# Patient Record
Sex: MALE | Race: WHITE | NOT HISPANIC OR LATINO | Employment: OTHER | URBAN - METROPOLITAN AREA
[De-identification: names, ages, dates, MRNs, and addresses within clinical notes are randomized per-mention and may not be internally consistent; named-entity substitution may affect disease eponyms.]

---

## 2017-01-05 RX ORDER — CLOPIDOGREL BISULFATE 75 MG/1
TABLET ORAL
Qty: 90 TABLET | Refills: 3 | Status: SHIPPED | OUTPATIENT
Start: 2017-01-05 | End: 2017-04-13 | Stop reason: SDUPTHER

## 2017-01-05 RX ORDER — ATORVASTATIN CALCIUM 20 MG/1
TABLET, FILM COATED ORAL
Qty: 90 TABLET | Refills: 3 | Status: SHIPPED | OUTPATIENT
Start: 2017-01-05 | End: 2017-04-13 | Stop reason: SDUPTHER

## 2017-01-23 RX ORDER — GABAPENTIN 100 MG/1
CAPSULE ORAL
Qty: 540 CAPSULE | Refills: 0 | Status: SHIPPED | OUTPATIENT
Start: 2017-01-23 | End: 2017-04-13

## 2017-01-25 ENCOUNTER — TELEPHONE (OUTPATIENT)
Dept: FAMILY MEDICINE CLINIC | Facility: CLINIC | Age: 77
End: 2017-01-25

## 2017-01-25 DIAGNOSIS — F41.9 ANXIETY: Chronic | ICD-10-CM

## 2017-01-25 RX ORDER — LORAZEPAM 0.5 MG/1
0.5 TABLET ORAL 2 TIMES DAILY
Qty: 60 TABLET | Refills: 0 | OUTPATIENT
Start: 2017-01-25 | End: 2017-03-27 | Stop reason: SDUPTHER

## 2017-01-25 NOTE — TELEPHONE ENCOUNTER
----- Message from Daniella Cabral sent at 1/25/2017  2:54 PM EST -----  Regarding: RX  LDS: 12/9/16  NEXT APPT: 4/13/17    BRANDEN @ [facility]    PATIENT NEEDS NEW RX FOR LORazepam (ATIVAN) 0.5 MG tablet    DAUGHTER (BENITA GAMEZ [phone])  CALLED THIS IN BECAUSE HER FATHER IS OUT OF THIS MED.    THANK YOU  Pharm called

## 2017-02-09 DIAGNOSIS — E11.9 DM II (DIABETES MELLITUS, TYPE II), CONTROLLED (HCC): Chronic | ICD-10-CM

## 2017-02-09 RX ORDER — NATEGLINIDE 120 MG/1
TABLET ORAL
Qty: 180 TABLET | Refills: 0 | Status: SHIPPED | OUTPATIENT
Start: 2017-02-09 | End: 2017-04-13 | Stop reason: SDUPTHER

## 2017-02-09 RX ORDER — PANTOPRAZOLE SODIUM 40 MG/1
TABLET, DELAYED RELEASE ORAL
Qty: 180 TABLET | Refills: 0 | Status: SHIPPED | OUTPATIENT
Start: 2017-02-09 | End: 2017-04-13 | Stop reason: SDUPTHER

## 2017-03-09 RX ORDER — RAMIPRIL 10 MG/1
CAPSULE ORAL
Qty: 30 CAPSULE | Refills: 0 | Status: SHIPPED | OUTPATIENT
Start: 2017-03-09 | End: 2017-04-04 | Stop reason: SDUPTHER

## 2017-03-13 RX ORDER — NIFEDIPINE 30 MG/1
30 TABLET, EXTENDED RELEASE ORAL DAILY
Qty: 30 TABLET | Refills: 2 | Status: SHIPPED | OUTPATIENT
Start: 2017-03-13 | End: 2017-04-13 | Stop reason: SDUPTHER

## 2017-03-23 RX ORDER — METOPROLOL SUCCINATE 25 MG/1
TABLET, EXTENDED RELEASE ORAL
Qty: 90 TABLET | Refills: 0 | Status: SHIPPED | OUTPATIENT
Start: 2017-03-23 | End: 2017-04-13 | Stop reason: SDUPTHER

## 2017-03-27 DIAGNOSIS — F41.9 ANXIETY: Chronic | ICD-10-CM

## 2017-03-27 RX ORDER — LORAZEPAM 0.5 MG/1
0.5 TABLET ORAL 2 TIMES DAILY
Qty: 60 TABLET | Refills: 0 | OUTPATIENT
Start: 2017-03-27 | End: 2017-04-13 | Stop reason: SDUPTHER

## 2017-04-04 RX ORDER — RAMIPRIL 10 MG/1
CAPSULE ORAL
Qty: 30 CAPSULE | Refills: 0 | Status: SHIPPED | OUTPATIENT
Start: 2017-04-04 | End: 2017-04-13 | Stop reason: SDUPTHER

## 2017-04-13 ENCOUNTER — OFFICE VISIT (OUTPATIENT)
Dept: FAMILY MEDICINE CLINIC | Facility: CLINIC | Age: 77
End: 2017-04-13

## 2017-04-13 VITALS
SYSTOLIC BLOOD PRESSURE: 122 MMHG | HEIGHT: 71 IN | BODY MASS INDEX: 30.15 KG/M2 | OXYGEN SATURATION: 98 % | WEIGHT: 215.4 LBS | TEMPERATURE: 98.4 F | HEART RATE: 75 BPM | DIASTOLIC BLOOD PRESSURE: 58 MMHG

## 2017-04-13 DIAGNOSIS — Z00.00 PREVENTATIVE HEALTH CARE: ICD-10-CM

## 2017-04-13 DIAGNOSIS — F41.9 ANXIETY: Chronic | ICD-10-CM

## 2017-04-13 DIAGNOSIS — Z79.899 ENCOUNTER FOR LONG-TERM (CURRENT) USE OF MEDICATIONS: ICD-10-CM

## 2017-04-13 DIAGNOSIS — E66.9 OBESITY (BMI 30.0-34.9): Chronic | ICD-10-CM

## 2017-04-13 DIAGNOSIS — E11.29 CONTROLLED TYPE 2 DIABETES MELLITUS WITH OTHER DIABETIC KIDNEY COMPLICATION, WITHOUT LONG-TERM CURRENT USE OF INSULIN (HCC): Chronic | ICD-10-CM

## 2017-04-13 DIAGNOSIS — E11.21 CONTROLLED TYPE 2 DIABETES MELLITUS WITH DIABETIC NEPHROPATHY, WITHOUT LONG-TERM CURRENT USE OF INSULIN (HCC): ICD-10-CM

## 2017-04-13 DIAGNOSIS — Z79.899 MEDICATION MANAGEMENT: ICD-10-CM

## 2017-04-13 DIAGNOSIS — I10 HYPERTENSION, ESSENTIAL: Primary | Chronic | ICD-10-CM

## 2017-04-13 DIAGNOSIS — E78.49 OTHER HYPERLIPIDEMIA: ICD-10-CM

## 2017-04-13 PROCEDURE — 99214 OFFICE O/P EST MOD 30 MIN: CPT | Performed by: INTERNAL MEDICINE

## 2017-04-13 PROCEDURE — G0439 PPPS, SUBSEQ VISIT: HCPCS | Performed by: INTERNAL MEDICINE

## 2017-04-13 RX ORDER — CLOPIDOGREL BISULFATE 75 MG/1
75 TABLET ORAL DAILY
Qty: 90 TABLET | Refills: 2 | Status: SHIPPED | OUTPATIENT
Start: 2017-04-13 | End: 2017-07-12

## 2017-04-13 RX ORDER — CHOLESTYRAMINE 4 G/9G
4 POWDER, FOR SUSPENSION ORAL EVERY 6 HOURS PRN
Qty: 90 PACKET | Refills: 2 | Status: SHIPPED | OUTPATIENT
Start: 2017-04-13 | End: 2018-02-05 | Stop reason: SDUPTHER

## 2017-04-13 RX ORDER — ATORVASTATIN CALCIUM 20 MG/1
20 TABLET, FILM COATED ORAL DAILY
Qty: 90 TABLET | Refills: 2 | Status: SHIPPED | OUTPATIENT
Start: 2017-04-13 | End: 2018-07-02 | Stop reason: SDUPTHER

## 2017-04-13 RX ORDER — NITROGLYCERIN 0.4 MG/1
0.4 TABLET SUBLINGUAL
Qty: 100 TABLET | Refills: 11 | Status: SHIPPED | OUTPATIENT
Start: 2017-04-13 | End: 2022-07-28 | Stop reason: ALTCHOICE

## 2017-04-13 RX ORDER — RAMIPRIL 10 MG/1
10 CAPSULE ORAL DAILY
Qty: 90 CAPSULE | Refills: 2 | Status: SHIPPED | OUTPATIENT
Start: 2017-04-13 | End: 2018-02-05 | Stop reason: SDUPTHER

## 2017-04-13 RX ORDER — LORAZEPAM 0.5 MG/1
0.5 TABLET ORAL 2 TIMES DAILY
Qty: 180 TABLET | Refills: 0 | Status: SHIPPED | OUTPATIENT
Start: 2017-04-13 | End: 2017-07-13 | Stop reason: SDUPTHER

## 2017-04-13 RX ORDER — PANTOPRAZOLE SODIUM 40 MG/1
40 TABLET, DELAYED RELEASE ORAL 2 TIMES DAILY
Qty: 180 TABLET | Refills: 2 | Status: SHIPPED | OUTPATIENT
Start: 2017-04-13 | End: 2017-07-12

## 2017-04-13 RX ORDER — GABAPENTIN 100 MG/1
200 CAPSULE ORAL 3 TIMES DAILY
Qty: 540 CAPSULE | Refills: 2 | Status: SHIPPED | OUTPATIENT
Start: 2017-04-13 | End: 2017-07-13 | Stop reason: SDUPTHER

## 2017-04-13 RX ORDER — METOPROLOL SUCCINATE 25 MG/1
25 TABLET, EXTENDED RELEASE ORAL DAILY
Qty: 90 TABLET | Refills: 2 | Status: SHIPPED | OUTPATIENT
Start: 2017-04-13 | End: 2018-07-02 | Stop reason: SDUPTHER

## 2017-04-13 RX ORDER — NATEGLINIDE 120 MG/1
120 TABLET ORAL
Qty: 180 TABLET | Refills: 2 | Status: SHIPPED | OUTPATIENT
Start: 2017-04-13 | End: 2017-07-12

## 2017-04-13 RX ORDER — NIFEDIPINE 30 MG/1
30 TABLET, EXTENDED RELEASE ORAL DAILY
Qty: 90 TABLET | Refills: 2 | Status: SHIPPED | OUTPATIENT
Start: 2017-04-13 | End: 2018-02-05 | Stop reason: SDUPTHER

## 2017-04-13 NOTE — PATIENT INSTRUCTIONS
"Diagnoses and all orders for this visit:    Hypertension, essential  Comments:  No change in meds  Watch salt in diet  Orders:  -     CBC & Differential  -     Comprehensive Metabolic Panel  -     Lipid Panel With LDL / HDL Ratio  -     Thyroid Panel With TSH  -     Microalbumin / Creatinine Urine Ratio  -     Hemoglobin A1c    Other hyperlipidemia  Comments:  Conitnue to monitor  Watch fat in diet  Exercise  Orders:  -     CBC & Differential  -     Comprehensive Metabolic Panel  -     Lipid Panel With LDL / HDL Ratio  -     Thyroid Panel With TSH  -     Microalbumin / Creatinine Urine Ratio  -     Hemoglobin A1c    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, without long-term current use of insulin  Comments:  Continue to monitor  Tianna change in meds  Orders:  -     CBC & Differential  -     Comprehensive Metabolic Panel  -     Lipid Panel With LDL / HDL Ratio  -     Thyroid Panel With TSH  -     Microalbumin / Creatinine Urine Ratio  -     Hemoglobin A1c    Obesity (BMI 30.0-34.9) - with reported \"poor appetite\"  Comments:  Obesity is ongoing  Follow up as planned  Orders:  -     CBC & Differential  -     Comprehensive Metabolic Panel  -     Lipid Panel With LDL / HDL Ratio  -     Thyroid Panel With TSH  -     Microalbumin / Creatinine Urine Ratio  -     Hemoglobin A1c    Anxiety  Comments:  controlled with lorazepam  Followe  up as planned  Orders:  -     LORazepam (ATIVAN) 0.5 MG tablet; Take 1 tablet by mouth 2 (Two) Times a Day.    Encounter for long-term (current) use of medications  -     CBC & Differential  -     Comprehensive Metabolic Panel  -     Lipid Panel With LDL / HDL Ratio  -     Thyroid Panel With TSH  -     Microalbumin / Creatinine Urine Ratio  -     Hemoglobin A1c    Preventative health care    Medication management  -     CBC & Differential  -     Comprehensive Metabolic Panel  -     Lipid Panel With LDL / HDL Ratio  -     Thyroid Panel With TSH  -     " Microalbumin / Creatinine Urine Ratio  -     Hemoglobin A1c    Controlled type 2 diabetes mellitus with diabetic nephropathy, without long-term current use of insulin  -     nateglinide (STARLIX) 120 MG tablet; Take 1 tablet by mouth 2 (Two) Times a Day Before Meals for 90 days.    Other orders  -     sertraline (ZOLOFT) 50 MG tablet; Take 1.5 tablets by mouth Daily for 90 days.  -     atorvastatin (LIPITOR) 20 MG tablet; Take 1 tablet by mouth Daily.  -     cholestyramine (QUESTRAN) 4 GM/DOSE powder; Take 1 packet by mouth Every 6 (Six) Hours As Needed (diarrhea).  -     clopidogrel (PLAVIX) 75 MG tablet; Take 1 tablet by mouth Daily for 90 days.  -     gabapentin (NEURONTIN) 100 MG capsule; Take 2 capsules by mouth 3 (Three) Times a Day.  -     metFORMIN (GLUCOPHAGE) 1000 MG tablet; Take 1 tablet by mouth 2 (Two) Times a Day With Meals.  -     metoprolol succinate XL (TOPROL-XL) 25 MG 24 hr tablet; Take 1 tablet by mouth Daily.  -     NIFEdipine XL (NIFEDICAL XL) 30 MG 24 hr tablet; Take 1 tablet by mouth Daily.  -     nitroglycerin (NITROSTAT) 0.4 MG SL tablet; Place 1 tablet under the tongue Every 5 (Five) Minutes As Needed for Chest Pain.  -     ONE TOUCH ULTRA TEST test strip; Use as instructed test glucose twice daily  -     pantoprazole (PROTONIX) 40 MG EC tablet; Take 1 tablet by mouth 2 (Two) Times a Day for 90 days.  -     ramipril (ALTACE) 10 MG capsule; Take 1 capsule by mouth Daily.      Medicare Wellness  Personal Prevention Plan of Service     Date of Office Visit:  2017  Encounter Provider:  Yfn Mendez MD  Place of Service:  [facility]  Patient Name: Dereck Ramírez  :  1940    As part of the Medicare Wellness portion of your visit today, we are providing you with this personalized preventive plan of services (PPPS). This plan is based upon recommendations of the [place] Preventive Services Task Force (USPSTF) and the Advisory  Committee on Immunization Practices (ACIP).    This lists the preventive care services that should be considered, and provides dates of when you are due. Items listed as completed are up-to-date and do not require any further intervention.    Health Maintenance   Topic Date Due   • TDAP/TD VACCINES (1 - Tdap) 08/13/1961   • ZOSTER VACCINE  05/12/2016   • DIABETIC FOOT EXAM  08/19/2016   • DIABETIC EYE EXAM  08/19/2016   • HEMOGLOBIN A1C  08/13/2017   • PNEUMOCOCCAL VACCINES (65+ LOW/MEDIUM RISK) (2 of 2 - PPSV23) 12/09/2017   • URINE MICROALBUMIN  12/09/2017   • MEDICARE ANNUAL WELLNESS  04/13/2018   • LIPID PANEL  04/13/2018   • COLONOSCOPY  05/23/2024   • INFLUENZA VACCINE  Completed       Orders Placed This Encounter   Procedures   • Comprehensive Metabolic Panel   • Lipid Panel With LDL / HDL Ratio   • Thyroid Panel With TSH   • Microalbumin / Creatinine Urine Ratio   • Hemoglobin A1c   • Unable To Void       Return in about 4 months (around 8/13/2017).

## 2017-04-13 NOTE — PROGRESS NOTES
Dereck Ramírez is a 76 y.o. male   Chief Complaint   Patient presents with   • Hypertension     follow up    • Diabetes       BEENA  Aba: Per House Bill #1 Requirements and [place] Board of Medical Licensure Regulations for prescribing of Schedule II and Schedule III with Hydrocodone, and other controlled medications for which the Board requires BEENA reporting and regulation, the following drug treatment plan was developed and reviewed with the patient on the date of this encounter:              Controlled medication(s) taken: Ativan           Medical Indication (including pain relief and/or other physical and psychoosocial functional issue) for treatment: anxiety          Further Diagnostic tests, consultations, or treatments needed: none            Plans for review of plan, adjustment and waning dose and further BEENA evaluation include:qwuarterly                  Risk for medication abuse for this patient based on physician review is felt to be extremely low.    Subjective   History of Present Illness     Dereck Ramírez is a 76 y.o.male who presents with:hypertension and type 2 diabetes.  Now doing well at present time.  Feels good at present time. Multiple medical issues.  Plan to review at present time.  Other problems addressed include:  HTN, hyperlipidemia, type 2 DM, obesity,        The following portions of the patient's history were reviewed and updated as appropriate: past medical history, surgeries, family history, allergies, current medications, past social history and problem list.    A comprehensive review of 14 systems was peformed  Review of Systems   Constitutional: Negative.  Negative for chills, fatigue, fever and unexpected weight change.   HENT: Negative.  Negative for ear pain, hearing loss, sinus pressure, sore throat and tinnitus.    Eyes: Negative.  Negative for pain, discharge and redness.   Respiratory: Negative.  Negative for cough, shortness of breath and wheezing.   "  Cardiovascular: Negative.  Negative for chest pain, palpitations and leg swelling.   Gastrointestinal: Negative.  Negative for abdominal pain, constipation, diarrhea and nausea.   Endocrine: Negative.  Negative for cold intolerance and heat intolerance.   Genitourinary: Negative.  Negative for difficulty urinating, flank pain and urgency.   Musculoskeletal: Negative.  Negative for back pain, joint swelling and myalgias.   Skin: Negative.  Negative for rash and wound.   Allergic/Immunologic: Negative.  Negative for environmental allergies and food allergies.   Neurological: Negative.  Negative for dizziness, seizures, numbness and headaches.   Hematological: Negative.  Negative for adenopathy. Does not bruise/bleed easily.   Psychiatric/Behavioral: Negative.  Negative for decreased concentration, dysphoric mood and sleep disturbance. The patient is not nervous/anxious.    All other systems reviewed and are negative.      I have reviewed the patient's medical history in detail and updated the computerized patient record.      Objective   Vitals:    04/13/17 1701   BP: 122/58   BP Location: Right arm   Patient Position: Sitting   Cuff Size: Adult   Pulse: 75   Temp: 98.4 °F (36.9 °C)   TempSrc: Oral   SpO2: 98%   Weight: 215 lb 6.4 oz (97.7 kg)   Height: 71\" (180.3 cm)   PainSc: 0-No pain         Physical Exam   Constitutional: He is oriented to person, place, and time. He appears well-developed and well-nourished. He is active and cooperative.  Non-toxic appearance. No distress.   HENT:   Head: Normocephalic and atraumatic. Hair is normal.   Right Ear: Hearing, tympanic membrane, external ear and ear canal normal. No drainage.   Left Ear: Hearing, tympanic membrane, external ear and ear canal normal. No drainage.   Nose: Nose normal. No rhinorrhea, nasal deformity or septal deviation.   Mouth/Throat: Oropharynx is clear and moist.   Eyes: Conjunctivae, EOM and lids are normal. Pupils are equal, round, and reactive " to light. Right eye exhibits no exudate. Left eye exhibits no exudate. Right pupil is round and reactive. Left pupil is round and reactive.   Neck: Trachea normal and normal range of motion. Neck supple. Normal carotid pulses, no hepatojugular reflux and no JVD present. Carotid bruit is not present. No tracheal deviation, no edema and normal range of motion present. No thyroid mass and no thyromegaly present.   Cardiovascular: Normal rate, regular rhythm, normal heart sounds, intact distal pulses and normal pulses.   No extrasystoles are present. PMI is not displaced.    Pulmonary/Chest: Effort normal and breath sounds normal. No accessory muscle usage. No tachypnea. No respiratory distress.   Abdominal: Soft. Normal appearance, normal aorta and bowel sounds are normal. He exhibits no abdominal bruit. There is no hepatosplenomegaly. There is no tenderness. Hernia confirmed negative in the right inguinal area and confirmed negative in the left inguinal area.   Bowel sounds present and normal in all four quadrants   Musculoskeletal: Normal range of motion.   Lymphadenopathy: No inguinal adenopathy noted on the right or left side.   Neurological: He is alert and oriented to person, place, and time. He has normal strength and normal reflexes. No cranial nerve deficit or sensory deficit. He displays a negative Romberg sign.   Skin: Skin is warm, dry and intact. He is not diaphoretic. No pallor.   Psychiatric: He has a normal mood and affect. His speech is normal and behavior is normal. Judgment and thought content normal. Cognition and memory are normal.   Nursing note and vitals reviewed.      Procedures                            Assessment/Plan     Diagnoses and all orders for this visit:    Hypertension, essential  Comments:  No change in meds  Watch salt in diet  Orders:  -     CBC & Differential  -     Comprehensive Metabolic Panel  -     Lipid Panel With LDL / HDL Ratio  -     Thyroid Panel With TSH  -      "Microalbumin / Creatinine Urine Ratio  -     Hemoglobin A1c    Other hyperlipidemia  Comments:  Conitnue to monitor  Watch fat in diet  Exercise  Orders:  -     CBC & Differential  -     Comprehensive Metabolic Panel  -     Lipid Panel With LDL / HDL Ratio  -     Thyroid Panel With TSH  -     Microalbumin / Creatinine Urine Ratio  -     Hemoglobin A1c    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, without long-term current use of insulin  Comments:  Continue to monitor  Tianna change in meds  Orders:  -     CBC & Differential  -     Comprehensive Metabolic Panel  -     Lipid Panel With LDL / HDL Ratio  -     Thyroid Panel With TSH  -     Microalbumin / Creatinine Urine Ratio  -     Hemoglobin A1c    Obesity (BMI 30.0-34.9) - with reported \"poor appetite\"  Comments:  Obesity is ongoing  Follow up as planned  Orders:  -     CBC & Differential  -     Comprehensive Metabolic Panel  -     Lipid Panel With LDL / HDL Ratio  -     Thyroid Panel With TSH  -     Microalbumin / Creatinine Urine Ratio  -     Hemoglobin A1c    Anxiety  Comments:  controlled with lorazepam  Followe  up as planned  Orders:  -     LORazepam (ATIVAN) 0.5 MG tablet; Take 1 tablet by mouth 2 (Two) Times a Day.    Encounter for long-term (current) use of medications  -     CBC & Differential  -     Comprehensive Metabolic Panel  -     Lipid Panel With LDL / HDL Ratio  -     Thyroid Panel With TSH  -     Microalbumin / Creatinine Urine Ratio  -     Hemoglobin A1c    Preventative health care    Medication management  -     CBC & Differential  -     Comprehensive Metabolic Panel  -     Lipid Panel With LDL / HDL Ratio  -     Thyroid Panel With TSH  -     Microalbumin / Creatinine Urine Ratio  -     Hemoglobin A1c    Controlled type 2 diabetes mellitus with diabetic nephropathy, without long-term current use of insulin  -     nateglinide (STARLIX) 120 MG tablet; Take 1 tablet by mouth 2 (Two) Times a Day Before Meals for 90 " days.    Other orders  -     sertraline (ZOLOFT) 50 MG tablet; Take 1.5 tablets by mouth Daily for 90 days.  -     atorvastatin (LIPITOR) 20 MG tablet; Take 1 tablet by mouth Daily.  -     cholestyramine (QUESTRAN) 4 GM/DOSE powder; Take 1 packet by mouth Every 6 (Six) Hours As Needed (diarrhea).  -     clopidogrel (PLAVIX) 75 MG tablet; Take 1 tablet by mouth Daily for 90 days.  -     gabapentin (NEURONTIN) 100 MG capsule; Take 2 capsules by mouth 3 (Three) Times a Day.  -     metFORMIN (GLUCOPHAGE) 1000 MG tablet; Take 1 tablet by mouth 2 (Two) Times a Day With Meals.  -     metoprolol succinate XL (TOPROL-XL) 25 MG 24 hr tablet; Take 1 tablet by mouth Daily.  -     NIFEdipine XL (NIFEDICAL XL) 30 MG 24 hr tablet; Take 1 tablet by mouth Daily.  -     nitroglycerin (NITROSTAT) 0.4 MG SL tablet; Place 1 tablet under the tongue Every 5 (Five) Minutes As Needed for Chest Pain.  -     ONE TOUCH ULTRA TEST test strip; Use as instructed test glucose twice daily  -     pantoprazole (PROTONIX) 40 MG EC tablet; Take 1 tablet by mouth 2 (Two) Times a Day for 90 days.  -     ramipril (ALTACE) 10 MG capsule; Take 1 capsule by mouth Daily.           Yfn Mendez MD  4/13/2017  5:06 PM

## 2017-04-14 LAB
ALBUMIN SERPL-MCNC: 4.5 G/DL (ref 3.5–5.2)
ALBUMIN/GLOB SERPL: 1.6 G/DL
ALP SERPL-CCNC: 139 U/L (ref 39–117)
ALT SERPL-CCNC: 12 U/L (ref 1–41)
AST SERPL-CCNC: 15 U/L (ref 1–40)
BASOPHILS # BLD AUTO: 0.04 10*3/MM3 (ref 0–0.2)
BASOPHILS NFR BLD AUTO: 0.6 % (ref 0–1.5)
BILIRUB SERPL-MCNC: 0.2 MG/DL (ref 0.1–1.2)
BUN SERPL-MCNC: 18 MG/DL (ref 8–23)
BUN/CREAT SERPL: 15.8 (ref 7–25)
CALCIUM SERPL-MCNC: 9.3 MG/DL (ref 8.6–10.5)
CHLORIDE SERPL-SCNC: 103 MMOL/L (ref 98–107)
CHOLEST SERPL-MCNC: 142 MG/DL (ref 0–200)
CO2 SERPL-SCNC: 20.9 MMOL/L (ref 22–29)
CREAT SERPL-MCNC: 1.14 MG/DL (ref 0.76–1.27)
EOSINOPHIL # BLD AUTO: 0.2 10*3/MM3 (ref 0–0.7)
EOSINOPHIL NFR BLD AUTO: 3.1 % (ref 0.3–6.2)
ERYTHROCYTE [DISTWIDTH] IN BLOOD BY AUTOMATED COUNT: 13.4 % (ref 11.5–14.5)
FT4I SERPL CALC-MCNC: 1.8 (ref 1.2–4.9)
GLOBULIN SER CALC-MCNC: 2.8 GM/DL
GLUCOSE SERPL-MCNC: 76 MG/DL (ref 65–99)
HBA1C MFR BLD: 5.01 % (ref 4.8–5.6)
HCT VFR BLD AUTO: 32.5 % (ref 40.4–52.2)
HDLC SERPL-MCNC: 31 MG/DL (ref 40–60)
HGB BLD-MCNC: 10.5 G/DL (ref 13.7–17.6)
IMM GRANULOCYTES # BLD: 0 10*3/MM3 (ref 0–0.03)
IMM GRANULOCYTES NFR BLD: 0 % (ref 0–0.5)
LDLC SERPL CALC-MCNC: 42 MG/DL (ref 0–100)
LDLC/HDLC SERPL: 1.36 {RATIO}
LYMPHOCYTES # BLD AUTO: 1.64 10*3/MM3 (ref 0.9–4.8)
LYMPHOCYTES NFR BLD AUTO: 25.1 % (ref 19.6–45.3)
MCH RBC QN AUTO: 29 PG (ref 27–32.7)
MCHC RBC AUTO-ENTMCNC: 32.3 G/DL (ref 32.6–36.4)
MCV RBC AUTO: 89.8 FL (ref 79.8–96.2)
MONOCYTES # BLD AUTO: 0.54 10*3/MM3 (ref 0.2–1.2)
MONOCYTES NFR BLD AUTO: 8.3 % (ref 5–12)
NEUTROPHILS # BLD AUTO: 4.12 10*3/MM3 (ref 1.9–8.1)
NEUTROPHILS NFR BLD AUTO: 62.9 % (ref 42.7–76)
PLATELET # BLD AUTO: 296 10*3/MM3 (ref 140–500)
POTASSIUM SERPL-SCNC: 4.9 MMOL/L (ref 3.5–5.2)
PROT SERPL-MCNC: 7.3 G/DL (ref 6–8.5)
RBC # BLD AUTO: 3.62 10*6/MM3 (ref 4.6–6)
SODIUM SERPL-SCNC: 141 MMOL/L (ref 136–145)
T3RU NFR SERPL: 28 % (ref 24–39)
T4 SERPL-MCNC: 6.5 UG/DL (ref 4.5–12)
TRIGL SERPL-MCNC: 344 MG/DL (ref 0–150)
TSH SERPL DL<=0.005 MIU/L-ACNC: 4.18 UIU/ML (ref 0.45–4.5)
UNABLE TO VOID: NORMAL
VLDLC SERPL CALC-MCNC: 68.8 MG/DL (ref 5–40)
WBC # BLD AUTO: 6.54 10*3/MM3 (ref 4.5–10.7)

## 2017-06-26 RX ORDER — METOPROLOL SUCCINATE 25 MG/1
TABLET, EXTENDED RELEASE ORAL
Qty: 90 TABLET | Refills: 0 | Status: SHIPPED | OUTPATIENT
Start: 2017-06-26 | End: 2017-07-13

## 2017-07-13 ENCOUNTER — OFFICE VISIT (OUTPATIENT)
Dept: FAMILY MEDICINE CLINIC | Facility: CLINIC | Age: 77
End: 2017-07-13

## 2017-07-13 VITALS
BODY MASS INDEX: 29.65 KG/M2 | HEIGHT: 71 IN | HEART RATE: 64 BPM | SYSTOLIC BLOOD PRESSURE: 116 MMHG | OXYGEN SATURATION: 96 % | TEMPERATURE: 98.1 F | DIASTOLIC BLOOD PRESSURE: 64 MMHG | WEIGHT: 211.8 LBS

## 2017-07-13 DIAGNOSIS — E78.49 OTHER HYPERLIPIDEMIA: ICD-10-CM

## 2017-07-13 DIAGNOSIS — F41.9 ANXIETY: Chronic | ICD-10-CM

## 2017-07-13 DIAGNOSIS — I10 HYPERTENSION, ESSENTIAL: Chronic | ICD-10-CM

## 2017-07-13 DIAGNOSIS — F41.9 ANXIETY: Primary | Chronic | ICD-10-CM

## 2017-07-13 DIAGNOSIS — Z13.9 SCREENING: ICD-10-CM

## 2017-07-13 DIAGNOSIS — E11.29 CONTROLLED TYPE 2 DIABETES MELLITUS WITH OTHER DIABETIC KIDNEY COMPLICATION, WITHOUT LONG-TERM CURRENT USE OF INSULIN (HCC): Chronic | ICD-10-CM

## 2017-07-13 LAB
POC AMPHETAMINES: NEGATIVE
POC BARBITURATES: NEGATIVE
POC BENZODIAZEPHINES: NEGATIVE
POC COCAINE: NEGATIVE
POC METHADONE: NEGATIVE
POC METHAMPHETAMINE SCREEN URINE: NEGATIVE
POC OPIATES: NEGATIVE
POC OXYCODONE: NEGATIVE
POC PHENCYCLIDINE: NEGATIVE
POC PROPOXYPHENE: NEGATIVE
POC THC: NEGATIVE
POC TRICYCLIC ANTIDEPRESSANTS: NEGATIVE

## 2017-07-13 PROCEDURE — 99213 OFFICE O/P EST LOW 20 MIN: CPT | Performed by: NURSE PRACTITIONER

## 2017-07-13 RX ORDER — GABAPENTIN 100 MG/1
200 CAPSULE ORAL 3 TIMES DAILY
Qty: 540 CAPSULE | Refills: 0 | OUTPATIENT
Start: 2017-07-13 | End: 2017-10-24 | Stop reason: SDUPTHER

## 2017-07-13 RX ORDER — LORAZEPAM 0.5 MG/1
TABLET ORAL
Qty: 180 TABLET | Refills: 0 | OUTPATIENT
Start: 2017-07-13 | End: 2017-10-12 | Stop reason: SDUPTHER

## 2017-07-13 NOTE — PATIENT INSTRUCTIONS
Generalized Anxiety Disorder  Generalized anxiety disorder (BETTINA) is a mental disorder. It interferes with life functions, including relationships, work, and school.  BETTINA is different from normal anxiety, which everyone experiences at some point in their lives in response to specific life events and activities. Normal anxiety actually helps us prepare for and get through these life events and activities. Normal anxiety goes away after the event or activity is over.   BETTINA causes anxiety that is not necessarily related to specific events or activities. It also causes excess anxiety in proportion to specific events or activities. The anxiety associated with BETTINA is also difficult to control. BETTINA can vary from mild to severe. People with severe BETTINA can have intense waves of anxiety with physical symptoms (panic attacks).   SYMPTOMS  The anxiety and worry associated with BETTINA are difficult to control. This anxiety and worry are related to many life events and activities and also occur more days than not for 6 months or longer. People with BETTINA also have three or more of the following symptoms (one or more in children):  · Restlessness.    · Fatigue.  · Difficulty concentrating.    · Irritability.  · Muscle tension.  · Difficulty sleeping or unsatisfying sleep.  DIAGNOSIS  BETTINA is diagnosed through an assessment by your health care provider. Your health care provider will ask you questions about your mood, physical symptoms, and events in your life. Your health care provider may ask you about your medical history and use of alcohol or drugs, including prescription medicines. Your health care provider may also do a physical exam and blood tests. Certain medical conditions and the use of certain substances can cause symptoms similar to those associated with BETTINA. Your health care provider may refer you to a mental health specialist for further evaluation.  TREATMENT  The following therapies are usually used to treat BETTINA:    · Medication. Antidepressant medication usually is prescribed for long-term daily control. Antianxiety medicines may be added in severe cases, especially when panic attacks occur.    · Talk therapy (psychotherapy). Certain types of talk therapy can be helpful in treating BETTINA by providing support, education, and guidance. A form of talk therapy called cognitive behavioral therapy can teach you healthy ways to think about and react to daily life events and activities.  · Stress management techniques. These include yoga, meditation, and exercise and can be very helpful when they are practiced regularly.  A mental health specialist can help determine which treatment is best for you. Some people see improvement with one therapy. However, other people require a combination of therapies.     This information is not intended to replace advice given to you by your health care provider. Make sure you discuss any questions you have with your health care provider.     Document Released: 04/14/2014 Document Revised: 01/08/2016 Document Reviewed: 04/14/2014  [employer] Interactive Patient Education ©2017 [employer]

## 2017-07-13 NOTE — PROGRESS NOTES
Subjective   Dereck Ramírez is a 76 y.o. male presents for routine follow up for anxiety and neuropathy. Requests refill on neurontin and ativan today. Will also need a prescription for metformin in the next week. Tolerating medication well. No complaints at this time. Sleeping well, at baseline with nocturia, followed by Dr. Geuvara.  Patient is doing well.     BEENA query complete. Treatment plan to include limited course of prescribed  controlled substance. Risks including addiction, benefits, and alternatives presented to patient.     Anxiety   Presents for follow-up visit. Symptoms include nervous/anxious behavior. Patient reports no chest pain, compulsions, confusion, decreased concentration, depressed mood, dizziness, dry mouth, excessive worry, feeling of choking, hyperventilation, impotence, insomnia, irritability, malaise, muscle tension, nausea, obsessions, palpitations, panic, restlessness, shortness of breath or suicidal ideas. Symptoms occur occasionally. The quality of sleep is fair. Nighttime awakenings: several.     Compliance with medications is %.   Hypertension   This is a chronic problem. The current episode started more than 1 year ago. The problem is unchanged. The problem is controlled. Associated symptoms include anxiety. Pertinent negatives include no blurred vision, chest pain, headaches, malaise/fatigue, neck pain, orthopnea, palpitations, peripheral edema, PND, shortness of breath or sweats. There are no associated agents to hypertension. Risk factors for coronary artery disease include male gender, obesity, diabetes mellitus and dyslipidemia. Past treatments include beta blockers and ACE inhibitors. The current treatment provides significant improvement. There are no compliance problems.         The following portions of the patient's history were reviewed and updated as appropriate: allergies, current medications, past family history, past medical history, past social history,  past surgical history and problem list.    Review of Systems   Constitutional: Negative.  Negative for irritability and malaise/fatigue.   HENT: Negative.    Eyes: Negative.  Negative for blurred vision.   Respiratory: Negative.  Negative for shortness of breath.    Cardiovascular: Negative.  Negative for chest pain, palpitations, orthopnea and PND.   Gastrointestinal: Negative.  Negative for nausea.   Endocrine: Negative.    Genitourinary: Negative.  Negative for impotence.   Musculoskeletal: Negative.  Negative for neck pain.   Skin: Negative.    Allergic/Immunologic: Negative.    Neurological: Negative.  Negative for dizziness and headaches.   Hematological: Negative.    Psychiatric/Behavioral: Negative for confusion, decreased concentration and suicidal ideas. The patient is nervous/anxious. The patient does not have insomnia.        Objective   Physical Exam   Constitutional: He is oriented to person, place, and time. He appears well-developed and well-nourished.   HENT:   Head: Normocephalic and atraumatic.   Mouth/Throat: Oropharynx is clear and moist.   Eyes: Pupils are equal, round, and reactive to light.   Neck: Neck supple. No thyromegaly present.   Cardiovascular: Normal rate, regular rhythm and normal heart sounds.  Exam reveals no gallop and no friction rub.    No murmur heard.  Pulmonary/Chest: Effort normal and breath sounds normal. No respiratory distress. He has no wheezes. He has no rales.   Abdominal: Soft. Bowel sounds are normal. He exhibits no distension. There is no tenderness.   Neurological: He is alert and oriented to person, place, and time.   Skin: Skin is warm and dry.   Psychiatric: He has a normal mood and affect.   Vitals reviewed.      Assessment/Plan   Dereck was seen today for follow-up.    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

## 2017-07-14 ENCOUNTER — TELEPHONE (OUTPATIENT)
Dept: FAMILY MEDICINE CLINIC | Facility: CLINIC | Age: 77
End: 2017-07-14

## 2017-07-14 NOTE — TELEPHONE ENCOUNTER
----- Message from Daniella Cabral sent at 7/14/2017  2:21 PM EDT -----  Regarding: Rx  Contact: [phone]  BENITA GAMEZ (DAUGHTER) called to say her father's Rx for  LORazepam (ATIVAN) 0.5 MG tablet cannot be filled without authorization of the physician per [facility] [address] [place] [address] AT [facility] - [phone] [facility] [phone] FX.    Mr Ramírez is in need of this med.    Thank you        Benita wanted rx dispensed 1 week early when the patient should still have medication. Last filled on 4/22/17 for a 90 day supply. Pharmacy to wait to fill until next week when it is due to be filled, can fill on 7/19/17 per regulations according to pharmacy.      Spoke with annabelle our nurse practitioner re this patient and she said it was ok to fill early, states that pt lost or took more on some days due to confusion. Informed pharmacy to fill rx.

## 2017-08-11 ENCOUNTER — OFFICE VISIT (OUTPATIENT)
Dept: CARDIOLOGY | Facility: CLINIC | Age: 77
End: 2017-08-11

## 2017-08-11 VITALS
WEIGHT: 212 LBS | SYSTOLIC BLOOD PRESSURE: 134 MMHG | HEIGHT: 72 IN | HEART RATE: 71 BPM | BODY MASS INDEX: 28.71 KG/M2 | DIASTOLIC BLOOD PRESSURE: 72 MMHG

## 2017-08-11 DIAGNOSIS — F41.9 ANXIETY: Chronic | ICD-10-CM

## 2017-08-11 DIAGNOSIS — I10 HYPERTENSION, ESSENTIAL: Chronic | ICD-10-CM

## 2017-08-11 DIAGNOSIS — R06.83 SNORING: ICD-10-CM

## 2017-08-11 DIAGNOSIS — G25.81 RLS (RESTLESS LEGS SYNDROME): Chronic | ICD-10-CM

## 2017-08-11 DIAGNOSIS — E78.2 MIXED HYPERLIPIDEMIA: ICD-10-CM

## 2017-08-11 DIAGNOSIS — I10 HTN (HYPERTENSION), BENIGN: ICD-10-CM

## 2017-08-11 DIAGNOSIS — I66.9 CEREBRAL ARTERY OCCLUSION: ICD-10-CM

## 2017-08-11 DIAGNOSIS — Z91.81 AT HIGH RISK FOR FALLS: Chronic | ICD-10-CM

## 2017-08-11 DIAGNOSIS — I25.10 CAD IN NATIVE ARTERY: Primary | ICD-10-CM

## 2017-08-11 DIAGNOSIS — I25.10 CHRONIC CORONARY ARTERY DISEASE: ICD-10-CM

## 2017-08-11 PROCEDURE — 93000 ELECTROCARDIOGRAM COMPLETE: CPT | Performed by: INTERNAL MEDICINE

## 2017-08-11 PROCEDURE — 99214 OFFICE O/P EST MOD 30 MIN: CPT | Performed by: INTERNAL MEDICINE

## 2017-08-11 RX ORDER — PANTOPRAZOLE SODIUM 40 MG/1
TABLET, DELAYED RELEASE ORAL
Refills: 2 | COMMUNITY
Start: 2017-08-01 | End: 2018-02-05 | Stop reason: SDUPTHER

## 2017-08-11 NOTE — PROGRESS NOTES
[facility]  Cardiology Office Visit Note        Subjective:     Encounter Date:2017      Patient ID: Dereck Ramírez   Age: 76 y.o.  Sex: male  :  1940  MRN: [id number]             Date of Office Visit: 2017  Encounter Provider: Kiran Aceves MD  Place of Service: [facility]     .    Chief Complaint:  History of Present Illness    The following portions of the patient's history were reviewed and updated as appropriate: allergies, current medications, past family history, past medical history, past social history, past surgical history and problem list.    Review of Systems   Constitution: Negative for chills, fever and weight gain.   HENT: Negative for congestion, headaches, hearing loss and sore throat.    Eyes: Negative for blurred vision and double vision.   Cardiovascular: Negative for chest pain, claudication, cyanosis, dyspnea on exertion, irregular heartbeat, leg swelling, near-syncope, orthopnea, palpitations, paroxysmal nocturnal dyspnea and syncope.   Respiratory: Negative for cough, shortness of breath, snoring and wheezing.    Endocrine: Negative for cold intolerance.   Hematologic/Lymphatic: Negative for adenopathy. Does not bruise/bleed easily.   Skin: Negative for rash.   Musculoskeletal: Negative for back pain.   Gastrointestinal: Negative for abdominal pain, change in bowel habit, constipation, diarrhea, nausea and vomiting.   Genitourinary: Negative for dysuria, frequency, hematuria and hesitancy.   Neurological: Positive for loss of balance. Negative for disturbances in coordination, excessive daytime sleepiness, dizziness, focal weakness, light-headedness and numbness.   Psychiatric/Behavioral: Negative for depression and memory loss. The patient is not nervous/anxious.    Allergic/Immunologic: Negative for hives.         ECG 12 Lead  Date/Time: 2017 2:29 PM  Performed by: KIRAN ACEVES JR  Authorized by:  KIRAN ACEVES JR   Comparison: compared with previous ECG   Similar to previous ECG  Rhythm: sinus rhythm  BPM: 67  Clinical impression: normal ECG                       HPI     The patient is a 76-year-old white male with history of coronary artery disease status post on her percent occlusion of the posterior descending artery with distal collaterals from the left coronary artery by cardiac catheterization in May 2013, history of hypertension, hyperlipidemia, ischemic stroke, who presents for cardiac follow-up.  I last saw him in the office in May 2016.  Since then, he has fallen several times.  The last time was 2 weeks ago.  He will feel like someone is pushing him from behind and then fall backwards trying to resist the push.  He states he becomes disoriented right before this happens.  Only occurs when he is standing up.  It can occur during the day or night.  It seems to be more common at night when walking through a dark room but this is not every time.  No associated shortness of breath diaphoresis nausea or chest pain.  No associated palpitations.  His first blood pressures been running well.    He doesn't sleep very much up to 2 hours a night according to the daughter.  He had a negative sleep study she says.    No palpitations.  No syncope.  He is not hit his head.  No PND, orthopnea or pedal edema.  No change in weight, 212 pounds.    Risk factors: Positive hypertension, hyperlipidemia and diabetes.  No smoking ever.  Positive family history of early CAD          Past Medical History:   Diagnosis Date   • Arthritis    • Cerebellar artery occlusion    • Diabetes mellitus    • Enlarged prostate    • Hyperlipidemia    • Hypertension    • Stroke 2011, 2012       Past Surgical History:   Procedure Laterality Date   • CARDIAC CATHETERIZATION      lesion 1: intervention outcome   • HERNIA REPAIR         Social History     Social History   • Marital status:      Spouse name: N/A   • Number of children:  "N/A   • Years of education: N/A     Occupational History   • Not on file.     Social History Main Topics   • Smoking status: Never Smoker   • Smokeless tobacco: Never Used   • Alcohol use No   • Drug use: No   • Sexual activity: Defer     Other Topics Concern   • Not on file     Social History Narrative   • No narrative on file       Family History   Problem Relation Age of Onset   • Heart disease Mother    • Stroke Mother    • Crohn's disease Father    • Stroke Father    • Cancer Other    • Stroke Other    • Diabetes Other    • Heart defect Other    • Hypertension Other    • Thyroid disease Other    • Cancer Maternal Uncle      bone   • Dementia Maternal Grandmother            Scheduled Meds:  Current Outpatient Prescriptions on File Prior to Visit   Medication Sig Dispense Refill   • atorvastatin (LIPITOR) 20 MG tablet Take 1 tablet by mouth Daily. 90 tablet 2   • cholestyramine (QUESTRAN) 4 GM/DOSE powder Take 1 packet by mouth Every 6 (Six) Hours As Needed (diarrhea). 90 packet 2   • gabapentin (NEURONTIN) 100 MG capsule Take 2 capsules by mouth 3 (Three) Times a Day. 540 capsule 0   • LORazepam (ATIVAN) 0.5 MG tablet TAKE 1 TABLET BY MOUTH TWICE DAILY 180 tablet 0   • metFORMIN (GLUCOPHAGE) 1000 MG tablet Take 1 tablet by mouth 2 (Two) Times a Day With Meals. 180 tablet 1   • metoprolol succinate XL (TOPROL-XL) 25 MG 24 hr tablet Take 1 tablet by mouth Daily. 90 tablet 2   • NIFEdipine XL (NIFEDICAL XL) 30 MG 24 hr tablet Take 1 tablet by mouth Daily. 90 tablet 2   • nitroglycerin (NITROSTAT) 0.4 MG SL tablet Place 1 tablet under the tongue Every 5 (Five) Minutes As Needed for Chest Pain. 100 tablet 11   • ramipril (ALTACE) 10 MG capsule Take 1 capsule by mouth Daily. 90 capsule 2   • sertraline (ZOLOFT) 50 MG tablet TAKE 1 AND 1/2 TABLETS BY MOUTH EVERY DAY 45 tablet 0     No current facility-administered medications on file prior to visit.        /72  Pulse 71  Ht 72\" (182.9 cm)  Wt 212 lb (96.2 kg) "  BMI 28.75 kg/m2    Objective:     Physical Exam   Constitutional: He is oriented to person, place, and time. He appears well-developed and well-nourished. No distress.   HENT:   Head: Normocephalic and atraumatic.   Right Ear: External ear normal.   Left Ear: External ear normal.   Mouth/Throat: Oropharynx is clear and moist. No oropharyngeal exudate.   Eyes: Conjunctivae and EOM are normal. Pupils are equal, round, and reactive to light. No scleral icterus.   Neck: Normal range of motion. Neck supple. No JVD present. No tracheal deviation present. No thyromegaly present.   Cardiovascular: Normal rate, regular rhythm, S1 normal, S2 normal, normal heart sounds and intact distal pulses.  PMI is not displaced.  Exam reveals no gallop, no distant heart sounds, no friction rub and no decreased pulses.    No murmur heard.  Pulmonary/Chest: Effort normal and breath sounds normal. No accessory muscle usage. No respiratory distress. He has no wheezes. He has no rales. He exhibits no tenderness.   Abdominal: Soft. Bowel sounds are normal. He exhibits no distension and no mass. There is no tenderness. There is no rebound and no guarding.   Musculoskeletal: Normal range of motion. He exhibits no edema, tenderness or deformity.   Lymphadenopathy:     He has no cervical adenopathy.   Neurological: He is alert and oriented to person, place, and time. He has normal reflexes. No cranial nerve deficit. Coordination normal.   Skin: Skin is dry. No rash noted. He is not diaphoretic. No erythema. No pallor.   Psychiatric: He has a normal mood and affect.             Lab Review:               Lab Review:         Lab Review     No results found for: CHOL  Lab Results   Component Value Date    HDL 31 (L) 04/13/2017    HDL 33 (L) 12/09/2016    HDL 28 (L) 08/19/2016     Lab Results   Component Value Date    LDL 42 04/13/2017    LDL 77 12/09/2016    LDL 46 08/19/2016     Lab Results   Component Value Date    TRIG 344 (H) 04/13/2017    TRIG  227 (H) 12/09/2016    TRIG 302 (H) 08/19/2016     No components found for: CHOLHDL  Lab Results   Component Value Date    GLUCOSE 97 09/16/2015    BUN 18 04/13/2017    CREATININE 1.14 04/13/2017    EGFRIFNONA 63 04/13/2017    EGFRIFAFRI 76 04/13/2017    BCR 15.8 04/13/2017    CO2 20.9 (L) 04/13/2017    CALCIUM 9.3 04/13/2017    PROTENTOTREF 7.3 04/13/2017    ALBUMIN 4.50 04/13/2017    LABIL2 1.6 04/13/2017    AST 15 04/13/2017    ALT 12 04/13/2017     Lab Results   Component Value Date    GLUCOSE 97 09/16/2015    CALCIUM 9.3 04/13/2017     04/13/2017    K 4.9 04/13/2017    CO2 20.9 (L) 04/13/2017     04/13/2017    BUN 18 04/13/2017    CREATININE 1.14 04/13/2017    EGFRIFAFRI 76 04/13/2017    EGFRIFNONA 63 04/13/2017    BCR 15.8 04/13/2017     Lab Results   Component Value Date    WBC 6.54 04/13/2017    HGB 10.5 (L) 04/13/2017    HCT 32.5 (L) 04/13/2017    MCV 89.8 04/13/2017     04/13/2017     No results found for: DDIMER  Lab Results   Component Value Date    TSH 4.180 04/13/2017    B8GOTJK 6.5 04/13/2017     Lab Results   Component Value Date    CKTOTAL 84 07/18/2014     No results found for: DIGOXIN  Lab Results   Component Value Date    CKTOTAL 84 07/18/2014    CKMB 3.5 07/18/2014    TROPONINT <0.01 07/18/2014     Lab Results   Component Value Date    INR 1.0 09/16/2015    INR 1.3 (H) 07/26/2014    INR 1.0 07/17/2014    PROTIME 11.2 09/16/2015    PROTIME 13.9 (H) 07/26/2014    PROTIME 11.0 07/17/2014     CrCl cannot be calculated (Patient's most recent sCr result is older than the maximum 30 days allowed.).    Assessment:          Diagnosis Plan   1. CAD in native artery  ECG 12 Lead   2. HTN (hypertension), benign  ECG 12 Lead   3. Cerebral artery occlusion  Ambulatory Referral to Physical Medicine Rehab   4. Chronic coronary artery disease     5. Mixed hyperlipidemia     6. Hypertension, essential     7. Snoring     8. At high risk for falls  Ambulatory Referral to Physical Medicine Rehab    9. Anxiety     10. RLS (restless legs syndrome)            Assessment and Plan:    Dereck was seen today for coronary artery disease and hypertension.    Diagnoses and all orders for this visit:    CAD in native artery  -     ECG 12 Lead    HTN (hypertension), benign  -     ECG 12 Lead    Cerebral artery occlusion  -     Ambulatory Referral to Physical Medicine Rehab    Chronic coronary artery disease    Mixed hyperlipidemia    Hypertension, essential    Snoring    At high risk for falls  -     Ambulatory Referral to Physical Medicine Rehab    Anxiety    RLS (restless legs syndrome)    Patient complain of frequent falling recently.  Seems like he is a balance issue not a disease/syncope issue.  However, he is somewhat orthostatic on exam with supine blood pressure 160/76 pulse 71, sitting 170/83 pulse 71, standing 150/74 pulse 73.  He is orthostatic when he goes from sitting to standing.  I gave him a prescription for compression stockings which will help keep the fluid out of his Lasix to combat the orthostasis.  He will call back in a week with this blood pressure readings and symptoms.    I will have him follow with Dr. Ford, physical medicine, for valuation and treatment of balance issues.    His last carotid duplex in December 2016 was normal.      A total of 25 minutes was spent in the care of this patient, including at least 13 minutes face-to-face with the patient.    I not only counseled the patient today on the significant factors noted in the assessment and plan, but I also recommended that the patient reduce salt and saturated animal fat intake in diet, as well as to perform scheduled exercise on a regular basis.      Plan:                  08/11/2017  6:47 PM  MD Facundo Orozco MD  8/11/2017, 6:47 PM    EMR Dragon/Transcription disclaimer:   Much of this encounter note is an electronic transcription/translation of spoken language to printed text. The electronic translation of  spoken language may permit erroneous, or at times, nonsensical words or phrases to be inadvertently transcribed; Although I have reviewed the note for such errors, some may still exist.

## 2017-09-18 ENCOUNTER — OFFICE VISIT (OUTPATIENT)
Dept: FAMILY MEDICINE CLINIC | Facility: CLINIC | Age: 77
End: 2017-09-18

## 2017-09-18 VITALS
WEIGHT: 213 LBS | OXYGEN SATURATION: 98 % | DIASTOLIC BLOOD PRESSURE: 58 MMHG | HEART RATE: 71 BPM | SYSTOLIC BLOOD PRESSURE: 132 MMHG | TEMPERATURE: 98.2 F | BODY MASS INDEX: 31.55 KG/M2 | HEIGHT: 69 IN

## 2017-09-18 DIAGNOSIS — J06.9 ACUTE URI: Primary | ICD-10-CM

## 2017-09-18 PROCEDURE — 99213 OFFICE O/P EST LOW 20 MIN: CPT | Performed by: NURSE PRACTITIONER

## 2017-09-18 RX ORDER — NATEGLINIDE 120 MG/1
TABLET ORAL
Refills: 2 | COMMUNITY
Start: 2017-08-19 | End: 2018-02-05 | Stop reason: SDUPTHER

## 2017-09-18 RX ORDER — AMOXICILLIN 875 MG/1
875 TABLET, COATED ORAL 2 TIMES DAILY
Qty: 20 TABLET | Refills: 0 | Status: SHIPPED | OUTPATIENT
Start: 2017-09-18 | End: 2017-10-12

## 2017-09-18 NOTE — PROGRESS NOTES
Subjective   Dereck Ramírez is a 77 y.o. male presents for 3 day history of runny nose, nasal congestion, cough, sore throat. Cough is productive, yellow sputum production, denies soa or wheezing. Sore throat is scratchy. Nasal congestion is clear. Post nasal drip reported. Denies ear pain. Denies n/v/d. Ill contacts at home, daughter.     URI    This is a new problem. The current episode started in the past 7 days. The problem has been unchanged. There has been no fever. Associated symptoms include congestion, coughing, rhinorrhea, sneezing and a sore throat. Pertinent negatives include no abdominal pain, chest pain, diarrhea, dysuria, ear pain, headaches, joint pain, joint swelling, nausea, neck pain, plugged ear sensation, rash, sinus pain, swollen glands, vomiting or wheezing. He has tried nothing for the symptoms.        The following portions of the patient's history were reviewed and updated as appropriate: allergies, current medications, past family history, past medical history, past social history, past surgical history and problem list.    Review of Systems   Constitutional: Negative.    HENT: Positive for congestion, postnasal drip, rhinorrhea, sinus pressure, sneezing and sore throat. Negative for ear pain.    Eyes: Positive for discharge.   Respiratory: Positive for cough. Negative for wheezing.    Cardiovascular: Negative.  Negative for chest pain.   Gastrointestinal: Negative.  Negative for abdominal pain, diarrhea, nausea and vomiting.   Endocrine: Negative.    Genitourinary: Negative.  Negative for dysuria.   Musculoskeletal: Negative.  Negative for joint pain and neck pain.   Skin: Negative.  Negative for rash.   Allergic/Immunologic: Negative.    Neurological: Negative.  Negative for headaches.   Hematological: Negative.    Psychiatric/Behavioral: Negative.        Objective   Physical Exam   Constitutional: He is oriented to person, place, and time. He appears well-developed and well-nourished.    HENT:   Head: Normocephalic and atraumatic.   Right Ear: Tympanic membrane and ear canal normal.   Left Ear: Tympanic membrane and ear canal normal.   Nose: Nose normal. Right sinus exhibits no maxillary sinus tenderness and no frontal sinus tenderness. Left sinus exhibits no maxillary sinus tenderness and no frontal sinus tenderness.   Mouth/Throat: Uvula is midline, oropharynx is clear and moist and mucous membranes are normal. No tonsillar exudate.   Neck: Neck supple.   Cardiovascular: Normal rate, regular rhythm and normal heart sounds.  Exam reveals no gallop and no friction rub.    No murmur heard.  Pulmonary/Chest: Effort normal and breath sounds normal. No respiratory distress. He has no decreased breath sounds (throughout). He has no wheezes. He has no rhonchi. He has no rales.   Abdominal: Soft. Bowel sounds are normal. He exhibits no distension. There is no tenderness.   Lymphadenopathy:     He has no cervical adenopathy.   Neurological: He is alert and oriented to person, place, and time.   Skin: Skin is warm and dry.   Psychiatric: He has a normal mood and affect.       Assessment/Plan   Dereck was seen today for nasal congestion, cough and sinus problem.    Diagnoses and all orders for this visit:    Acute URI    Other orders  -     amoxicillin (AMOXIL) 875 MG tablet; Take 1 tablet by mouth 2 (Two) Times a Day.

## 2017-09-18 NOTE — PATIENT INSTRUCTIONS
Start daily claritin or zyrtec. Increase fluids. Increase rest. Follow up for worsening cough, shortness of breath or wheezing.     Amoxicillin capsules or tablets  What is this medicine?  AMOXICILLIN (a mox i YOUNG in) is a penicillin antibiotic. It is used to treat certain kinds of bacterial infections. It will not work for colds, flu, or other viral infections.  This medicine may be used for other purposes; ask your health care provider or pharmacist if you have questions.  COMMON BRAND NAME(S): Amoxil, Moxilin, Sumox, Trimox  What should I tell my health care provider before I take this medicine?  They need to know if you have any of these conditions:  -asthma  -kidney disease  -an unusual or allergic reaction to amoxicillin, other penicillins, cephalosporin antibiotics, other medicines, foods, dyes, or preservatives  -pregnant or trying to get pregnant  -breast-feeding  How should I use this medicine?  Take this medicine by mouth with a glass of water. Follow the directions on your prescription label. You may take this medicine with food or on an empty stomach. Take your medicine at regular intervals. Do not take your medicine more often than directed. Take all of your medicine as directed even if you think your are better. Do not skip doses or stop your medicine early.  Talk to your pediatrician regarding the use of this medicine in children. While this drug may be prescribed for selected conditions, precautions do apply.  Overdosage: If you think you have taken too much of this medicine contact a poison control center or emergency room at once.  NOTE: This medicine is only for you. Do not share this medicine with others.  What if I miss a dose?  If you miss a dose, take it as soon as you can. If it is almost time for your next dose, take only that dose. Do not take double or extra doses.  What may interact with this medicine?  -amiloride  -birth control  pills  -chloramphenicol  -macrolides  -probenecid  -sulfonamides  -tetracyclines  This list may not describe all possible interactions. Give your health care provider a list of all the medicines, herbs, non-prescription drugs, or dietary supplements you use. Also tell them if you smoke, drink alcohol, or use illegal drugs. Some items may interact with your medicine.  What should I watch for while using this medicine?  Tell your doctor or health care professional if your symptoms do not improve in 2 or 3 days. Take all of the doses of your medicine as directed. Do not skip doses or stop your medicine early.  If you are diabetic, you may get a false positive result for sugar in your urine with certain brands of urine tests. Check with your doctor.  Do not treat diarrhea with over-the-counter products. Contact your doctor if you have diarrhea that lasts more than 2 days or if the diarrhea is severe and watery.  What side effects may I notice from receiving this medicine?  Side effects that you should report to your doctor or health care professional as soon as possible:  -allergic reactions like skin rash, itching or hives, swelling of the face, lips, or tongue  -breathing problems  -dark urine  -redness, blistering, peeling or loosening of the skin, including inside the mouth  -seizures  -severe or watery diarrhea  -trouble passing urine or change in the amount of urine  -unusual bleeding or bruising  -unusually weak or tired  -yellowing of the eyes or skin  Side effects that usually do not require medical attention (report to your doctor or health care professional if they continue or are bothersome):  -dizziness  -headache  -stomach upset  -trouble sleeping  This list may not describe all possible side effects. Call your doctor for medical advice about side effects. You may report side effects to FDA at 9-650-FDA-1189.  Where should I keep my medicine?  Keep out of the reach of children.  Store between 68 and 77  degrees F (20 and 25 degrees C). Keep bottle closed tightly. Throw away any unused medicine after the expiration date.  NOTE: This sheet is a summary. It may not cover all possible information. If you have questions about this medicine, talk to your doctor, pharmacist, or health care provider.     © 2017, [employer]. (2009-03-10 14:10:59)

## 2017-10-12 ENCOUNTER — OFFICE VISIT (OUTPATIENT)
Dept: FAMILY MEDICINE CLINIC | Facility: CLINIC | Age: 77
End: 2017-10-12

## 2017-10-12 VITALS
WEIGHT: 215.4 LBS | HEART RATE: 73 BPM | OXYGEN SATURATION: 98 % | TEMPERATURE: 97.6 F | DIASTOLIC BLOOD PRESSURE: 54 MMHG | SYSTOLIC BLOOD PRESSURE: 126 MMHG | BODY MASS INDEX: 31.9 KG/M2 | HEIGHT: 69 IN

## 2017-10-12 DIAGNOSIS — E11.29 CONTROLLED TYPE 2 DIABETES MELLITUS WITH OTHER DIABETIC KIDNEY COMPLICATION, WITHOUT LONG-TERM CURRENT USE OF INSULIN (HCC): Chronic | ICD-10-CM

## 2017-10-12 DIAGNOSIS — F41.9 ANXIETY: Primary | Chronic | ICD-10-CM

## 2017-10-12 DIAGNOSIS — E78.2 MIXED HYPERLIPIDEMIA: ICD-10-CM

## 2017-10-12 DIAGNOSIS — Z23 IMMUNIZATION DUE: ICD-10-CM

## 2017-10-12 DIAGNOSIS — F41.9 ANXIETY: Chronic | ICD-10-CM

## 2017-10-12 PROCEDURE — 99213 OFFICE O/P EST LOW 20 MIN: CPT | Performed by: NURSE PRACTITIONER

## 2017-10-12 PROCEDURE — G0008 ADMIN INFLUENZA VIRUS VAC: HCPCS | Performed by: NURSE PRACTITIONER

## 2017-10-12 PROCEDURE — 90662 IIV NO PRSV INCREASED AG IM: CPT | Performed by: NURSE PRACTITIONER

## 2017-10-12 RX ORDER — LORAZEPAM 0.5 MG/1
0.5 TABLET ORAL EVERY 8 HOURS PRN
Qty: 180 TABLET | Refills: 0 | OUTPATIENT
Start: 2017-10-12 | End: 2018-01-09 | Stop reason: SDUPTHER

## 2017-10-12 NOTE — PROGRESS NOTES
Subjective   Dereck Ramírez is a 77 y.o. male presents for routine follow up for anxiety. Taking lorazepam. Taking 2 tablets daily, one in am and pm. Also reports skin concern. Bleeding from right leg.     BEENA query complete. Treatment plan to include limited course of prescribed  controlled substance. Risks including addiction, benefits, and alternatives presented to patient.     Anxiety   Presents for follow-up visit. Symptoms include nervous/anxious behavior (improved with medication). Patient reports no chest pain, compulsions, confusion, decreased concentration, depressed mood, dizziness, dry mouth, excessive worry, feeling of choking, hyperventilation, impotence, insomnia, irritability, malaise, muscle tension, nausea, obsessions, palpitations, panic, restlessness, shortness of breath or suicidal ideas. The quality of sleep is good. Nighttime awakenings: occasional.     Compliance with medications is %.        The following portions of the patient's history were reviewed and updated as appropriate: allergies, current medications, past family history, past medical history, past social history, past surgical history and problem list.    Review of Systems   Constitutional: Negative for chills, fatigue, fever, irritability and unexpected weight change.   HENT: Negative for ear pain, hearing loss, sinus pressure, sore throat and tinnitus.    Eyes: Negative for pain, discharge and redness.   Respiratory: Negative for cough, shortness of breath and wheezing.    Cardiovascular: Negative for chest pain, palpitations and leg swelling.   Gastrointestinal: Negative for abdominal pain, constipation, diarrhea and nausea.   Endocrine: Negative for cold intolerance and heat intolerance.   Genitourinary: Negative for difficulty urinating, flank pain, impotence and urgency.   Musculoskeletal: Negative for back pain, joint swelling and myalgias.   Skin: Negative for rash and wound.   Allergic/Immunologic: Negative for  environmental allergies and food allergies.   Neurological: Negative for dizziness, seizures, numbness and headaches.   Hematological: Negative for adenopathy. Does not bruise/bleed easily.   Psychiatric/Behavioral: Negative for confusion, decreased concentration, dysphoric mood, sleep disturbance and suicidal ideas. The patient is nervous/anxious (improved with medication). The patient does not have insomnia.    All other systems reviewed and are negative.      Objective   Physical Exam   Constitutional: He is oriented to person, place, and time. He appears well-developed and well-nourished.   HENT:   Head: Normocephalic and atraumatic.   Eyes: Pupils are equal, round, and reactive to light.   Neck: Neck supple.   Cardiovascular: Normal rate, regular rhythm and normal heart sounds.  Exam reveals no gallop and no friction rub.    No murmur heard.  Pulmonary/Chest: Effort normal and breath sounds normal. No respiratory distress. He has no wheezes. He has no rales.   Abdominal: Soft. Bowel sounds are normal. He exhibits no distension. There is no tenderness.   Neurological: He is alert and oriented to person, place, and time.   Skin: Skin is warm and dry. Lesion noted.        Honey crusted lesion, scabbing present, approximately 3 cm, nontender, minimal erythema surrounding   Psychiatric: He has a normal mood and affect.   Vitals reviewed.      Assessment/Plan   Dereck was seen today for hypertension, hyperlipidemia and spot on right leg.    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

## 2017-10-17 ENCOUNTER — RESULTS ENCOUNTER (OUTPATIENT)
Dept: FAMILY MEDICINE CLINIC | Facility: CLINIC | Age: 77
End: 2017-10-17

## 2017-10-17 DIAGNOSIS — E78.2 MIXED HYPERLIPIDEMIA: ICD-10-CM

## 2017-10-17 DIAGNOSIS — E11.29 CONTROLLED TYPE 2 DIABETES MELLITUS WITH OTHER DIABETIC KIDNEY COMPLICATION, WITHOUT LONG-TERM CURRENT USE OF INSULIN (HCC): Chronic | ICD-10-CM

## 2017-10-24 RX ORDER — GABAPENTIN 100 MG/1
200 CAPSULE ORAL 3 TIMES DAILY
Qty: 540 CAPSULE | Refills: 0 | OUTPATIENT
Start: 2017-10-24 | End: 2018-02-05 | Stop reason: SDUPTHER

## 2018-01-09 DIAGNOSIS — F41.9 ANXIETY: Chronic | ICD-10-CM

## 2018-01-09 RX ORDER — LORAZEPAM 0.5 MG/1
0.5 TABLET ORAL EVERY 8 HOURS PRN
Qty: 180 TABLET | Refills: 0 | OUTPATIENT
Start: 2018-01-09 | End: 2018-04-26 | Stop reason: CLARIF

## 2018-01-18 ENCOUNTER — OFFICE VISIT (OUTPATIENT)
Dept: FAMILY MEDICINE CLINIC | Facility: CLINIC | Age: 78
End: 2018-01-18

## 2018-01-18 VITALS
OXYGEN SATURATION: 96 % | HEART RATE: 69 BPM | DIASTOLIC BLOOD PRESSURE: 72 MMHG | BODY MASS INDEX: 31.83 KG/M2 | HEIGHT: 69 IN | SYSTOLIC BLOOD PRESSURE: 150 MMHG | TEMPERATURE: 97.6 F | WEIGHT: 214.9 LBS

## 2018-01-18 DIAGNOSIS — E11.9 TYPE 2 DIABETES MELLITUS WITHOUT COMPLICATION, WITHOUT LONG-TERM CURRENT USE OF INSULIN (HCC): ICD-10-CM

## 2018-01-18 DIAGNOSIS — E78.5 HYPERLIPIDEMIA, UNSPECIFIED HYPERLIPIDEMIA TYPE: ICD-10-CM

## 2018-01-18 DIAGNOSIS — Z79.899 MEDICATION MANAGEMENT: Primary | ICD-10-CM

## 2018-01-18 DIAGNOSIS — F41.9 ANXIETY: ICD-10-CM

## 2018-01-18 LAB
ALBUMIN SERPL-MCNC: 4.5 G/DL (ref 3.5–5.2)
ALBUMIN/GLOB SERPL: 1.4 G/DL
ALP SERPL-CCNC: 190 U/L (ref 39–117)
ALT SERPL-CCNC: 14 U/L (ref 1–41)
AST SERPL-CCNC: 14 U/L (ref 1–40)
BILIRUB SERPL-MCNC: 0.2 MG/DL (ref 0.1–1.2)
BUN SERPL-MCNC: 18 MG/DL (ref 8–23)
BUN/CREAT SERPL: 13.6 (ref 7–25)
CALCIUM SERPL-MCNC: 9.4 MG/DL (ref 8.6–10.5)
CHLORIDE SERPL-SCNC: 104 MMOL/L (ref 98–107)
CHOLEST SERPL-MCNC: 144 MG/DL (ref 0–200)
CO2 SERPL-SCNC: 23.4 MMOL/L (ref 22–29)
CREAT SERPL-MCNC: 1.32 MG/DL (ref 0.76–1.27)
GLOBULIN SER CALC-MCNC: 3.2 GM/DL
GLUCOSE SERPL-MCNC: 111 MG/DL (ref 65–99)
HBA1C MFR BLD: 5.2 % (ref 4.8–5.6)
HDLC SERPL-MCNC: 34 MG/DL (ref 40–60)
LDLC SERPL CALC-MCNC: 47 MG/DL (ref 0–100)
LDLC/HDLC SERPL: 1.38 {RATIO}
POTASSIUM SERPL-SCNC: 5.1 MMOL/L (ref 3.5–5.2)
PROT SERPL-MCNC: 7.7 G/DL (ref 6–8.5)
SODIUM SERPL-SCNC: 142 MMOL/L (ref 136–145)
TRIGL SERPL-MCNC: 316 MG/DL (ref 0–150)
VLDLC SERPL CALC-MCNC: 63.2 MG/DL (ref 5–40)

## 2018-01-18 PROCEDURE — 99213 OFFICE O/P EST LOW 20 MIN: CPT | Performed by: NURSE PRACTITIONER

## 2018-01-18 RX ORDER — BUSPIRONE HYDROCHLORIDE 5 MG/1
5 TABLET ORAL 3 TIMES DAILY PRN
Qty: 90 TABLET | Refills: 1 | Status: SHIPPED | OUTPATIENT
Start: 2018-01-18 | End: 2018-03-16 | Stop reason: SDUPTHER

## 2018-01-18 NOTE — PATIENT INSTRUCTIONS
Lorazepam taper dosage:    Decrease lorazepam to one tablet at bedtime x 7 days.  Then take lorazepam 1 tablet every other night x 7 days and discontinue.

## 2018-01-18 NOTE — PROGRESS NOTES
Subjective   Dereck Ramírez is a 77 y.o. male presents for routine follow up for medication management. Currently taking ativan twice daily. Does help calm him down. Agreeable to try other medications for anxiety. Doing well overall. Blood pressure is mildly increased. Denies any chest pain or problems there.    Hypertension   This is a chronic problem. The current episode started more than 1 year ago. The problem has been waxing and waning since onset. Associated symptoms include anxiety. Pertinent negatives include no blurred vision, chest pain, headaches, malaise/fatigue, neck pain, orthopnea, palpitations, peripheral edema, PND, shortness of breath or sweats. There are no associated agents to hypertension. Risk factors for coronary artery disease include male gender, obesity and diabetes mellitus. Past treatments include beta blockers, calcium channel blockers and ACE inhibitors. The current treatment provides moderate improvement. There are no compliance problems.    Anxiety   Presents for follow-up visit. Symptoms include nervous/anxious behavior (occasional). Patient reports no chest pain, compulsions, confusion, decreased concentration, depressed mood, dizziness, dry mouth, excessive worry, feeling of choking, hyperventilation, impotence, insomnia, irritability, malaise, muscle tension, nausea, obsessions, palpitations, panic, restlessness, shortness of breath or suicidal ideas. Symptoms occur occasionally. The quality of sleep is good. Nighttime awakenings: occasional.     Compliance with medications is %.        The following portions of the patient's history were reviewed and updated as appropriate: allergies, current medications, past family history, past medical history, past social history, past surgical history and problem list.    Review of Systems   Constitutional: Negative.  Negative for irritability and malaise/fatigue.   HENT: Negative.    Eyes: Negative.  Negative for blurred vision.    Respiratory: Negative.  Negative for shortness of breath.    Cardiovascular: Negative.  Negative for chest pain, palpitations, orthopnea and PND.   Gastrointestinal: Negative.  Negative for nausea.   Endocrine: Negative.    Genitourinary: Negative.  Negative for impotence.   Musculoskeletal: Negative.  Negative for neck pain.   Skin: Negative.    Allergic/Immunologic: Negative.    Neurological: Negative.  Negative for dizziness and headaches.   Hematological: Negative.    Psychiatric/Behavioral: Negative for confusion, decreased concentration and suicidal ideas. The patient is nervous/anxious (occasional). The patient does not have insomnia.        Objective   Physical Exam   Constitutional: He is oriented to person, place, and time. He appears well-developed and well-nourished.   HENT:   Head: Normocephalic and atraumatic.   Eyes: Conjunctivae are normal. Pupils are equal, round, and reactive to light.   Neck: Neck supple.   Cardiovascular: Normal rate, regular rhythm and normal heart sounds.  Exam reveals no gallop and no friction rub.    No murmur heard.  Pulmonary/Chest: Effort normal and breath sounds normal. No respiratory distress. He has no wheezes. He has no rales.   Abdominal: Soft. Bowel sounds are normal. He exhibits no distension. There is no tenderness.   Neurological: He is alert and oriented to person, place, and time.   Skin: Skin is warm and dry.   Psychiatric: He has a normal mood and affect.   Vitals reviewed.      Assessment/Plan   Dereck was seen today for anxiety and hypertension.    Diagnoses and all orders for this visit:    Medication management  -     Cancel: [id number] Drug-Bund - Urine, Clean Catch  -     Lipid Panel With LDL / HDL Ratio  -     Hemoglobin A1c  -     Comprehensive metabolic panel    Type 2 diabetes mellitus without complication, without long-term current use of insulin  -     Hemoglobin A1c    Hyperlipidemia, unspecified hyperlipidemia type  -     Lipid Panel With LDL /  HDL Ratio    Anxiety  -     busPIRone (BUSPAR) 5 MG tablet; Take 1 tablet by mouth 3 (Three) Times a Day As Needed (anxiety).

## 2018-02-05 RX ORDER — NIFEDIPINE 30 MG/1
30 TABLET, EXTENDED RELEASE ORAL DAILY
Qty: 90 TABLET | Refills: 2 | Status: SHIPPED | OUTPATIENT
Start: 2018-02-05 | End: 2018-04-26 | Stop reason: SDUPTHER

## 2018-02-05 RX ORDER — GABAPENTIN 100 MG/1
200 CAPSULE ORAL 3 TIMES DAILY
Qty: 540 CAPSULE | Refills: 0 | OUTPATIENT
Start: 2018-02-05 | End: 2018-04-26 | Stop reason: SDUPTHER

## 2018-02-05 RX ORDER — RAMIPRIL 10 MG/1
10 CAPSULE ORAL DAILY
Qty: 90 CAPSULE | Refills: 2 | Status: SHIPPED | OUTPATIENT
Start: 2018-02-05 | End: 2018-04-26 | Stop reason: SDUPTHER

## 2018-02-05 RX ORDER — CHOLESTYRAMINE 4 G/9G
4 POWDER, FOR SUSPENSION ORAL EVERY 6 HOURS PRN
Qty: 90 PACKET | Refills: 2 | Status: SHIPPED | OUTPATIENT
Start: 2018-02-05 | End: 2018-08-22

## 2018-02-05 RX ORDER — NATEGLINIDE 120 MG/1
120 TABLET ORAL 2 TIMES DAILY
Qty: 180 TABLET | Refills: 2 | Status: SHIPPED | OUTPATIENT
Start: 2018-02-05 | End: 2018-04-26

## 2018-02-05 RX ORDER — PANTOPRAZOLE SODIUM 40 MG/1
40 TABLET, DELAYED RELEASE ORAL DAILY
Qty: 90 TABLET | Refills: 2 | Status: SHIPPED | OUTPATIENT
Start: 2018-02-05 | End: 2018-04-09 | Stop reason: SDUPTHER

## 2018-03-16 DIAGNOSIS — F41.9 ANXIETY: ICD-10-CM

## 2018-03-19 RX ORDER — BUSPIRONE HYDROCHLORIDE 5 MG/1
TABLET ORAL
Qty: 90 TABLET | Refills: 3 | Status: SHIPPED | OUTPATIENT
Start: 2018-03-19 | End: 2018-04-26 | Stop reason: SDUPTHER

## 2018-04-09 RX ORDER — PANTOPRAZOLE SODIUM 40 MG/1
40 TABLET, DELAYED RELEASE ORAL 2 TIMES DAILY
Qty: 180 TABLET | Refills: 2 | Status: SHIPPED | OUTPATIENT
Start: 2018-04-09 | End: 2018-04-26 | Stop reason: SDUPTHER

## 2018-04-26 ENCOUNTER — OFFICE VISIT (OUTPATIENT)
Dept: FAMILY MEDICINE CLINIC | Facility: CLINIC | Age: 78
End: 2018-04-26

## 2018-04-26 VITALS
BODY MASS INDEX: 31.1 KG/M2 | HEART RATE: 78 BPM | RESPIRATION RATE: 16 BRPM | TEMPERATURE: 98 F | HEIGHT: 69 IN | DIASTOLIC BLOOD PRESSURE: 58 MMHG | SYSTOLIC BLOOD PRESSURE: 136 MMHG | OXYGEN SATURATION: 98 % | WEIGHT: 210 LBS

## 2018-04-26 DIAGNOSIS — F41.9 ANXIETY: ICD-10-CM

## 2018-04-26 DIAGNOSIS — E11.59 TYPE 2 DIABETES MELLITUS WITH OTHER CIRCULATORY COMPLICATION, WITHOUT LONG-TERM CURRENT USE OF INSULIN (HCC): Primary | ICD-10-CM

## 2018-04-26 PROCEDURE — 99214 OFFICE O/P EST MOD 30 MIN: CPT | Performed by: NURSE PRACTITIONER

## 2018-04-26 RX ORDER — GABAPENTIN 100 MG/1
200 CAPSULE ORAL 3 TIMES DAILY
Qty: 540 CAPSULE | Refills: 0 | Status: SHIPPED | OUTPATIENT
Start: 2018-04-26 | End: 2018-10-25 | Stop reason: SDUPTHER

## 2018-04-26 RX ORDER — NIFEDIPINE 30 MG/1
30 TABLET, EXTENDED RELEASE ORAL DAILY
Qty: 90 TABLET | Refills: 2 | Status: SHIPPED | OUTPATIENT
Start: 2018-04-26 | End: 2018-10-25 | Stop reason: SDUPTHER

## 2018-04-26 RX ORDER — PANTOPRAZOLE SODIUM 40 MG/1
40 TABLET, DELAYED RELEASE ORAL 2 TIMES DAILY
Qty: 180 TABLET | Refills: 2 | Status: SHIPPED | OUTPATIENT
Start: 2018-04-26 | End: 2019-10-17

## 2018-04-26 RX ORDER — CLOPIDOGREL BISULFATE 75 MG/1
TABLET ORAL DAILY
Refills: 1 | COMMUNITY
Start: 2018-03-20 | End: 2018-07-02 | Stop reason: SDUPTHER

## 2018-04-26 RX ORDER — NATEGLINIDE 60 MG/1
60 TABLET ORAL 2 TIMES DAILY WITH MEALS
Qty: 60 TABLET | Refills: 3 | Status: SHIPPED | OUTPATIENT
Start: 2018-04-26 | End: 2018-09-07 | Stop reason: SDUPTHER

## 2018-04-26 RX ORDER — RAMIPRIL 10 MG/1
10 CAPSULE ORAL DAILY
Qty: 90 CAPSULE | Refills: 2 | Status: SHIPPED | OUTPATIENT
Start: 2018-04-26 | End: 2018-10-25 | Stop reason: SDUPTHER

## 2018-04-26 RX ORDER — BUSPIRONE HYDROCHLORIDE 5 MG/1
5 TABLET ORAL 3 TIMES DAILY PRN
Qty: 90 TABLET | Refills: 3 | Status: SHIPPED | OUTPATIENT
Start: 2018-04-26 | End: 2018-10-25 | Stop reason: SDUPTHER

## 2018-04-26 NOTE — PROGRESS NOTES
Subjective   Dereck Ramírez is a 77 y.o. male presents for medication management. Recently started buspar, tolerating well. Taking 5 mg twice daily. Daughter present in room. Doing well overall, feels a little bit more hyper off the benzo but doing well. Declines increasing to three times daily at this time, would like to give it more time.    Recent labs reviewed with patient during visit. A1C decreased at 4.8. Patient currently taking metformin twice daily as well as starlix twice daily. Denies any hypoglycemic episodes. Does report that he is not eating out as frequently and eating healthier overall.     Diabetes   He presents for his follow-up diabetic visit. He has type 2 diabetes mellitus. His disease course has been improving. There are no diabetic associated symptoms. There are no hypoglycemic complications. Symptoms are stable. Diabetic complications include peripheral neuropathy. Risk factors for coronary artery disease include diabetes mellitus, hypertension, male sex and dyslipidemia. Current diabetic treatment includes oral agent (dual therapy). He is compliant with treatment most of the time.   Anxiety   Presents for follow-up visit. Symptoms include restlessness. Symptoms occur occasionally. The severity of symptoms is mild. The quality of sleep is good. Nighttime awakenings: occasional.     Compliance with medications is %.        The following portions of the patient's history were reviewed and updated as appropriate: allergies, current medications, past family history, past medical history, past social history, past surgical history and problem list.    Review of Systems   Constitutional: Negative.    HENT: Negative.    Eyes: Negative.    Respiratory: Negative.    Cardiovascular: Negative.    Gastrointestinal: Negative.    Endocrine: Negative.    Genitourinary: Negative.    Musculoskeletal: Negative.    Skin: Negative.    Allergic/Immunologic: Negative.    Neurological: Negative.     Hematological: Negative.    Psychiatric/Behavioral: Negative.        Objective   Physical Exam   Constitutional: He is oriented to person, place, and time. He appears well-developed and well-nourished.   Eyes: Pupils are equal, round, and reactive to light.   Neck: Neck supple. No JVD present. No thyromegaly present.   Cardiovascular: Normal rate, regular rhythm and normal heart sounds.  Exam reveals no gallop and no friction rub.    No murmur heard.  Pulmonary/Chest: Effort normal and breath sounds normal. No respiratory distress. He has no wheezes. He has no rales.   Lymphadenopathy:     He has no cervical adenopathy.   Neurological: He is oriented to person, place, and time.   Skin: Skin is warm and dry.   Psychiatric: He has a normal mood and affect.   Vitals reviewed.      Assessment/Plan   Dereck was seen today for hypertension and med refill.    Diagnoses and all orders for this visit:    Type 2 diabetes mellitus with other circulatory complication, without long-term current use of insulin  -     Cancel: Hemoglobin A1c  -     Hemoglobin A1c; Future  -     Comprehensive metabolic panel; Future  -     Hemoglobin A1c  -     Comprehensive metabolic panel    Anxiety  -     busPIRone (BUSPAR) 5 MG tablet; Take 1 tablet by mouth 3 (Three) Times a Day As Needed (anxiety). for anxiety    Other orders  -     nateglinide (STARLIX) 60 MG tablet; Take 1 tablet by mouth 2 (Two) Times a Day With Meals.  -     gabapentin (NEURONTIN) 100 MG capsule; Take 2 capsules by mouth 3 (Three) Times a Day.  -     pantoprazole (PROTONIX) 40 MG EC tablet; Take 1 tablet by mouth 2 (Two) Times a Day.  -     NIFEdipine XL (NIFEDICAL XL) 30 MG 24 hr tablet; Take 1 tablet by mouth Daily.  -     ramipril (ALTACE) 10 MG capsule; Take 1 capsule by mouth Daily.    Will decrease starlix to 60 mg twice daily, repeat A1C in 4 months with the plan to discontinue if Blood sugar/A1C is still on the lower end. Discussed with patient and daughter,  agreeable with plan.   Repeat labs at next visit.  Triglycerides still elevated, nonfasting labs. Difficult for patient to come in for truly fasting labs. Will continue to monitor.

## 2018-07-02 ENCOUNTER — TELEPHONE (OUTPATIENT)
Dept: FAMILY MEDICINE CLINIC | Facility: CLINIC | Age: 78
End: 2018-07-02

## 2018-07-02 RX ORDER — ATORVASTATIN CALCIUM 20 MG/1
20 TABLET, FILM COATED ORAL DAILY
Qty: 90 TABLET | Refills: 2 | Status: SHIPPED | OUTPATIENT
Start: 2018-07-02 | End: 2019-03-21 | Stop reason: SDUPTHER

## 2018-07-02 RX ORDER — CLOPIDOGREL BISULFATE 75 MG/1
75 TABLET ORAL DAILY
Qty: 90 TABLET | Refills: 1 | Status: SHIPPED | OUTPATIENT
Start: 2018-07-02 | End: 2018-12-26 | Stop reason: SDUPTHER

## 2018-07-02 RX ORDER — METOPROLOL SUCCINATE 25 MG/1
25 TABLET, EXTENDED RELEASE ORAL DAILY
Qty: 90 TABLET | Refills: 2 | Status: SHIPPED | OUTPATIENT
Start: 2018-07-02 | End: 2019-03-21 | Stop reason: SDUPTHER

## 2018-07-02 NOTE — TELEPHONE ENCOUNTER
We only got a refill request for metformin from pharmacy. I put in refill request to [facility] and they will get refilled today.

## 2018-07-02 NOTE — TELEPHONE ENCOUNTER
Pt states that the pharmacy has told pt that they have sent over refill request for   clopidogrel 75mg    atorvastatin 20 mg    metoproloolersuccinate 25mg     Pt states all 3 are 90 day supply   Pt is out of medications as of 07/02/18     Nicki

## 2018-07-20 ENCOUNTER — HOSPITAL ENCOUNTER (EMERGENCY)
Facility: HOSPITAL | Age: 78
Discharge: HOME | End: 2018-07-20
Attending: FAMILY MEDICINE | Admitting: FAMILY MEDICINE

## 2018-07-20 VITALS
HEART RATE: 72 BPM | SYSTOLIC BLOOD PRESSURE: 167 MMHG | BODY MASS INDEX: 30.48 KG/M2 | WEIGHT: 225 LBS | OXYGEN SATURATION: 95 % | RESPIRATION RATE: 16 BRPM | DIASTOLIC BLOOD PRESSURE: 76 MMHG | TEMPERATURE: 97.4 F | HEIGHT: 72 IN

## 2018-07-20 DIAGNOSIS — S30.810A ABRASION OF RIGHT BUTTOCK, INITIAL ENCOUNTER: Primary | ICD-10-CM

## 2018-07-20 PROCEDURE — 99283 EMERGENCY DEPT VISIT LOW MDM: CPT

## 2018-07-27 ENCOUNTER — OFFICE VISIT (OUTPATIENT)
Dept: FAMILY MEDICINE CLINIC | Facility: CLINIC | Age: 78
End: 2018-07-27

## 2018-07-27 VITALS
HEIGHT: 72 IN | DIASTOLIC BLOOD PRESSURE: 69 MMHG | OXYGEN SATURATION: 97 % | HEART RATE: 76 BPM | SYSTOLIC BLOOD PRESSURE: 138 MMHG | TEMPERATURE: 98 F | BODY MASS INDEX: 29.17 KG/M2 | WEIGHT: 215.4 LBS
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
## 2018-07-27 DIAGNOSIS — S30.0XXD CONTUSION OF LOWER BACK, SUBSEQUENT ENCOUNTER: Primary | ICD-10-CM

## 2018-07-27 PROBLEM — S30.0XXA CONTUSION OF LOWER BACK: Status: ACTIVE | Noted: 2018-07-27

## 2018-07-27 PROCEDURE — 99213 OFFICE O/P EST LOW 20 MIN: CPT | Performed by: NURSE PRACTITIONER

## 2018-07-27 RX ORDER — CHOLESTYRAMINE 4 G/9G
POWDER, FOR SUSPENSION ORAL
Refills: 2 | COMMUNITY
Start: 2018-04-27 | End: 2018-08-22 | Stop reason: SDUPTHER
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
## 2018-08-22 RX ORDER — CHOLESTYRAMINE 4 G/9G
POWDER, FOR SUSPENSION ORAL
Qty: 90 PACKET | Refills: 5 | Status: SHIPPED | OUTPATIENT
Start: 2018-08-22 | End: 2018-11-01 | Stop reason: SDUPTHER

## 2018-08-23 ENCOUNTER — OFFICE VISIT (OUTPATIENT)
Dept: FAMILY MEDICINE CLINIC | Facility: CLINIC | Age: 78
End: 2018-08-23

## 2018-08-23 VITALS
WEIGHT: 214.7 LBS | TEMPERATURE: 97.5 F | HEIGHT: 72 IN | DIASTOLIC BLOOD PRESSURE: 60 MMHG | BODY MASS INDEX: 29.08 KG/M2 | HEART RATE: 72 BPM | OXYGEN SATURATION: 98 % | SYSTOLIC BLOOD PRESSURE: 142 MMHG

## 2018-08-23 DIAGNOSIS — Z23 ENCOUNTER FOR IMMUNIZATION: ICD-10-CM

## 2018-08-23 DIAGNOSIS — R21 RASH: ICD-10-CM

## 2018-08-23 DIAGNOSIS — E11.29 CONTROLLED TYPE 2 DIABETES MELLITUS WITH OTHER DIABETIC KIDNEY COMPLICATION, WITHOUT LONG-TERM CURRENT USE OF INSULIN (HCC): Primary | Chronic | ICD-10-CM

## 2018-08-23 DIAGNOSIS — F41.9 ANXIETY: ICD-10-CM

## 2018-08-23 DIAGNOSIS — I10 HYPERTENSION, ESSENTIAL: Chronic | ICD-10-CM

## 2018-08-23 LAB
ALBUMIN SERPL-MCNC: 4.8 G/DL (ref 3.5–5.2)
ALBUMIN/GLOB SERPL: 1.9 G/DL
ALP SERPL-CCNC: 170 U/L (ref 39–117)
ALT SERPL-CCNC: 16 U/L (ref 1–41)
AST SERPL-CCNC: 16 U/L (ref 1–40)
BILIRUB SERPL-MCNC: 0.2 MG/DL (ref 0.1–1.2)
BUN SERPL-MCNC: 20 MG/DL (ref 8–23)
BUN/CREAT SERPL: 15 (ref 7–25)
CALCIUM SERPL-MCNC: 8.8 MG/DL (ref 8.6–10.5)
CHLORIDE SERPL-SCNC: 106 MMOL/L (ref 98–107)
CO2 SERPL-SCNC: 23.3 MMOL/L (ref 22–29)
CREAT SERPL-MCNC: 1.33 MG/DL (ref 0.76–1.27)
GLOBULIN SER CALC-MCNC: 2.5 GM/DL
GLUCOSE SERPL-MCNC: 106 MG/DL (ref 65–99)
HBA1C MFR BLD: 5.3 % (ref 4.8–5.6)
POTASSIUM SERPL-SCNC: 5 MMOL/L (ref 3.5–5.2)
PROT SERPL-MCNC: 7.3 G/DL (ref 6–8.5)
SODIUM SERPL-SCNC: 140 MMOL/L (ref 136–145)

## 2018-08-23 PROCEDURE — 90632 HEPA VACCINE ADULT IM: CPT | Performed by: NURSE PRACTITIONER

## 2018-08-23 PROCEDURE — 99214 OFFICE O/P EST MOD 30 MIN: CPT | Performed by: NURSE PRACTITIONER

## 2018-08-23 PROCEDURE — G0009 ADMIN PNEUMOCOCCAL VACCINE: HCPCS | Performed by: NURSE PRACTITIONER

## 2018-08-23 PROCEDURE — 90471 IMMUNIZATION ADMIN: CPT | Performed by: NURSE PRACTITIONER

## 2018-08-23 PROCEDURE — 90732 PPSV23 VACC 2 YRS+ SUBQ/IM: CPT | Performed by: NURSE PRACTITIONER

## 2018-08-23 NOTE — PROGRESS NOTES
Subjective   Dereck Ramírez is a 78 y.o. male presents for routine follow up for diabetes and hypertension. Tolerating medication well. He is non-fasting today but agreeable to labs. Doing well overall. Anxiety is well controlled. Does have increased stress, his grandson and daughter live with him. His grandson is bipolar which can be challenging.    Diabetes   He presents for his follow-up diabetic visit. He has type 2 diabetes mellitus. Hypoglycemia symptoms include nervousness/anxiousness (intermittent, improved with buspar). There are no diabetic associated symptoms. Pertinent negatives for diabetes include no chest pain. There are no hypoglycemic complications. There are no diabetic complications. Risk factors for coronary artery disease include dyslipidemia, diabetes mellitus, male sex and hypertension. Current diabetic treatment includes oral agent (dual therapy). He is compliant with treatment most of the time.   Hyperlipidemia   This is a chronic problem. The current episode started more than 1 year ago. The problem is controlled. Recent lipid tests were reviewed and are normal. He has no history of chronic renal disease, diabetes, hypothyroidism, liver disease, obesity or nephrotic syndrome. Pertinent negatives include no chest pain, focal sensory loss, focal weakness, leg pain, myalgias or shortness of breath. Current antihyperlipidemic treatment includes statins. The current treatment provides moderate improvement of lipids. There are no compliance problems.  Risk factors for coronary artery disease include male sex, hypertension, dyslipidemia and diabetes mellitus.   Anxiety   Presents for follow-up visit. Symptoms include irritability and nervous/anxious behavior (intermittent, improved with buspar). Patient reports no chest pain, decreased concentration, depressed mood, excessive worry, hyperventilation, malaise, muscle tension, nausea, obsessions, palpitations, panic, restlessness, shortness of  breath or suicidal ideas. Symptoms occur occasionally. The severity of symptoms is mild. The quality of sleep is good. Nighttime awakenings: occasional.     Compliance with medications is %.        The following portions of the patient's history were reviewed and updated as appropriate: allergies, current medications, past family history, past medical history, past social history, past surgical history and problem list.    Review of Systems   Constitutional: Positive for irritability.   Eyes: Negative.    Respiratory: Negative.  Negative for shortness of breath.    Cardiovascular: Negative.  Negative for chest pain and palpitations.   Gastrointestinal: Negative.  Negative for nausea.   Endocrine: Negative.    Genitourinary: Negative.    Musculoskeletal: Negative.  Negative for myalgias.   Allergic/Immunologic: Negative.    Neurological: Negative.  Negative for focal weakness.   Hematological: Negative.    Psychiatric/Behavioral: Negative for decreased concentration and suicidal ideas. The patient is nervous/anxious (intermittent, improved with buspar).        Objective   Physical Exam   Constitutional: He is oriented to person, place, and time. He appears well-developed and well-nourished.   Eyes: Pupils are equal, round, and reactive to light.   Neck: Neck supple.   Cardiovascular: Normal rate, regular rhythm and normal heart sounds.  Exam reveals no gallop and no friction rub.    No murmur heard.  Pulmonary/Chest: Effort normal and breath sounds normal. No respiratory distress. He has no wheezes. He has no rales.   Abdominal: Soft. Bowel sounds are normal. He exhibits no distension. There is no tenderness.   Neurological: He is alert and oriented to person, place, and time.   Skin: Skin is warm and dry. Rash (healing, right flank, linear appearance, no vesicles, erythematous from healing lesions) noted. There is erythema (right buttock healing wound, no open areas, erythematous).   Psychiatric: He has a  normal mood and affect.   Vitals reviewed.      Assessment/Plan   Dercek was seen today for diabetes and mouth lesions.    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 Will continue current BP meds. Follow up 4 months    Rash  Comments:  healing, has shingles appearance, no previous vaccine, recommend vaccine in one month    Anxiety  Comments:  will continue buspar, recommend twice daily scheduled and an additional dose as needed  Increased stress at home with bipolar grandson    Encounter for immunization  -     Hepatitis A Vaccine Adult IM  -     Pneumococcal Polysaccharide Vaccine 23-Valent (PPSV23) Greater Than or Equal To 3yo Subcutaneous / IM

## 2018-09-07 RX ORDER — NATEGLINIDE 60 MG/1
TABLET ORAL
Qty: 60 TABLET | Refills: 0 | Status: SHIPPED | OUTPATIENT
Start: 2018-09-07 | End: 2018-10-06 | Stop reason: SDUPTHER

## 2018-10-08 RX ORDER — NATEGLINIDE 60 MG/1
TABLET ORAL
Qty: 60 TABLET | Refills: 5 | Status: SHIPPED | OUTPATIENT
Start: 2018-10-08 | End: 2019-04-03 | Stop reason: SDUPTHER

## 2018-10-25 DIAGNOSIS — F41.9 ANXIETY: ICD-10-CM

## 2018-10-25 RX ORDER — NIFEDIPINE 30 MG/1
TABLET, EXTENDED RELEASE ORAL
Qty: 90 TABLET | Refills: 0 | Status: SHIPPED | OUTPATIENT
Start: 2018-10-25 | End: 2019-01-17 | Stop reason: SDUPTHER

## 2018-10-25 RX ORDER — RAMIPRIL 10 MG/1
10 CAPSULE ORAL DAILY
Qty: 90 CAPSULE | Refills: 2 | Status: SHIPPED | OUTPATIENT
Start: 2018-10-25 | End: 2018-10-26 | Stop reason: SDUPTHER

## 2018-10-26 RX ORDER — GABAPENTIN 100 MG/1
CAPSULE ORAL
Qty: 540 CAPSULE | Refills: 0 | Status: SHIPPED | OUTPATIENT
Start: 2018-10-26 | End: 2019-01-08 | Stop reason: SDUPTHER

## 2018-10-26 RX ORDER — RAMIPRIL 10 MG/1
10 CAPSULE ORAL DAILY
Qty: 90 CAPSULE | Refills: 2 | Status: SHIPPED | OUTPATIENT
Start: 2018-10-26 | End: 2019-04-21 | Stop reason: HOSPADM

## 2018-10-26 RX ORDER — BUSPIRONE HYDROCHLORIDE 5 MG/1
TABLET ORAL
Qty: 90 TABLET | Refills: 0 | Status: SHIPPED | OUTPATIENT
Start: 2018-10-26 | End: 2019-04-19

## 2018-11-01 RX ORDER — CHOLESTYRAMINE 4 G/9G
POWDER, FOR SUSPENSION ORAL
Qty: 368 PACKET | Refills: 3 | Status: SHIPPED | OUTPATIENT
Start: 2018-11-01 | End: 2019-04-19

## 2018-11-01 RX ORDER — CHOLESTYRAMINE 4 G/9G
POWDER, FOR SUSPENSION ORAL
Qty: 90 PACKET | Refills: 3 | Status: SHIPPED | OUTPATIENT
Start: 2018-11-01 | End: 2018-11-01 | Stop reason: SDUPTHER

## 2018-12-20 ENCOUNTER — OFFICE VISIT (OUTPATIENT)
Dept: FAMILY MEDICINE CLINIC | Facility: CLINIC | Age: 78
End: 2018-12-20

## 2018-12-20 VITALS
BODY MASS INDEX: 29.49 KG/M2 | TEMPERATURE: 97.8 F | OXYGEN SATURATION: 99 % | DIASTOLIC BLOOD PRESSURE: 66 MMHG | SYSTOLIC BLOOD PRESSURE: 132 MMHG | WEIGHT: 217.5 LBS | HEART RATE: 71 BPM

## 2018-12-20 DIAGNOSIS — Z00.00 MEDICARE ANNUAL WELLNESS VISIT, SUBSEQUENT: Primary | ICD-10-CM

## 2018-12-20 PROCEDURE — G0439 PPPS, SUBSEQ VISIT: HCPCS | Performed by: NURSE PRACTITIONER

## 2018-12-20 PROCEDURE — G0008 ADMIN INFLUENZA VIRUS VAC: HCPCS | Performed by: NURSE PRACTITIONER

## 2018-12-20 PROCEDURE — 90662 IIV NO PRSV INCREASED AG IM: CPT | Performed by: NURSE PRACTITIONER
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
## 2018-12-20 NOTE — PATIENT INSTRUCTIONS
Medicare Wellness  Personal Prevention Plan of Service     Date of Office Visit:  2018  Encounter Provider:  KAVON Resendiz  Place of Service:  [facility]  Patient Name: Dereck Ramírez  :  1940    As part of the Medicare Wellness portion of your visit today, we are providing you with this personalized preventive plan of services (PPPS). This plan is based upon recommendations of the [place] Preventive Services Task Force (USPSTF) and the Advisory Committee on Immunization Practices (ACIP).    This lists the preventive care services that should be considered, and provides dates of when you are due. Items listed as completed are up-to-date and do not require any further intervention.    Health Maintenance   Topic Date Due   • TDAP/TD VACCINES (1 - Tdap) 1961   • ZOSTER VACCINE (2 of 2) 2017   • URINE MICROALBUMIN  2017   • MEDICARE ANNUAL WELLNESS  2018   • LIPID PANEL  2019   • HEMOGLOBIN A1C  2019   • HEPATITIS A VACCINE ADULT (2 of 2) 2019   • COLONOSCOPY  2024   • INFLUENZA VACCINE  Completed   • PNEUMOCOCCAL VACCINES (65+ LOW/MEDIUM RISK)  Completed       Orders Placed This Encounter   Procedures   • Flu Vaccine High Dose PF 65YR+ ([id number])       Return in about 4 months (around 2019).

## 2018-12-26 RX ORDER — CLOPIDOGREL BISULFATE 75 MG/1
75 TABLET ORAL DAILY
Qty: 90 TABLET | Refills: 0 | Status: SHIPPED | OUTPATIENT
Start: 2018-12-26 | End: 2019-03-21 | Stop reason: SDUPTHER

## 2019-01-07 ENCOUNTER — TELEPHONE (OUTPATIENT)
Dept: FAMILY MEDICINE CLINIC | Facility: CLINIC | Age: 79
End: 2019-01-07

## 2019-01-07 NOTE — TELEPHONE ENCOUNTER
Recommend antihistamine such as claritin or zyrtec daily, increase fluids and get plenty of rest.

## 2019-01-07 NOTE — TELEPHONE ENCOUNTER
Pt daughter saw says he has runny nose and was wondering what kind of meds he can take [birth year] ped[facility] l[facility] almanza

## 2019-01-08 RX ORDER — GABAPENTIN 100 MG/1
CAPSULE ORAL
Qty: 540 CAPSULE | Refills: 0 | Status: SHIPPED | OUTPATIENT
Start: 2019-01-08 | End: 2019-04-15 | Stop reason: SDUPTHER

## 2019-01-17 RX ORDER — NIFEDIPINE 30 MG/1
TABLET, EXTENDED RELEASE ORAL
Qty: 90 TABLET | Refills: 0 | Status: SHIPPED | OUTPATIENT
Start: 2019-01-17 | End: 2019-04-15 | Stop reason: SDUPTHER

## 2019-03-22 RX ORDER — CLOPIDOGREL BISULFATE 75 MG/1
75 TABLET ORAL DAILY
Qty: 90 TABLET | Refills: 0 | Status: SHIPPED | OUTPATIENT
Start: 2019-03-22 | End: 2019-04-19

## 2019-03-22 RX ORDER — ATORVASTATIN CALCIUM 20 MG/1
TABLET, FILM COATED ORAL
Qty: 90 TABLET | Refills: 0 | Status: SHIPPED | OUTPATIENT
Start: 2019-03-22 | End: 2019-04-19

## 2019-03-22 RX ORDER — METOPROLOL SUCCINATE 25 MG/1
TABLET, EXTENDED RELEASE ORAL
Qty: 90 TABLET | Refills: 0 | Status: SHIPPED | OUTPATIENT
Start: 2019-03-22 | End: 2019-04-19

## 2019-04-03 RX ORDER — NATEGLINIDE 60 MG/1
TABLET ORAL
Qty: 60 TABLET | Refills: 0 | Status: SHIPPED | OUTPATIENT
Start: 2019-04-03 | End: 2019-04-19

## 2019-04-16 RX ORDER — NIFEDIPINE 30 MG/1
TABLET, EXTENDED RELEASE ORAL
Qty: 90 TABLET | Refills: 0 | Status: SHIPPED | OUTPATIENT
Start: 2019-04-16 | End: 2019-04-19

## 2019-04-16 RX ORDER — GABAPENTIN 100 MG/1
CAPSULE ORAL
Qty: 540 CAPSULE | Refills: 0 | Status: SHIPPED | OUTPATIENT
Start: 2019-04-16 | End: 2019-04-19

## 2019-04-18 ENCOUNTER — OFFICE VISIT (OUTPATIENT)
Dept: FAMILY MEDICINE CLINIC | Facility: CLINIC | Age: 79
End: 2019-04-18

## 2019-04-18 VITALS
WEIGHT: 214.6 LBS | HEART RATE: 72 BPM | OXYGEN SATURATION: 98 % | RESPIRATION RATE: 15 BRPM | TEMPERATURE: 97.7 F | HEIGHT: 72 IN | SYSTOLIC BLOOD PRESSURE: 124 MMHG | BODY MASS INDEX: 29.07 KG/M2 | DIASTOLIC BLOOD PRESSURE: 52 MMHG

## 2019-04-18 DIAGNOSIS — Z79.899 MEDICATION MANAGEMENT: ICD-10-CM

## 2019-04-18 DIAGNOSIS — E78.01 FAMILIAL HYPERCHOLESTEROLEMIA: ICD-10-CM

## 2019-04-18 DIAGNOSIS — E11.9 TYPE 2 DIABETES MELLITUS WITHOUT COMPLICATION, WITHOUT LONG-TERM CURRENT USE OF INSULIN (HCC): Primary | ICD-10-CM

## 2019-04-18 PROCEDURE — 90632 HEPA VACCINE ADULT IM: CPT | Performed by: NURSE PRACTITIONER

## 2019-04-18 PROCEDURE — 90715 TDAP VACCINE 7 YRS/> IM: CPT | Performed by: NURSE PRACTITIONER

## 2019-04-18 PROCEDURE — 90471 IMMUNIZATION ADMIN: CPT | Performed by: NURSE PRACTITIONER

## 2019-04-18 PROCEDURE — 90472 IMMUNIZATION ADMIN EACH ADD: CPT | Performed by: NURSE PRACTITIONER

## 2019-04-18 PROCEDURE — 99213 OFFICE O/P EST LOW 20 MIN: CPT | Performed by: NURSE PRACTITIONER

## 2019-04-18 NOTE — PROGRESS NOTES
Subjective   Dereck Ramírez is a 78 y.o. male presents for follow up for diabetes. Taking medication as prescribed. Daughter present during office visit. States he has been eating a few sweets here and there but overall he has been doing well.     She does report he is hyper. Taking buspar as directed and tolerating well. No confusion, no agitation.     Interested in his second hepatitis A vaccine today, aware that medicare will likely not pay for that.   Also interested in a td as he is around cats. Aware medicare may not cover and interested in both vaccines.     Diabetes   He presents for his follow-up diabetic visit. He has type 2 diabetes mellitus. His disease course has been stable. There are no hypoglycemic associated symptoms. Associated symptoms include foot paresthesias. There are no hypoglycemic complications. Symptoms are stable. Diabetic complications include peripheral neuropathy. Risk factors for coronary artery disease include diabetes mellitus, dyslipidemia, male sex and hypertension. Current diabetic treatment includes oral agent (dual therapy). He is compliant with treatment most of the time.   Hyperlipidemia   This is a chronic problem. The current episode started more than 1 year ago. The problem is controlled. Recent lipid tests were reviewed and are normal. He has no history of chronic renal disease, diabetes, hypothyroidism, liver disease, obesity or nephrotic syndrome. There are no known factors aggravating his hyperlipidemia. Pertinent negatives include no focal sensory loss, focal weakness, leg pain, myalgias or shortness of breath. Current antihyperlipidemic treatment includes statins. The current treatment provides moderate improvement of lipids. There are no compliance problems.  Risk factors for coronary artery disease include diabetes mellitus, dyslipidemia, hypertension and male sex.        The following portions of the patient's history were reviewed and updated as appropriate:  allergies, current medications, past family history, past medical history, past social history, past surgical history and problem list.    Review of Systems   Constitutional: Negative.    HENT: Negative.    Eyes: Negative.    Respiratory: Negative.  Negative for shortness of breath.    Cardiovascular: Negative.    Gastrointestinal: Negative.    Endocrine: Negative.    Genitourinary: Negative.    Musculoskeletal: Negative.  Negative for myalgias.   Skin: Negative.    Allergic/Immunologic: Negative.    Neurological: Negative.  Negative for focal weakness.   Hematological: Negative.    Psychiatric/Behavioral: Negative.        Objective   Physical Exam   Constitutional: He is oriented to person, place, and time.   HENT:   Mouth/Throat: Oropharynx is clear and moist.   Cardiovascular: Normal rate, regular rhythm and normal heart sounds. Exam reveals no gallop and no friction rub.   No murmur heard.  Pulmonary/Chest: Effort normal and breath sounds normal. No stridor. No respiratory distress. He has no wheezes. He has no rales.   Abdominal: Soft. Bowel sounds are normal. He exhibits no distension. There is no tenderness.   Neurological: He is alert and oriented to person, place, and time.   Psychiatric: He has a normal mood and affect.       Assessment/Plan   Dereck was seen today for follow-up.    Diagnoses and all orders for this visit:    Type 2 diabetes mellitus without complication, without long-term current use of insulin (CMS/[facility])  -     Comprehensive metabolic panel  -     Hemoglobin A1c  -     Microalbumin / Creatinine Urine Ratio - Urine, Clean Catch    Familial hypercholesterolemia  -     Lipid Panel With LDL / HDL Ratio    Medication management  -     CBC & Differential    Other orders  -     Hepatitis A Vaccine Adult IM  -     Td (adult) Vaccine Not Adsorbed          Ok to give hep A and td, aware that medicare will not cover with a routine office visit  Agreeable to proceed  Labs today  Follow up in six  months, sooner if needed

## 2019-04-19 ENCOUNTER — APPOINTMENT (OUTPATIENT)
Dept: CT IMAGING | Facility: HOSPITAL | Age: 79
End: 2019-04-19

## 2019-04-19 ENCOUNTER — HOSPITAL ENCOUNTER (INPATIENT)
Facility: HOSPITAL | Age: 79
LOS: 2 days | Discharge: HOME | End: 2019-04-21
Attending: EMERGENCY MEDICINE | Admitting: INTERNAL MEDICINE

## 2019-04-19 DIAGNOSIS — D64.9 ANEMIA, UNSPECIFIED TYPE: ICD-10-CM

## 2019-04-19 DIAGNOSIS — N17.9 ACUTE RENAL FAILURE, UNSPECIFIED ACUTE RENAL FAILURE TYPE (HCC): Primary | ICD-10-CM

## 2019-04-19 DIAGNOSIS — E87.5 HYPERKALEMIA: ICD-10-CM

## 2019-04-19 DIAGNOSIS — R33.9 URINARY RETENTION: ICD-10-CM

## 2019-04-19 PROBLEM — E53.8 B12 DEFICIENCY: Status: ACTIVE | Noted: 2019-04-19

## 2019-04-19 LAB
ALBUMIN SERPL-MCNC: 4.2 G/DL (ref 3.5–5.2)
ALBUMIN SERPL-MCNC: 4.6 G/DL (ref 3.5–5.2)
ALBUMIN/GLOB SERPL: 1.4 G/DL
ALBUMIN/GLOB SERPL: 1.8 G/DL
ALP SERPL-CCNC: 164 U/L (ref 39–117)
ALP SERPL-CCNC: 202 U/L (ref 39–117)
ALT SERPL W P-5'-P-CCNC: 13 U/L (ref 1–41)
ALT SERPL-CCNC: 14 U/L (ref 1–41)
ANION GAP SERPL CALCULATED.3IONS-SCNC: 13.3 MMOL/L
APTT PPP: 30.7 SECONDS (ref 22.7–35.4)
AST SERPL-CCNC: 14 U/L (ref 1–40)
AST SERPL-CCNC: 16 U/L (ref 1–40)
BACTERIA UR QL AUTO: ABNORMAL /HPF
BASOPHILS # BLD AUTO: 0.04 10*3/MM3 (ref 0–0.2)
BASOPHILS # BLD AUTO: 0.05 10*3/MM3 (ref 0–0.2)
BASOPHILS NFR BLD AUTO: 0.6 % (ref 0–1.5)
BASOPHILS NFR BLD AUTO: 0.9 % (ref 0–1.5)
BILIRUB SERPL-MCNC: 0.3 MG/DL (ref 0.2–1.2)
BILIRUB SERPL-MCNC: <0.2 MG/DL (ref 0.2–1.2)
BILIRUB UR QL STRIP: NEGATIVE
BUN BLD-MCNC: 41 MG/DL (ref 8–23)
BUN SERPL-MCNC: 34 MG/DL (ref 8–23)
BUN/CREAT SERPL: 15.8 (ref 7–25)
BUN/CREAT SERPL: 22.2 (ref 7–25)
CALCIUM SERPL-MCNC: 9.3 MG/DL (ref 8.6–10.5)
CALCIUM SPEC-SCNC: 9.2 MG/DL (ref 8.6–10.5)
CHLORIDE SERPL-SCNC: 108 MMOL/L (ref 98–107)
CHLORIDE SERPL-SCNC: 111 MMOL/L (ref 98–107)
CHOLEST SERPL-MCNC: 125 MG/DL (ref 0–200)
CLARITY UR: CLEAR
CO2 SERPL-SCNC: 15.7 MMOL/L (ref 22–29)
CO2 SERPL-SCNC: 18.6 MMOL/L (ref 22–29)
COLOR UR: YELLOW
CREAT BLD-MCNC: 1.85 MG/DL (ref 0.76–1.27)
CREAT SERPL-MCNC: 2.15 MG/DL (ref 0.76–1.27)
DEPRECATED RDW RBC AUTO: 46.6 FL (ref 37–54)
EOSINOPHIL # BLD AUTO: 0.13 10*3/MM3 (ref 0–0.4)
EOSINOPHIL # BLD AUTO: 0.18 10*3/MM3 (ref 0–0.4)
EOSINOPHIL NFR BLD AUTO: 2.2 % (ref 0.3–6.2)
EOSINOPHIL NFR BLD AUTO: 2.8 % (ref 0.3–6.2)
ERYTHROCYTE [DISTWIDTH] IN BLOOD BY AUTOMATED COUNT: 14.4 % (ref 12.3–15.4)
ERYTHROCYTE [DISTWIDTH] IN BLOOD BY AUTOMATED COUNT: 14.4 % (ref 12.3–15.4)
FERRITIN SERPL-MCNC: 46.5 NG/ML (ref 30–400)
GFR SERPL CREATININE-BSD FRML MDRD: 36 ML/MIN/1.73
GLOBULIN SER CALC-MCNC: 2.5 GM/DL
GLOBULIN UR ELPH-MCNC: 3.1 GM/DL
GLUCOSE BLD-MCNC: 87 MG/DL (ref 65–99)
GLUCOSE BLDC GLUCOMTR-MCNC: 103 MG/DL (ref 70–130)
GLUCOSE BLDC GLUCOMTR-MCNC: 82 MG/DL (ref 70–130)
GLUCOSE BLDC GLUCOMTR-MCNC: 98 MG/DL (ref 70–130)
GLUCOSE SERPL-MCNC: 77 MG/DL (ref 65–99)
GLUCOSE UR STRIP-MCNC: NEGATIVE MG/DL
HBA1C MFR BLD: 5.3 % (ref 4.8–5.6)
HCT VFR BLD AUTO: 28.8 % (ref 37.5–51)
HCT VFR BLD AUTO: 31 % (ref 37.5–51)
HDLC SERPL-MCNC: 29 MG/DL (ref 40–60)
HEMOCCULT STL QL: NEGATIVE
HGB BLD-MCNC: 8.8 G/DL (ref 13–17.7)
HGB BLD-MCNC: 9.1 G/DL (ref 13–17.7)
HGB UR QL STRIP.AUTO: NEGATIVE
HOLD SPECIMEN: NORMAL
HOLD SPECIMEN: NORMAL
HYALINE CASTS UR QL AUTO: ABNORMAL /LPF
IMM GRANULOCYTES # BLD AUTO: 0.02 10*3/MM3 (ref 0–0.05)
IMM GRANULOCYTES # BLD AUTO: 0.02 10*3/MM3 (ref 0–0.05)
IMM GRANULOCYTES NFR BLD AUTO: 0.3 % (ref 0–0.5)
IMM GRANULOCYTES NFR BLD AUTO: 0.3 % (ref 0–0.5)
INR PPP: 1.09 (ref 0.9–1.1)
IRON 24H UR-MRATE: 35 MCG/DL (ref 59–158)
IRON SATN MFR SERPL: 9 % (ref 20–50)
KETONES UR QL STRIP: NEGATIVE
LDLC SERPL CALC-MCNC: 38 MG/DL (ref 0–100)
LDLC/HDLC SERPL: 1.3 {RATIO}
LEUKOCYTE ESTERASE UR QL STRIP.AUTO: ABNORMAL
LYMPHOCYTES # BLD AUTO: 1.39 10*3/MM3 (ref 0.7–3.1)
LYMPHOCYTES # BLD AUTO: 1.44 10*3/MM3 (ref 0.7–3.1)
LYMPHOCYTES NFR BLD AUTO: 21.4 % (ref 19.6–45.3)
LYMPHOCYTES NFR BLD AUTO: 24.5 % (ref 19.6–45.3)
MAGNESIUM SERPL-MCNC: 1.9 MG/DL (ref 1.6–2.4)
MCH RBC QN AUTO: 26.8 PG (ref 26.6–33)
MCH RBC QN AUTO: 27.4 PG (ref 26.6–33)
MCHC RBC AUTO-ENTMCNC: 29.4 G/DL (ref 31.5–35.7)
MCHC RBC AUTO-ENTMCNC: 30.6 G/DL (ref 31.5–35.7)
MCV RBC AUTO: 89.7 FL (ref 79–97)
MCV RBC AUTO: 91.4 FL (ref 79–97)
MONOCYTES # BLD AUTO: 0.67 10*3/MM3 (ref 0.1–0.9)
MONOCYTES # BLD AUTO: 0.73 10*3/MM3 (ref 0.1–0.9)
MONOCYTES NFR BLD AUTO: 10.3 % (ref 5–12)
MONOCYTES NFR BLD AUTO: 12.4 % (ref 5–12)
NEUTROPHILS # BLD AUTO: 3.5 10*3/MM3 (ref 1.4–7)
NEUTROPHILS # BLD AUTO: 4.2 10*3/MM3 (ref 1.4–7)
NEUTROPHILS NFR BLD AUTO: 59.7 % (ref 42.7–76)
NEUTROPHILS NFR BLD AUTO: 64.6 % (ref 42.7–76)
NITRITE UR QL STRIP: NEGATIVE
NRBC BLD AUTO-RTO: 0 /100 WBC (ref 0–0)
NRBC BLD AUTO-RTO: 0 /100 WBC (ref 0–0)
PH UR STRIP.AUTO: <=5 [PH] (ref 5–8)
PLATELET # BLD AUTO: 271 10*3/MM3 (ref 140–450)
PLATELET # BLD AUTO: 291 10*3/MM3 (ref 140–450)
PMV BLD AUTO: 10.1 FL (ref 6–12)
POTASSIUM BLD-SCNC: 5.7 MMOL/L (ref 3.5–5.2)
POTASSIUM SERPL-SCNC: 6 MMOL/L (ref 3.5–5.2)
PROT SERPL-MCNC: 7.1 G/DL (ref 6–8.5)
PROT SERPL-MCNC: 7.3 G/DL (ref 6–8.5)
PROT UR QL STRIP: NEGATIVE
PROTHROMBIN TIME: 13.8 SECONDS (ref 11.7–14.2)
RBC # BLD AUTO: 3.21 10*6/MM3 (ref 4.14–5.8)
RBC # BLD AUTO: 3.39 10*6/MM3 (ref 4.14–5.8)
RBC # UR: ABNORMAL /HPF
REF LAB TEST METHOD: ABNORMAL
SODIUM BLD-SCNC: 140 MMOL/L (ref 136–145)
SODIUM SERPL-SCNC: 137 MMOL/L (ref 136–145)
SP GR UR STRIP: 1.01 (ref 1–1.03)
SQUAMOUS #/AREA URNS HPF: ABNORMAL /HPF
TIBC SERPL-MCNC: 390 MCG/DL (ref 298–536)
TRANSFERRIN SERPL-MCNC: 262 MG/DL (ref 200–360)
TRIGL SERPL-MCNC: 292 MG/DL (ref 0–150)
UROBILINOGEN UR QL STRIP: ABNORMAL
VIT B12 BLD-MCNC: 345 PG/ML (ref 211–946)
VLDLC SERPL CALC-MCNC: 58.4 MG/DL
WBC # BLD AUTO: 5.87 10*3/MM3 (ref 3.4–10.8)
WBC NRBC COR # BLD: 6.5 10*3/MM3 (ref 3.4–10.8)
WBC UR QL AUTO: ABNORMAL /HPF
WHOLE BLOOD HOLD SPECIMEN: NORMAL
WHOLE BLOOD HOLD SPECIMEN: NORMAL

## 2019-04-19 PROCEDURE — 85610 PROTHROMBIN TIME: CPT | Performed by: EMERGENCY MEDICINE

## 2019-04-19 PROCEDURE — 93005 ELECTROCARDIOGRAM TRACING: CPT | Performed by: EMERGENCY MEDICINE

## 2019-04-19 PROCEDURE — 85025 COMPLETE CBC W/AUTO DIFF WBC: CPT | Performed by: EMERGENCY MEDICINE

## 2019-04-19 PROCEDURE — 25010000002 ENOXAPARIN PER 10 MG: Performed by: INTERNAL MEDICINE

## 2019-04-19 PROCEDURE — 99284 EMERGENCY DEPT VISIT MOD MDM: CPT

## 2019-04-19 PROCEDURE — 84466 ASSAY OF TRANSFERRIN: CPT | Performed by: INTERNAL MEDICINE

## 2019-04-19 PROCEDURE — 83735 ASSAY OF MAGNESIUM: CPT | Performed by: EMERGENCY MEDICINE

## 2019-04-19 PROCEDURE — 81001 URINALYSIS AUTO W/SCOPE: CPT | Performed by: EMERGENCY MEDICINE

## 2019-04-19 PROCEDURE — 85730 THROMBOPLASTIN TIME PARTIAL: CPT | Performed by: EMERGENCY MEDICINE

## 2019-04-19 PROCEDURE — 74176 CT ABD & PELVIS W/O CONTRAST: CPT

## 2019-04-19 PROCEDURE — 82272 OCCULT BLD FECES 1-3 TESTS: CPT | Performed by: EMERGENCY MEDICINE

## 2019-04-19 PROCEDURE — 80053 COMPREHEN METABOLIC PANEL: CPT | Performed by: EMERGENCY MEDICINE

## 2019-04-19 PROCEDURE — 93010 ELECTROCARDIOGRAM REPORT: CPT | Performed by: INTERNAL MEDICINE

## 2019-04-19 PROCEDURE — 51798 US URINE CAPACITY MEASURE: CPT

## 2019-04-19 PROCEDURE — 82728 ASSAY OF FERRITIN: CPT | Performed by: INTERNAL MEDICINE

## 2019-04-19 PROCEDURE — 83540 ASSAY OF IRON: CPT | Performed by: INTERNAL MEDICINE

## 2019-04-19 PROCEDURE — 82607 VITAMIN B-12: CPT | Performed by: INTERNAL MEDICINE

## 2019-04-19 PROCEDURE — 51702 INSERT TEMP BLADDER CATH: CPT

## 2019-04-19 PROCEDURE — 82962 GLUCOSE BLOOD TEST: CPT

## 2019-04-19 RX ORDER — CLOPIDOGREL BISULFATE 75 MG/1
75 TABLET ORAL DAILY
COMMUNITY
End: 2019-05-23 | Stop reason: SDUPTHER

## 2019-04-19 RX ORDER — TAMSULOSIN HYDROCHLORIDE 0.4 MG/1
0.4 CAPSULE ORAL DAILY
Status: DISCONTINUED | OUTPATIENT
Start: 2019-04-19 | End: 2019-04-21 | Stop reason: HOSPADM

## 2019-04-19 RX ORDER — GABAPENTIN 100 MG/1
200 CAPSULE ORAL 3 TIMES DAILY
COMMUNITY
End: 2019-05-23 | Stop reason: SDUPTHER

## 2019-04-19 RX ORDER — CHOLESTYRAMINE 4 G/9G
1 POWDER, FOR SUSPENSION ORAL EVERY 6 HOURS PRN
COMMUNITY
End: 2019-05-23

## 2019-04-19 RX ORDER — SODIUM CHLORIDE, SODIUM LACTATE, POTASSIUM CHLORIDE, CALCIUM CHLORIDE 600; 310; 30; 20 MG/100ML; MG/100ML; MG/100ML; MG/100ML
100 INJECTION, SOLUTION INTRAVENOUS CONTINUOUS
Status: DISCONTINUED | OUTPATIENT
Start: 2019-04-19 | End: 2019-04-20

## 2019-04-19 RX ORDER — ONDANSETRON 4 MG/1
4 TABLET, FILM COATED ORAL EVERY 6 HOURS PRN
Status: DISCONTINUED | OUTPATIENT
Start: 2019-04-19 | End: 2019-04-21 | Stop reason: HOSPADM

## 2019-04-19 RX ORDER — NIFEDIPINE 30 MG/1
30 TABLET, EXTENDED RELEASE ORAL DAILY
Status: DISCONTINUED | OUTPATIENT
Start: 2019-04-20 | End: 2019-04-21 | Stop reason: HOSPADM

## 2019-04-19 RX ORDER — SODIUM CHLORIDE 9 MG/ML
125 INJECTION, SOLUTION INTRAVENOUS CONTINUOUS
Status: DISCONTINUED | OUTPATIENT
Start: 2019-04-19 | End: 2019-04-19

## 2019-04-19 RX ORDER — ACETAMINOPHEN 325 MG/1
650 TABLET ORAL EVERY 4 HOURS PRN
Status: DISCONTINUED | OUTPATIENT
Start: 2019-04-19 | End: 2019-04-21 | Stop reason: HOSPADM

## 2019-04-19 RX ORDER — SODIUM CHLORIDE 0.9 % (FLUSH) 0.9 %
3-10 SYRINGE (ML) INJECTION AS NEEDED
Status: DISCONTINUED | OUTPATIENT
Start: 2019-04-19 | End: 2019-04-21 | Stop reason: HOSPADM

## 2019-04-19 RX ORDER — CHOLECALCIFEROL (VITAMIN D3) 125 MCG
1000 CAPSULE ORAL DAILY
Status: DISCONTINUED | OUTPATIENT
Start: 2019-04-19 | End: 2019-04-21 | Stop reason: HOSPADM

## 2019-04-19 RX ORDER — CHOLECALCIFEROL (VITAMIN D3) 125 MCG
5 CAPSULE ORAL NIGHTLY PRN
Status: DISCONTINUED | OUTPATIENT
Start: 2019-04-19 | End: 2019-04-21 | Stop reason: HOSPADM

## 2019-04-19 RX ORDER — CLOPIDOGREL BISULFATE 75 MG/1
75 TABLET ORAL DAILY
Status: DISCONTINUED | OUTPATIENT
Start: 2019-04-20 | End: 2019-04-21 | Stop reason: HOSPADM

## 2019-04-19 RX ORDER — PANTOPRAZOLE SODIUM 40 MG/1
40 TABLET, DELAYED RELEASE ORAL 2 TIMES DAILY
Status: DISCONTINUED | OUTPATIENT
Start: 2019-04-19 | End: 2019-04-21 | Stop reason: HOSPADM

## 2019-04-19 RX ORDER — GABAPENTIN 100 MG/1
100 CAPSULE ORAL NIGHTLY
Status: DISCONTINUED | OUTPATIENT
Start: 2019-04-19 | End: 2019-04-20

## 2019-04-19 RX ORDER — HYDROCODONE BITARTRATE AND ACETAMINOPHEN 5; 325 MG/1; MG/1
1 TABLET ORAL EVERY 4 HOURS PRN
Status: DISCONTINUED | OUTPATIENT
Start: 2019-04-19 | End: 2019-04-21 | Stop reason: HOSPADM

## 2019-04-19 RX ORDER — ATORVASTATIN CALCIUM 20 MG/1
20 TABLET, FILM COATED ORAL DAILY
Status: DISCONTINUED | OUTPATIENT
Start: 2019-04-20 | End: 2019-04-21 | Stop reason: HOSPADM

## 2019-04-19 RX ORDER — SODIUM CHLORIDE 0.9 % (FLUSH) 0.9 %
10 SYRINGE (ML) INJECTION AS NEEDED
Status: DISCONTINUED | OUTPATIENT
Start: 2019-04-19 | End: 2019-04-21 | Stop reason: HOSPADM

## 2019-04-19 RX ORDER — BUSPIRONE HYDROCHLORIDE 5 MG/1
5 TABLET ORAL 3 TIMES DAILY PRN
COMMUNITY
End: 2019-05-23 | Stop reason: SDUPTHER

## 2019-04-19 RX ORDER — DEXTROSE MONOHYDRATE 25 G/50ML
25 INJECTION, SOLUTION INTRAVENOUS
Status: DISCONTINUED | OUTPATIENT
Start: 2019-04-19 | End: 2019-04-21 | Stop reason: HOSPADM

## 2019-04-19 RX ORDER — METOPROLOL SUCCINATE 25 MG/1
25 TABLET, EXTENDED RELEASE ORAL DAILY
COMMUNITY
End: 2019-12-26 | Stop reason: SDUPTHER

## 2019-04-19 RX ORDER — SODIUM CHLORIDE 0.9 % (FLUSH) 0.9 %
3 SYRINGE (ML) INJECTION EVERY 12 HOURS SCHEDULED
Status: DISCONTINUED | OUTPATIENT
Start: 2019-04-19 | End: 2019-04-21 | Stop reason: HOSPADM

## 2019-04-19 RX ORDER — NATEGLINIDE 60 MG/1
60 TABLET ORAL 2 TIMES DAILY WITH MEALS
COMMUNITY
End: 2019-05-23 | Stop reason: SDUPTHER

## 2019-04-19 RX ORDER — NITROGLYCERIN 0.4 MG/1
0.4 TABLET SUBLINGUAL
Status: DISCONTINUED | OUTPATIENT
Start: 2019-04-19 | End: 2019-04-21 | Stop reason: HOSPADM

## 2019-04-19 RX ORDER — CALCIUM CARBONATE 200(500)MG
2 TABLET,CHEWABLE ORAL 2 TIMES DAILY PRN
Status: DISCONTINUED | OUTPATIENT
Start: 2019-04-19 | End: 2019-04-21 | Stop reason: HOSPADM

## 2019-04-19 RX ORDER — METOPROLOL SUCCINATE 25 MG/1
25 TABLET, EXTENDED RELEASE ORAL DAILY
Status: DISCONTINUED | OUTPATIENT
Start: 2019-04-20 | End: 2019-04-21 | Stop reason: HOSPADM

## 2019-04-19 RX ORDER — BUSPIRONE HYDROCHLORIDE 5 MG/1
5 TABLET ORAL EVERY 8 HOURS SCHEDULED
Status: DISCONTINUED | OUTPATIENT
Start: 2019-04-19 | End: 2019-04-21 | Stop reason: HOSPADM

## 2019-04-19 RX ORDER — ATORVASTATIN CALCIUM 20 MG/1
20 TABLET, FILM COATED ORAL DAILY
COMMUNITY
End: 2019-05-23 | Stop reason: SDUPTHER

## 2019-04-19 RX ORDER — NICOTINE POLACRILEX 4 MG
15 LOZENGE BUCCAL
Status: DISCONTINUED | OUTPATIENT
Start: 2019-04-19 | End: 2019-04-21 | Stop reason: HOSPADM

## 2019-04-19 RX ORDER — NIFEDIPINE 30 MG/1
30 TABLET, EXTENDED RELEASE ORAL DAILY
COMMUNITY
End: 2019-10-17 | Stop reason: SDUPTHER

## 2019-04-19 RX ORDER — BISACODYL 5 MG/1
5 TABLET, DELAYED RELEASE ORAL DAILY PRN
Status: DISCONTINUED | OUTPATIENT
Start: 2019-04-19 | End: 2019-04-21 | Stop reason: HOSPADM

## 2019-04-19 RX ORDER — ONDANSETRON 2 MG/ML
4 INJECTION INTRAMUSCULAR; INTRAVENOUS EVERY 6 HOURS PRN
Status: DISCONTINUED | OUTPATIENT
Start: 2019-04-19 | End: 2019-04-21 | Stop reason: HOSPADM

## 2019-04-19 RX ADMIN — SODIUM CHLORIDE 125 ML/HR: 9 INJECTION, SOLUTION INTRAVENOUS at 12:12

## 2019-04-19 RX ADMIN — PANTOPRAZOLE SODIUM 40 MG: 40 TABLET, DELAYED RELEASE ORAL at 21:15

## 2019-04-19 RX ADMIN — ENOXAPARIN SODIUM 30 MG: 30 INJECTION SUBCUTANEOUS at 20:05

## 2019-04-19 RX ADMIN — GABAPENTIN 100 MG: 100 CAPSULE ORAL at 21:15

## 2019-04-19 RX ADMIN — Medication 1000 MCG: at 21:15

## 2019-04-19 RX ADMIN — BUSPIRONE HYDROCHLORIDE 5 MG: 5 TABLET ORAL at 21:15

## 2019-04-19 RX ADMIN — TAMSULOSIN HYDROCHLORIDE 0.4 MG: 0.4 CAPSULE ORAL at 22:03

## 2019-04-19 RX ADMIN — SODIUM CHLORIDE 500 ML: 9 INJECTION, SOLUTION INTRAVENOUS at 12:12

## 2019-04-19 RX ADMIN — SODIUM CHLORIDE, POTASSIUM CHLORIDE, SODIUM LACTATE AND CALCIUM CHLORIDE 100 ML/HR: 600; 310; 30; 20 INJECTION, SOLUTION INTRAVENOUS at 20:05

## 2019-04-19 RX ADMIN — SODIUM CHLORIDE, PRESERVATIVE FREE 3 ML: 5 INJECTION INTRAVENOUS at 20:15

## 2019-04-20 LAB
ANION GAP SERPL CALCULATED.3IONS-SCNC: 12.2 MMOL/L
BASOPHILS # BLD AUTO: 0.05 10*3/MM3 (ref 0–0.2)
BASOPHILS NFR BLD AUTO: 0.7 % (ref 0–1.5)
BUN BLD-MCNC: 29 MG/DL (ref 8–23)
BUN/CREAT SERPL: 19.6 (ref 7–25)
CALCIUM SPEC-SCNC: 8.4 MG/DL (ref 8.6–10.5)
CHLORIDE SERPL-SCNC: 111 MMOL/L (ref 98–107)
CO2 SERPL-SCNC: 14.8 MMOL/L (ref 22–29)
CREAT BLD-MCNC: 1.48 MG/DL (ref 0.76–1.27)
DEPRECATED RDW RBC AUTO: 46.8 FL (ref 37–54)
EOSINOPHIL # BLD AUTO: 0.2 10*3/MM3 (ref 0–0.4)
EOSINOPHIL NFR BLD AUTO: 3 % (ref 0.3–6.2)
ERYTHROCYTE [DISTWIDTH] IN BLOOD BY AUTOMATED COUNT: 14.2 % (ref 12.3–15.4)
GFR SERPL CREATININE-BSD FRML MDRD: 46 ML/MIN/1.73
GLUCOSE BLD-MCNC: 92 MG/DL (ref 65–99)
GLUCOSE BLDC GLUCOMTR-MCNC: 126 MG/DL (ref 70–130)
GLUCOSE BLDC GLUCOMTR-MCNC: 143 MG/DL (ref 70–130)
GLUCOSE BLDC GLUCOMTR-MCNC: 177 MG/DL (ref 70–130)
GLUCOSE BLDC GLUCOMTR-MCNC: 92 MG/DL (ref 70–130)
HCT VFR BLD AUTO: 26 % (ref 37.5–51)
HGB BLD-MCNC: 7.9 G/DL (ref 13–17.7)
IMM GRANULOCYTES # BLD AUTO: 0.03 10*3/MM3 (ref 0–0.05)
IMM GRANULOCYTES NFR BLD AUTO: 0.4 % (ref 0–0.5)
LYMPHOCYTES # BLD AUTO: 1.54 10*3/MM3 (ref 0.7–3.1)
LYMPHOCYTES NFR BLD AUTO: 23.1 % (ref 19.6–45.3)
MCH RBC QN AUTO: 27.4 PG (ref 26.6–33)
MCHC RBC AUTO-ENTMCNC: 30.4 G/DL (ref 31.5–35.7)
MCV RBC AUTO: 90.3 FL (ref 79–97)
MONOCYTES # BLD AUTO: 0.82 10*3/MM3 (ref 0.1–0.9)
MONOCYTES NFR BLD AUTO: 12.3 % (ref 5–12)
NEUTROPHILS # BLD AUTO: 4.04 10*3/MM3 (ref 1.7–7)
NEUTROPHILS NFR BLD AUTO: 60.5 % (ref 42.7–76)
NRBC BLD AUTO-RTO: 0 /100 WBC (ref 0–0.2)
PLATELET # BLD AUTO: 257 10*3/MM3 (ref 140–450)
PMV BLD AUTO: 10.2 FL (ref 6–12)
POTASSIUM BLD-SCNC: 5.7 MMOL/L (ref 3.5–5.2)
RBC # BLD AUTO: 2.88 10*6/MM3 (ref 4.14–5.8)
SODIUM BLD-SCNC: 138 MMOL/L (ref 136–145)
WBC NRBC COR # BLD: 6.68 10*3/MM3 (ref 3.4–10.8)

## 2019-04-20 PROCEDURE — 97161 PT EVAL LOW COMPLEX 20 MIN: CPT | Performed by: PHYSICAL THERAPIST

## 2019-04-20 PROCEDURE — 82962 GLUCOSE BLOOD TEST: CPT

## 2019-04-20 PROCEDURE — 36415 COLL VENOUS BLD VENIPUNCTURE: CPT | Performed by: INTERNAL MEDICINE

## 2019-04-20 PROCEDURE — 25010000002 ENOXAPARIN PER 10 MG: Performed by: INTERNAL MEDICINE

## 2019-04-20 PROCEDURE — 63710000001 INSULIN LISPRO (HUMAN) PER 5 UNITS: Performed by: INTERNAL MEDICINE

## 2019-04-20 PROCEDURE — 80048 BASIC METABOLIC PNL TOTAL CA: CPT | Performed by: INTERNAL MEDICINE

## 2019-04-20 PROCEDURE — 85025 COMPLETE CBC W/AUTO DIFF WBC: CPT | Performed by: INTERNAL MEDICINE

## 2019-04-20 PROCEDURE — 97116 GAIT TRAINING THERAPY: CPT | Performed by: PHYSICAL THERAPIST

## 2019-04-20 RX ORDER — SODIUM CHLORIDE, SODIUM LACTATE, POTASSIUM CHLORIDE, CALCIUM CHLORIDE 600; 310; 30; 20 MG/100ML; MG/100ML; MG/100ML; MG/100ML
125 INJECTION, SOLUTION INTRAVENOUS CONTINUOUS
Status: ACTIVE | OUTPATIENT
Start: 2019-04-20 | End: 2019-04-20

## 2019-04-20 RX ORDER — SODIUM BICARBONATE 650 MG/1
1300 TABLET ORAL 3 TIMES DAILY
Status: DISCONTINUED | OUTPATIENT
Start: 2019-04-20 | End: 2019-04-21

## 2019-04-20 RX ORDER — GABAPENTIN 100 MG/1
200 CAPSULE ORAL EVERY 8 HOURS SCHEDULED
Status: DISCONTINUED | OUTPATIENT
Start: 2019-04-20 | End: 2019-04-21 | Stop reason: HOSPADM

## 2019-04-20 RX ORDER — SODIUM POLYSTYRENE SULFONATE 15 G/60ML
15 SUSPENSION ORAL; RECTAL ONCE
Status: COMPLETED | OUTPATIENT
Start: 2019-04-20 | End: 2019-04-20

## 2019-04-20 RX ADMIN — PANTOPRAZOLE SODIUM 40 MG: 40 TABLET, DELAYED RELEASE ORAL at 20:06

## 2019-04-20 RX ADMIN — SODIUM CHLORIDE, PRESERVATIVE FREE 3 ML: 5 INJECTION INTRAVENOUS at 09:19

## 2019-04-20 RX ADMIN — SERTRALINE 75 MG: 50 TABLET, FILM COATED ORAL at 09:19

## 2019-04-20 RX ADMIN — GABAPENTIN 200 MG: 100 CAPSULE ORAL at 22:19

## 2019-04-20 RX ADMIN — ATORVASTATIN CALCIUM 20 MG: 20 TABLET, FILM COATED ORAL at 09:18

## 2019-04-20 RX ADMIN — SODIUM BICARBONATE 1300 MG: 650 TABLET ORAL at 11:57

## 2019-04-20 RX ADMIN — GABAPENTIN 200 MG: 100 CAPSULE ORAL at 15:05

## 2019-04-20 RX ADMIN — INSULIN LISPRO 2 UNITS: 100 INJECTION, SOLUTION INTRAVENOUS; SUBCUTANEOUS at 20:06

## 2019-04-20 RX ADMIN — SODIUM POLYSTYRENE SULFONATE 15 G: 15 SUSPENSION ORAL; RECTAL at 11:57

## 2019-04-20 RX ADMIN — BUSPIRONE HYDROCHLORIDE 5 MG: 5 TABLET ORAL at 22:19

## 2019-04-20 RX ADMIN — BUSPIRONE HYDROCHLORIDE 5 MG: 5 TABLET ORAL at 15:05

## 2019-04-20 RX ADMIN — METOPROLOL SUCCINATE 25 MG: 25 TABLET, FILM COATED, EXTENDED RELEASE ORAL at 09:19

## 2019-04-20 RX ADMIN — ENOXAPARIN SODIUM 30 MG: 30 INJECTION SUBCUTANEOUS at 15:05

## 2019-04-20 RX ADMIN — SODIUM BICARBONATE 1300 MG: 650 TABLET ORAL at 16:11

## 2019-04-20 RX ADMIN — SODIUM CHLORIDE, PRESERVATIVE FREE 3 ML: 5 INJECTION INTRAVENOUS at 22:19

## 2019-04-20 RX ADMIN — TAMSULOSIN HYDROCHLORIDE 0.4 MG: 0.4 CAPSULE ORAL at 09:19

## 2019-04-20 RX ADMIN — Medication 1000 MCG: at 09:19

## 2019-04-20 RX ADMIN — CLOPIDOGREL 75 MG: 75 TABLET, FILM COATED ORAL at 09:18

## 2019-04-20 RX ADMIN — NIFEDIPINE 30 MG: 30 TABLET, FILM COATED, EXTENDED RELEASE ORAL at 09:18

## 2019-04-20 RX ADMIN — PANTOPRAZOLE SODIUM 40 MG: 40 TABLET, DELAYED RELEASE ORAL at 09:19

## 2019-04-20 RX ADMIN — BUSPIRONE HYDROCHLORIDE 5 MG: 5 TABLET ORAL at 05:43

## 2019-04-20 RX ADMIN — SODIUM BICARBONATE 1300 MG: 650 TABLET ORAL at 20:06

## 2019-04-21 VITALS
TEMPERATURE: 97.3 F | HEART RATE: 63 BPM | RESPIRATION RATE: 16 BRPM | OXYGEN SATURATION: 95 % | SYSTOLIC BLOOD PRESSURE: 143 MMHG | WEIGHT: 208.5 LBS | DIASTOLIC BLOOD PRESSURE: 65 MMHG | HEIGHT: 72 IN | BODY MASS INDEX: 28.24 KG/M2

## 2019-04-21 PROBLEM — N17.9 ACUTE RENAL FAILURE: Status: RESOLVED | Noted: 2019-04-19 | Resolved: 2019-04-21

## 2019-04-21 PROBLEM — D50.9 IRON DEFICIENCY ANEMIA: Status: ACTIVE | Noted: 2019-04-21

## 2019-04-21 PROBLEM — N18.30 CKD (CHRONIC KIDNEY DISEASE) STAGE 3, GFR 30-59 ML/MIN (HCC): Status: ACTIVE | Noted: 2019-04-21

## 2019-04-21 PROBLEM — R33.9 URINE RETENTION: Status: RESOLVED | Noted: 2019-04-19 | Resolved: 2019-04-21

## 2019-04-21 PROBLEM — E87.5 HYPERKALEMIA: Status: RESOLVED | Noted: 2019-04-19 | Resolved: 2019-04-21

## 2019-04-21 LAB
ANION GAP SERPL CALCULATED.3IONS-SCNC: 12.5 MMOL/L
BASOPHILS # BLD AUTO: 0.04 10*3/MM3 (ref 0–0.2)
BASOPHILS NFR BLD AUTO: 0.6 % (ref 0–1.5)
BUN BLD-MCNC: 19 MG/DL (ref 8–23)
BUN/CREAT SERPL: 14.2 (ref 7–25)
CALCIUM SPEC-SCNC: 8 MG/DL (ref 8.6–10.5)
CHLORIDE SERPL-SCNC: 110 MMOL/L (ref 98–107)
CO2 SERPL-SCNC: 20.5 MMOL/L (ref 22–29)
CREAT BLD-MCNC: 1.34 MG/DL (ref 0.76–1.27)
DEPRECATED RDW RBC AUTO: 43.7 FL (ref 37–54)
EOSINOPHIL # BLD AUTO: 0.23 10*3/MM3 (ref 0–0.4)
EOSINOPHIL NFR BLD AUTO: 3.7 % (ref 0.3–6.2)
ERYTHROCYTE [DISTWIDTH] IN BLOOD BY AUTOMATED COUNT: 14 % (ref 12.3–15.4)
GFR SERPL CREATININE-BSD FRML MDRD: 52 ML/MIN/1.73
GLUCOSE BLD-MCNC: 93 MG/DL (ref 65–99)
GLUCOSE BLDC GLUCOMTR-MCNC: 132 MG/DL (ref 70–130)
GLUCOSE BLDC GLUCOMTR-MCNC: 94 MG/DL (ref 70–130)
HCT VFR BLD AUTO: 25.4 % (ref 37.5–51)
HGB BLD-MCNC: 8.1 G/DL (ref 13–17.7)
IMM GRANULOCYTES # BLD AUTO: 0.03 10*3/MM3 (ref 0–0.05)
IMM GRANULOCYTES NFR BLD AUTO: 0.5 % (ref 0–0.5)
LYMPHOCYTES # BLD AUTO: 1.55 10*3/MM3 (ref 0.7–3.1)
LYMPHOCYTES NFR BLD AUTO: 24.8 % (ref 19.6–45.3)
MCH RBC QN AUTO: 27.6 PG (ref 26.6–33)
MCHC RBC AUTO-ENTMCNC: 31.9 G/DL (ref 31.5–35.7)
MCV RBC AUTO: 86.7 FL (ref 79–97)
MONOCYTES # BLD AUTO: 0.77 10*3/MM3 (ref 0.1–0.9)
MONOCYTES NFR BLD AUTO: 12.3 % (ref 5–12)
NEUTROPHILS # BLD AUTO: 3.64 10*3/MM3 (ref 1.7–7)
NEUTROPHILS NFR BLD AUTO: 58.1 % (ref 42.7–76)
NRBC BLD AUTO-RTO: 0 /100 WBC (ref 0–0.2)
PLATELET # BLD AUTO: 252 10*3/MM3 (ref 140–450)
PMV BLD AUTO: 9.9 FL (ref 6–12)
POTASSIUM BLD-SCNC: 4.6 MMOL/L (ref 3.5–5.2)
RBC # BLD AUTO: 2.93 10*6/MM3 (ref 4.14–5.8)
SODIUM BLD-SCNC: 143 MMOL/L (ref 136–145)
WBC NRBC COR # BLD: 6.26 10*3/MM3 (ref 3.4–10.8)

## 2019-04-21 PROCEDURE — 80048 BASIC METABOLIC PNL TOTAL CA: CPT | Performed by: INTERNAL MEDICINE

## 2019-04-21 PROCEDURE — 85025 COMPLETE CBC W/AUTO DIFF WBC: CPT | Performed by: INTERNAL MEDICINE

## 2019-04-21 PROCEDURE — 82962 GLUCOSE BLOOD TEST: CPT

## 2019-04-21 PROCEDURE — 97110 THERAPEUTIC EXERCISES: CPT

## 2019-04-21 RX ORDER — TAMSULOSIN HYDROCHLORIDE 0.4 MG/1
0.4 CAPSULE ORAL DAILY
Qty: 30 CAPSULE | Refills: 0 | Status: SHIPPED | OUTPATIENT
Start: 2019-04-22 | End: 2019-05-20 | Stop reason: SDUPTHER

## 2019-04-21 RX ORDER — DOXYCYCLINE HYCLATE 50 MG/1
324 CAPSULE, GELATIN COATED ORAL
Qty: 30 TABLET | Refills: 0 | Status: SHIPPED | OUTPATIENT
Start: 2019-04-21 | End: 2019-05-20 | Stop reason: SDUPTHER

## 2019-04-21 RX ADMIN — GABAPENTIN 200 MG: 100 CAPSULE ORAL at 06:23

## 2019-04-21 RX ADMIN — SERTRALINE 75 MG: 50 TABLET, FILM COATED ORAL at 10:09

## 2019-04-21 RX ADMIN — PANTOPRAZOLE SODIUM 40 MG: 40 TABLET, DELAYED RELEASE ORAL at 10:15

## 2019-04-21 RX ADMIN — SODIUM CHLORIDE, PRESERVATIVE FREE 3 ML: 5 INJECTION INTRAVENOUS at 10:10

## 2019-04-21 RX ADMIN — Medication 1000 MCG: at 10:09

## 2019-04-21 RX ADMIN — CLOPIDOGREL 75 MG: 75 TABLET, FILM COATED ORAL at 10:09

## 2019-04-21 RX ADMIN — NIFEDIPINE 30 MG: 30 TABLET, FILM COATED, EXTENDED RELEASE ORAL at 10:09

## 2019-04-21 RX ADMIN — TAMSULOSIN HYDROCHLORIDE 0.4 MG: 0.4 CAPSULE ORAL at 10:08

## 2019-04-21 RX ADMIN — METOPROLOL SUCCINATE 25 MG: 25 TABLET, FILM COATED, EXTENDED RELEASE ORAL at 10:10

## 2019-04-21 RX ADMIN — BUSPIRONE HYDROCHLORIDE 5 MG: 5 TABLET ORAL at 06:23

## 2019-04-21 RX ADMIN — ATORVASTATIN CALCIUM 20 MG: 20 TABLET, FILM COATED ORAL at 10:09

## 2019-04-22 ENCOUNTER — READMISSION MANAGEMENT (OUTPATIENT)
Dept: CALL CENTER | Facility: HOSPITAL | Age: 79
End: 2019-04-22

## 2019-04-22 NOTE — OUTREACH NOTE
Prep Survey      Responses   Facility patient discharged from?  [facility]   Is patient eligible?  Yes   Discharge diagnosis  CKD, Iron def. anemia, normocytic anemia, B12 def., BPH, DM II, HTN   Does the patient have one of the following disease processes/diagnoses(primary or secondary)?  Other   Does the patient have Home health ordered?  No   Is there a DME ordered?  No   Comments regarding appointments  See AVS   Prep survey completed?  Yes          Rena Kim RN

## 2019-04-24 ENCOUNTER — READMISSION MANAGEMENT (OUTPATIENT)
Dept: CALL CENTER | Facility: HOSPITAL | Age: 79
End: 2019-04-24

## 2019-04-24 NOTE — OUTREACH NOTE
Medical Week 1 Survey      Responses   Facility patient discharged from?  [facility]   Does the patient have one of the following disease processes/diagnoses(primary or secondary)?  Other   Is there a successful TCM telephone encounter documented?  No   Week 1 attempt successful?  No   Unsuccessful attempts  Attempt 1          Chelsea Dallas RN

## 2019-04-25 ENCOUNTER — OFFICE VISIT (OUTPATIENT)
Dept: FAMILY MEDICINE CLINIC | Facility: CLINIC | Age: 79
End: 2019-04-25

## 2019-04-25 VITALS
HEIGHT: 72 IN | SYSTOLIC BLOOD PRESSURE: 132 MMHG | HEART RATE: 65 BPM | DIASTOLIC BLOOD PRESSURE: 54 MMHG | OXYGEN SATURATION: 98 % | WEIGHT: 216 LBS | BODY MASS INDEX: 29.26 KG/M2 | TEMPERATURE: 98.7 F

## 2019-04-25 DIAGNOSIS — N17.9 ACUTE KIDNEY INJURY (HCC): Primary | ICD-10-CM

## 2019-04-25 DIAGNOSIS — I10 HYPERTENSION, ESSENTIAL: Chronic | ICD-10-CM

## 2019-04-25 DIAGNOSIS — D64.9 ANEMIA, UNSPECIFIED TYPE: ICD-10-CM

## 2019-04-25 DIAGNOSIS — E11.29 CONTROLLED TYPE 2 DIABETES MELLITUS WITH OTHER DIABETIC KIDNEY COMPLICATION, WITHOUT LONG-TERM CURRENT USE OF INSULIN (HCC): Chronic | ICD-10-CM

## 2019-04-25 LAB
BILIRUB BLD-MCNC: NEGATIVE MG/DL
CLARITY, POC: CLEAR
COLOR UR: YELLOW
GLUCOSE UR STRIP-MCNC: NEGATIVE MG/DL
KETONES UR QL: NEGATIVE
LEUKOCYTE EST, POC: NEGATIVE
NITRITE UR-MCNC: NEGATIVE MG/ML
PH UR: 5.5 [PH] (ref 5–8)
PROT UR STRIP-MCNC: NEGATIVE MG/DL
RBC # UR STRIP: NEGATIVE /UL
SP GR UR: 1.01 (ref 1–1.03)
UROBILINOGEN UR QL: NORMAL

## 2019-04-25 PROCEDURE — 99213 OFFICE O/P EST LOW 20 MIN: CPT | Performed by: NURSE PRACTITIONER

## 2019-04-25 PROCEDURE — 81003 URINALYSIS AUTO W/O SCOPE: CPT | Performed by: NURSE PRACTITIONER

## 2019-04-25 NOTE — PROGRESS NOTES
Subjective   Dereck Ramírez is a 78 y.o. male presents for hospitalization follow up. Was in the office recently and with routine labs, was noted to have JUANA with hyperkalemia. He was treated with two nights in the hospital, received fluids, and metformin and ramipril were discontinued. His kidney function was back to baseline at discharge. He is here today for further labs and monitoring of BP and glucose.         Hypertension   This is a chronic problem. The current episode started more than 1 year ago. The problem is unchanged. The problem is controlled. Pertinent negatives include no anxiety, blurred vision, chest pain, headaches, malaise/fatigue, neck pain, orthopnea, palpitations, peripheral edema, PND, shortness of breath or sweats. There are no associated agents to hypertension. Risk factors for coronary artery disease include male gender, dyslipidemia and diabetes mellitus. Past treatments include ACE inhibitors, beta blockers and calcium channel blockers. Current antihypertension treatment includes beta blockers and calcium channel blockers. The current treatment provides moderate improvement. There are no compliance problems.    Diabetes   He presents for his follow-up diabetic visit. He has type 2 diabetes mellitus. His disease course has been stable. Pertinent negatives for hypoglycemia include no headaches or sweats. Pertinent negatives for diabetes include no blurred vision, no chest pain, no fatigue, no foot paresthesias, no foot ulcerations, no polydipsia, no polyphagia, no polyuria, no visual change, no weakness and no weight loss. There are no hypoglycemic complications. Symptoms are stable. Diabetic complications include peripheral neuropathy. Risk factors for coronary artery disease include diabetes mellitus, male sex, hypertension and dyslipidemia. Current diabetic treatment includes oral agent (dual therapy). He is compliant with treatment most of the time. There is no change in his home  blood glucose trend. An ACE inhibitor/angiotensin II receptor blocker is contraindicated (secondary to JUANA).        The following portions of the patient's history were reviewed and updated as appropriate: allergies, current medications, past family history, past medical history, past social history, past surgical history and problem list.    Review of Systems   Constitutional: Negative.  Negative for fatigue, malaise/fatigue and weight loss.   HENT: Negative.    Eyes: Negative.  Negative for blurred vision.   Respiratory: Negative.  Negative for shortness of breath.    Cardiovascular: Negative.  Negative for chest pain, palpitations, orthopnea and PND.   Gastrointestinal: Negative.    Endocrine: Negative.  Negative for polydipsia, polyphagia and polyuria.   Genitourinary: Negative.    Musculoskeletal: Negative.  Negative for neck pain.   Skin: Negative.    Allergic/Immunologic: Negative.    Neurological: Negative.  Negative for weakness and headaches.   Hematological: Negative.    Psychiatric/Behavioral: Negative.        Objective   Physical Exam   Constitutional: He is oriented to person, place, and time. He appears well-developed and well-nourished. No distress.   Neck: Neck supple.   Cardiovascular: Normal rate, regular rhythm and normal heart sounds. Exam reveals no gallop and no friction rub.   No murmur heard.  Pulmonary/Chest: Effort normal and breath sounds normal. No respiratory distress. He has no wheezes. He has no rales.   Abdominal: Soft. Bowel sounds are normal. He exhibits no distension. There is no tenderness.   Neurological: He is alert and oriented to person, place, and time.   Skin: Skin is warm and dry. He is not diaphoretic.   Psychiatric: He has a normal mood and affect.       Assessment/Plan   Dereck was seen today for diabetes.    Diagnoses and all orders for this visit:    Acute kidney injury (CMS/[facility])  -     Comprehensive metabolic panel  -     POCT urinalysis dipstick,  automated    Anemia, unspecified type  -     CBC & Differential    Hypertension, essential    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, without long-term current use of insulin (CMS/[facility])    glucose is stable  bp is well controlled off ramipril,   Will repeat labs, check status of kidney function/glucose  Follow up in 2 months, sooner if needed

## 2019-04-26 ENCOUNTER — READMISSION MANAGEMENT (OUTPATIENT)
Dept: CALL CENTER | Facility: HOSPITAL | Age: 79
End: 2019-04-26

## 2019-04-26 LAB
ALBUMIN SERPL-MCNC: 4.5 G/DL (ref 3.5–5.2)
ALBUMIN/GLOB SERPL: 1.5 G/DL
ALP SERPL-CCNC: 193 U/L (ref 39–117)
ALT SERPL-CCNC: 17 U/L (ref 1–41)
AST SERPL-CCNC: 18 U/L (ref 1–40)
BASOPHILS # BLD AUTO: 0.06 10*3/MM3 (ref 0–0.2)
BASOPHILS NFR BLD AUTO: 1 % (ref 0–1.5)
BILIRUB SERPL-MCNC: 0.2 MG/DL (ref 0.2–1.2)
BUN SERPL-MCNC: 28 MG/DL (ref 8–23)
BUN/CREAT SERPL: 17.4 (ref 7–25)
CALCIUM SERPL-MCNC: 9.4 MG/DL (ref 8.6–10.5)
CHLORIDE SERPL-SCNC: 104 MMOL/L (ref 98–107)
CO2 SERPL-SCNC: 24.9 MMOL/L (ref 22–29)
CREAT SERPL-MCNC: 1.61 MG/DL (ref 0.76–1.27)
EOSINOPHIL # BLD AUTO: 0.26 10*3/MM3 (ref 0–0.4)
EOSINOPHIL NFR BLD AUTO: 4.2 % (ref 0.3–6.2)
ERYTHROCYTE [DISTWIDTH] IN BLOOD BY AUTOMATED COUNT: 14.3 % (ref 12.3–15.4)
GLOBULIN SER CALC-MCNC: 3 GM/DL
GLUCOSE SERPL-MCNC: 90 MG/DL (ref 65–99)
HCT VFR BLD AUTO: 30.9 % (ref 37.5–51)
HGB BLD-MCNC: 9.3 G/DL (ref 13–17.7)
IMM GRANULOCYTES # BLD AUTO: 0.02 10*3/MM3 (ref 0–0.05)
IMM GRANULOCYTES NFR BLD AUTO: 0.3 % (ref 0–0.5)
LYMPHOCYTES # BLD AUTO: 1.51 10*3/MM3 (ref 0.7–3.1)
LYMPHOCYTES NFR BLD AUTO: 24.2 % (ref 19.6–45.3)
MCH RBC QN AUTO: 27.8 PG (ref 26.6–33)
MCHC RBC AUTO-ENTMCNC: 30.1 G/DL (ref 31.5–35.7)
MCV RBC AUTO: 92.2 FL (ref 79–97)
MONOCYTES # BLD AUTO: 0.83 10*3/MM3 (ref 0.1–0.9)
MONOCYTES NFR BLD AUTO: 13.3 % (ref 5–12)
NEUTROPHILS # BLD AUTO: 3.55 10*3/MM3 (ref 1.7–7)
NEUTROPHILS NFR BLD AUTO: 57 % (ref 42.7–76)
NRBC BLD AUTO-RTO: 0 /100 WBC (ref 0–0.2)
PLATELET # BLD AUTO: 321 10*3/MM3 (ref 140–450)
POTASSIUM SERPL-SCNC: 5.1 MMOL/L (ref 3.5–5.2)
PROT SERPL-MCNC: 7.5 G/DL (ref 6–8.5)
RBC # BLD AUTO: 3.35 10*6/MM3 (ref 4.14–5.8)
SODIUM SERPL-SCNC: 141 MMOL/L (ref 136–145)
WBC # BLD AUTO: 6.23 10*3/MM3 (ref 3.4–10.8)

## 2019-04-26 NOTE — OUTREACH NOTE
Medical Week 1 Survey      Responses   Facility patient discharged from?  [facility]   Does the patient have one of the following disease processes/diagnoses(primary or secondary)?  Other   Is there a successful TCM telephone encounter documented?  No   Week 1 attempt successful?  Yes   Call start time  1059   Call end time  1104   Discharge diagnosis  CKD, Iron def. anemia, normocytic anemia, B12 def., BPH, DM II, HTN   Is patient permission given to speak with other caregiver?  Yes   List who call center can speak with  Anyone   Meds reviewed with patient/caregiver?  Yes   Is the patient having any side effects they believe may be caused by any medication additions or changes?  No   Does the patient have all medications ordered at discharge?  Yes   Is the patient taking all medications as directed (includes completed medication regime)?  Yes   Comments regarding appointments  See AVS   Does the patient have a primary care provider?   Yes   Does the patient have an appointment with their PCP within 7 days of discharge?  Yes   Has the patient kept scheduled appointments due by today?  Yes   Has home health visited the patient within 72 hours of discharge?  N/A   Psychosocial issues?  No   Did the patient receive a copy of their discharge instructions?  Yes   Nursing interventions  Reviewed instructions with patient   Is the patient/caregiver able to teach back signs and symptoms related to disease process for when to call PCP?  Yes   Is the patient/caregiver able to teach back signs and symptoms related to disease process for when to call 911?  Yes   Is the patient/caregiver able to teach back the hierarchy of who to call/visit for symptoms/problems? PCP, Specialist, Home health nurse, Urgent Care, ED, 911  Yes   Week 1 call completed?  Yes   Wrap up additional comments  Glucose 149 after breakfast. Has followed up with JOVITA Valles and will see her again next week. Care was excellent.           Paty Mak,  RN

## 2019-05-02 RX ORDER — NATEGLINIDE 60 MG/1
TABLET ORAL
Qty: 60 TABLET | Refills: 5 | Status: SHIPPED | OUTPATIENT
Start: 2019-05-02 | End: 2019-05-23

## 2019-05-03 DIAGNOSIS — F41.9 ANXIETY: ICD-10-CM

## 2019-05-03 RX ORDER — BUSPIRONE HYDROCHLORIDE 5 MG/1
TABLET ORAL
Qty: 90 TABLET | Refills: 3 | Status: SHIPPED | OUTPATIENT
Start: 2019-05-03 | End: 2019-05-23

## 2019-05-17 DIAGNOSIS — F41.9 ANXIETY: ICD-10-CM

## 2019-05-21 RX ORDER — LANOLIN ALCOHOL/MO/W.PET/CERES
CREAM (GRAM) TOPICAL
Qty: 30 TABLET | Refills: 5 | Status: SHIPPED | OUTPATIENT
Start: 2019-05-21 | End: 2019-05-23 | Stop reason: SDUPTHER

## 2019-05-21 RX ORDER — DOXYCYCLINE HYCLATE 50 MG/1
CAPSULE, GELATIN COATED ORAL
Qty: 30 TABLET | Refills: 5 | Status: SHIPPED | OUTPATIENT
Start: 2019-05-21 | End: 2019-05-23 | Stop reason: SDUPTHER

## 2019-05-21 RX ORDER — BUSPIRONE HYDROCHLORIDE 5 MG/1
TABLET ORAL
Qty: 90 TABLET | Refills: 0 | Status: SHIPPED | OUTPATIENT
Start: 2019-05-21 | End: 2019-05-23

## 2019-05-21 RX ORDER — TAMSULOSIN HYDROCHLORIDE 0.4 MG/1
CAPSULE ORAL
Qty: 30 CAPSULE | Refills: 5 | Status: SHIPPED | OUTPATIENT
Start: 2019-05-21 | End: 2019-05-23 | Stop reason: SDUPTHER

## 2019-05-23 ENCOUNTER — OFFICE VISIT (OUTPATIENT)
Dept: FAMILY MEDICINE CLINIC | Facility: CLINIC | Age: 79
End: 2019-05-23

## 2019-05-23 VITALS
BODY MASS INDEX: 29.43 KG/M2 | OXYGEN SATURATION: 98 % | TEMPERATURE: 98 F | SYSTOLIC BLOOD PRESSURE: 140 MMHG | DIASTOLIC BLOOD PRESSURE: 60 MMHG | WEIGHT: 217 LBS | HEART RATE: 67 BPM

## 2019-05-23 DIAGNOSIS — E11.9 TYPE 2 DIABETES MELLITUS WITHOUT COMPLICATION, WITHOUT LONG-TERM CURRENT USE OF INSULIN (HCC): Primary | ICD-10-CM

## 2019-05-23 DIAGNOSIS — N28.9 FUNCTION KIDNEY DECREASED: ICD-10-CM

## 2019-05-23 PROCEDURE — 99213 OFFICE O/P EST LOW 20 MIN: CPT | Performed by: NURSE PRACTITIONER

## 2019-05-23 RX ORDER — LANOLIN ALCOHOL/MO/W.PET/CERES
1000 CREAM (GRAM) TOPICAL DAILY
Qty: 90 TABLET | Refills: 1 | Status: SHIPPED | OUTPATIENT
Start: 2019-05-23 | End: 2020-04-24 | Stop reason: SDUPTHER

## 2019-05-23 RX ORDER — GABAPENTIN 100 MG/1
200 CAPSULE ORAL 3 TIMES DAILY
Qty: 540 CAPSULE | Refills: 0 | Status: SHIPPED | OUTPATIENT
Start: 2019-05-23 | End: 2019-10-03 | Stop reason: SDUPTHER

## 2019-05-23 RX ORDER — DOXYCYCLINE HYCLATE 50 MG/1
1 CAPSULE, GELATIN COATED ORAL
Qty: 90 TABLET | Refills: 1 | Status: SHIPPED | OUTPATIENT
Start: 2019-05-23 | End: 2020-05-13 | Stop reason: SDUPTHER

## 2019-05-23 RX ORDER — NATEGLINIDE 60 MG/1
60 TABLET ORAL 2 TIMES DAILY WITH MEALS
Qty: 180 TABLET | Refills: 1 | Status: SHIPPED | OUTPATIENT
Start: 2019-05-23 | End: 2020-03-06 | Stop reason: SDUPTHER

## 2019-05-23 RX ORDER — BUSPIRONE HYDROCHLORIDE 5 MG/1
5 TABLET ORAL 3 TIMES DAILY PRN
Qty: 270 TABLET | Refills: 1 | Status: SHIPPED | OUTPATIENT
Start: 2019-05-23 | End: 2020-03-30 | Stop reason: SDUPTHER

## 2019-05-23 RX ORDER — CLOPIDOGREL BISULFATE 75 MG/1
75 TABLET ORAL DAILY
Qty: 90 TABLET | Refills: 1 | Status: SHIPPED | OUTPATIENT
Start: 2019-05-23 | End: 2019-12-26 | Stop reason: SDUPTHER

## 2019-05-23 RX ORDER — ATORVASTATIN CALCIUM 20 MG/1
20 TABLET, FILM COATED ORAL DAILY
Qty: 90 TABLET | Refills: 1 | Status: SHIPPED | OUTPATIENT
Start: 2019-05-23 | End: 2019-12-26 | Stop reason: SDUPTHER

## 2019-05-23 RX ORDER — TAMSULOSIN HYDROCHLORIDE 0.4 MG/1
1 CAPSULE ORAL DAILY
Qty: 90 CAPSULE | Refills: 1 | Status: SHIPPED | OUTPATIENT
Start: 2019-05-23 | End: 2020-03-17 | Stop reason: SDUPTHER

## 2019-05-23 NOTE — PROGRESS NOTES
Subjective   Dereck Ramírez is a 78 y.o. male presents for one month follow up post hospitalization. He was noted at his last OV to have significantly decreased kidney function. He was referred to the ED for further eval and treatment. He was admitted for two nights and treated with IV fluids. Metformin was discontinued. BP medication was changed. He followed up and his kidney function had increased. At that time, his protonix was discontinued. He stopped his metformin indefinitely. His daughter is present during examination and reports his glucose has been ranging 110 - 192 (immediately after meals). Last A1C was well controlled. Agreeable to obtain labs today. States he is increasing his fluid intake. Denies any concerns at the present time.     History of Present Illness     The following portions of the patient's history were reviewed and updated as appropriate: allergies, current medications, past family history, past medical history, past social history, past surgical history and problem list.    Review of Systems    Objective   Physical Exam   Constitutional: He is oriented to person, place, and time. He appears well-developed and well-nourished. No distress.   HENT:   Mouth/Throat: Oropharynx is clear and moist and mucous membranes are normal.   Cardiovascular: Normal rate, regular rhythm and normal heart sounds. Exam reveals no gallop and no friction rub.   No murmur heard.  Pulmonary/Chest: Effort normal and breath sounds normal. No stridor. No respiratory distress. He has no wheezes.   Neurological: He is alert and oriented to person, place, and time.   Skin: He is not diaphoretic.   Psychiatric: He has a normal mood and affect.       Assessment/Plan   Dereck was seen today for blood sugar problem.    Diagnoses and all orders for this visit:    Type 2 diabetes mellitus without complication, without long-term current use of insulin (CMS/[facility])  -     Comprehensive metabolic panel  -     Hemoglobin A1c  -      glucose blood test strip; Check blood sugar twice daily and as needed    Function kidney decreased  -     Comprehensive metabolic panel    Other orders  -     nateglinide (STARLIX) 60 MG tablet; Take 1 tablet by mouth 2 (Two) Times a Day With Meals.  -     vitamin B-12 (CYANOCOBALAMIN) 1000 MCG tablet; Take 1 tablet by mouth Daily.  -     tamsulosin (FLOMAX) 0.4 MG capsule 24 hr capsule; Take 1 capsule by mouth Daily.  -     ferrous gluconate (FERGON) 324 MG tablet; Take 1 tablet by mouth Daily With Breakfast.  -     busPIRone (BUSPAR) 5 MG tablet; Take 1 tablet by mouth 3 (Three) Times a Day As Needed (anxiety).  -     atorvastatin (LIPITOR) 20 MG tablet; Take 1 tablet by mouth Daily.  -     clopidogrel (PLAVIX) 75 MG tablet; Take 1 tablet by mouth Daily.  -     gabapentin (NEURONTIN) 100 MG capsule; Take 2 capsules by mouth 3 (Three) Times a Day.      Will continue current treatment, obtain A1C and cmp today to monitor stability of kidney function  Patient to follow up in 3 months, sooner if needed  Will call results when available with necessary changes if needed

## 2019-05-24 LAB
ALBUMIN SERPL-MCNC: 4 G/DL (ref 3.5–5.2)
ALBUMIN/GLOB SERPL: 1.3 G/DL
ALP SERPL-CCNC: 210 U/L (ref 39–117)
ALT SERPL-CCNC: 20 U/L (ref 1–41)
AST SERPL-CCNC: 19 U/L (ref 1–40)
BILIRUB SERPL-MCNC: 0.2 MG/DL (ref 0.2–1.2)
BUN SERPL-MCNC: 19 MG/DL (ref 8–23)
BUN/CREAT SERPL: 15.4 (ref 7–25)
CALCIUM SERPL-MCNC: 9.4 MG/DL (ref 8.6–10.5)
CHLORIDE SERPL-SCNC: 104 MMOL/L (ref 98–107)
CO2 SERPL-SCNC: 25.4 MMOL/L (ref 22–29)
CREAT SERPL-MCNC: 1.23 MG/DL (ref 0.76–1.27)
GLOBULIN SER CALC-MCNC: 3 GM/DL
GLUCOSE SERPL-MCNC: 129 MG/DL (ref 65–99)
HBA1C MFR BLD: 5.3 % (ref 4.8–5.6)
POTASSIUM SERPL-SCNC: 4.7 MMOL/L (ref 3.5–5.2)
PROT SERPL-MCNC: 7 G/DL (ref 6–8.5)
SODIUM SERPL-SCNC: 140 MMOL/L (ref 136–145)

## 2019-06-05 RX ORDER — METOPROLOL SUCCINATE 25 MG/1
TABLET, EXTENDED RELEASE ORAL
Qty: 90 TABLET | Refills: 1 | Status: SHIPPED | OUTPATIENT
Start: 2019-06-05 | End: 2019-08-22

## 2019-07-05 RX ORDER — NIFEDIPINE 30 MG/1
TABLET, EXTENDED RELEASE ORAL
Qty: 90 TABLET | Refills: 0 | Status: SHIPPED | OUTPATIENT
Start: 2019-07-05 | End: 2019-08-22

## 2019-08-22 ENCOUNTER — HOSPITAL ENCOUNTER (EMERGENCY)
Facility: HOSPITAL | Age: 79
Discharge: HOME | End: 2019-08-22
Attending: EMERGENCY MEDICINE | Admitting: EMERGENCY MEDICINE

## 2019-08-22 ENCOUNTER — APPOINTMENT (OUTPATIENT)
Dept: CT IMAGING | Facility: HOSPITAL | Age: 79
End: 2019-08-22

## 2019-08-22 ENCOUNTER — APPOINTMENT (OUTPATIENT)
Dept: GENERAL RADIOLOGY | Facility: HOSPITAL | Age: 79
End: 2019-08-22

## 2019-08-22 ENCOUNTER — OFFICE VISIT (OUTPATIENT)
Dept: FAMILY MEDICINE CLINIC | Facility: CLINIC | Age: 79
End: 2019-08-22

## 2019-08-22 VITALS
RESPIRATION RATE: 16 BRPM | SYSTOLIC BLOOD PRESSURE: 120 MMHG | HEART RATE: 78 BPM | TEMPERATURE: 98.7 F | OXYGEN SATURATION: 98 % | DIASTOLIC BLOOD PRESSURE: 52 MMHG | BODY MASS INDEX: 29.61 KG/M2 | WEIGHT: 218.3 LBS

## 2019-08-22 VITALS
RESPIRATION RATE: 17 BRPM | HEIGHT: 72 IN | OXYGEN SATURATION: 96 % | BODY MASS INDEX: 29.56 KG/M2 | DIASTOLIC BLOOD PRESSURE: 65 MMHG | SYSTOLIC BLOOD PRESSURE: 137 MMHG | TEMPERATURE: 99.3 F | WEIGHT: 218.26 LBS | HEART RATE: 66 BPM

## 2019-08-22 DIAGNOSIS — S09.90XA CLOSED HEAD INJURY, INITIAL ENCOUNTER: ICD-10-CM

## 2019-08-22 DIAGNOSIS — R55 VASOVAGAL EPISODE: ICD-10-CM

## 2019-08-22 DIAGNOSIS — S00.01XA ABRASION OF SCALP, INITIAL ENCOUNTER: ICD-10-CM
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
## 2019-08-22 DIAGNOSIS — I10 ESSENTIAL HYPERTENSION: ICD-10-CM

## 2019-08-22 DIAGNOSIS — E11.9 TYPE 2 DIABETES MELLITUS WITHOUT COMPLICATION, WITHOUT LONG-TERM CURRENT USE OF INSULIN (HCC): Primary | ICD-10-CM

## 2019-08-22 LAB
ALBUMIN SERPL-MCNC: 4.7 G/DL (ref 3.5–5.2)
ALBUMIN/GLOB SERPL: 1.7 G/DL
ALP SERPL-CCNC: 180 U/L (ref 39–117)
ALT SERPL W P-5'-P-CCNC: 14 U/L (ref 1–41)
ANION GAP SERPL CALCULATED.3IONS-SCNC: 11 MMOL/L (ref 5–15)
AST SERPL-CCNC: 18 U/L (ref 1–40)
BASOPHILS # BLD AUTO: 0.04 10*3/MM3 (ref 0–0.2)
BASOPHILS NFR BLD AUTO: 0.3 % (ref 0–1.5)
BILIRUB SERPL-MCNC: 0.3 MG/DL (ref 0.2–1.2)
BUN BLD-MCNC: 33 MG/DL (ref 8–23)
BUN/CREAT SERPL: 22.4 (ref 7–25)
CALCIUM SPEC-SCNC: 8.7 MG/DL (ref 8.6–10.5)
CHLORIDE SERPL-SCNC: 104 MMOL/L (ref 98–107)
CO2 SERPL-SCNC: 23 MMOL/L (ref 22–29)
CREAT BLD-MCNC: 1.47 MG/DL (ref 0.76–1.27)
DEPRECATED RDW RBC AUTO: 44.4 FL (ref 37–54)
EOSINOPHIL # BLD AUTO: 0.07 10*3/MM3 (ref 0–0.4)
EOSINOPHIL NFR BLD AUTO: 0.5 % (ref 0.3–6.2)
ERYTHROCYTE [DISTWIDTH] IN BLOOD BY AUTOMATED COUNT: 13.5 % (ref 12.3–15.4)
GFR SERPL CREATININE-BSD FRML MDRD: 46 ML/MIN/1.73
GLOBULIN UR ELPH-MCNC: 2.8 GM/DL
GLUCOSE BLD-MCNC: 93 MG/DL (ref 65–99)
HCT VFR BLD AUTO: 30.7 % (ref 37.5–51)
HGB BLD-MCNC: 9.3 G/DL (ref 13–17.7)
IMM GRANULOCYTES # BLD AUTO: 0.06 10*3/MM3 (ref 0–0.05)
IMM GRANULOCYTES NFR BLD AUTO: 0.5 % (ref 0–0.5)
LYMPHOCYTES # BLD AUTO: 0.98 10*3/MM3 (ref 0.7–3.1)
LYMPHOCYTES NFR BLD AUTO: 7.6 % (ref 19.6–45.3)
MCH RBC QN AUTO: 27.3 PG (ref 26.6–33)
MCHC RBC AUTO-ENTMCNC: 30.3 G/DL (ref 31.5–35.7)
MCV RBC AUTO: 90 FL (ref 79–97)
MONOCYTES # BLD AUTO: 2.08 10*3/MM3 (ref 0.1–0.9)
MONOCYTES NFR BLD AUTO: 16.1 % (ref 5–12)
NEUTROPHILS # BLD AUTO: 9.7 10*3/MM3 (ref 1.7–7)
NEUTROPHILS NFR BLD AUTO: 75 % (ref 42.7–76)
NRBC BLD AUTO-RTO: 0 /100 WBC (ref 0–0.2)
PLATELET # BLD AUTO: 245 10*3/MM3 (ref 140–450)
PMV BLD AUTO: 9.9 FL (ref 6–12)
POTASSIUM BLD-SCNC: 4.8 MMOL/L (ref 3.5–5.2)
PROT SERPL-MCNC: 7.5 G/DL (ref 6–8.5)
RBC # BLD AUTO: 3.41 10*6/MM3 (ref 4.14–5.8)
SODIUM BLD-SCNC: 138 MMOL/L (ref 136–145)
TROPONIN T SERPL-MCNC: <0.01 NG/ML (ref 0–0.03)
WBC NRBC COR # BLD: 12.93 10*3/MM3 (ref 3.4–10.8)

## 2019-08-22 PROCEDURE — 84484 ASSAY OF TROPONIN QUANT: CPT | Performed by: NURSE PRACTITIONER

## 2019-08-22 PROCEDURE — 71046 X-RAY EXAM CHEST 2 VIEWS: CPT

## 2019-08-22 PROCEDURE — 99213 OFFICE O/P EST LOW 20 MIN: CPT | Performed by: NURSE PRACTITIONER

## 2019-08-22 PROCEDURE — 93005 ELECTROCARDIOGRAM TRACING: CPT | Performed by: NURSE PRACTITIONER

## 2019-08-22 PROCEDURE — 93010 ELECTROCARDIOGRAM REPORT: CPT | Performed by: INTERNAL MEDICINE

## 2019-08-22 PROCEDURE — 80053 COMPREHEN METABOLIC PANEL: CPT | Performed by: NURSE PRACTITIONER

## 2019-08-22 PROCEDURE — 85025 COMPLETE CBC W/AUTO DIFF WBC: CPT | Performed by: NURSE PRACTITIONER

## 2019-08-22 PROCEDURE — 70450 CT HEAD/BRAIN W/O DYE: CPT

## 2019-08-22 PROCEDURE — 99283 EMERGENCY DEPT VISIT LOW MDM: CPT

## 2019-08-22 NOTE — ED PROVIDER NOTES
"Pt is a 79 y.o. male who presents to the ED complaining of an episode of near syncope PTA while at a restaurant. Pt states that he felt lightheaded walking in and then fell backwards hitting his head on a display case. Family states pt was \"walking funny\" into the restaurant but did have lab work at his PCP this morning.          On exam,  Constitutional: mild distress  HENT: 0.5cm abrasions to the vertex of his scalp,  Swelling and abrasion to left forehead  Cardiovascular: RRR, no murmur  Pulmonary: CTAB  Abdomen: soft, nontender, nondistended, normal active bowel sounds  Musculoskeletal: 3+ pedal edema, no cervical spine tenderness  Neurological:  strengths are equal, BLE strength is 5/5    Labs (WBC-12.93, hemoglobin-9.3, creatinine-1.47, troponin<0.010)  reviewed.     EKG          EKG time: 1434  Rhythm/Rate: NSR 76 BPM  P waves and DE: normal  QRS, axis: normal   ST and T waves: normal     Interpreted Contemporaneously by me, independently viewed  unchanged compared to prior 4/19/19    Plan: Review CT head and CXR      MD ATTESTATION NOTE    The BRYN and I have discussed this patient's history, physical exam, and treatment plan.  I have reviewed the documentation and personally had a face to face interaction with the patient. I affirm the documentation and agree with the treatment and plan.  The attached note describes my personal findings.      Documentation assistance provided by lucía Galeas for MD Thierry. Information recorded by the scribe was done at my direction and has been verified and validated by me.             Maye Galeas  08/22/19 6773       Bert Hou MD  08/22/19 2200    "

## 2019-08-22 NOTE — DISCHARGE INSTRUCTIONS
Home to rest  Drink plenty of fluids  Sit and stand slowly   Follow up with your doctor  Return if worse or new concerns   Continue care with your primary care physician and have your blood pressure regularly checked and managed. Normal blood pressure is 120/80.

## 2019-08-22 NOTE — PROGRESS NOTES
Subjective   Dereck Ramírez is a 79 y.o. male presents for routine follow up for hypertension and diabetes with recent medication changes. His kidney function had recently declined    Hypertension   This is a chronic problem. The current episode started more than 1 year ago. The problem is unchanged. The problem is controlled. Pertinent negatives include no anxiety, blurred vision, chest pain, headaches, malaise/fatigue, neck pain, orthopnea, palpitations, peripheral edema, PND, shortness of breath or sweats. There are no associated agents to hypertension. Risk factors for coronary artery disease include diabetes mellitus, dyslipidemia and male gender. Past treatments include calcium channel blockers, diuretics and beta blockers. Current antihypertension treatment includes beta blockers and calcium channel blockers. The current treatment provides significant improvement. There are no compliance problems.    Diabetes   He presents for his follow-up diabetic visit. He has type 2 diabetes mellitus. His disease course has been stable. There are no hypoglycemic associated symptoms. Pertinent negatives for hypoglycemia include no headaches or sweats. Pertinent negatives for diabetes include no blurred vision and no chest pain. There are no hypoglycemic complications. Symptoms are stable. There are no diabetic complications. Risk factors for coronary artery disease include diabetes mellitus, dyslipidemia, hypertension and male sex. Current diabetic treatment includes oral agent (monotherapy). He is compliant with treatment most of the time.        The following portions of the patient's history were reviewed and updated as appropriate: allergies, current medications, past family history, past medical history, past social history, past surgical history and problem list.    Review of Systems   Constitutional: Negative.  Negative for malaise/fatigue.   HENT: Negative.    Eyes: Negative.  Negative for blurred vision.    Respiratory: Negative.  Negative for shortness of breath.    Cardiovascular: Negative.  Negative for chest pain, palpitations, orthopnea and PND.   Gastrointestinal: Negative.    Endocrine: Negative.    Genitourinary: Negative.    Musculoskeletal: Negative.  Negative for neck pain.   Skin: Negative.    Allergic/Immunologic: Negative.    Neurological: Negative.  Negative for headaches.   Hematological: Negative.    Psychiatric/Behavioral: Negative.        Objective   Physical Exam   Constitutional: He is oriented to person, place, and time. He appears well-developed and well-nourished.   Cardiovascular: Normal rate, regular rhythm and normal heart sounds. Exam reveals no gallop and no friction rub.   No murmur heard.  Pulmonary/Chest: Effort normal and breath sounds normal. No stridor. No respiratory distress. He has no wheezes.   Neurological: He is alert and oriented to person, place, and time.   Psychiatric: He has a normal mood and affect.   Vitals reviewed.      Assessment/Plan   Dereck was seen today for diabetes.    Diagnoses and all orders for this visit:    Type 2 diabetes mellitus without complication, without long-term current use of insulin (CMS/[facility])  -     Comprehensive metabolic panel  -     Hemoglobin A1c    Essential hypertension  -     Comprehensive metabolic panel  -     Hemoglobin A1c          Will check labs today, repeat A1C and CMP with medication changes, Metformin was d/c, may need additional coverage although his last A1C was well controlled  Follow up in six months, sooner if labs indicated.

## 2019-08-22 NOTE — ED NOTES
Pt was walking into restaurant when he began to feel light headed, fell backwards and struck head on display cabinet. Pt has lac to scalp.  Pt takes plavix     Antwan Baltazar RN  08/22/19 6220
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
## 2019-08-23 LAB
ALBUMIN SERPL-MCNC: 4.1 G/DL (ref 3.5–5.2)
ALBUMIN/GLOB SERPL: 1.6 G/DL
ALP SERPL-CCNC: 181 U/L (ref 39–117)
ALT SERPL-CCNC: 18 U/L (ref 1–41)
AST SERPL-CCNC: 15 U/L (ref 1–40)
BILIRUB SERPL-MCNC: 0.3 MG/DL (ref 0.2–1.2)
BUN SERPL-MCNC: 33 MG/DL (ref 8–23)
BUN/CREAT SERPL: 18 (ref 7–25)
CALCIUM SERPL-MCNC: 8.3 MG/DL (ref 8.6–10.5)
CHLORIDE SERPL-SCNC: 100 MMOL/L (ref 98–107)
CO2 SERPL-SCNC: 23.4 MMOL/L (ref 22–29)
CREAT SERPL-MCNC: 1.83 MG/DL (ref 0.76–1.27)
GLOBULIN SER CALC-MCNC: 2.6 GM/DL
GLUCOSE SERPL-MCNC: 97 MG/DL (ref 65–99)
HBA1C MFR BLD: 6 % (ref 4.8–5.6)
POTASSIUM SERPL-SCNC: 5.1 MMOL/L (ref 3.5–5.2)
PROT SERPL-MCNC: 6.7 G/DL (ref 6–8.5)
SODIUM SERPL-SCNC: 138 MMOL/L (ref 136–145)

## 2019-08-29 ENCOUNTER — OFFICE VISIT (OUTPATIENT)
Dept: FAMILY MEDICINE CLINIC | Facility: CLINIC | Age: 79
End: 2019-08-29

## 2019-08-29 VITALS
RESPIRATION RATE: 16 BRPM | OXYGEN SATURATION: 99 % | DIASTOLIC BLOOD PRESSURE: 62 MMHG | HEART RATE: 74 BPM | SYSTOLIC BLOOD PRESSURE: 140 MMHG | WEIGHT: 219.8 LBS | BODY MASS INDEX: 29.81 KG/M2 | TEMPERATURE: 97.8 F

## 2019-08-29 DIAGNOSIS — Z86.73 HISTORY OF CVA (CEREBROVASCULAR ACCIDENT): ICD-10-CM

## 2019-08-29 DIAGNOSIS — R06.02 SHORTNESS OF BREATH: ICD-10-CM

## 2019-08-29 DIAGNOSIS — R55 SYNCOPE, UNSPECIFIED SYNCOPE TYPE: Primary | ICD-10-CM

## 2019-08-29 PROCEDURE — 99214 OFFICE O/P EST MOD 30 MIN: CPT | Performed by: NURSE PRACTITIONER
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
## 2019-09-06 DIAGNOSIS — R55 SYNCOPE, UNSPECIFIED SYNCOPE TYPE: Primary | ICD-10-CM

## 2019-09-13 ENCOUNTER — HOSPITAL ENCOUNTER (OUTPATIENT)
Dept: CARDIOLOGY | Facility: HOSPITAL | Age: 79
Discharge: HOME | End: 2019-09-13

## 2019-09-13 ENCOUNTER — HOSPITAL ENCOUNTER (OUTPATIENT)
Dept: CARDIOLOGY | Facility: HOSPITAL | Age: 79
Discharge: HOME | End: 2019-09-13
Admitting: NURSE PRACTITIONER

## 2019-09-13 ENCOUNTER — TELEPHONE (OUTPATIENT)
Dept: FAMILY MEDICINE CLINIC | Facility: CLINIC | Age: 79
End: 2019-09-13

## 2019-09-13 VITALS
HEART RATE: 71 BPM | WEIGHT: 219.8 LBS | DIASTOLIC BLOOD PRESSURE: 70 MMHG | BODY MASS INDEX: 29.77 KG/M2 | SYSTOLIC BLOOD PRESSURE: 159 MMHG | HEIGHT: 72 IN

## 2019-09-13 DIAGNOSIS — R06.02 SHORTNESS OF BREATH: ICD-10-CM

## 2019-09-13 DIAGNOSIS — Z86.73 HISTORY OF CVA (CEREBROVASCULAR ACCIDENT): ICD-10-CM

## 2019-09-13 DIAGNOSIS — R55 SYNCOPE, UNSPECIFIED SYNCOPE TYPE: ICD-10-CM

## 2019-09-13 LAB
AORTIC DIMENSIONLESS INDEX: 0.8 (DI)
BH CV ECHO MEAS - ACS: 2.2 CM
BH CV ECHO MEAS - AO MAX PG: 6 MMHG
BH CV ECHO MEAS - AO MEAN PG (FULL): 2 MMHG
BH CV ECHO MEAS - AO MEAN PG: 4 MMHG
BH CV ECHO MEAS - AO ROOT AREA (BSA CORRECTED): 1.5
BH CV ECHO MEAS - AO ROOT AREA: 8.6 CM^2
BH CV ECHO MEAS - AO ROOT DIAM: 3.3 CM
BH CV ECHO MEAS - AO V2 MAX: 126 CM/SEC
BH CV ECHO MEAS - AO V2 MEAN: 88.1 CM/SEC
BH CV ECHO MEAS - AO V2 VTI: 28.2 CM
BH CV ECHO MEAS - ASC AORTA: 2.8 CM
BH CV ECHO MEAS - AVA(I,A): 3.8 CM^2
BH CV ECHO MEAS - AVA(I,D): 3.8 CM^2
BH CV ECHO MEAS - BSA(HAYCOCK): 2.3 M^2
BH CV ECHO MEAS - BSA: 2.2 M^2
BH CV ECHO MEAS - BZI_BMI: 29.7 KILOGRAMS/M^2
BH CV ECHO MEAS - BZI_METRIC_HEIGHT: 182.9 CM
BH CV ECHO MEAS - BZI_METRIC_WEIGHT: 99.3 KG
BH CV ECHO MEAS - EDV(CUBED): 157.5 ML
BH CV ECHO MEAS - EDV(MOD-SP2): 157 ML
BH CV ECHO MEAS - EDV(MOD-SP4): 169 ML
BH CV ECHO MEAS - EDV(TEICH): 141.3 ML
BH CV ECHO MEAS - EF(CUBED): 65.2 %
BH CV ECHO MEAS - EF(MOD-BP): 61 %
BH CV ECHO MEAS - EF(MOD-SP2): 63.1 %
BH CV ECHO MEAS - EF(MOD-SP4): 60.9 %
BH CV ECHO MEAS - EF(TEICH): 56.2 %
BH CV ECHO MEAS - ESV(CUBED): 54.9 ML
BH CV ECHO MEAS - ESV(MOD-SP2): 58 ML
BH CV ECHO MEAS - ESV(MOD-SP4): 66 ML
BH CV ECHO MEAS - ESV(TEICH): 62 ML
BH CV ECHO MEAS - FS: 29.6 %
BH CV ECHO MEAS - IVS/LVPW: 1
BH CV ECHO MEAS - IVSD: 0.9 CM
BH CV ECHO MEAS - LAT PEAK E' VEL: 7 CM/SEC
BH CV ECHO MEAS - LV DIASTOLIC VOL/BSA (35-75): 76.3 ML/M^2
BH CV ECHO MEAS - LV MASS(C)D: 180.1 GRAMS
BH CV ECHO MEAS - LV MASS(C)DI: 81.4 GRAMS/M^2
BH CV ECHO MEAS - LV MAX PG: 4 MMHG
BH CV ECHO MEAS - LV MEAN PG: 2 MMHG
BH CV ECHO MEAS - LV SYSTOLIC VOL/BSA (12-30): 29.8 ML/M^2
BH CV ECHO MEAS - LV V1 MAX: 104 CM/SEC
BH CV ECHO MEAS - LV V1 MEAN: 67.6 CM/SEC
BH CV ECHO MEAS - LV V1 VTI: 23.5 CM
BH CV ECHO MEAS - LVIDD: 5.4 CM
BH CV ECHO MEAS - LVIDS: 3.8 CM
BH CV ECHO MEAS - LVLD AP2: 10 CM
BH CV ECHO MEAS - LVLD AP4: 10.2 CM
BH CV ECHO MEAS - LVLS AP2: 9.1 CM
BH CV ECHO MEAS - LVLS AP4: 8.2 CM
BH CV ECHO MEAS - LVOT AREA (M): 4.5 CM^2
BH CV ECHO MEAS - LVOT AREA: 4.5 CM^2
BH CV ECHO MEAS - LVOT DIAM: 2.4 CM
BH CV ECHO MEAS - LVPWD: 0.9 CM
BH CV ECHO MEAS - MED PEAK E' VEL: 7 CM/SEC
BH CV ECHO MEAS - MV A DUR: 0.17 SEC
BH CV ECHO MEAS - MV A MAX VEL: 67.1 CM/SEC
BH CV ECHO MEAS - MV DEC SLOPE: 463 CM/SEC^2
BH CV ECHO MEAS - MV DEC TIME: 194 SEC
BH CV ECHO MEAS - MV E MAX VEL: 71.1 CM/SEC
BH CV ECHO MEAS - MV E/A: 1.1
BH CV ECHO MEAS - MV MEAN PG: 2 MMHG
BH CV ECHO MEAS - MV P1/2T MAX VEL: 115 CM/SEC
BH CV ECHO MEAS - MV P1/2T: 72.7 MSEC
BH CV ECHO MEAS - MV V2 MEAN: 66.4 CM/SEC
BH CV ECHO MEAS - MV V2 VTI: 33.4 CM
BH CV ECHO MEAS - MVA P1/2T LCG: 1.9 CM^2
BH CV ECHO MEAS - MVA(P1/2T): 3 CM^2
BH CV ECHO MEAS - MVA(VTI): 3.2 CM^2
BH CV ECHO MEAS - PA ACC SLOPE: 9.2 CM/SEC^2
BH CV ECHO MEAS - PA ACC TIME: 0.15 SEC
BH CV ECHO MEAS - PA MAX PG (FULL): 5.8 MMHG
BH CV ECHO MEAS - PA MAX PG: 7.8 MMHG
BH CV ECHO MEAS - PA PR(ACCEL): 12.4 MMHG
BH CV ECHO MEAS - PA V2 MAX: 140 CM/SEC
BH CV ECHO MEAS - PULM A REVS DUR: 0.12 SEC
BH CV ECHO MEAS - PULM A REVS VEL: 33.3 CM/SEC
BH CV ECHO MEAS - PULM DIAS VEL: 87.5 CM/SEC
BH CV ECHO MEAS - PULM S/D: 1
BH CV ECHO MEAS - PULM SYS VEL: 89.2 CM/SEC
BH CV ECHO MEAS - PVA(V,A): 1.9 CM^2
BH CV ECHO MEAS - PVA(V,D): 1.9 CM^2
BH CV ECHO MEAS - QP/QS: 0.56
BH CV ECHO MEAS - RAP SYSTOLE: 8 MMHG
BH CV ECHO MEAS - RV MAX PG: 2.1 MMHG
BH CV ECHO MEAS - RV MEAN PG: 1 MMHG
BH CV ECHO MEAS - RV V1 MAX: 71.6 CM/SEC
BH CV ECHO MEAS - RV V1 MEAN: 55.3 CM/SEC
BH CV ECHO MEAS - RV V1 VTI: 15.7 CM
BH CV ECHO MEAS - RVOT AREA: 3.8 CM^2
BH CV ECHO MEAS - RVOT DIAM: 2.2 CM
BH CV ECHO MEAS - RVSP: 37.2 MMHG
BH CV ECHO MEAS - SI(AO): 108.9 ML/M^2
BH CV ECHO MEAS - SI(CUBED): 46.3 ML/M^2
BH CV ECHO MEAS - SI(LVOT): 48 ML/M^2
BH CV ECHO MEAS - SI(MOD-SP2): 44.7 ML/M^2
BH CV ECHO MEAS - SI(MOD-SP4): 46.5 ML/M^2
BH CV ECHO MEAS - SI(TEICH): 35.8 ML/M^2
BH CV ECHO MEAS - SV(AO): 241.2 ML
BH CV ECHO MEAS - SV(CUBED): 102.6 ML
BH CV ECHO MEAS - SV(LVOT): 106.3 ML
BH CV ECHO MEAS - SV(MOD-SP2): 99 ML
BH CV ECHO MEAS - SV(MOD-SP4): 103 ML
BH CV ECHO MEAS - SV(RVOT): 59.7 ML
BH CV ECHO MEAS - SV(TEICH): 79.4 ML
BH CV ECHO MEAS - TAPSE (>1.6): 2.8 CM2
BH CV ECHO MEAS - TR MAX VEL: 270 CM/SEC
BH CV ECHO MEASUREMENTS AVERAGE E/E' RATIO: 10.16
BH CV VAS BP RIGHT ARM: NORMAL MMHG
BH CV XLRA - RV BASE: 4.7 CM
BH CV XLRA - TDI S': 17.1 CM/SEC
BH CV XLRA MEAS LEFT DIST CCA EDV: -15.2 CM/SEC
BH CV XLRA MEAS LEFT DIST CCA PSV: -97.3 CM/SEC
BH CV XLRA MEAS LEFT DIST ICA EDV: -24 CM/SEC
BH CV XLRA MEAS LEFT DIST ICA PSV: -83.3 CM/SEC
BH CV XLRA MEAS LEFT ICA/CCA RATIO: 0.87
BH CV XLRA MEAS LEFT MID ICA EDV: -22 CM/SEC
BH CV XLRA MEAS LEFT MID ICA PSV: -71.1 CM/SEC
BH CV XLRA MEAS LEFT PROX CCA EDV: 18.9 CM/SEC
BH CV XLRA MEAS LEFT PROX CCA PSV: 111 CM/SEC
BH CV XLRA MEAS LEFT PROX ECA EDV: 11.1 CM/SEC
BH CV XLRA MEAS LEFT PROX ECA PSV: 118 CM/SEC
BH CV XLRA MEAS LEFT PROX ICA EDV: -23.5 CM/SEC
BH CV XLRA MEAS LEFT PROX ICA PSV: -85 CM/SEC
BH CV XLRA MEAS LEFT PROX SCLA PSV: 120 CM/SEC
BH CV XLRA MEAS LEFT VERTEBRAL A EDV: 5.2 CM/SEC
BH CV XLRA MEAS LEFT VERTEBRAL A PSV: 36.2 CM/SEC
BH CV XLRA MEAS RIGHT DIST CCA EDV: 11.1 CM/SEC
BH CV XLRA MEAS RIGHT DIST CCA PSV: 75.6 CM/SEC
BH CV XLRA MEAS RIGHT DIST ICA EDV: -27 CM/SEC
BH CV XLRA MEAS RIGHT DIST ICA PSV: -86.8 CM/SEC
BH CV XLRA MEAS RIGHT ICA/CCA RATIO: 1.14
BH CV XLRA MEAS RIGHT MID ICA EDV: -19.3 CM/SEC
BH CV XLRA MEAS RIGHT MID ICA PSV: -67 CM/SEC
BH CV XLRA MEAS RIGHT PROX CCA EDV: 19 CM/SEC
BH CV XLRA MEAS RIGHT PROX CCA PSV: 161 CM/SEC
BH CV XLRA MEAS RIGHT PROX ECA EDV: -11.7 CM/SEC
BH CV XLRA MEAS RIGHT PROX ECA PSV: -118 CM/SEC
BH CV XLRA MEAS RIGHT PROX ICA EDV: -14.9 CM/SEC
BH CV XLRA MEAS RIGHT PROX ICA PSV: -68.7 CM/SEC
BH CV XLRA MEAS RIGHT PROX SCLA PSV: 158 CM/SEC
BH CV XLRA MEAS RIGHT VERTEBRAL A EDV: 13.8 CM/SEC
BH CV XLRA MEAS RIGHT VERTEBRAL A PSV: 62.1 CM/SEC
LEFT ARM BP: NORMAL MMHG
LEFT ATRIUM VOLUME INDEX: 40.7 ML/M2
MAXIMAL PREDICTED HEART RATE: 143 BPM
RIGHT ARM BP: NORMAL MMHG
STRESS TARGET HR: 122 BPM

## 2019-09-13 PROCEDURE — 93306 TTE W/DOPPLER COMPLETE: CPT

## 2019-09-13 PROCEDURE — 93306 TTE W/DOPPLER COMPLETE: CPT | Performed by: INTERNAL MEDICINE

## 2019-09-13 PROCEDURE — 25010000002 PERFLUTREN (DEFINITY) 8.476 MG IN SODIUM CHLORIDE 0.9 % 10 ML INJECTION: Performed by: NURSE PRACTITIONER

## 2019-09-13 PROCEDURE — 93880 EXTRACRANIAL BILAT STUDY: CPT

## 2019-09-13 RX ADMIN — SODIUM CHLORIDE 6 ML: 9 INJECTION INTRAMUSCULAR; INTRAVENOUS; SUBCUTANEOUS at 11:55

## 2019-09-16 ENCOUNTER — OFFICE VISIT (OUTPATIENT)
Dept: CARDIOLOGY | Facility: CLINIC | Age: 79
End: 2019-09-16

## 2019-09-16 VITALS
DIASTOLIC BLOOD PRESSURE: 70 MMHG | HEIGHT: 72 IN | SYSTOLIC BLOOD PRESSURE: 154 MMHG | BODY MASS INDEX: 29.12 KG/M2 | HEART RATE: 71 BPM | WEIGHT: 215 LBS

## 2019-09-16 DIAGNOSIS — I25.10 CHRONIC CORONARY ARTERY DISEASE: ICD-10-CM

## 2019-09-16 DIAGNOSIS — E78.2 MIXED HYPERLIPIDEMIA: ICD-10-CM

## 2019-09-16 DIAGNOSIS — R55 SYNCOPE, UNSPECIFIED SYNCOPE TYPE: Primary | ICD-10-CM

## 2019-09-16 DIAGNOSIS — N18.30 CKD (CHRONIC KIDNEY DISEASE) STAGE 3, GFR 30-59 ML/MIN (HCC): ICD-10-CM

## 2019-09-16 DIAGNOSIS — I10 HYPERTENSION, ESSENTIAL: Chronic | ICD-10-CM

## 2019-09-16 PROCEDURE — 99204 OFFICE O/P NEW MOD 45 MIN: CPT | Performed by: INTERNAL MEDICINE

## 2019-09-16 PROCEDURE — 93000 ELECTROCARDIOGRAM COMPLETE: CPT | Performed by: INTERNAL MEDICINE
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
## 2019-10-03 NOTE — TELEPHONE ENCOUNTER
Refill for controlled substance.  Last vist 8/19  Need new Control form   BEENA ran today  Future appt. 10/17/19

## 2019-10-04 RX ORDER — NIFEDIPINE 30 MG/1
TABLET, EXTENDED RELEASE ORAL
Qty: 90 TABLET | Refills: 0 | Status: SHIPPED | OUTPATIENT
Start: 2019-10-04 | End: 2020-01-02

## 2019-10-04 RX ORDER — GABAPENTIN 100 MG/1
CAPSULE ORAL
Qty: 540 CAPSULE | Refills: 0 | Status: SHIPPED | OUTPATIENT
Start: 2019-10-04 | End: 2020-02-07 | Stop reason: SDUPTHER

## 2019-10-17 ENCOUNTER — OFFICE VISIT (OUTPATIENT)
Dept: FAMILY MEDICINE CLINIC | Facility: CLINIC | Age: 79
End: 2019-10-17

## 2019-10-17 VITALS
WEIGHT: 219.4 LBS | BODY MASS INDEX: 29.72 KG/M2 | OXYGEN SATURATION: 99 % | HEIGHT: 72 IN | TEMPERATURE: 97.6 F | DIASTOLIC BLOOD PRESSURE: 58 MMHG | HEART RATE: 67 BPM | SYSTOLIC BLOOD PRESSURE: 146 MMHG

## 2019-10-17 DIAGNOSIS — Z23 NEED FOR VACCINATION: ICD-10-CM

## 2019-10-17 DIAGNOSIS — E11.29 CONTROLLED TYPE 2 DIABETES MELLITUS WITH OTHER DIABETIC KIDNEY COMPLICATION, WITHOUT LONG-TERM CURRENT USE OF INSULIN (HCC): Primary | Chronic | ICD-10-CM

## 2019-10-17 DIAGNOSIS — I10 HYPERTENSION, ESSENTIAL: Chronic | ICD-10-CM

## 2019-10-17 DIAGNOSIS — E78.2 MIXED HYPERLIPIDEMIA: ICD-10-CM

## 2019-10-17 LAB
ALBUMIN SERPL-MCNC: 4.7 G/DL (ref 3.5–5.2)
ALBUMIN/GLOB SERPL: 1.7 G/DL
ALP SERPL-CCNC: 207 U/L (ref 39–117)
ALT SERPL-CCNC: 20 U/L (ref 1–41)
AST SERPL-CCNC: 20 U/L (ref 1–40)
BASOPHILS # BLD AUTO: 0.04 10*3/MM3 (ref 0–0.2)
BASOPHILS NFR BLD AUTO: 0.7 % (ref 0–1.5)
BILIRUB SERPL-MCNC: 0.2 MG/DL (ref 0.2–1.2)
BUN SERPL-MCNC: 39 MG/DL (ref 8–23)
BUN/CREAT SERPL: 29.3 (ref 7–25)
CALCIUM SERPL-MCNC: 9.2 MG/DL (ref 8.6–10.5)
CHLORIDE SERPL-SCNC: 104 MMOL/L (ref 98–107)
CO2 SERPL-SCNC: 22.2 MMOL/L (ref 22–29)
CREAT SERPL-MCNC: 1.33 MG/DL (ref 0.76–1.27)
EOSINOPHIL # BLD AUTO: 0.11 10*3/MM3 (ref 0–0.4)
EOSINOPHIL NFR BLD AUTO: 1.8 % (ref 0.3–6.2)
ERYTHROCYTE [DISTWIDTH] IN BLOOD BY AUTOMATED COUNT: 14 % (ref 12.3–15.4)
GLOBULIN SER CALC-MCNC: 2.8 GM/DL
GLUCOSE SERPL-MCNC: 96 MG/DL (ref 65–99)
HBA1C MFR BLD: 6.8 % (ref 4.8–5.6)
HCT VFR BLD AUTO: 31.9 % (ref 37.5–51)
HGB BLD-MCNC: 10.3 G/DL (ref 13–17.7)
IMM GRANULOCYTES # BLD AUTO: 0.02 10*3/MM3 (ref 0–0.05)
IMM GRANULOCYTES NFR BLD AUTO: 0.3 % (ref 0–0.5)
LYMPHOCYTES # BLD AUTO: 1.35 10*3/MM3 (ref 0.7–3.1)
LYMPHOCYTES NFR BLD AUTO: 22.3 % (ref 19.6–45.3)
MCH RBC QN AUTO: 27.5 PG (ref 26.6–33)
MCHC RBC AUTO-ENTMCNC: 32.3 G/DL (ref 31.5–35.7)
MCV RBC AUTO: 85.1 FL (ref 79–97)
MONOCYTES # BLD AUTO: 0.67 10*3/MM3 (ref 0.1–0.9)
MONOCYTES NFR BLD AUTO: 11.1 % (ref 5–12)
NEUTROPHILS # BLD AUTO: 3.86 10*3/MM3 (ref 1.7–7)
NEUTROPHILS NFR BLD AUTO: 63.8 % (ref 42.7–76)
NRBC BLD AUTO-RTO: 0 /100 WBC (ref 0–0.2)
PLATELET # BLD AUTO: 327 10*3/MM3 (ref 140–450)
POTASSIUM SERPL-SCNC: 5.3 MMOL/L (ref 3.5–5.2)
PROT SERPL-MCNC: 7.5 G/DL (ref 6–8.5)
RBC # BLD AUTO: 3.75 10*6/MM3 (ref 4.14–5.8)
SODIUM SERPL-SCNC: 138 MMOL/L (ref 136–145)
WBC # BLD AUTO: 6.05 10*3/MM3 (ref 3.4–10.8)

## 2019-10-17 PROCEDURE — 99214 OFFICE O/P EST MOD 30 MIN: CPT | Performed by: NURSE PRACTITIONER

## 2019-10-17 PROCEDURE — 90653 IIV ADJUVANT VACCINE IM: CPT | Performed by: NURSE PRACTITIONER

## 2019-10-17 PROCEDURE — G0008 ADMIN INFLUENZA VIRUS VAC: HCPCS | Performed by: NURSE PRACTITIONER

## 2019-10-17 NOTE — PROGRESS NOTES
Subjective   Dereck Ramírez is a 79 y.o. male presents for follow up. States forgot he had an appointment today and it was too late to cancel. Previously evaluated with syncopal episode. Referred to cardiology. Holter monitor completed. Carotid screening completed. He has a follow up early next week for results of cardiology. Denies any new episodes. No problems with glucose. Daughter is present during office visit and states he likes sweets. He does use splenda, but also likes to put peanut butter and raisins in his oatmeal. Denies any hypoglycemic episodes. He is currently treated with one medication, starlix, and tolerating well.    His blood pressure is increased today, at 146/58. He is currently prescribed toprol XL, heart rate stable at 67 today. He takes procardia as well. Denies any dizziness, no chest pain or headaches.     He is taking lipitor for cholesterol with good results. Will hold on rechecking labs today as patient is not fasting and labs were recently evaluated.     Hypertension   This is a chronic problem. The current episode started more than 1 year ago. The problem is unchanged. The problem is controlled. Pertinent negatives include no anxiety, blurred vision, chest pain, headaches, malaise/fatigue, neck pain, orthopnea, palpitations, peripheral edema, PND, shortness of breath or sweats. There are no associated agents to hypertension. Risk factors for coronary artery disease include diabetes mellitus, male gender and dyslipidemia. Past treatments include beta blockers and calcium channel blockers. Current antihypertension treatment includes calcium channel blockers and beta blockers. The current treatment provides moderate improvement. Hypertensive end-organ damage includes CVA. There is no history of chronic renal disease.   Diabetes   He presents for his follow-up diabetic visit. He has type 2 diabetes mellitus. His disease course has been stable. There are no hypoglycemic associated  symptoms. Pertinent negatives for hypoglycemia include no headaches or sweats. There are no diabetic associated symptoms. Pertinent negatives for diabetes include no blurred vision and no chest pain. There are no hypoglycemic complications. Diabetic complications include a CVA. Risk factors for coronary artery disease include hypertension, male sex, dyslipidemia and diabetes mellitus. Current diabetic treatment includes oral agent (monotherapy). He is compliant with treatment most of the time.   Hyperlipidemia   This is a chronic problem. The current episode started more than 1 year ago. The problem is controlled. Recent lipid tests were reviewed and are normal. Exacerbating diseases include diabetes. He has no history of chronic renal disease, hypothyroidism, liver disease, obesity or nephrotic syndrome. Factors aggravating his hyperlipidemia include fatty foods. Pertinent negatives include no chest pain, focal sensory loss, focal weakness, leg pain, myalgias or shortness of breath. Current antihyperlipidemic treatment includes statins. The current treatment provides moderate improvement of lipids. Compliance problems include adherence to exercise.         The following portions of the patient's history were reviewed and updated as appropriate: allergies, current medications, past family history, past medical history, past social history, past surgical history and problem list.    Review of Systems   Constitutional: Negative.  Negative for malaise/fatigue.   HENT: Negative.    Eyes: Negative.  Negative for blurred vision.   Respiratory: Negative.  Negative for shortness of breath.    Cardiovascular: Negative.  Negative for chest pain, palpitations, orthopnea and PND.   Gastrointestinal: Negative.    Endocrine: Negative.    Genitourinary: Negative.    Musculoskeletal: Negative.  Negative for myalgias and neck pain.   Skin: Negative.    Allergic/Immunologic: Negative.    Neurological: Negative.  Negative for focal  weakness and headaches.   Hematological: Negative.    Psychiatric/Behavioral: Negative.        Objective   Physical Exam   Constitutional: He is oriented to person, place, and time. He appears well-developed and well-nourished. No distress.   Neck: Neck supple.   Cardiovascular: Normal rate, regular rhythm and normal heart sounds. Exam reveals no gallop and no friction rub.   No murmur heard.  Pulmonary/Chest: Effort normal and breath sounds normal. No respiratory distress. He has no wheezes. He has no rales.   Abdominal: Soft. Bowel sounds are normal. He exhibits no distension. There is no tenderness.   Neurological: He is alert and oriented to person, place, and time.   Skin: Skin is warm and dry. He is not diaphoretic.   Psychiatric: He has a normal mood and affect.   Vitals reviewed.      Assessment/Plan   Dereck was seen today for hypertension and hyperlipidemia.    Diagnoses and all orders for this visit:    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, without long-term current use of insulin (CMS/[facility])  -     CBC & Differential  -     Comprehensive metabolic panel  -     Hemoglobin A1c    Need for vaccination  -     Fluad Quad >65 years    Hypertension, essential  -     CBC & Differential  -     Comprehensive metabolic panel  -     Hemoglobin A1c    Mixed hyperlipidemia  -     CBC & Differential  -     Comprehensive metabolic panel  -     Hemoglobin A1c        Repeat labs today, will monitor kidney function with medication as well as glucose with A1C  Follow up  In February as scheduled  Has follow up with cardiology on 10/21  Flu shot today

## 2019-10-21 ENCOUNTER — OFFICE VISIT (OUTPATIENT)
Dept: CARDIOLOGY | Facility: CLINIC | Age: 79
End: 2019-10-21

## 2019-10-21 VITALS
BODY MASS INDEX: 29.8 KG/M2 | HEART RATE: 73 BPM | HEIGHT: 72 IN | SYSTOLIC BLOOD PRESSURE: 140 MMHG | WEIGHT: 220 LBS | DIASTOLIC BLOOD PRESSURE: 62 MMHG

## 2019-10-21 DIAGNOSIS — I63.9 CEREBROVASCULAR ACCIDENT (CVA), UNSPECIFIED MECHANISM (HCC): Chronic | ICD-10-CM

## 2019-10-21 DIAGNOSIS — E78.2 MIXED HYPERLIPIDEMIA: ICD-10-CM

## 2019-10-21 DIAGNOSIS — R55 SYNCOPE AND COLLAPSE: Primary | ICD-10-CM

## 2019-10-21 DIAGNOSIS — I10 HYPERTENSION, ESSENTIAL: Chronic | ICD-10-CM

## 2019-10-21 DIAGNOSIS — I25.10 CHRONIC CORONARY ARTERY DISEASE: ICD-10-CM

## 2019-10-21 PROCEDURE — 93000 ELECTROCARDIOGRAM COMPLETE: CPT | Performed by: INTERNAL MEDICINE

## 2019-10-21 PROCEDURE — 99213 OFFICE O/P EST LOW 20 MIN: CPT | Performed by: INTERNAL MEDICINE

## 2019-10-21 NOTE — PROGRESS NOTES
[facility] Follow Up Office Note     Encounter Date:10/21/19  Patient:Dereck Ramírez  :1940  MRN:[id number]      Chief Complaint:   Chief Complaint   Patient presents with   • Coronary Artery Disease   • Hypertension   • Chronic Kidney Disease   • Hyperlipidemia   • Loss of Consciousness         History of Presenting Illness:     Mr. Ramírez is a 79-year-old gentleman with past medical history notable for coronary artery disease which is been medically managed, hypertension, mixed hyperlipidemia, chronic kidney disease, chronic anemia, history of CVA, and diabetes with neuropathy who presents for an initial office visit follow up office visit regarding his recent cardiac work-up.  He was last seen approximately 4 to 6 weeks ago and at that time his symptoms seem to be multifactorial in nature.  However given his extensive cardiac history and unclear nature of his falls and syncope we did obtain a event monitor.  In general his monitor was relatively normal no clear etiology for syncope noted on the study.  Fortunately since we have seen him last he has had no further episodes of presyncope or syncope.  In general he is doing fairly well.       Review of Systems:  Review of Systems   Cardiovascular: Positive for leg swelling.   Neurological: Positive for loss of balance.       Current Outpatient Medications on File Prior to Visit   Medication Sig Dispense Refill   • atorvastatin (LIPITOR) 20 MG tablet Take 1 tablet by mouth Daily. 90 tablet 1   • busPIRone (BUSPAR) 5 MG tablet Take 1 tablet by mouth 3 (Three) Times a Day As Needed (anxiety). 270 tablet 1   • clopidogrel (PLAVIX) 75 MG tablet Take 1 tablet by mouth Daily. 90 tablet 1   • ferrous gluconate (FERGON) 324 MG tablet Take 1 tablet by mouth Daily With Breakfast. 90 tablet 1   • gabapentin (NEURONTIN) 100 MG capsule TAKE 2 CAPSULES BY MOUTH THREE TIMES DAILY 540 capsule 0   • glucose blood test strip Check blood sugar twice daily and as  needed 100 each 12   • metoprolol succinate XL (TOPROL-XL) 25 MG 24 hr tablet Take 25 mg by mouth Daily.     • nateglinide (STARLIX) 60 MG tablet Take 1 tablet by mouth 2 (Two) Times a Day With Meals. 180 tablet 1   • NIFEdipine XL (PROCARDIA XL) 30 MG 24 hr tablet TAKE 1 TABLET BY MOUTH EVERY DAY 90 tablet 0   • nitroglycerin (NITROSTAT) 0.4 MG SL tablet Place 1 tablet under the tongue Every 5 (Five) Minutes As Needed for Chest Pain. 100 tablet 11   • sertraline (ZOLOFT) 50 MG tablet Take 75 mg by mouth Daily.     • tamsulosin (FLOMAX) 0.4 MG capsule 24 hr capsule Take 1 capsule by mouth Daily. 90 capsule 1   • vitamin B-12 (CYANOCOBALAMIN) 1000 MCG tablet Take 1 tablet by mouth Daily. 90 tablet 1     No current facility-administered medications on file prior to visit.        No Known Allergies    Past Medical History:   Diagnosis Date   • Arthritis    • Cerebellar artery occlusion    • Diabetes mellitus (CMS/HCC)    • Enlarged prostate    • Hyperlipidemia    • Hypertension    • Stroke (CMS/HCC) 2011, 2012       Past Surgical History:   Procedure Laterality Date   • CARDIAC CATHETERIZATION      lesion 1: intervention outcome   • HERNIA REPAIR         Social History     Socioeconomic History   • Marital status:      Spouse name: Not on file   • Number of children: Not on file   • Years of education: Not on file   • Highest education level: Not on file   Tobacco Use   • Smoking status: Never Smoker   • Smokeless tobacco: Never Used   Substance and Sexual Activity   • Alcohol use: No   • Drug use: No   • Sexual activity: Defer       Family History   Problem Relation Age of Onset   • Heart disease Mother    • Stroke Mother    • Crohn's disease Father    • Stroke Father    • Cancer Other    • Stroke Other    • Diabetes Other    • Heart defect Other    • Hypertension Other    • Thyroid disease Other    • Cancer Maternal Uncle         bone   • Dementia Maternal Grandmother        The following portions of the  "patient's history were reviewed and updated as appropriate: allergies, current medications, past family history, past medical history, past social history, past surgical history and problem list.       Objective:       Vitals:    10/21/19 0856   BP: 140/62   Pulse: 73   Weight: 99.8 kg (220 lb)   Height: 182.9 cm (72\")       Physical Exam:  Constitutional: Well appearing, slightly frail, walking with cane, no acute distress   HENT: Oropharynx clear and membrane moist  Eyes: Normal conjunctiva, no sclera icterus.  Neck: Supple, no carotid bruit bilaterally.  Cardiovascular: Regular rate and rhythm, no murmur, 1+ bilateral lower extremity edema.  Pulmonary: Normal respiratory effort, no lung sounds, no wheezing.  Abdominal: Soft, nontender, no hepatosplenomegaly, liver is non-pulsatile.  Neurological: Alert and orient x 3.   Skin: Warm, dry, no ecchymosis, no rash.  Psych: Appropriate mood and affect. Normal judgment and insight.       Lab Results   Component Value Date    GLUCOSE 93 08/22/2019    BUN 39 (H) 10/17/2019    CREATININE 1.33 (H) 10/17/2019    EGFRIFNONA 52 (L) 10/17/2019    EGFRIFAFRI 63 10/17/2019    BCR 29.3 (H) 10/17/2019    K 5.3 (H) 10/17/2019    CO2 22.2 10/17/2019    CALCIUM 9.2 10/17/2019    PROTENTOTREF 7.5 10/17/2019    ALBUMIN 4.70 10/17/2019    LABIL2 1.7 10/17/2019    AST 20 10/17/2019    ALT 20 10/17/2019       Lab Results   Component Value Date    WBC 6.05 10/17/2019    HGB 10.3 (L) 10/17/2019    HCT 31.9 (L) 10/17/2019    MCV 85.1 10/17/2019     10/17/2019       Lab Results   Component Value Date    CKTOTAL 84 07/18/2014    CKMB 3.5 07/18/2014    TROPONINT <0.010 08/22/2019       Lab Results   Component Value Date    CHLPL 125 04/18/2019    CHLPL 144 01/18/2018    CHLPL 142 04/13/2017     Lab Results   Component Value Date    TRIG 292 (H) 04/18/2019    TRIG 316 (H) 01/18/2018    TRIG 344 (H) 04/13/2017     Lab Results   Component Value Date    HDL 29 (L) 04/18/2019    HDL 34 (L) " 01/18/2018    HDL 31 (L) 04/13/2017     Lab Results   Component Value Date    LDL 38 04/18/2019    LDL 47 01/18/2018    LDL 42 04/13/2017       Lab Results   Component Value Date    TSH 4.180 04/13/2017         ECG 12 Lead  Date/Time: 10/21/2019 9:31 AM  Performed by: Josafat Mendez MD  Authorized by: Josafat Mendez MD   Comparison: compared with previous ECG from 9/16/2019  Similar to previous ECG  Rhythm: sinus rhythm  Conduction: non-specific intraventricular conduction delay    Clinical impression: non-specific ECG        Mobile Telemetry Monitor 2 week with tracings reviewed by myself 10/14/2019:  Overall relatively normal 2-week mobile telemetry monitor. Normal amount of ectopy with short self-limited runs of SVT and VT. No etiology for syncope noted on the study.    Echocardiogram report 9/13/2019:  · Left ventricular systolic function is normal. Calculated EF = 61.0%. Normal left ventricular cavity size, wall thickness, mass and mass index noted. All left ventricular wall segments contract normally. Septal wall motion is normal. Left ventricular diastolic function is normal. Normal left atrial pressure.  · Right ventricular cavity is mild-to-moderately dilated  · Right atrial cavity size is mildly dilated.  · Left atrial cavity size is mild-to-moderately dilated.  · Mild mitral valve regurgitation is present.  · No prior study available for comparison.     Carotid duplex 9/13/2019:  • Right ICA Prox: Imaging indicates 16%-49% stenosis.   • Left ICA Prox: Imaging indicates 16-49% stenosis.     Stress Lexiscan nuclear 9/5/2015:  1. Normal Lexiscan Cardiolite stress test.   2. No evidence of ischemia or infarction.   3. Normal wall motion.   4. Normal ejection fraction.   5. Normal thickening.      Cardiac catheterization report 5/24/2013:  1.  Moderate to severe disease of the ramus off the first diagonal and ramus off the first obtuse marginal.   2. Minimal segmental disease of the right coronary  artery. There is 100% occlusion of the posterior descending artery with distal filling by the left coronary artery.   3. Normal left ventricular systolic function.     Assessment:          Diagnosis Plan   1. Syncope and collapse     2. Chronic coronary artery disease     3. Hypertension, essential     4. Cerebrovascular accident (CVA), unspecified mechanism (CMS/HCC)     5. Mixed hyperlipidemia            Plan:     Mr. Ramírez is a 79-year-old gentleman with past medical history notable for coronary artery disease which is been medically managed, hypertension, mixed hyperlipidemia, chronic kidney disease, chronic anemia, history of CVA, and diabetes with neuropathy who presents for follow-up on recent cardiac testing for his falls and syncope.  In general it felt like his syncope was more multifactorial in nature and may be related to deconditioning and orthostasis.  Fortunately patient has been doing fairly well.  His monitor did not demonstrate any high risk findings that would require any further cardiac testing or consideration for pacemaker at this time.  I will continue with supportive care with increasing his conditioning and compression socks for dependent edema.      1.  Syncope:  - Likely orthostasis and continue supportive care  - Essential normal event monitor  - Compression socks for dependent edema to avoid episodic orthostasis     2.  Coronary artery disease without angina:  - Stress testing in 2015 demonstrating no ischemia.  Patient with known coronary artery disease which is been medically managed.  - Continue clopidogrel, metoprolol succinate, and atorvastatin     3.  Hypertension:  - Reasonably controlled on today's office visit.  Given concern for orthostasis would not push his medical regimen any further and would continue with current medical regimen.    - Limited on medications due to underlying kidney disease     4.  Mixed hyperlipidemia:  - Continue high potency statin as cholesterol  control       Follow-up:  1 year      Josafat Mendez MD  [facility]  10/21/19  9:27 AM

## 2019-12-26 ENCOUNTER — TELEPHONE (OUTPATIENT)
Dept: FAMILY MEDICINE CLINIC | Facility: CLINIC | Age: 79
End: 2019-12-26

## 2019-12-26 RX ORDER — ATORVASTATIN CALCIUM 20 MG/1
20 TABLET, FILM COATED ORAL DAILY
Qty: 90 TABLET | Refills: 1 | Status: SHIPPED | OUTPATIENT
Start: 2019-12-26 | End: 2020-06-08 | Stop reason: SDUPTHER

## 2019-12-26 RX ORDER — CLOPIDOGREL BISULFATE 75 MG/1
75 TABLET ORAL DAILY
Qty: 90 TABLET | Refills: 1 | Status: SHIPPED | OUTPATIENT
Start: 2019-12-26 | End: 2020-06-08 | Stop reason: SDUPTHER

## 2019-12-26 RX ORDER — METOPROLOL SUCCINATE 25 MG/1
25 TABLET, EXTENDED RELEASE ORAL DAILY
Qty: 90 TABLET | Refills: 1 | Status: SHIPPED | OUTPATIENT
Start: 2019-12-26 | End: 2020-06-08 | Stop reason: SDUPTHER

## 2019-12-26 NOTE — TELEPHONE ENCOUNTER
Pt is needing METOPROLOL, CLOPIDOGREL, ATORVASTATIN sent into [facility] on file.

## 2020-01-02 RX ORDER — NIFEDIPINE 30 MG/1
TABLET, EXTENDED RELEASE ORAL
Qty: 90 TABLET | Refills: 0 | Status: SHIPPED | OUTPATIENT
Start: 2020-01-02 | End: 2020-04-24 | Stop reason: SDUPTHER

## 2020-02-07 RX ORDER — GABAPENTIN 100 MG/1
200 CAPSULE ORAL 3 TIMES DAILY
Qty: 540 CAPSULE | Refills: 0 | Status: SHIPPED | OUTPATIENT
Start: 2020-02-07 | End: 2020-04-24 | Stop reason: SDUPTHER

## 2020-02-13 DIAGNOSIS — I10 HYPERTENSION, ESSENTIAL: ICD-10-CM

## 2020-02-13 DIAGNOSIS — E11.9 TYPE 2 DIABETES MELLITUS WITHOUT COMPLICATION, WITHOUT LONG-TERM CURRENT USE OF INSULIN (HCC): Primary | ICD-10-CM

## 2020-02-13 DIAGNOSIS — E11.9 TYPE 2 DIABETES MELLITUS WITHOUT COMPLICATION, WITHOUT LONG-TERM CURRENT USE OF INSULIN (HCC): ICD-10-CM

## 2020-02-18 LAB
ALBUMIN SERPL-MCNC: 4.1 G/DL (ref 3.5–5.2)
ALBUMIN/GLOB SERPL: 1.4 G/DL
ALP SERPL-CCNC: 215 U/L (ref 39–117)
ALT SERPL-CCNC: 26 U/L (ref 1–41)
AST SERPL-CCNC: 22 U/L (ref 1–40)
BILIRUB SERPL-MCNC: 0.2 MG/DL (ref 0.2–1.2)
BUN SERPL-MCNC: 26 MG/DL (ref 8–23)
BUN/CREAT SERPL: 18.7 (ref 7–25)
CALCIUM SERPL-MCNC: 8.8 MG/DL (ref 8.6–10.5)
CHLORIDE SERPL-SCNC: 101 MMOL/L (ref 98–107)
CO2 SERPL-SCNC: 22.3 MMOL/L (ref 22–29)
CREAT SERPL-MCNC: 1.39 MG/DL (ref 0.76–1.27)
ERYTHROCYTE [DISTWIDTH] IN BLOOD BY AUTOMATED COUNT: 13.2 % (ref 12.3–15.4)
GLOBULIN SER CALC-MCNC: 3 GM/DL
GLUCOSE SERPL-MCNC: 195 MG/DL (ref 65–99)
HBA1C MFR BLD: 7 % (ref 4.8–5.6)
HCT VFR BLD AUTO: 32.1 % (ref 37.5–51)
HGB BLD-MCNC: 10.3 G/DL (ref 13–17.7)
MCH RBC QN AUTO: 27.2 PG (ref 26.6–33)
MCHC RBC AUTO-ENTMCNC: 32.1 G/DL (ref 31.5–35.7)
MCV RBC AUTO: 84.9 FL (ref 79–97)
PLATELET # BLD AUTO: 251 10*3/MM3 (ref 140–450)
POTASSIUM SERPL-SCNC: 4.2 MMOL/L (ref 3.5–5.2)
PROT SERPL-MCNC: 7.1 G/DL (ref 6–8.5)
RBC # BLD AUTO: 3.78 10*6/MM3 (ref 4.14–5.8)
SODIUM SERPL-SCNC: 138 MMOL/L (ref 136–145)
WBC # BLD AUTO: 7.09 10*3/MM3 (ref 3.4–10.8)

## 2020-02-20 ENCOUNTER — OFFICE VISIT (OUTPATIENT)
Dept: FAMILY MEDICINE CLINIC | Facility: CLINIC | Age: 80
End: 2020-02-20

## 2020-02-20 VITALS
BODY MASS INDEX: 30.87 KG/M2 | TEMPERATURE: 98 F | RESPIRATION RATE: 18 BRPM | HEART RATE: 76 BPM | HEIGHT: 72 IN | OXYGEN SATURATION: 98 % | WEIGHT: 227.9 LBS | SYSTOLIC BLOOD PRESSURE: 128 MMHG | DIASTOLIC BLOOD PRESSURE: 62 MMHG

## 2020-02-20 DIAGNOSIS — I10 HYPERTENSION, ESSENTIAL: Primary | Chronic | ICD-10-CM

## 2020-02-20 DIAGNOSIS — E11.29 CONTROLLED TYPE 2 DIABETES MELLITUS WITH OTHER DIABETIC KIDNEY COMPLICATION, WITHOUT LONG-TERM CURRENT USE OF INSULIN (HCC): Chronic | ICD-10-CM

## 2020-02-20 PROCEDURE — 99213 OFFICE O/P EST LOW 20 MIN: CPT | Performed by: NURSE PRACTITIONER

## 2020-02-20 NOTE — PROGRESS NOTES
"Subjective   Dereck Ramírez is a 79 y.o. male   who presents for   Chief Complaint   Patient presents with   • Diabetes     6 month f/u       Six month follow up diabetes, only taking starlix twice daily  Has been eating more sweets and weight gain reported  Shortness of breath with activity, at baseline  Swelling in lower extremities after being sedentary during the daytime, resolves by morning    Fall last week, was bending over and upon standing fell backwards, no injury  occurs intermittently    /62   Pulse 76   Temp 98 °F (36.7 °C)   Resp 18   Ht 182.9 cm (72\")   Wt 103 kg (227 lb 14.4 oz)   SpO2 98%   BMI 30.91 kg/m²       History of Present Illness   Diabetes   He presents for his follow-up diabetic visit. He has type 2 diabetes mellitus. His disease course has been stable. There are no hypoglycemic associated symptoms. Pertinent negatives for hypoglycemia include no headaches or sweats. Associated symptoms include foot paresthesias. Pertinent negatives for diabetes include no blurred vision and no chest pain. There are no hypoglycemic complications. Symptoms are stable. Diabetic complications include a CVA and peripheral neuropathy. Risk factors for coronary artery disease include diabetes mellitus, dyslipidemia, hypertension and obesity. Current diabetic treatment includes oral agent (monotherapy). He is compliant with treatment most of the time.   Hypertension   This is a chronic problem. The current episode started more than 1 year ago. The problem has been gradually improving since onset. The problem is controlled. Pertinent negatives include no anxiety, blurred vision, chest pain, headaches, neck pain, orthopnea, palpitations, peripheral edema, PND, shortness of breath or sweats. There are no associated agents to hypertension. Past treatments include calcium channel blockers and beta blockers. The current treatment provides significant improvement. Hypertensive end-organ damage includes " CVA.       The following portions of the patient's history were reviewed and updated as appropriate: allergies, current medications, past family history, past medical history, past social history, past surgical history and problem list.    Review of Systems  Review of Systems   Constitutional: Negative.    HENT: Negative.    Eyes: Negative.  Negative for blurred vision.   Respiratory: Negative.  Negative for shortness of breath.    Cardiovascular: Negative.  Negative for chest pain, palpitations, orthopnea and PND.   Gastrointestinal: Negative.    Endocrine: Negative.    Genitourinary: Negative.    Musculoskeletal: Negative.  Negative for neck pain.   Skin: Negative.    Allergic/Immunologic: Negative.    Neurological: Negative.  Negative for headaches.   Hematological: Negative.    Psychiatric/Behavioral: Negative.        Objective   Physical Exam  Physical Exam   Constitutional: He is oriented to person, place, and time. He appears well-developed and well-nourished. No distress.   Neck: Neck supple.   Cardiovascular: Normal rate, regular rhythm and normal heart sounds. Exam reveals no gallop and no friction rub.   No murmur heard.  Pulmonary/Chest: Effort normal and breath sounds normal. No respiratory distress. He has no wheezes. He has no rales.   Abdominal: Soft. Bowel sounds are normal. He exhibits no distension. There is no tenderness.   Neurological: He is alert and oriented to person, place, and time.   Skin: Skin is warm and dry. He is not diaphoretic.   Psychiatric: He has a normal mood and affect.         Assessment/Plan

## 2020-03-06 ENCOUNTER — TELEPHONE (OUTPATIENT)
Dept: FAMILY MEDICINE CLINIC | Facility: CLINIC | Age: 80
End: 2020-03-06

## 2020-03-06 RX ORDER — NATEGLINIDE 60 MG/1
60 TABLET ORAL 2 TIMES DAILY WITH MEALS
Qty: 180 TABLET | Refills: 1 | Status: SHIPPED | OUTPATIENT
Start: 2020-03-06 | End: 2020-08-20 | Stop reason: SDUPTHER

## 2020-03-06 NOTE — TELEPHONE ENCOUNTER
PT'S DAUGHTER CALLED TO REQUEST A REFILL ON THE PATIENTS NATEGLINIDE 60 MG TABLET 90 DAY SUPPLY BE SENT TO HIS [facility] THAT IS IN HIS CHART AND SHE STATES THAT THE PATIENT WILL BE OUT TOMORROW. PLEASE ADVISE AND CALL DAUGHTER BACK -[phone].

## 2020-03-16 ENCOUNTER — TELEPHONE (OUTPATIENT)
Dept: FAMILY MEDICINE CLINIC | Facility: CLINIC | Age: 80
End: 2020-03-16

## 2020-03-16 NOTE — TELEPHONE ENCOUNTER
PT DAUGHTER/ LAUREN CALLED AND STATED THAT PT HAS BEEN TAKING tamsulosin (FLOMAX) 0.4 MG capsule 24 hr capsule.  DAUGHTER STATED THAT SHE DID NOT PAY ATTENTION TO THE BOTTLE AND NOW STATES IT IS SOMEONE ELSES RX AND PT HAS TAKEN MOST OF IT. PT WANTS TO KNOW IF IT IS SOMETHING PT SHOULD HAVE BEEN TAKEN     PLEASE ADVISE     [phone]

## 2020-03-17 RX ORDER — TAMSULOSIN HYDROCHLORIDE 0.4 MG/1
1 CAPSULE ORAL DAILY
Qty: 90 CAPSULE | Refills: 1 | Status: SHIPPED | OUTPATIENT
Start: 2020-03-17 | End: 2020-09-16 | Stop reason: SDUPTHER

## 2020-03-17 NOTE — TELEPHONE ENCOUNTER
Spoke with Daughter Melany and she just need a refill on the Flomax. He is still urinating less at night. If you could please send a new Rx to [facility].

## 2020-03-17 NOTE — TELEPHONE ENCOUNTER
He was previously prescribed flomax (tamsulosin)   It is ok to take  Is he noticing less urination at night? If so, we can continue this

## 2020-03-27 NOTE — TELEPHONE ENCOUNTER
Patient's daughter, Maribel, called stating that the patient needs a refill on:    -busPIRone (BUSPAR) 5 MG tablet (Patient almost out of this medication)    Mauricio on [address] confirmed.     Patient call back: [phone].

## 2020-03-30 ENCOUNTER — TELEPHONE (OUTPATIENT)
Dept: FAMILY MEDICINE CLINIC | Facility: CLINIC | Age: 80
End: 2020-03-30

## 2020-03-30 RX ORDER — BUSPIRONE HYDROCHLORIDE 5 MG/1
5 TABLET ORAL 3 TIMES DAILY PRN
Qty: 270 TABLET | Refills: 0 | Status: SHIPPED | OUTPATIENT
Start: 2020-03-30 | End: 2020-03-30

## 2020-03-30 RX ORDER — BUSPIRONE HYDROCHLORIDE 5 MG/1
5 TABLET ORAL 3 TIMES DAILY PRN
Qty: 270 TABLET | Refills: 1 | Status: SHIPPED | OUTPATIENT
Start: 2020-03-30 | End: 2021-03-26 | Stop reason: SDUPTHER

## 2020-03-30 NOTE — TELEPHONE ENCOUNTER
RX sent in with no refills. BEENA has been ran for this patient and has been placed for scan. Amada Clark

## 2020-03-30 NOTE — TELEPHONE ENCOUNTER
PATIENTS DAUGHTER CALLED IN REFERENCE TO  busPIRone (BUSPAR) 5 MG tablet    [id number] - [place], [place] - [address] AT [facility] - [phone]  - [phone]   [phone]    PHARMACY HAS SENT ORDER AS WELL    BENITA 'S CALL BACK NUMBER [phone]

## 2020-04-24 ENCOUNTER — TELEPHONE (OUTPATIENT)
Dept: FAMILY MEDICINE CLINIC | Facility: CLINIC | Age: 80
End: 2020-04-24

## 2020-04-24 DIAGNOSIS — E11.59 TYPE 2 DIABETES MELLITUS WITH OTHER CIRCULATORY COMPLICATION, WITHOUT LONG-TERM CURRENT USE OF INSULIN (HCC): Primary | ICD-10-CM

## 2020-04-24 RX ORDER — LANOLIN ALCOHOL/MO/W.PET/CERES
1000 CREAM (GRAM) TOPICAL DAILY
Qty: 90 TABLET | Refills: 1 | Status: SHIPPED | OUTPATIENT
Start: 2020-04-24 | End: 2020-10-01 | Stop reason: SDUPTHER

## 2020-04-24 RX ORDER — NIFEDIPINE 30 MG/1
30 TABLET, EXTENDED RELEASE ORAL DAILY
Qty: 90 TABLET | Refills: 1 | Status: SHIPPED | OUTPATIENT
Start: 2020-04-24 | End: 2020-10-01 | Stop reason: SDUPTHER

## 2020-04-24 RX ORDER — GABAPENTIN 100 MG/1
200 CAPSULE ORAL 3 TIMES DAILY
Qty: 540 CAPSULE | Refills: 0 | Status: SHIPPED | OUTPATIENT
Start: 2020-04-24 | End: 2020-07-27 | Stop reason: SDUPTHER

## 2020-04-24 NOTE — TELEPHONE ENCOUNTER
BJ CALLED IN AND STATED PATIENT  NEEDS A REFILL OF  gabapentin (NEURONTIN) 100 MG capsule AND NIFEdipine XL (PROCARDIA XL) 30 MG 24 hr tablet AND vitamin B-12 (CYANOCOBALAMIN) 1000 MCG tablet  . PATIENT IS OUT OF MEDICATION  [place] [place] [address] . BJ CALL BACK [phone]

## 2020-05-13 ENCOUNTER — TELEPHONE (OUTPATIENT)
Dept: FAMILY MEDICINE CLINIC | Facility: CLINIC | Age: 80
End: 2020-05-13

## 2020-05-13 RX ORDER — DOXYCYCLINE HYCLATE 50 MG/1
1 CAPSULE, GELATIN COATED ORAL
Qty: 90 TABLET | Refills: 1 | Status: SHIPPED | OUTPATIENT
Start: 2020-05-13 | End: 2020-09-22 | Stop reason: SDUPTHER

## 2020-05-13 RX ORDER — DOXYCYCLINE HYCLATE 50 MG/1
1 CAPSULE, GELATIN COATED ORAL
Qty: 90 TABLET | Refills: 1 | OUTPATIENT
Start: 2020-05-13

## 2020-05-13 NOTE — TELEPHONE ENCOUNTER
PATIENT REQUESTED A REFILL ON:ferrous gluconate (FERGON) 324 MG tablet    PATIENT CAN BE REACHED ON: [phone]    PHARMACY PREFERRED:[id number] - [place] [id number] AT [facility] - [phone]  - [phone] FX

## 2020-06-08 RX ORDER — CLOPIDOGREL BISULFATE 75 MG/1
75 TABLET ORAL DAILY
Qty: 90 TABLET | Refills: 1 | Status: SHIPPED | OUTPATIENT
Start: 2020-06-08 | End: 2020-10-01 | Stop reason: SDUPTHER

## 2020-06-08 RX ORDER — METOPROLOL SUCCINATE 25 MG/1
25 TABLET, EXTENDED RELEASE ORAL DAILY
Qty: 90 TABLET | Refills: 1 | Status: SHIPPED | OUTPATIENT
Start: 2020-06-08 | End: 2020-12-21 | Stop reason: SDUPTHER

## 2020-06-08 RX ORDER — ATORVASTATIN CALCIUM 20 MG/1
20 TABLET, FILM COATED ORAL DAILY
Qty: 90 TABLET | Refills: 1 | Status: SHIPPED | OUTPATIENT
Start: 2020-06-08 | End: 2020-10-01 | Stop reason: SDUPTHER

## 2020-07-27 DIAGNOSIS — E11.59 TYPE 2 DIABETES MELLITUS WITH OTHER CIRCULATORY COMPLICATION, WITHOUT LONG-TERM CURRENT USE OF INSULIN (HCC): ICD-10-CM

## 2020-07-27 NOTE — TELEPHONE ENCOUNTER
Caller: Maribel Arreguin    Relationship: Emergency Contact    Best call back number: [phone]    Medication needed:   Requested Prescriptions     Pending Prescriptions Disp Refills   • gabapentin (NEURONTIN) 100 MG capsule 540 capsule 0     Sig: Take 2 capsules by mouth 3 (Three) Times a Day.       When do you need the refill by: W/IN 3 DAYS    What details did the patient provide when requesting the medication: PHARMACY SAYS THEY SENT A REQUEST A WEEK AGO, IS NOT IN SYSTEM. PLEASE PUSH THROUGH ASAP.    Does the patient have less than a 3 day supply:  [x] Yes  [] No    What is the patient's preferred pharmacy: [id number] [address] AT [facility] [phone] [facility] [phone] FX

## 2020-07-28 RX ORDER — GABAPENTIN 100 MG/1
200 CAPSULE ORAL 3 TIMES DAILY
Qty: 540 CAPSULE | Refills: 0 | Status: SHIPPED | OUTPATIENT
Start: 2020-07-28 | End: 2020-10-21 | Stop reason: SDUPTHER

## 2020-08-20 ENCOUNTER — OFFICE VISIT (OUTPATIENT)
Dept: FAMILY MEDICINE CLINIC | Facility: CLINIC | Age: 80
End: 2020-08-20

## 2020-08-20 VITALS
HEIGHT: 72 IN | HEART RATE: 81 BPM | WEIGHT: 239.5 LBS | DIASTOLIC BLOOD PRESSURE: 70 MMHG | RESPIRATION RATE: 16 BRPM | SYSTOLIC BLOOD PRESSURE: 166 MMHG | TEMPERATURE: 98 F | OXYGEN SATURATION: 99 % | BODY MASS INDEX: 32.44 KG/M2

## 2020-08-20 DIAGNOSIS — E11.59 TYPE 2 DIABETES MELLITUS WITH OTHER CIRCULATORY COMPLICATION, WITHOUT LONG-TERM CURRENT USE OF INSULIN (HCC): Primary | ICD-10-CM

## 2020-08-20 DIAGNOSIS — E78.2 MIXED HYPERLIPIDEMIA: ICD-10-CM

## 2020-08-20 DIAGNOSIS — Z13.29 SCREENING FOR THYROID DISORDER: ICD-10-CM

## 2020-08-20 DIAGNOSIS — I10 HYPERTENSION, ESSENTIAL: ICD-10-CM

## 2020-08-20 LAB
ALBUMIN SERPL-MCNC: 4.3 G/DL (ref 3.5–5.2)
ALBUMIN/GLOB SERPL: 2 G/DL
ALP SERPL-CCNC: 213 U/L (ref 39–117)
ALT SERPL-CCNC: 23 U/L (ref 1–41)
AST SERPL-CCNC: 21 U/L (ref 1–40)
BASOPHILS # BLD AUTO: 0.04 10*3/MM3 (ref 0–0.2)
BASOPHILS NFR BLD AUTO: 0.6 % (ref 0–1.5)
BILIRUB SERPL-MCNC: 0.2 MG/DL (ref 0–1.2)
BUN SERPL-MCNC: 29 MG/DL (ref 8–23)
BUN/CREAT SERPL: 20.6 (ref 7–25)
CALCIUM SERPL-MCNC: 8.4 MG/DL (ref 8.6–10.5)
CHLORIDE SERPL-SCNC: 104 MMOL/L (ref 98–107)
CHOLEST SERPL-MCNC: 163 MG/DL (ref 0–200)
CO2 SERPL-SCNC: 20 MMOL/L (ref 22–29)
CREAT SERPL-MCNC: 1.41 MG/DL (ref 0.76–1.27)
EOSINOPHIL # BLD AUTO: 0.17 10*3/MM3 (ref 0–0.4)
EOSINOPHIL NFR BLD AUTO: 2.4 % (ref 0.3–6.2)
ERYTHROCYTE [DISTWIDTH] IN BLOOD BY AUTOMATED COUNT: 13.3 % (ref 12.3–15.4)
GLOBULIN SER CALC-MCNC: 2.2 GM/DL
GLUCOSE SERPL-MCNC: 210 MG/DL (ref 65–99)
HBA1C MFR BLD: 7.7 % (ref 4.8–5.6)
HCT VFR BLD AUTO: 31.7 % (ref 37.5–51)
HDLC SERPL-MCNC: 32 MG/DL (ref 40–60)
HGB BLD-MCNC: 10.5 G/DL (ref 13–17.7)
IMM GRANULOCYTES # BLD AUTO: 0.04 10*3/MM3 (ref 0–0.05)
IMM GRANULOCYTES NFR BLD AUTO: 0.6 % (ref 0–0.5)
LDLC SERPL CALC-MCNC: 68 MG/DL (ref 0–100)
LDLC/HDLC SERPL: 2.11 {RATIO}
LYMPHOCYTES # BLD AUTO: 1.51 10*3/MM3 (ref 0.7–3.1)
LYMPHOCYTES NFR BLD AUTO: 21.7 % (ref 19.6–45.3)
MCH RBC QN AUTO: 28.1 PG (ref 26.6–33)
MCHC RBC AUTO-ENTMCNC: 33.1 G/DL (ref 31.5–35.7)
MCV RBC AUTO: 84.8 FL (ref 79–97)
MONOCYTES # BLD AUTO: 0.75 10*3/MM3 (ref 0.1–0.9)
MONOCYTES NFR BLD AUTO: 10.8 % (ref 5–12)
NEUTROPHILS # BLD AUTO: 4.46 10*3/MM3 (ref 1.7–7)
NEUTROPHILS NFR BLD AUTO: 63.9 % (ref 42.7–76)
NRBC BLD AUTO-RTO: 0 /100 WBC (ref 0–0.2)
PLATELET # BLD AUTO: 295 10*3/MM3 (ref 140–450)
POTASSIUM SERPL-SCNC: 4.6 MMOL/L (ref 3.5–5.2)
PROT SERPL-MCNC: 6.5 G/DL (ref 6–8.5)
RBC # BLD AUTO: 3.74 10*6/MM3 (ref 4.14–5.8)
SODIUM SERPL-SCNC: 137 MMOL/L (ref 136–145)
TRIGL SERPL-MCNC: 317 MG/DL (ref 0–150)
TSH SERPL DL<=0.005 MIU/L-ACNC: 3.75 UIU/ML (ref 0.27–4.2)
VLDLC SERPL CALC-MCNC: 63.4 MG/DL
WBC # BLD AUTO: 6.97 10*3/MM3 (ref 3.4–10.8)

## 2020-08-20 PROCEDURE — 99213 OFFICE O/P EST LOW 20 MIN: CPT | Performed by: NURSE PRACTITIONER

## 2020-08-20 RX ORDER — NATEGLINIDE 60 MG/1
60 TABLET ORAL 2 TIMES DAILY WITH MEALS
Qty: 180 TABLET | Refills: 3 | Status: SHIPPED | OUTPATIENT
Start: 2020-08-20 | End: 2021-08-13 | Stop reason: SDUPTHER

## 2020-08-20 NOTE — PROGRESS NOTES
"Hattie Ramírez is a 80 y.o. male   who presents for   Chief Complaint   Patient presents with   • Hypertension     6mo FU           /70   Pulse 81   Temp 98 °F (36.7 °C)   Resp 16   Ht 182.9 cm (72\")   Wt 109 kg (239 lb 8 oz)   SpO2 99%   BMI 32.48 kg/m²       History of Present Illness   Hypertension   This is a chronic problem. The current episode started more than 1 year ago. The problem is unchanged. The problem is controlled. Pertinent negatives include no anxiety, blurred vision, chest pain, headaches, malaise/fatigue, neck pain, orthopnea, palpitations, peripheral edema, PND, shortness of breath or sweats. There are no associated agents to hypertension. Risk factors for coronary artery disease include diabetes mellitus and male gender. Past treatments include beta blockers and calcium channel blockers. Current antihypertension treatment includes calcium channel blockers and beta blockers. The current treatment provides significant improvement. There are no compliance problems.  Hypertensive end-organ damage includes CVA.   Diabetes   He presents for his follow-up diabetic visit. He has type 2 diabetes mellitus. His disease course has been stable. There are no hypoglycemic associated symptoms. Pertinent negatives for hypoglycemia include no headaches or sweats. There are no diabetic associated symptoms. Pertinent negatives for diabetes include no blurred vision and no chest pain. There are no hypoglycemic complications. Symptoms are stable. Diabetic complications include a CVA. Risk factors for coronary artery disease include diabetes mellitus, dyslipidemia, hypertension, male sex and obesity. Current diabetic treatment includes oral agent (monotherapy). He is compliant with treatment most of the time.       The following portions of the patient's history were reviewed and updated as appropriate: allergies, current medications, past family history, past medical history, past social " history, past surgical history and problem list.    Review of Systems  Review of Systems   Constitutional: Negative.  Negative for malaise/fatigue.   HENT: Negative.    Eyes: Negative.  Negative for blurred vision.   Respiratory: Negative.  Negative for shortness of breath.    Cardiovascular: Negative.  Negative for chest pain, palpitations, orthopnea and PND.   Gastrointestinal: Negative.    Endocrine: Negative.    Genitourinary: Negative.    Musculoskeletal: Negative.  Negative for neck pain.   Skin: Negative.    Allergic/Immunologic: Negative.    Neurological: Negative.  Negative for headaches.   Hematological: Negative.    Psychiatric/Behavioral: Negative.        Objective   Physical Exam  Physical Exam   Constitutional: He is oriented to person, place, and time. He appears well-developed and well-nourished. No distress.   Neck: Neck supple.   Cardiovascular: Normal rate, regular rhythm and normal heart sounds. Exam reveals no gallop and no friction rub.   No murmur heard.  Pulmonary/Chest: Effort normal and breath sounds normal. No respiratory distress. He has no wheezes. He has no rales.   Abdominal: Soft. Bowel sounds are normal. He exhibits no distension. There is no tenderness.   Neurological: He is alert and oriented to person, place, and time.   Skin: Skin is warm and dry. He is not diaphoretic.   Psychiatric: He has a normal mood and affect.   Vitals reviewed.        Assessment/Plan   Dereck was seen today for hypertension.    Diagnoses and all orders for this visit:    Type 2 diabetes mellitus with other circulatory complication, without long-term current use of insulin (CMS/[facility])  -     CBC & Differential  -     Comprehensive Metabolic Panel  -     Hemoglobin A1c    Hypertension, essential  -     CBC & Differential  -     Comprehensive Metabolic Panel    Mixed hyperlipidemia  -     Lipid Panel With LDL / HDL Ratio    Screening for thyroid disorder  -     TSH Rfx On Abnormal To Free T4    Other orders  -      nateglinide (STARLIX) 60 MG tablet; Take 1 tablet by mouth 2 (Two) Times a Day With Meals.       Labs today  Continue current medications  Discussed flu shot, will obtain at local pharmacy when available.  Follow up in six months, sooner if needed

## 2020-09-16 NOTE — TELEPHONE ENCOUNTER
Caller: Maribel Arreguin    Relationship: Emergency Contact    Best call back number: [phone]  Medication needed:   Requested Prescriptions     Pending Prescriptions Disp Refills   • tamsulosin (FLOMAX) 0.4 MG capsule 24 hr capsule 90 capsule 1     Sig: Take 1 capsule by mouth Daily.       When do you need the refill by: ASAP   What details did the patient provide when requesting the medication: OUT OF MEDICATION  Does the patient have less than a 3 day supply:  [x] Yes  [] No    What is the patient's preferred pharmacy: [id number] [address] [facility] AT [facility] - [phone] [facility] [phone] FX

## 2020-09-17 RX ORDER — TAMSULOSIN HYDROCHLORIDE 0.4 MG/1
1 CAPSULE ORAL DAILY
Qty: 90 CAPSULE | Refills: 1 | Status: SHIPPED | OUTPATIENT
Start: 2020-09-17 | End: 2021-02-25 | Stop reason: SDUPTHER

## 2020-09-22 RX ORDER — DOXYCYCLINE HYCLATE 50 MG/1
1 CAPSULE, GELATIN COATED ORAL
Qty: 90 TABLET | Refills: 1 | Status: SHIPPED | OUTPATIENT
Start: 2020-09-22 | End: 2021-02-25 | Stop reason: SDUPTHER

## 2020-10-01 RX ORDER — LANOLIN ALCOHOL/MO/W.PET/CERES
1000 CREAM (GRAM) TOPICAL DAILY
Qty: 90 TABLET | Refills: 1 | Status: SHIPPED | OUTPATIENT
Start: 2020-10-01 | End: 2021-02-25 | Stop reason: SDUPTHER

## 2020-10-01 RX ORDER — ATORVASTATIN CALCIUM 20 MG/1
20 TABLET, FILM COATED ORAL DAILY
Qty: 90 TABLET | Refills: 1 | Status: SHIPPED | OUTPATIENT
Start: 2020-10-01 | End: 2021-02-25 | Stop reason: SDUPTHER

## 2020-10-01 RX ORDER — CLOPIDOGREL BISULFATE 75 MG/1
75 TABLET ORAL DAILY
Qty: 90 TABLET | Refills: 1 | Status: SHIPPED | OUTPATIENT
Start: 2020-10-01 | End: 2021-02-25 | Stop reason: SDUPTHER

## 2020-10-01 RX ORDER — NIFEDIPINE 30 MG/1
30 TABLET, EXTENDED RELEASE ORAL DAILY
Qty: 90 TABLET | Refills: 1 | Status: SHIPPED | OUTPATIENT
Start: 2020-10-01 | End: 2021-02-25 | Stop reason: SDUPTHER

## 2020-10-06 ENCOUNTER — FLU SHOT (OUTPATIENT)
Dept: FAMILY MEDICINE CLINIC | Facility: CLINIC | Age: 80
End: 2020-10-06

## 2020-10-06 DIAGNOSIS — Z23 NEED FOR INFLUENZA VACCINATION: ICD-10-CM

## 2020-10-06 PROCEDURE — 90694 VACC AIIV4 NO PRSRV 0.5ML IM: CPT | Performed by: NURSE PRACTITIONER

## 2020-10-06 PROCEDURE — G0008 ADMIN INFLUENZA VIRUS VAC: HCPCS | Performed by: NURSE PRACTITIONER

## 2020-10-13 ENCOUNTER — TELEMEDICINE (OUTPATIENT)
Dept: FAMILY MEDICINE CLINIC | Facility: CLINIC | Age: 80
End: 2020-10-13

## 2020-10-13 DIAGNOSIS — J30.1 SEASONAL ALLERGIC RHINITIS DUE TO POLLEN: Primary | ICD-10-CM

## 2020-10-13 PROCEDURE — 99212 OFFICE O/P EST SF 10 MIN: CPT | Performed by: NURSE PRACTITIONER

## 2020-10-13 NOTE — PROGRESS NOTES
Subjective   Dereck Ramírez is a 80 y.o. male   who presents for   Chief Complaint   Patient presents with   • Nasal Congestion     Presents with runny nose and cough for the past week  Clear nasal drip, denies shortness of breath or wheezing  Denies post nasal draniage  Occasional sneezing  Watery eyes, clear discharge  Denies crusting  Denies sinus pain or pressure  Denies change in       There were no vitals taken for this visit.      History of Present Illness   URI   This is a new problem. The current episode started in the past 7 days. The problem has been unchanged. There has been no fever. Associated symptoms include coughing (dry) and rhinorrhea (clear). Pertinent negatives include no abdominal pain, chest pain, congestion, diarrhea, dysuria, ear pain, headaches, joint pain, joint swelling, nausea, neck pain, plugged ear sensation, rash, sinus pain, sneezing, sore throat, swollen glands, vomiting or wheezing. He has tried nothing for the symptoms. The treatment provided no relief.       The following portions of the patient's history were reviewed and updated as appropriate: allergies, current medications, past family history, past medical history, past social history, past surgical history and problem list.    Review of Systems  Review of Systems   HENT: Positive for rhinorrhea (clear). Negative for congestion, ear pain, sinus pain, sneezing and sore throat.    Eyes: Positive for discharge (watery eyes in past week, denies crusting). Negative for itching.   Respiratory: Positive for cough (dry). Negative for wheezing.    Cardiovascular: Negative for chest pain.   Gastrointestinal: Negative for abdominal pain, diarrhea, nausea and vomiting.   Genitourinary: Negative for dysuria.   Musculoskeletal: Negative for joint pain and neck pain.   Skin: Negative for rash.   Neurological: Negative for headaches.       Objective   Physical Exam  Physical Exam  Constitutional:       Appearance: Normal appearance.    Eyes:      Conjunctiva/sclera: Conjunctivae normal.   Pulmonary:      Effort: Pulmonary effort is normal.   Neurological:      Mental Status: He is alert and oriented to person, place, and time.           Assessment/Plan   Diagnoses and all orders for this visit:    1. Seasonal allergic rhinitis due to pollen (Primary)         Start daily antihistamine, 5 mg zyrtec at bedtime  Follow up for worsening or no improvement in symptoms    You have chosen to receive care through a telehealth visit.  Do you consent to use a video/audio connection for your medical care today? Yes  Time spent 10 minutes

## 2020-10-21 DIAGNOSIS — E11.59 TYPE 2 DIABETES MELLITUS WITH OTHER CIRCULATORY COMPLICATION, WITHOUT LONG-TERM CURRENT USE OF INSULIN (HCC): ICD-10-CM

## 2020-10-21 RX ORDER — GABAPENTIN 100 MG/1
200 CAPSULE ORAL 3 TIMES DAILY
Qty: 540 CAPSULE | Refills: 1 | Status: SHIPPED | OUTPATIENT
Start: 2020-10-21 | End: 2021-04-01 | Stop reason: SDUPTHER

## 2020-10-21 NOTE — TELEPHONE ENCOUNTER
Caller: Maribel Arreguin    Relationship: Emergency Contact    Best call back number: [phone]     Medication needed:   Requested Prescriptions     Pending Prescriptions Disp Refills   • gabapentin (NEURONTIN) 100 MG capsule 540 capsule 0     Sig: Take 2 capsules by mouth 3 (Three) Times a Day.       When do you need the refill by: 10/22/2020    What details did the patient provide when requesting the medication: PATIENTS DAUGHTER WILL BE LEAVING AND IS TRYING TO GET FILLED BEFORE SHE DOES HE HAS ABOUT A WEEK LEFT.    Does the patient have less than a 3 day supply:  [] Yes  [x] No    What is the patient's preferred pharmacy:      [id number] [address] AT [facility] [phone] [facility] [phone] FX

## 2020-12-09 ENCOUNTER — OFFICE VISIT (OUTPATIENT)
Dept: CARDIOLOGY | Facility: CLINIC | Age: 80
End: 2020-12-09

## 2020-12-09 VITALS
DIASTOLIC BLOOD PRESSURE: 89 MMHG | BODY MASS INDEX: 29.8 KG/M2 | HEIGHT: 72 IN | SYSTOLIC BLOOD PRESSURE: 177 MMHG | HEART RATE: 70 BPM | WEIGHT: 220 LBS

## 2020-12-09 DIAGNOSIS — I10 HYPERTENSION, ESSENTIAL: Chronic | ICD-10-CM

## 2020-12-09 DIAGNOSIS — E78.2 MIXED HYPERLIPIDEMIA: ICD-10-CM

## 2020-12-09 DIAGNOSIS — I25.10 CHRONIC CORONARY ARTERY DISEASE: Primary | ICD-10-CM

## 2020-12-09 DIAGNOSIS — N18.31 STAGE 3A CHRONIC KIDNEY DISEASE (HCC): ICD-10-CM

## 2020-12-09 DIAGNOSIS — I63.9 CEREBROVASCULAR ACCIDENT (CVA), UNSPECIFIED MECHANISM (HCC): Chronic | ICD-10-CM

## 2020-12-09 PROCEDURE — 99443 PR PHYS/QHP TELEPHONE EVALUATION 21-30 MIN: CPT | Performed by: INTERNAL MEDICINE

## 2020-12-09 RX ORDER — FUROSEMIDE 20 MG/1
20 TABLET ORAL DAILY
Qty: 30 TABLET | Refills: 11 | Status: SHIPPED | OUTPATIENT
Start: 2020-12-09 | End: 2021-01-06 | Stop reason: SDUPTHER

## 2020-12-09 NOTE — PROGRESS NOTES
[facility] Telemedicine Follow Up Office Note     Encounter Date:20  Patient:Dereck Ramírez  :1940  MRN:[id number]      Chief Complaint:   Chief Complaint   Patient presents with   • Syncope     1 year f/u   • Coronary Artery Disease   • Hypertension   • Hyperlipidemia         History of Presenting Illness:     Mr. Ramírez is a 80 y.o. gentleman with past medical history notable for coronary artery disease which is been medically managed, hypertension, mixed hyperlipidemia, chronic kidney disease, chronic anemia, history of CVA, and diabetes with neuropathy who presents for follow up visit regarding his cardiac issues.  He was last seen a year ago after follow-up for syncopal spell.  His work-up at that time did not identify any arrhythmic issues or structural abnormalities with his heart which would have contributed.  In general he is doing fairly well and the plan was to follow-up in 1 year.  He presents today for a telehealth visit.  Unfortunate the daughter states that over the last year he has had worsening leg swelling that seems to come and go.  Additionally his blood pressure has also tended to increase over the last couple of months.  His blood pressure tends to run in the 160s to 170s systolic.  Fortunately he denies any further syncopal episodes but he did have a fall a week ago but did not lose consciousness.  It was again feeling like he was being pushed over.  But again no loss of consciousness.  His daughter did check his blood pressure at that time and was noted to be high.        Review of Systems:  Review of Systems   Constitution: Negative.   HENT: Negative.    Eyes: Negative.    Cardiovascular: Positive for leg swelling.   Respiratory: Negative.    Endocrine: Negative.    Hematologic/Lymphatic: Negative.    Skin: Negative.    Musculoskeletal: Negative.    Gastrointestinal: Negative.    Genitourinary: Negative.    Neurological: Positive for loss of balance.    Psychiatric/Behavioral: Negative.    Allergic/Immunologic: Negative.        Current Outpatient Medications on File Prior to Visit   Medication Sig Dispense Refill   • atorvastatin (LIPITOR) 20 MG tablet Take 1 tablet by mouth Daily. 90 tablet 1   • busPIRone (BUSPAR) 5 MG tablet Take 1 tablet by mouth 3 (Three) Times a Day As Needed (anxiety). 270 tablet 1   • clopidogrel (PLAVIX) 75 MG tablet Take 1 tablet by mouth Daily. 90 tablet 1   • ferrous gluconate (FERGON) 324 MG tablet Take 1 tablet by mouth Daily With Breakfast. 90 tablet 1   • gabapentin (NEURONTIN) 100 MG capsule Take 2 capsules by mouth 3 (Three) Times a Day. 540 capsule 1   • glucose blood test strip Check blood sugar twice daily and as needed 100 each 12   • metoprolol succinate XL (TOPROL-XL) 25 MG 24 hr tablet Take 1 tablet by mouth Daily. 90 tablet 1   • nateglinide (STARLIX) 60 MG tablet Take 1 tablet by mouth 2 (Two) Times a Day With Meals. 180 tablet 3   • NIFEdipine XL (PROCARDIA XL) 30 MG 24 hr tablet Take 1 tablet by mouth Daily. 90 tablet 1   • nitroglycerin (NITROSTAT) 0.4 MG SL tablet Place 1 tablet under the tongue Every 5 (Five) Minutes As Needed for Chest Pain. 100 tablet 11   • sertraline (ZOLOFT) 50 MG tablet TAKE 1 AND 1/2 TABLETS BY MOUTH EVERY  tablet 1   • tamsulosin (FLOMAX) 0.4 MG capsule 24 hr capsule Take 1 capsule by mouth Daily. 90 capsule 1   • vitamin B-12 (CYANOCOBALAMIN) 1000 MCG tablet Take 1 tablet by mouth Daily. 90 tablet 1   • [DISCONTINUED] sertraline (ZOLOFT) 50 MG tablet Take 1.5 tablets by mouth Daily. 135 tablet 1     No current facility-administered medications on file prior to visit.        No Known Allergies    Past Medical History:   Diagnosis Date   • Arthritis    • Cerebellar artery occlusion    • Diabetes mellitus (CMS/HCC)    • Enlarged prostate    • Hyperlipidemia    • Hypertension    • Stroke (CMS/HCC) 2011, 2012       Past Surgical History:   Procedure Laterality Date   • CARDIAC  "CATHETERIZATION      lesion 1: intervention outcome   • HERNIA REPAIR         Social History     Socioeconomic History   • Marital status:      Spouse name: Not on file   • Number of children: Not on file   • Years of education: Not on file   • Highest education level: Not on file   Tobacco Use   • Smoking status: Never Smoker   • Smokeless tobacco: Never Used   Substance and Sexual Activity   • Alcohol use: No   • Drug use: No   • Sexual activity: Defer       Family History   Problem Relation Age of Onset   • Heart disease Mother    • Stroke Mother    • Crohn's disease Father    • Stroke Father    • Cancer Other    • Stroke Other    • Diabetes Other    • Heart defect Other    • Hypertension Other    • Thyroid disease Other    • Cancer Maternal Uncle         bone   • Dementia Maternal Grandmother        The following portions of the patient's history were reviewed and updated as appropriate: allergies, current medications, past family history, past medical history, past social history, past surgical history and problem list.       Objective:       Vitals:    12/09/20 0804   BP: 177/89   BP Location: Right arm   Patient Position: Sitting   Pulse: 70   Weight: 99.8 kg (220 lb)   Height: 182.9 cm (72\")       Physical Exam:  Deferred due to phone visit       Lab Results   Component Value Date    GLUCOSE 93 08/22/2019    BUN 29 (H) 08/20/2020    CREATININE 1.41 (H) 08/20/2020    EGFRIFNONA 49 (L) 08/20/2020    EGFRIFAFRI 59 (L) 08/20/2020    BCR 20.6 08/20/2020    K 4.6 08/20/2020    CO2 20.0 (L) 08/20/2020    CALCIUM 8.4 (L) 08/20/2020    PROTENTOTREF 6.5 08/20/2020    ALBUMIN 4.30 08/20/2020    LABIL2 2.0 08/20/2020    AST 21 08/20/2020    ALT 23 08/20/2020       Lab Results   Component Value Date    WBC 6.97 08/20/2020    HGB 10.5 (L) 08/20/2020    HCT 31.7 (L) 08/20/2020    MCV 84.8 08/20/2020     08/20/2020       Lab Results   Component Value Date    CKTOTAL 84 07/18/2014    CKMB 3.5 07/18/2014    " TROPONINT <0.010 08/22/2019       Lab Results   Component Value Date    CHLPL 163 08/20/2020    CHLPL 125 04/18/2019    CHLPL 144 01/18/2018     Lab Results   Component Value Date    TRIG 317 (H) 08/20/2020    TRIG 292 (H) 04/18/2019    TRIG 316 (H) 01/18/2018     Lab Results   Component Value Date    HDL 32 (L) 08/20/2020    HDL 29 (L) 04/18/2019    HDL 34 (L) 01/18/2018     Lab Results   Component Value Date    LDL 68 08/20/2020    LDL 38 04/18/2019    LDL 47 01/18/2018       Lab Results   Component Value Date    TSH 3.750 08/20/2020       Mobile Telemetry Monitor 2 week 10/14/2019:  · Overall relatively normal 2-week mobile telemetry monitor.   · Normal amount of ectopy with short self-limited runs of SVT and VT.   · No etiology for syncope noted on the study.    Echocardiogram report 9/13/2019:  · Left ventricular systolic function is normal. Calculated EF = 61.0%. Normal left ventricular cavity size, wall thickness, mass and mass index noted. All left ventricular wall segments contract normally. Septal wall motion is normal. Left ventricular diastolic function is normal. Normal left atrial pressure.  · Right ventricular cavity is mild-to-moderately dilated  · Right atrial cavity size is mildly dilated.  · Left atrial cavity size is mild-to-moderately dilated.  · Mild mitral valve regurgitation is present.  · No prior study available for comparison.     Carotid duplex 9/13/2019:  • Right ICA Prox: Imaging indicates 16%-49% stenosis.   • Left ICA Prox: Imaging indicates 16-49% stenosis.     Stress Lexiscan nuclear 9/5/2015:  · Normal Lexiscan Cardiolite stress test.   · No evidence of ischemia or infarction.   · Normal wall motion.   · Normal ejection fraction.   · Normal thickening.      Cardiac catheterization report 5/24/2013:  · Moderate to severe disease of the ramus off the first diagonal and ramus off the first obtuse marginal.   · Minimal segmental disease of the right coronary artery. There is 100%  occlusion of the posterior descending artery with distal filling by the left coronary artery.   · Normal left ventricular systolic function.     Assessment:          Diagnosis Plan   1. Chronic coronary artery disease     2. Hypertension, essential     3. Cerebrovascular accident (CVA), unspecified mechanism (CMS/HCC)     4. Mixed hyperlipidemia     5. Stage 3a chronic kidney disease            Plan:     Mr. Ramírez is a 80 y.o. gentleman with past medical history notable for coronary artery disease which is been medically managed, hypertension, mixed hyperlipidemia, chronic kidney disease, chronic anemia, history of CVA, and diabetes with neuropathy who presents for follow-up.  Unfortunately his leg swelling and his chronic renal insufficiency makes management of this somewhat challenging.  I would like to start with a low-dose diuretic to see if we can improve some of his leg swelling without impacting his kidney function and will need to closely monitor this with repeat BMP in a week.  This might also help us with improving his blood pressure slightly.  However I would like to first start this Lasix and monitor his blood pressure response and if his blood pressure remains elevated we might consider changing his metoprolol to carvedilol or adding another agent but obviously will need to be careful given both his renal insufficiency and history of falls.         Leg edema:  · Compression socks for dependent edema to avoid episodic orthostasis  · We will add low-dose diuretic and monitor response with follow-up BMP     Coronary artery disease without angina:  · Stress testing in 2015 demonstrating no ischemia.  Patient with known coronary artery disease which is been medically managed.  · Continue clopidogrel, metoprolol succinate, and atorvastatin     Hypertension:  · Elevated today and in the past  · We will monitor response to starting Lasix  · May consider changing metoprolol to carvedilol.    · Limited on  medications due to underlying kidney disease     Mixed hyperlipidemia:  · Continue high potency statin as cholesterol control       Follow-up:  1 month      Josafat Mendez MD  [facility]  12/09/20  08:14 EST        This patient has consented to a telehealth visit via phone. The visit was scheduled as a phone visit to comply with patient safety concerns in accordance with CDC recommendations.  All vitals recorded within this visit are reported by the patient.  I spent  32 minutes in total including but not limited to the 22 minutes spent in direct conversation with this patient.

## 2020-12-21 NOTE — TELEPHONE ENCOUNTER
Caller: Maribel Arreguin    Relationship: Emergency Contact    Best call back number:     Medication needed:   Requested Prescriptions     Pending Prescriptions Disp Refills   • metoprolol succinate XL (TOPROL-XL) 25 MG 24 hr tablet 90 tablet 1     Sig: Take 1 tablet by mouth Daily.       When do you need the refill by: ASAP    What details did the patient provide when requesting the medication: PATIENT IS COMPLETELY OUT    Does the patient have less than a 3 day supply:  [x] Yes  [] No    What is the patient's preferred pharmacy: [facility] #[id number] [facility] [address] AT [facility] [phone] [facility] [phone]

## 2020-12-22 RX ORDER — METOPROLOL SUCCINATE 25 MG/1
25 TABLET, EXTENDED RELEASE ORAL DAILY
Qty: 90 TABLET | Refills: 1 | Status: SHIPPED | OUTPATIENT
Start: 2020-12-22 | End: 2021-01-06

## 2020-12-29 ENCOUNTER — LAB (OUTPATIENT)
Dept: LAB | Facility: HOSPITAL | Age: 80
End: 2020-12-29

## 2020-12-29 DIAGNOSIS — N18.31 STAGE 3A CHRONIC KIDNEY DISEASE (HCC): ICD-10-CM

## 2020-12-29 LAB
ANION GAP SERPL CALCULATED.3IONS-SCNC: 9.9 MMOL/L (ref 5–15)
BUN SERPL-MCNC: 30 MG/DL (ref 8–23)
BUN/CREAT SERPL: 18.2 (ref 7–25)
CALCIUM SPEC-SCNC: 9 MG/DL (ref 8.6–10.5)
CHLORIDE SERPL-SCNC: 101 MMOL/L (ref 98–107)
CO2 SERPL-SCNC: 25.1 MMOL/L (ref 22–29)
CREAT SERPL-MCNC: 1.65 MG/DL (ref 0.76–1.27)
GFR SERPL CREATININE-BSD FRML MDRD: 41 ML/MIN/1.73
GLUCOSE SERPL-MCNC: 283 MG/DL (ref 65–99)
POTASSIUM SERPL-SCNC: 4.4 MMOL/L (ref 3.5–5.2)
SODIUM SERPL-SCNC: 136 MMOL/L (ref 136–145)

## 2020-12-29 PROCEDURE — 80048 BASIC METABOLIC PNL TOTAL CA: CPT

## 2020-12-29 PROCEDURE — 36415 COLL VENOUS BLD VENIPUNCTURE: CPT

## 2021-01-06 ENCOUNTER — OFFICE VISIT (OUTPATIENT)
Dept: CARDIOLOGY | Facility: CLINIC | Age: 81
End: 2021-01-06

## 2021-01-06 VITALS
WEIGHT: 242.8 LBS | HEART RATE: 79 BPM | SYSTOLIC BLOOD PRESSURE: 180 MMHG | HEIGHT: 72 IN | BODY MASS INDEX: 32.89 KG/M2 | DIASTOLIC BLOOD PRESSURE: 82 MMHG

## 2021-01-06 DIAGNOSIS — I50.33 ACUTE ON CHRONIC DIASTOLIC CONGESTIVE HEART FAILURE (HCC): Primary | ICD-10-CM

## 2021-01-06 DIAGNOSIS — N18.32 STAGE 3B CHRONIC KIDNEY DISEASE (HCC): ICD-10-CM

## 2021-01-06 DIAGNOSIS — I25.10 CHRONIC CORONARY ARTERY DISEASE: ICD-10-CM

## 2021-01-06 DIAGNOSIS — I10 HYPERTENSION, ESSENTIAL: Chronic | ICD-10-CM

## 2021-01-06 PROCEDURE — 93000 ELECTROCARDIOGRAM COMPLETE: CPT | Performed by: INTERNAL MEDICINE

## 2021-01-06 PROCEDURE — 99214 OFFICE O/P EST MOD 30 MIN: CPT | Performed by: INTERNAL MEDICINE

## 2021-01-06 RX ORDER — FUROSEMIDE 40 MG/1
40 TABLET ORAL DAILY
Qty: 90 TABLET | Refills: 3 | Status: SHIPPED | OUTPATIENT
Start: 2021-01-06 | End: 2021-06-15 | Stop reason: SDUPTHER

## 2021-01-06 RX ORDER — CARVEDILOL 12.5 MG/1
12.5 TABLET ORAL 2 TIMES DAILY WITH MEALS
Qty: 180 TABLET | Refills: 3 | Status: SHIPPED | OUTPATIENT
Start: 2021-01-06 | End: 2021-08-31

## 2021-01-06 NOTE — PROGRESS NOTES
[facility] Telemedicine Follow Up Office Note     Encounter Date:21  Patient:Dereck Ramírez  :1940  MRN:[id number]      Chief Complaint:   Chief Complaint   Patient presents with   • Coronary Artery Disease     4 week f/u   • Hypertension   • Hyperlipidemia     History of Presenting Illness:     Mr. Ramírez is a 80 y.o. gentleman with past medical history notable for coronary artery disease which is been medically managed, hypertension, mixed hyperlipidemia, chronic kidney disease, chronic anemia, history of CVA, and diabetes with neuropathy who presents for follow up visit.  He was actually seen via telehealth a month ago and prior to that a year ago in our office.  Unfortunately his telehealth visit he had accumulated multiple chronic issues which had progressed since the beginning of the pandemic back in March.  He has had progressive lower extremity swelling which at our last office visit about a year ago was only mild in severity and reasonably controlled.  This had significantly progressed.  He additionally was having issues with falls at home.  His blood pressure was also significantly elevated and given all these multiple issues we did have him start a low-dose diuretic with follow-up labs along with a office visit for us to physically examine him given his significant changes.    Since starting the Lasix his follow-up labs demonstrate stable creatinine but he has not had much in the way of any significant improvement in his blood pressure or lower extremity swelling.  He has not had any falls and in general is doing about the same.        Review of Systems:  Review of Systems   Constitution: Negative.   HENT: Negative.    Eyes: Negative.    Cardiovascular: Positive for leg swelling.   Respiratory: Negative.    Endocrine: Negative.    Hematologic/Lymphatic: Negative.    Skin: Negative.    Musculoskeletal: Negative.    Gastrointestinal: Negative.    Genitourinary: Negative.     Neurological: Positive for loss of balance.   Psychiatric/Behavioral: Negative.    Allergic/Immunologic: Negative.        Current Outpatient Medications on File Prior to Visit   Medication Sig Dispense Refill   • atorvastatin (LIPITOR) 20 MG tablet Take 1 tablet by mouth Daily. 90 tablet 1   • busPIRone (BUSPAR) 5 MG tablet Take 1 tablet by mouth 3 (Three) Times a Day As Needed (anxiety). 270 tablet 1   • clopidogrel (PLAVIX) 75 MG tablet Take 1 tablet by mouth Daily. 90 tablet 1   • ferrous gluconate (FERGON) 324 MG tablet Take 1 tablet by mouth Daily With Breakfast. 90 tablet 1   • gabapentin (NEURONTIN) 100 MG capsule Take 2 capsules by mouth 3 (Three) Times a Day. 540 capsule 1   • glucose blood test strip Check blood sugar twice daily and as needed 100 each 12   • nateglinide (STARLIX) 60 MG tablet Take 1 tablet by mouth 2 (Two) Times a Day With Meals. 180 tablet 3   • NIFEdipine XL (PROCARDIA XL) 30 MG 24 hr tablet Take 1 tablet by mouth Daily. 90 tablet 1   • nitroglycerin (NITROSTAT) 0.4 MG SL tablet Place 1 tablet under the tongue Every 5 (Five) Minutes As Needed for Chest Pain. 100 tablet 11   • sertraline (ZOLOFT) 50 MG tablet TAKE 1 AND 1/2 TABLETS BY MOUTH EVERY  tablet 1   • tamsulosin (FLOMAX) 0.4 MG capsule 24 hr capsule Take 1 capsule by mouth Daily. 90 capsule 1   • vitamin B-12 (CYANOCOBALAMIN) 1000 MCG tablet Take 1 tablet by mouth Daily. 90 tablet 1   • [DISCONTINUED] furosemide (LASIX) 20 MG tablet Take 1 tablet by mouth Daily. 30 tablet 11   • [DISCONTINUED] metoprolol succinate XL (TOPROL-XL) 25 MG 24 hr tablet Take 1 tablet by mouth Daily. 90 tablet 1     No current facility-administered medications on file prior to visit.        No Known Allergies    Past Medical History:   Diagnosis Date   • Arthritis    • Cerebellar artery occlusion    • Diabetes mellitus (CMS/HCC)    • Enlarged prostate    • Hyperlipidemia    • Hypertension    • Stroke (CMS/HCC) 2011, 2012       Past Surgical  "History:   Procedure Laterality Date   • CARDIAC CATHETERIZATION      lesion 1: intervention outcome   • HERNIA REPAIR         Social History     Socioeconomic History   • Marital status:      Spouse name: Not on file   • Number of children: Not on file   • Years of education: Not on file   • Highest education level: Not on file   Tobacco Use   • Smoking status: Never Smoker   • Smokeless tobacco: Never Used   Substance and Sexual Activity   • Alcohol use: No   • Drug use: No   • Sexual activity: Defer       Family History   Problem Relation Age of Onset   • Heart disease Mother    • Stroke Mother    • Crohn's disease Father    • Stroke Father    • Cancer Other    • Stroke Other    • Diabetes Other    • Heart defect Other    • Hypertension Other    • Thyroid disease Other    • Cancer Maternal Uncle         bone   • Dementia Maternal Grandmother        The following portions of the patient's history were reviewed and updated as appropriate: allergies, current medications, past family history, past medical history, past social history, past surgical history and problem list.       Objective:       Vitals:    01/06/21 1418   BP: 180/82   BP Location: Left arm   Patient Position: Sitting   Pulse: 79   Weight: 110 kg (242 lb 12.8 oz)   Height: 182.9 cm (72\")       Physical Exam:  Constitutional:  Somewhat ill, frail, no acute distress   HENT: Oropharynx clear and membrane moist  Eyes: Normal conjunctiva, no sclera icterus.  Neck: Supple, no carotid bruit bilaterally.  Cardiovascular: Regular rate and rhythm, No Murmur, 3+ bilateral lower extremity edema.  Pulmonary: Normal respiratory effort, normal lung sounds, no wheezing.  Abdominal: Soft, nontender, no hepatosplenomegaly, liver is non-pulsatile.  Neurological: Alert and orient x 3.   Poor memory  Skin: Warm, dry, no ecchymosis, no rash.  Psych: Appropriate mood and affect. Normal judgment and insight.         Lab Results   Component Value Date    GLUCOSE 283 " (H) 12/29/2020    BUN 30 (H) 12/29/2020    CREATININE 1.65 (H) 12/29/2020    EGFRIFNONA 41 (L) 12/29/2020    EGFRIFAFRI 59 (L) 08/20/2020    BCR 18.2 12/29/2020    K 4.4 12/29/2020    CO2 25.1 12/29/2020    CALCIUM 9.0 12/29/2020    PROTENTOTREF 6.5 08/20/2020    ALBUMIN 4.30 08/20/2020    LABIL2 2.0 08/20/2020    AST 21 08/20/2020    ALT 23 08/20/2020       Lab Results   Component Value Date    WBC 6.97 08/20/2020    HGB 10.5 (L) 08/20/2020    HCT 31.7 (L) 08/20/2020    MCV 84.8 08/20/2020     08/20/2020       Lab Results   Component Value Date    CKTOTAL 84 07/18/2014    CKMB 3.5 07/18/2014    TROPONINT <0.010 08/22/2019       Lab Results   Component Value Date    CHLPL 163 08/20/2020    CHLPL 125 04/18/2019    CHLPL 144 01/18/2018     Lab Results   Component Value Date    TRIG 317 (H) 08/20/2020    TRIG 292 (H) 04/18/2019    TRIG 316 (H) 01/18/2018     Lab Results   Component Value Date    HDL 32 (L) 08/20/2020    HDL 29 (L) 04/18/2019    HDL 34 (L) 01/18/2018     Lab Results   Component Value Date    LDL 68 08/20/2020    LDL 38 04/18/2019    LDL 47 01/18/2018       Lab Results   Component Value Date    TSH 3.750 08/20/2020       ECG 12 Lead    Date/Time: 1/6/2021 2:55 PM  Performed by: Josafat Mendez MD  Authorized by: Josafat Mendez MD   Comparison: compared with previous ECG from 10/21/2020  Rhythm: sinus rhythm  Ectopy: atrial premature contractions  Conduction: non-specific intraventricular conduction delay    Clinical impression: abnormal EKG        Mobile Telemetry Monitor 2 week 10/14/2019:  · Overall relatively normal 2-week mobile telemetry monitor.   · Normal amount of ectopy with short self-limited runs of SVT and VT.   · No etiology for syncope noted on the study.    Echocardiogram report 9/13/2019:  · Left ventricular systolic function is normal. Calculated EF = 61.0%. Normal left ventricular cavity size, wall thickness, mass and mass index noted. All left ventricular wall segments  contract normally. Septal wall motion is normal. Left ventricular diastolic function is normal. Normal left atrial pressure.  · Right ventricular cavity is mild-to-moderately dilated  · Right atrial cavity size is mildly dilated.  · Left atrial cavity size is mild-to-moderately dilated.  · Mild mitral valve regurgitation is present.  · No prior study available for comparison.     Carotid duplex 9/13/2019:  • Right ICA Prox: Imaging indicates 16%-49% stenosis.   • Left ICA Prox: Imaging indicates 16-49% stenosis.     Stress Lexiscan nuclear 9/5/2015:  · Normal Lexiscan Cardiolite stress test.   · No evidence of ischemia or infarction.   · Normal wall motion.   · Normal ejection fraction.   · Normal thickening.      Cardiac catheterization report 5/24/2013:  · Moderate to severe disease of the ramus off the first diagonal and ramus off the first obtuse marginal.   · Minimal segmental disease of the right coronary artery. There is 100% occlusion of the posterior descending artery with distal filling by the left coronary artery.   · Normal left ventricular systolic function.     Assessment:          Diagnosis Plan   1. Acute on chronic diastolic congestive heart failure (CMS/HCC)  Adult Transthoracic Echo Complete W/ Cont if Necessary Per Protocol    Basic Metabolic Panel    proBNP    ECG 12 Lead   2. Hypertension, essential     3. Chronic coronary artery disease     4. Stage 3b chronic kidney disease            Plan:       Mr. Ramírez is a 80 y.o. gentleman with past medical history notable for coronary artery disease which is been medically managed, hypertension, mixed hyperlipidemia, chronic kidney disease, chronic anemia, history of CVA, and diabetes with neuropathy who presents for follow-up.  Unfortunately has not had much in the way of improvement after starting a low-dose diuretic and given his underlying kidney function he probably needs a higher dose.  I have asked him to increase his Lasix from 20 mg to 40 mg  we will follow-up with a repeat BMP and proBNP level closely follow his creatinine clearance and electrolytes given his underlying kidney disease.  I have also ordered a repeat echocardiogram given his change in clinical status to reevaluate his heart function.  He did have a mildly dilated right ventricle on his last echocardiogram but clinically was doing much better than.  We may be dealing with pulmonary hypertension and need to further evaluate this as a possibility.  Finally with regards to his elevated blood pressures I would like to try and optimize his better with changing from metoprolol to carvedilol.  Given his underlying renal dysfunction we have limited options.  Would continue nifedipine for now but this may be contributing somewhat to his lower extremity swelling if it does not improve with adjusting diuretic dose or clear abnormality on echocardiogram may need to consider alternative location       Cute on chronic diastolic congestive heart failure:  · Compression socks for dependent edema to avoid episodic orthostasis  · Last echocardiogram did not demonstrate any significant diastolic dysfunction however there was some RV dilatation which could represent pulmonary pretension  · Follow-up on repeat echocardiogram  · Increase Lasix from 40 mg to 40 mg and follow-up BNP level to closely monitor creatinine and electrolytes.       Coronary artery disease without angina:  · Stress testing in 2015 demonstrating no ischemia.  Patient with known coronary artery disease which is been medically managed.  · Continue clopidogrel, metoprolol succinate, and atorvastatin     Hypertension:  · Elevated today and in the past  · Will monitor response to increasing Lasix but would also change metoprolol to carvedilol.    · Limited on medications due to underlying kidney disease     Mixed hyperlipidemia:  · Continue high potency statin as LDL well controlled on last lipid panel 8/2020      Follow-up:  3  month      Josafat Mendez MD  [facility]  01/06/21  14:56 EST

## 2021-01-06 NOTE — PATIENT INSTRUCTIONS
1. Stop metoprolol 25 mg daily  2. Start Carvedilol (Coreg) 12.5 mg twice daily  3. Will get echocardiogram  4. Will increased lasix (furosemide) from 20 mg to 40 mg daily  5. Repeat BMP in one week after increasing lasix

## 2021-01-28 ENCOUNTER — HOSPITAL ENCOUNTER (OUTPATIENT)
Dept: CARDIOLOGY | Facility: HOSPITAL | Age: 81
Discharge: HOME | End: 2021-01-28
Admitting: INTERNAL MEDICINE

## 2021-01-28 VITALS
DIASTOLIC BLOOD PRESSURE: 60 MMHG | BODY MASS INDEX: 32.78 KG/M2 | OXYGEN SATURATION: 97 % | SYSTOLIC BLOOD PRESSURE: 140 MMHG | WEIGHT: 242 LBS | HEART RATE: 78 BPM | HEIGHT: 72 IN

## 2021-01-28 DIAGNOSIS — I50.33 ACUTE ON CHRONIC DIASTOLIC CONGESTIVE HEART FAILURE (HCC): ICD-10-CM

## 2021-01-28 LAB
AORTIC ARCH: 3.1 CM
ASCENDING AORTA: 3 CM
BH CV ECHO MEAS - ACS: 2.5 CM
BH CV ECHO MEAS - AO MAX PG (FULL): 5.6 MMHG
BH CV ECHO MEAS - AO MAX PG: 10 MMHG
BH CV ECHO MEAS - AO MEAN PG (FULL): 2.4 MMHG
BH CV ECHO MEAS - AO MEAN PG: 5.5 MMHG
BH CV ECHO MEAS - AO ROOT AREA (BSA CORRECTED): 1.6
BH CV ECHO MEAS - AO ROOT AREA: 10.2 CM^2
BH CV ECHO MEAS - AO ROOT DIAM: 3.6 CM
BH CV ECHO MEAS - AO V2 MAX: 158.1 CM/SEC
BH CV ECHO MEAS - AO V2 MEAN: 108.8 CM/SEC
BH CV ECHO MEAS - AO V2 VTI: 31.6 CM
BH CV ECHO MEAS - ASC AORTA: 3 CM
BH CV ECHO MEAS - AVA(I,A): 3.5 CM^2
BH CV ECHO MEAS - AVA(I,D): 3.5 CM^2
BH CV ECHO MEAS - AVA(V,A): 3.1 CM^2
BH CV ECHO MEAS - AVA(V,D): 3.1 CM^2
BH CV ECHO MEAS - BSA(HAYCOCK): 2.4 M^2
BH CV ECHO MEAS - BSA: 2.3 M^2
BH CV ECHO MEAS - BZI_BMI: 32.8 KILOGRAMS/M^2
BH CV ECHO MEAS - BZI_METRIC_HEIGHT: 182.9 CM
BH CV ECHO MEAS - BZI_METRIC_WEIGHT: 109.8 KG
BH CV ECHO MEAS - CONTRAST EF (2CH): 62 CM2
BH CV ECHO MEAS - CONTRAST EF 4CH: 69 CM2
BH CV ECHO MEAS - EDV(MOD-SP2): 109 ML
BH CV ECHO MEAS - EDV(MOD-SP4): 157 ML
BH CV ECHO MEAS - EDV(TEICH): 153.1 ML
BH CV ECHO MEAS - EF(CUBED): 78.2 %
BH CV ECHO MEAS - EF(MOD-BP): 66.2 %
BH CV ECHO MEAS - EF(MOD-SP2): 62.4 %
BH CV ECHO MEAS - EF(MOD-SP4): 69.4 %
BH CV ECHO MEAS - EF(TEICH): 69.7 %
BH CV ECHO MEAS - ESV(MOD-SP2): 41 ML
BH CV ECHO MEAS - ESV(MOD-SP4): 48 ML
BH CV ECHO MEAS - ESV(TEICH): 46.3 ML
BH CV ECHO MEAS - FS: 39.8 %
BH CV ECHO MEAS - IVS/LVPW: 0.93
BH CV ECHO MEAS - IVSD: 1.2 CM
BH CV ECHO MEAS - LAT PEAK E' VEL: 7.8 CM/SEC
BH CV ECHO MEAS - LV DIASTOLIC VOL/BSA (35-75): 68 ML/M^2
BH CV ECHO MEAS - LV MASS(C)D: 296.7 GRAMS
BH CV ECHO MEAS - LV MASS(C)DI: 128.4 GRAMS/M^2
BH CV ECHO MEAS - LV MAX PG: 4.4 MMHG
BH CV ECHO MEAS - LV MEAN PG: 3.1 MMHG
BH CV ECHO MEAS - LV SYSTOLIC VOL/BSA (12-30): 20.8 ML/M^2
BH CV ECHO MEAS - LV V1 MAX: 104.3 CM/SEC
BH CV ECHO MEAS - LV V1 MEAN: 86 CM/SEC
BH CV ECHO MEAS - LV V1 VTI: 23.8 CM
BH CV ECHO MEAS - LVIDD: 5.6 CM
BH CV ECHO MEAS - LVIDS: 3.4 CM
BH CV ECHO MEAS - LVLD AP2: 8.4 CM
BH CV ECHO MEAS - LVLD AP4: 8.7 CM
BH CV ECHO MEAS - LVLS AP2: 7 CM
BH CV ECHO MEAS - LVLS AP4: 7.2 CM
BH CV ECHO MEAS - LVOT AREA (M): 4.5 CM^2
BH CV ECHO MEAS - LVOT AREA: 4.6 CM^2
BH CV ECHO MEAS - LVOT DIAM: 2.4 CM
BH CV ECHO MEAS - LVPWD: 1.3 CM
BH CV ECHO MEAS - MED PEAK E' VEL: 6.6 CM/SEC
BH CV ECHO MEAS - MV A DUR: 0.1 SEC
BH CV ECHO MEAS - MV A MAX VEL: 103.3 CM/SEC
BH CV ECHO MEAS - MV DEC SLOPE: 617.4 CM/SEC^2
BH CV ECHO MEAS - MV DEC TIME: 0.21 SEC
BH CV ECHO MEAS - MV E MAX VEL: 98.5 CM/SEC
BH CV ECHO MEAS - MV E/A: 0.95
BH CV ECHO MEAS - MV MAX PG: 6.8 MMHG
BH CV ECHO MEAS - MV MEAN PG: 3.7 MMHG
BH CV ECHO MEAS - MV P1/2T MAX VEL: 127.9 CM/SEC
BH CV ECHO MEAS - MV P1/2T: 60.7 MSEC
BH CV ECHO MEAS - MV V2 MAX: 130.3 CM/SEC
BH CV ECHO MEAS - MV V2 MEAN: 92.4 CM/SEC
BH CV ECHO MEAS - MV V2 VTI: 37.6 CM
BH CV ECHO MEAS - MVA P1/2T LCG: 1.7 CM^2
BH CV ECHO MEAS - MVA(P1/2T): 3.6 CM^2
BH CV ECHO MEAS - MVA(VTI): 2.9 CM^2
BH CV ECHO MEAS - PA ACC TIME: 0.08 SEC
BH CV ECHO MEAS - PA MAX PG (FULL): 4.2 MMHG
BH CV ECHO MEAS - PA MAX PG: 7.1 MMHG
BH CV ECHO MEAS - PA PR(ACCEL): 43.3 MMHG
BH CV ECHO MEAS - PA V2 MAX: 133 CM/SEC
BH CV ECHO MEAS - PULM A REVS DUR: 0.09 SEC
BH CV ECHO MEAS - PULM A REVS VEL: 32.7 CM/SEC
BH CV ECHO MEAS - PULM DIAS VEL: 51 CM/SEC
BH CV ECHO MEAS - PULM S/D: 1.7
BH CV ECHO MEAS - PULM SYS VEL: 87.1 CM/SEC
BH CV ECHO MEAS - PVA(V,A): 2.8 CM^2
BH CV ECHO MEAS - PVA(V,D): 2.8 CM^2
BH CV ECHO MEAS - QP/QS: 0.73
BH CV ECHO MEAS - RAP SYSTOLE: 3 MMHG
BH CV ECHO MEAS - RV MAX PG: 2.9 MMHG
BH CV ECHO MEAS - RV MEAN PG: 1.9 MMHG
BH CV ECHO MEAS - RV V1 MAX: 84.8 CM/SEC
BH CV ECHO MEAS - RV V1 MEAN: 64.6 CM/SEC
BH CV ECHO MEAS - RV V1 VTI: 18.1 CM
BH CV ECHO MEAS - RVOT AREA: 4.5 CM^2
BH CV ECHO MEAS - RVOT DIAM: 2.4 CM
BH CV ECHO MEAS - RVSP: 33 MMHG
BH CV ECHO MEAS - SI(AO): 140.1 ML/M^2
BH CV ECHO MEAS - SI(CUBED): 59.1 ML/M^2
BH CV ECHO MEAS - SI(LVOT): 47.9 ML/M^2
BH CV ECHO MEAS - SI(MOD-SP2): 29.4 ML/M^2
BH CV ECHO MEAS - SI(MOD-SP4): 47.2 ML/M^2
BH CV ECHO MEAS - SI(TEICH): 46.2 ML/M^2
BH CV ECHO MEAS - SUP REN AO DIAM: 2.4 CM
BH CV ECHO MEAS - SV(AO): 323.7 ML
BH CV ECHO MEAS - SV(CUBED): 136.6 ML
BH CV ECHO MEAS - SV(LVOT): 110.7 ML
BH CV ECHO MEAS - SV(MOD-SP2): 68 ML
BH CV ECHO MEAS - SV(MOD-SP4): 109 ML
BH CV ECHO MEAS - SV(RVOT): 80.5 ML
BH CV ECHO MEAS - SV(TEICH): 106.8 ML
BH CV ECHO MEAS - TAPSE (>1.6): 1.8 CM
BH CV ECHO MEAS - TR MAX VEL: 274.2 CM/SEC
BH CV ECHO MEASUREMENTS AVERAGE E/E' RATIO: 13.68
BH CV VAS BP RIGHT ARM: NORMAL MMHG
BH CV XLRA - RV BASE: 3 CM
BH CV XLRA - RV LENGTH: 7.6 CM
BH CV XLRA - RV MID: 2.3 CM
BH CV XLRA - TDI S': 10.4 CM/SEC
LEFT ATRIUM VOLUME INDEX: 39 ML/M2
LV EF 2D ECHO EST: 65 %
MAXIMAL PREDICTED HEART RATE: 142 BPM
SINUS: 3 CM
STJ: 3.1 CM
STRESS TARGET HR: 121 BPM

## 2021-01-28 PROCEDURE — 25010000002 PERFLUTREN (DEFINITY) 8.476 MG IN SODIUM CHLORIDE (PF) 0.9 % 10 ML INJECTION: Performed by: INTERNAL MEDICINE

## 2021-01-28 PROCEDURE — 93306 TTE W/DOPPLER COMPLETE: CPT | Performed by: INTERNAL MEDICINE

## 2021-01-28 PROCEDURE — 93306 TTE W/DOPPLER COMPLETE: CPT

## 2021-01-28 RX ADMIN — PERFLUTREN 1.5 ML: 6.52 INJECTION, SUSPENSION INTRAVENOUS at 07:41

## 2021-02-01 ENCOUNTER — IMMUNIZATION (OUTPATIENT)
Dept: VACCINE CLINIC | Facility: HOSPITAL | Age: 81
End: 2021-02-01

## 2021-02-01 PROCEDURE — 0001A: CPT | Performed by: THORACIC SURGERY (CARDIOTHORACIC VASCULAR SURGERY)

## 2021-02-01 PROCEDURE — 91300 HC SARSCOV02 VAC 30MCG/0.3ML IM: CPT | Performed by: THORACIC SURGERY (CARDIOTHORACIC VASCULAR SURGERY)

## 2021-02-22 ENCOUNTER — IMMUNIZATION (OUTPATIENT)
Dept: VACCINE CLINIC | Facility: HOSPITAL | Age: 81
End: 2021-02-22

## 2021-02-22 PROCEDURE — 0002A: CPT | Performed by: THORACIC SURGERY (CARDIOTHORACIC VASCULAR SURGERY)

## 2021-02-22 PROCEDURE — 91300 HC SARSCOV02 VAC 30MCG/0.3ML IM: CPT | Performed by: THORACIC SURGERY (CARDIOTHORACIC VASCULAR SURGERY)

## 2021-02-25 ENCOUNTER — OFFICE VISIT (OUTPATIENT)
Dept: FAMILY MEDICINE CLINIC | Facility: CLINIC | Age: 81
End: 2021-02-25

## 2021-02-25 VITALS
DIASTOLIC BLOOD PRESSURE: 64 MMHG | RESPIRATION RATE: 16 BRPM | TEMPERATURE: 97.7 F | WEIGHT: 240.7 LBS | BODY MASS INDEX: 32.6 KG/M2 | HEIGHT: 72 IN | SYSTOLIC BLOOD PRESSURE: 134 MMHG

## 2021-02-25 DIAGNOSIS — E11.59 TYPE 2 DIABETES MELLITUS WITH OTHER CIRCULATORY COMPLICATION, WITHOUT LONG-TERM CURRENT USE OF INSULIN (HCC): Primary | ICD-10-CM

## 2021-02-25 DIAGNOSIS — E78.2 MIXED HYPERLIPIDEMIA: ICD-10-CM

## 2021-02-25 DIAGNOSIS — I10 HYPERTENSION, ESSENTIAL: ICD-10-CM

## 2021-02-25 DIAGNOSIS — I50.33 ACUTE ON CHRONIC DIASTOLIC HEART FAILURE (HCC): ICD-10-CM

## 2021-02-25 PROCEDURE — 99214 OFFICE O/P EST MOD 30 MIN: CPT | Performed by: NURSE PRACTITIONER

## 2021-02-25 RX ORDER — NIFEDIPINE 30 MG/1
30 TABLET, EXTENDED RELEASE ORAL DAILY
Qty: 90 TABLET | Refills: 1 | Status: SHIPPED | OUTPATIENT
Start: 2021-02-25 | End: 2021-08-30 | Stop reason: SDUPTHER

## 2021-02-25 RX ORDER — LANOLIN ALCOHOL/MO/W.PET/CERES
1000 CREAM (GRAM) TOPICAL DAILY
Qty: 90 TABLET | Refills: 1 | Status: SHIPPED | OUTPATIENT
Start: 2021-02-25 | End: 2021-05-07 | Stop reason: SDUPTHER

## 2021-02-25 RX ORDER — DOXYCYCLINE HYCLATE 50 MG/1
1 CAPSULE, GELATIN COATED ORAL
Qty: 90 TABLET | Refills: 1 | Status: SHIPPED | OUTPATIENT
Start: 2021-02-25 | End: 2021-05-07 | Stop reason: SDUPTHER

## 2021-02-25 RX ORDER — CLOPIDOGREL BISULFATE 75 MG/1
75 TABLET ORAL DAILY
Qty: 90 TABLET | Refills: 1 | Status: SHIPPED | OUTPATIENT
Start: 2021-02-25 | End: 2021-07-19 | Stop reason: SDUPTHER

## 2021-02-25 RX ORDER — ATORVASTATIN CALCIUM 20 MG/1
20 TABLET, FILM COATED ORAL DAILY
Qty: 90 TABLET | Refills: 1 | Status: SHIPPED | OUTPATIENT
Start: 2021-02-25 | End: 2021-07-19 | Stop reason: SDUPTHER

## 2021-02-25 RX ORDER — TAMSULOSIN HYDROCHLORIDE 0.4 MG/1
1 CAPSULE ORAL DAILY
Qty: 90 CAPSULE | Refills: 1 | Status: SHIPPED | OUTPATIENT
Start: 2021-02-25 | End: 2021-06-15 | Stop reason: SDUPTHER

## 2021-02-25 NOTE — PROGRESS NOTES
Chief Complaint  Diabetes (6mo FU)    Subjective          Dereck Ramírez presents to [facility]  Lower extremity edema that started approximately two months ago  Was evaluated by cardiology, lasix was increased to 40 mg daily and echo completed    Diabetes  He presents for his follow-up diabetic visit. He has type 2 diabetes mellitus. His disease course has been stable. There are no hypoglycemic associated symptoms. Pertinent negatives for hypoglycemia include no headaches or sweats. There are no diabetic associated symptoms. Pertinent negatives for diabetes include no blurred vision and no chest pain. There are no hypoglycemic complications. Diabetic complications include a CVA and heart disease. Risk factors for coronary artery disease include diabetes mellitus, dyslipidemia, male sex, hypertension and obesity. Current diabetic treatment includes oral agent (monotherapy). He is compliant with treatment most of the time. There is no change in his home blood glucose trend. An ACE inhibitor/angiotensin II receptor blocker is contraindicated.   Hypertension  This is a chronic problem. The current episode started more than 1 year ago. The problem is unchanged. The problem is controlled. Associated symptoms include peripheral edema (much improved on lasix 40). Pertinent negatives include no anxiety, blurred vision, chest pain, headaches, malaise/fatigue, neck pain, orthopnea, palpitations, PND, shortness of breath or sweats. There are no associated agents to hypertension. Risk factors for coronary artery disease include diabetes mellitus, dyslipidemia, obesity and male gender. Past treatments include beta blockers, diuretics and calcium channel blockers. Current antihypertension treatment includes beta blockers, diuretics and calcium channel blockers. Compliance problems include diet and exercise.  Hypertensive end-organ damage includes CVA.       Review of Systems   Constitutional:  "Negative.  Negative for malaise/fatigue.   HENT: Negative.    Eyes: Negative.  Negative for blurred vision.   Respiratory: Negative.  Negative for shortness of breath.    Cardiovascular: Positive for leg swelling. Negative for chest pain, palpitations, orthopnea and PND.   Gastrointestinal: Negative.    Endocrine: Negative.    Genitourinary: Negative.    Musculoskeletal: Negative.  Negative for neck pain.   Skin: Negative.    Allergic/Immunologic: Negative.    Neurological: Negative.    Hematological: Negative.    Psychiatric/Behavioral: Negative.      Objective   Vital Signs:   /64   Temp 97.7 °F (36.5 °C) (Temporal)   Resp 16   Ht 182.9 cm (72\")   Wt 109 kg (240 lb 11.2 oz)   BMI 32.64 kg/m²     Physical Exam  Constitutional:       General: He is not in acute distress.     Appearance: Normal appearance. He is well-developed. He is not ill-appearing or diaphoretic.   Neck:      Musculoskeletal: Neck supple.   Cardiovascular:      Rate and Rhythm: Normal rate and regular rhythm.      Heart sounds: Normal heart sounds. No murmur. No friction rub. No gallop.    Pulmonary:      Effort: Pulmonary effort is normal. No respiratory distress.      Breath sounds: Normal breath sounds. No wheezing or rales.   Abdominal:      General: Bowel sounds are normal. There is no distension.      Palpations: Abdomen is soft.      Tenderness: There is no abdominal tenderness.   Skin:     General: Skin is warm and dry.   Neurological:      Mental Status: He is alert and oriented to person, place, and time.   Psychiatric:         Mood and Affect: Mood normal.        Result Review :                 Assessment and Plan    Diagnoses and all orders for this visit:    1. Type 2 diabetes mellitus with other circulatory complication, without long-term current use of insulin (CMS/[facility]) (Primary)  -     CBC & Differential  -     Comprehensive Metabolic Panel  -     Hemoglobin A1c    2. Hypertension, essential  -     CBC & Differential  - "     Comprehensive Metabolic Panel    3. Mixed hyperlipidemia  -     CBC & Differential  -     Comprehensive Metabolic Panel  -     Lipid Panel With LDL / HDL Ratio    4. Acute on chronic diastolic heart failure (CMS/HCC)  -     proBNP    Other orders  -     atorvastatin (LIPITOR) 20 MG tablet; Take 1 tablet by mouth Daily.  Dispense: 90 tablet; Refill: 1  -     clopidogrel (PLAVIX) 75 MG tablet; Take 1 tablet by mouth Daily.  Dispense: 90 tablet; Refill: 1  -     NIFEdipine XL (PROCARDIA XL) 30 MG 24 hr tablet; Take 1 tablet by mouth Daily.  Dispense: 90 tablet; Refill: 1  -     tamsulosin (FLOMAX) 0.4 MG capsule 24 hr capsule; Take 1 capsule by mouth Daily.  Dispense: 90 capsule; Refill: 1  -     ferrous gluconate (FERGON) 324 MG tablet; Take 1 tablet by mouth Daily With Breakfast.  Dispense: 90 tablet; Refill: 1  -     vitamin B-12 (CYANOCOBALAMIN) 1000 MCG tablet; Take 1 tablet by mouth Daily.  Dispense: 90 tablet; Refill: 1        Follow Up   Return in about 6 months (around 8/25/2021).  Patient was given instructions and counseling regarding his condition or for health maintenance advice. Please see specific information pulled into the AVS if appropriate.     Continue current medications  Labs today  Refills given  Received both covid vaccines  Follow up in six months

## 2021-02-26 DIAGNOSIS — R74.8 ELEVATED ALKALINE PHOSPHATASE LEVEL: Primary | ICD-10-CM

## 2021-02-26 LAB
ALBUMIN SERPL-MCNC: 4 G/DL (ref 3.5–5.2)
ALBUMIN/GLOB SERPL: 1.5 G/DL
ALP SERPL-CCNC: 246 U/L (ref 39–117)
ALT SERPL-CCNC: 18 U/L (ref 1–41)
AST SERPL-CCNC: 16 U/L (ref 1–40)
BASOPHILS # BLD AUTO: 0.04 10*3/MM3 (ref 0–0.2)
BASOPHILS NFR BLD AUTO: 0.6 % (ref 0–1.5)
BILIRUB SERPL-MCNC: 0.2 MG/DL (ref 0–1.2)
BUN SERPL-MCNC: 28 MG/DL (ref 8–23)
BUN/CREAT SERPL: 17.5 (ref 7–25)
CALCIUM SERPL-MCNC: 8.9 MG/DL (ref 8.6–10.5)
CHLORIDE SERPL-SCNC: 104 MMOL/L (ref 98–107)
CHOLEST SERPL-MCNC: 158 MG/DL (ref 0–200)
CO2 SERPL-SCNC: 22.8 MMOL/L (ref 22–29)
CREAT SERPL-MCNC: 1.6 MG/DL (ref 0.76–1.27)
EOSINOPHIL # BLD AUTO: 0.18 10*3/MM3 (ref 0–0.4)
EOSINOPHIL NFR BLD AUTO: 2.6 % (ref 0.3–6.2)
ERYTHROCYTE [DISTWIDTH] IN BLOOD BY AUTOMATED COUNT: 13.4 % (ref 12.3–15.4)
GLOBULIN SER CALC-MCNC: 2.6 GM/DL
GLUCOSE SERPL-MCNC: 234 MG/DL (ref 65–99)
HBA1C MFR BLD: 9.4 % (ref 4.8–5.6)
HCT VFR BLD AUTO: 34.1 % (ref 37.5–51)
HDLC SERPL-MCNC: 28 MG/DL (ref 40–60)
HGB BLD-MCNC: 10.6 G/DL (ref 13–17.7)
IMM GRANULOCYTES # BLD AUTO: 0.02 10*3/MM3 (ref 0–0.05)
IMM GRANULOCYTES NFR BLD AUTO: 0.3 % (ref 0–0.5)
LDLC SERPL CALC-MCNC: 69 MG/DL (ref 0–100)
LDLC/HDLC SERPL: 1.89 {RATIO}
LYMPHOCYTES # BLD AUTO: 1.37 10*3/MM3 (ref 0.7–3.1)
LYMPHOCYTES NFR BLD AUTO: 19.5 % (ref 19.6–45.3)
MCH RBC QN AUTO: 27.6 PG (ref 26.6–33)
MCHC RBC AUTO-ENTMCNC: 31.1 G/DL (ref 31.5–35.7)
MCV RBC AUTO: 88.8 FL (ref 79–97)
MONOCYTES # BLD AUTO: 0.87 10*3/MM3 (ref 0.1–0.9)
MONOCYTES NFR BLD AUTO: 12.4 % (ref 5–12)
NEUTROPHILS # BLD AUTO: 4.54 10*3/MM3 (ref 1.7–7)
NEUTROPHILS NFR BLD AUTO: 64.6 % (ref 42.7–76)
NRBC BLD AUTO-RTO: 0 /100 WBC (ref 0–0.2)
NT-PROBNP SERPL-MCNC: 276 PG/ML (ref 0–486)
PLATELET # BLD AUTO: 258 10*3/MM3 (ref 140–450)
POTASSIUM SERPL-SCNC: 5.2 MMOL/L (ref 3.5–5.2)
PROT SERPL-MCNC: 6.6 G/DL (ref 6–8.5)
RBC # BLD AUTO: 3.84 10*6/MM3 (ref 4.14–5.8)
SODIUM SERPL-SCNC: 137 MMOL/L (ref 136–145)
TRIGL SERPL-MCNC: 385 MG/DL (ref 0–150)
VLDLC SERPL CALC-MCNC: 61 MG/DL (ref 5–40)
WBC # BLD AUTO: 7.02 10*3/MM3 (ref 3.4–10.8)

## 2021-03-02 LAB
ALP BONE SERPL-MCNC: 61.7 UG/L (ref 7.6–24.4)
GGT SERPL-CCNC: 39 U/L (ref 8–61)
Lab: NORMAL

## 2021-03-04 DIAGNOSIS — R79.89 LOW VITAMIN D LEVEL: Primary | ICD-10-CM

## 2021-03-04 DIAGNOSIS — R53.83 FATIGUE, UNSPECIFIED TYPE: ICD-10-CM

## 2021-03-04 DIAGNOSIS — E55.9 VITAMIN D DEFICIENCY: ICD-10-CM

## 2021-03-05 LAB
SPECIMEN STATUS: NORMAL
WRITTEN AUTHORIZATION: NORMAL

## 2021-03-26 RX ORDER — BUSPIRONE HYDROCHLORIDE 5 MG/1
5 TABLET ORAL 3 TIMES DAILY PRN
Qty: 270 TABLET | Refills: 1 | Status: SHIPPED | OUTPATIENT
Start: 2021-03-26 | End: 2021-08-30 | Stop reason: SDUPTHER

## 2021-03-30 DIAGNOSIS — E11.59 TYPE 2 DIABETES MELLITUS WITH OTHER CIRCULATORY COMPLICATION, WITHOUT LONG-TERM CURRENT USE OF INSULIN (HCC): ICD-10-CM

## 2021-03-30 RX ORDER — GABAPENTIN 100 MG/1
200 CAPSULE ORAL 3 TIMES DAILY
Qty: 540 CAPSULE | Refills: 1 | OUTPATIENT
Start: 2021-03-30

## 2021-03-30 NOTE — TELEPHONE ENCOUNTER
Caller: KallieMaribel    Relationship: Emergency Contact    Best call back number: [phone]    Medication needed:   Requested Prescriptions     Pending Prescriptions Disp Refills   • sertraline (ZOLOFT) 50 MG tablet 135 tablet 1     Sig: Take 1.5 tablets by mouth Daily.   • gabapentin (NEURONTIN) 100 MG capsule 540 capsule 1     Sig: Take 2 capsules by mouth 3 (Three) Times a Day.       When do you need the refill by: TODAY 03/30/2021    What additional details did the patient provide when requesting the medication: PATIENT WILL TAKE LAST DOSAGE TODAY.     Does the patient have less than a 3 day supply:  [x] Yes  [] No    What is the patient's preferred pharmacy: [id number] [address] AT [facility] [phone] [facility] [phone] FX

## 2021-04-01 DIAGNOSIS — E11.59 TYPE 2 DIABETES MELLITUS WITH OTHER CIRCULATORY COMPLICATION, WITHOUT LONG-TERM CURRENT USE OF INSULIN (HCC): ICD-10-CM

## 2021-04-01 RX ORDER — GABAPENTIN 100 MG/1
200 CAPSULE ORAL 3 TIMES DAILY
Qty: 540 CAPSULE | Refills: 1 | OUTPATIENT
Start: 2021-04-01

## 2021-04-01 RX ORDER — GABAPENTIN 100 MG/1
200 CAPSULE ORAL 3 TIMES DAILY
Qty: 540 CAPSULE | Refills: 1 | Status: SHIPPED | OUTPATIENT
Start: 2021-04-01 | End: 2021-10-20 | Stop reason: SDUPTHER

## 2021-04-01 NOTE — TELEPHONE ENCOUNTER
Spoke with pt vanessa and they will be going out of town on April 15th and wont be back till sometime in May. She stated that he has about 3wks left on meds and wanted to refill before they leave so that he does not run out while on vacation.

## 2021-04-01 NOTE — TELEPHONE ENCOUNTER
PATIENT CALLING IN REGARDS TO ASK DR HERNANDEZ TO CALL IN A PRIOR AUTHORIZATION FOR GABAPENTIN. PATIENT HAS ABOUT 1 WEEK LEFT ON HAND. PLEASE ADVISE, THANK YOU!

## 2021-04-08 ENCOUNTER — OFFICE VISIT (OUTPATIENT)
Dept: CARDIOLOGY | Facility: CLINIC | Age: 81
End: 2021-04-08

## 2021-04-08 VITALS
WEIGHT: 239 LBS | BODY MASS INDEX: 32.37 KG/M2 | DIASTOLIC BLOOD PRESSURE: 60 MMHG | HEART RATE: 66 BPM | HEIGHT: 72 IN | SYSTOLIC BLOOD PRESSURE: 130 MMHG

## 2021-04-08 DIAGNOSIS — I25.10 CHRONIC CORONARY ARTERY DISEASE: ICD-10-CM

## 2021-04-08 DIAGNOSIS — N18.32 STAGE 3B CHRONIC KIDNEY DISEASE (HCC): ICD-10-CM

## 2021-04-08 DIAGNOSIS — I50.32 CHRONIC DIASTOLIC HEART FAILURE (HCC): Primary | ICD-10-CM

## 2021-04-08 DIAGNOSIS — I10 HYPERTENSION, ESSENTIAL: Chronic | ICD-10-CM

## 2021-04-08 DIAGNOSIS — E78.2 MIXED HYPERLIPIDEMIA: ICD-10-CM

## 2021-04-08 PROCEDURE — 99214 OFFICE O/P EST MOD 30 MIN: CPT | Performed by: INTERNAL MEDICINE

## 2021-04-08 PROCEDURE — 93000 ELECTROCARDIOGRAM COMPLETE: CPT | Performed by: INTERNAL MEDICINE

## 2021-04-08 NOTE — PROGRESS NOTES
[facility] Follow Up Office Note     Encounter Date:21  Patient:Dereck Ramírez  :1940  MRN:[id number]      Chief Complaint:   Chief Complaint   Patient presents with   • Congestive Heart Failure     3 month f/u   • Coronary Artery Disease   • Hypertension     History of Presenting Illness:     Mr. Ramírez is a 80 y.o. gentleman with past medical history notable for coronary artery disease which is been medically managed, hypertension, mixed hyperlipidemia, chronic kidney disease, chronic anemia, history of CVA, and diabetes with neuropathy who presents for follow up visit.  He was last seen approximate 3 months ago and at that visit was having continued issues with significant leg swelling as well as hypertension.  He did change his metoprolol to carvedilol and increased his Lasix from 20 mg to 40 mg.  Subsequent lab work did identify stable creatinine.  He also was noted to have a relatively normal proBNP level.  Echocardiogram also demonstrated preserved LV function with no new valvular abnormalities and continued demonstration of diastolic dysfunction.  Fortunately with these changes his leg swelling has improved.  Is not completely resolved but in general is feeling much better.  His weight is also down.      Review of Systems:  Review of Systems   Constitutional: Negative.   HENT: Negative.    Eyes: Negative.    Cardiovascular: Positive for leg swelling.   Respiratory: Negative.    Endocrine: Negative.    Hematologic/Lymphatic: Negative.    Skin: Negative.    Musculoskeletal: Negative.    Gastrointestinal: Negative.    Genitourinary: Negative.    Neurological: Positive for loss of balance.   Psychiatric/Behavioral: Negative.    Allergic/Immunologic: Negative.        Current Outpatient Medications on File Prior to Visit   Medication Sig Dispense Refill   • atorvastatin (LIPITOR) 20 MG tablet Take 1 tablet by mouth Daily. 90 tablet 1   • busPIRone (BUSPAR) 5 MG tablet Take 1 tablet by  mouth 3 (Three) Times a Day As Needed (anxiety). 270 tablet 1   • carvedilol (COREG) 12.5 MG tablet Take 1 tablet by mouth 2 (Two) Times a Day With Meals. 180 tablet 3   • clopidogrel (PLAVIX) 75 MG tablet Take 1 tablet by mouth Daily. 90 tablet 1   • ferrous gluconate (FERGON) 324 MG tablet Take 1 tablet by mouth Daily With Breakfast. 90 tablet 1   • furosemide (LASIX) 40 MG tablet Take 1 tablet by mouth Daily. 90 tablet 3   • gabapentin (NEURONTIN) 100 MG capsule Take 2 capsules by mouth 3 (Three) Times a Day. 540 capsule 1   • glucose blood test strip Check blood sugar twice daily and as needed 100 each 12   • nateglinide (STARLIX) 60 MG tablet Take 1 tablet by mouth 2 (Two) Times a Day With Meals. 180 tablet 3   • NIFEdipine XL (PROCARDIA XL) 30 MG 24 hr tablet Take 1 tablet by mouth Daily. 90 tablet 1   • nitroglycerin (NITROSTAT) 0.4 MG SL tablet Place 1 tablet under the tongue Every 5 (Five) Minutes As Needed for Chest Pain. 100 tablet 11   • sertraline (ZOLOFT) 50 MG tablet Take 1.5 tablets by mouth Daily. 135 tablet 1   • tamsulosin (FLOMAX) 0.4 MG capsule 24 hr capsule Take 1 capsule by mouth Daily. 90 capsule 1   • vitamin B-12 (CYANOCOBALAMIN) 1000 MCG tablet Take 1 tablet by mouth Daily. 90 tablet 1     No current facility-administered medications on file prior to visit.       No Known Allergies    Past Medical History:   Diagnosis Date   • Arthritis    • Cerebellar artery occlusion    • Diabetes mellitus (CMS/HCC)    • Enlarged prostate    • Hyperlipidemia    • Hypertension    • Stroke (CMS/HCC) 2011, 2012       Past Surgical History:   Procedure Laterality Date   • CARDIAC CATHETERIZATION      lesion 1: intervention outcome   • HERNIA REPAIR         Social History     Socioeconomic History   • Marital status:      Spouse name: Not on file   • Number of children: Not on file   • Years of education: Not on file   • Highest education level: Not on file   Tobacco Use   • Smoking status: Never  "Smoker   • Smokeless tobacco: Never Used   Substance and Sexual Activity   • Alcohol use: No   • Drug use: No   • Sexual activity: Defer       Family History   Problem Relation Age of Onset   • Heart disease Mother    • Stroke Mother    • Crohn's disease Father    • Stroke Father    • Cancer Other    • Stroke Other    • Diabetes Other    • Heart defect Other    • Hypertension Other    • Thyroid disease Other    • Cancer Maternal Uncle         bone   • Dementia Maternal Grandmother        The following portions of the patient's history were reviewed and updated as appropriate: allergies, current medications, past family history, past medical history, past social history, past surgical history and problem list.       Objective:       Vitals:    04/08/21 0822   BP: 130/60   BP Location: Right arm   Patient Position: Sitting   Pulse: 66   Weight: 108 kg (239 lb)   Height: 182.9 cm (72\")       Physical Exam:  Constitutional:  Somewhat ill, frail, no acute distress   HENT: Oropharynx clear and membrane moist  Eyes: Normal conjunctiva, no sclera icterus.  Neck: Supple, no carotid bruit bilaterally.  Cardiovascular: Regular rate and rhythm, No Murmur, 1+ bilateral lower extremity edema.  Pulmonary: Normal respiratory effort, normal lung sounds, no wheezing.  Abdominal: Soft, nontender, no hepatosplenomegaly, liver is non-pulsatile.  Neurological: Alert and orient x 3.   Poor memory  Skin: Warm, dry, no ecchymosis, no rash.  Psych: Appropriate mood and affect. Normal judgment and insight.         Lab Results   Component Value Date    GLUCOSE 283 (H) 12/29/2020    BUN 28 (H) 02/25/2021    CREATININE 1.60 (H) 02/25/2021    EGFRIFNONA 42 (L) 02/25/2021    EGFRIFAFRI 51 (L) 02/25/2021    BCR 17.5 02/25/2021    K 5.2 02/25/2021    CO2 22.8 02/25/2021    CALCIUM 8.9 02/25/2021    PROTENTOTREF 6.6 02/25/2021    ALBUMIN 4.00 02/25/2021    LABIL2 1.5 02/25/2021    AST 16 02/25/2021    ALT 18 02/25/2021       Lab Results   Component " Value Date    WBC 7.02 02/25/2021    HGB 10.6 (L) 02/25/2021    HCT 34.1 (L) 02/25/2021    MCV 88.8 02/25/2021     02/25/2021       Lab Results   Component Value Date    CKTOTAL 84 07/18/2014    CKMB 3.5 07/18/2014    TROPONINT <0.010 08/22/2019       Lab Results   Component Value Date    CHLPL 158 02/25/2021    CHLPL 163 08/20/2020    CHLPL 125 04/18/2019     Lab Results   Component Value Date    TRIG 385 (H) 02/25/2021    TRIG 317 (H) 08/20/2020    TRIG 292 (H) 04/18/2019     Lab Results   Component Value Date    HDL 28 (L) 02/25/2021    HDL 32 (L) 08/20/2020    HDL 29 (L) 04/18/2019     Lab Results   Component Value Date    LDL 69 02/25/2021    LDL 68 08/20/2020    LDL 38 04/18/2019       Lab Results   Component Value Date    TSH 3.750 08/20/2020       ECG 12 Lead    Date/Time: 4/8/2021 8:47 AM  Performed by: Josafat Mendez MD  Authorized by: Josafat Mendez MD   Comparison: compared with previous ECG from 1/6/2021  Similar to previous ECG  Rhythm: sinus rhythm  Conduction: non-specific intraventricular conduction delay    Clinical impression: abnormal EKG        Echocardiogram 1/28/2021 with images reviewed by myself:  · Estimated left ventricular EF = 65% Left ventricular systolic function is normal.  · Left ventricular diastolic function was normal.  · Left ventricular wall thickness is consistent with concentric hypertrophy.  · Left atrial volume is mildly increased.    Mobile Telemetry Monitor 2 week 10/14/2019:  · Overall relatively normal 2-week mobile telemetry monitor.   · Normal amount of ectopy with short self-limited runs of SVT and VT.   · No etiology for syncope noted on the study.    Echocardiogram report 9/13/2019:  · Left ventricular systolic function is normal. Calculated EF = 61.0%. Normal left ventricular cavity size, wall thickness, mass and mass index noted. All left ventricular wall segments contract normally. Septal wall motion is normal. Left ventricular diastolic function  is normal. Normal left atrial pressure.  · Right ventricular cavity is mild-to-moderately dilated  · Right atrial cavity size is mildly dilated.  · Left atrial cavity size is mild-to-moderately dilated.  · Mild mitral valve regurgitation is present.  · No prior study available for comparison.     Carotid duplex 9/13/2019:  • Right ICA Prox: Imaging indicates 16%-49% stenosis.   • Left ICA Prox: Imaging indicates 16-49% stenosis.     Stress Lexiscan nuclear 9/5/2015:  · Normal Lexiscan Cardiolite stress test.   · No evidence of ischemia or infarction.   · Normal wall motion.   · Normal ejection fraction.   · Normal thickening.      Cardiac catheterization report 5/24/2013:  · Moderate to severe disease of the ramus off the first diagonal and ramus off the first obtuse marginal.   · Minimal segmental disease of the right coronary artery. There is 100% occlusion of the posterior descending artery with distal filling by the left coronary artery.   · Normal left ventricular systolic function.     Assessment:          Diagnosis Plan   1. Chronic diastolic heart failure (CMS/[facility])  ECG 12 Lead   2. Chronic coronary artery disease     3. Hypertension, essential     4. Mixed hyperlipidemia     5. Stage 3b chronic kidney disease (CMS/[facility])            Plan:       Mr. Ramírez is a 80 y.o. gentleman with past medical history notable for coronary artery disease which is been medically managed, hypertension, mixed hyperlipidemia, chronic kidney disease, chronic anemia, history of CVA, and diabetes with neuropathy who presents for follow-up.  Fortunately since her last appointment his leg swelling has improved on higher dose diuretic.  His creatinine has been reasonably stable even with adding this diuretic therapy.  He also has had improvement in his blood pressure.  In the future if his leg swelling continues to worsen we could consider discontinuing his nifedipine and uptitrating his carvedilol or adding a different agent but  obviously were somewhat limited given his chronic kidney disease.  Hopefully we can continue with his current medical regimen as it stands.       Chronic diastolic congestive heart failure:  · Compression socks for dependent edema to avoid episodic orthostasis  · Echocardiogram 1/2021 demonstrates normal LV function with grade 1 diastolic dysfunction  · Continue Lasix 40 mg daily  · Follow-up BMP 2/2021 demonstrates stable creatinine at 1.6 and normal potassium..       Coronary artery disease without angina:  · Stress testing in 2015 demonstrating no ischemia.  Patient with known coronary artery disease which is been medically managed.  · Continue clopidogrel, metoprolol succinate, and atorvastatin     Hypertension:  · Better control today and will continue current medical therapy.  · BMP 2/2021 demonstrates elevated creatinine but stable with normal sodium and potassium.     Mixed hyperlipidemia:  · Continue high potency statin as   · Lipid panel 2/2021 demonstrates good control total cholesterol and LDL   · CMP 2/2021 demonstrates normal ALT and AST      Follow-up:  6 month      Josafat Mendez MD  [facility]  04/08/21  08:55 EDT

## 2021-04-12 DIAGNOSIS — E11.59 TYPE 2 DIABETES MELLITUS WITH OTHER CIRCULATORY COMPLICATION, WITHOUT LONG-TERM CURRENT USE OF INSULIN (HCC): ICD-10-CM

## 2021-04-12 RX ORDER — GABAPENTIN 100 MG/1
200 CAPSULE ORAL 3 TIMES DAILY
Qty: 540 CAPSULE | Refills: 1 | OUTPATIENT
Start: 2021-04-12

## 2021-04-12 NOTE — TELEPHONE ENCOUNTER
Caller: Maribel Arreguin    Relationship: Emergency Contact    Best call back number: [phone]     Medication needed:   Requested Prescriptions     Pending Prescriptions Disp Refills   • gabapentin (NEURONTIN) 100 MG capsule 540 capsule 1     Sig: Take 2 capsules by mouth 3 (Three) Times a Day.       When do you need the refill by: 4-12-21    What additional details did the patient provide when requesting the medication: DAUGHTER STATED SHE IS GOING OUT OF TOWN FOR A MONTH AND WILL NOT BE ABLE TO  MEDICATION FOR PATIENT BECAUSE OF THAT THEY ARE REQUESTING A REFILL TODAY TO PICK IT UP FOR PATIENT BEFORE SEE LEAVES ON WEDNESDAY.    Does the patient have less than a 3 day supply:  [] Yes  [x] No    What is the patient's preferred pharmacy: [id number] [address] AT [facility] [phone] [facility] [phone] FX

## 2021-05-07 RX ORDER — DOXYCYCLINE HYCLATE 50 MG/1
1 CAPSULE, GELATIN COATED ORAL
Qty: 90 TABLET | Refills: 1 | Status: SHIPPED | OUTPATIENT
Start: 2021-05-07 | End: 2022-05-03

## 2021-05-07 RX ORDER — LANOLIN ALCOHOL/MO/W.PET/CERES
1000 CREAM (GRAM) TOPICAL DAILY
Qty: 90 TABLET | Refills: 1 | Status: SHIPPED | OUTPATIENT
Start: 2021-05-07 | End: 2022-04-05 | Stop reason: SDUPTHER

## 2021-06-15 ENCOUNTER — OFFICE VISIT (OUTPATIENT)
Dept: FAMILY MEDICINE CLINIC | Facility: CLINIC | Age: 81
End: 2021-06-15

## 2021-06-15 VITALS
WEIGHT: 235.2 LBS | HEART RATE: 72 BPM | BODY MASS INDEX: 31.86 KG/M2 | SYSTOLIC BLOOD PRESSURE: 144 MMHG | DIASTOLIC BLOOD PRESSURE: 76 MMHG | RESPIRATION RATE: 16 BRPM | OXYGEN SATURATION: 100 % | HEIGHT: 72 IN

## 2021-06-15 DIAGNOSIS — E11.59 TYPE 2 DIABETES MELLITUS WITH OTHER CIRCULATORY COMPLICATION, WITHOUT LONG-TERM CURRENT USE OF INSULIN (HCC): Primary | ICD-10-CM

## 2021-06-15 DIAGNOSIS — R60.0 LOWER EXTREMITY EDEMA: ICD-10-CM

## 2021-06-15 PROCEDURE — 99213 OFFICE O/P EST LOW 20 MIN: CPT | Performed by: NURSE PRACTITIONER

## 2021-06-15 RX ORDER — TAMSULOSIN HYDROCHLORIDE 0.4 MG/1
1 CAPSULE ORAL DAILY
Qty: 90 CAPSULE | Refills: 1 | Status: SHIPPED | OUTPATIENT
Start: 2021-06-15 | End: 2022-05-26 | Stop reason: SDUPTHER

## 2021-06-15 RX ORDER — FUROSEMIDE 40 MG/1
TABLET ORAL
Qty: 135 TABLET | Refills: 0 | Status: SHIPPED | OUTPATIENT
Start: 2021-06-15 | End: 2021-08-27 | Stop reason: SDUPTHER

## 2021-06-15 RX ORDER — FUROSEMIDE 40 MG/1
TABLET ORAL
Qty: 181 TABLET | OUTPATIENT
Start: 2021-06-15

## 2021-06-15 NOTE — PROGRESS NOTES
"Chief Complaint  Leg Swelling (both legs with blisters on both has been going on for a while)    Subjective          Dereck Ramírez presents to [facility]  Dereck presents for worsening lower extremity edema with blisters on the back of his right leg. Daughter is present during examination and reports these are healing currently, but his swelling is not improving. Patient states it does go down overnight, however it is currently edematous at 745 am, shoes are tight. He is not currently wearing socks, has not tried compression socks. He is taking lasix 40 mg daily, kidney function is decreased but stable at last labs.    Leg Swelling  This is a chronic problem. The current episode started more than 1 month ago. The problem occurs daily. The problem has been waxing and waning. Pertinent negatives include no abdominal pain, anorexia, arthralgias, change in bowel habit, chest pain, chills, congestion, coughing, diaphoresis, fatigue, fever, headaches, joint swelling, myalgias, nausea, neck pain, numbness, rash, sore throat, swollen glands, urinary symptoms, vertigo, visual change, vomiting or weakness. Nothing aggravates the symptoms. Treatments tried: lasix. The treatment provided mild relief.       Objective   Vital Signs:   /76   Pulse 72   Resp 16   Ht 182.9 cm (72\")   Wt 107 kg (235 lb 3.2 oz)   SpO2 100%   BMI 31.90 kg/m²     Physical Exam  Vitals reviewed.   Constitutional:       Appearance: Normal appearance.   Cardiovascular:      Rate and Rhythm: Normal rate and regular rhythm.      Heart sounds: No murmur heard.   No friction rub. No gallop.       Comments: 2+ lower extremity edema, pitting  Pulmonary:      Effort: Pulmonary effort is normal. No respiratory distress.      Breath sounds: Normal breath sounds. No wheezing, rhonchi or rales.   Skin:     General: Skin is warm and dry.      Comments: Minimal scabbed areas on left calf, nontender, no discharge or drainage, "    Neurological:      Mental Status: He is alert.   Psychiatric:         Mood and Affect: Mood normal.        Result Review :                 Assessment and Plan    Diagnoses and all orders for this visit:    1. Type 2 diabetes mellitus with other circulatory complication, without long-term current use of insulin (CMS/[facility]) (Primary)  -     Basic metabolic panel  -     Hemoglobin A1c    2. Lower extremity edema  -     Basic metabolic panel  -     Hemoglobin A1c  -     Compression Knee High Stockings    Other orders  -     tamsulosin (FLOMAX) 0.4 MG capsule 24 hr capsule; Take 1 capsule by mouth Daily.  Dispense: 90 capsule; Refill: 1  -     sertraline (ZOLOFT) 50 MG tablet; Take 1.5 tablets by mouth Daily.  Dispense: 135 tablet; Refill: 1  -     furosemide (LASIX) 40 MG tablet; Take 1 tab po qam and 1/2 tab po qpm  Dispense: 135 tablet; Refill: 0        Follow Up   No follow-ups on file.  Patient was given instructions and counseling regarding his condition or for health maintenance advice. Please see specific information pulled into the AVS if appropriate.     Increase lasix , currently taking 40 mg daily, will add 20 mg dose in afternoon, advised to avoid taking before bed as this will cause frequent urination.  Will need to monitor kidney function with dose adjustment  BP is well controlled, no weight gain, actually down 4 lbs from last visit  He does report trying to eat better, does not eat a lot of sodium, but does enjoy sweets, last a1C was increased, will repeat today  bp is mildly increased from baseline, increased lasix will help manage bp as well

## 2021-06-16 LAB
BUN SERPL-MCNC: 31 MG/DL (ref 8–23)
BUN/CREAT SERPL: 19.9 (ref 7–25)
CALCIUM SERPL-MCNC: 9.1 MG/DL (ref 8.6–10.5)
CHLORIDE SERPL-SCNC: 103 MMOL/L (ref 98–107)
CO2 SERPL-SCNC: 22.8 MMOL/L (ref 22–29)
CREAT SERPL-MCNC: 1.56 MG/DL (ref 0.76–1.27)
GLUCOSE SERPL-MCNC: 189 MG/DL (ref 65–99)
HBA1C MFR BLD: 9.7 % (ref 4.8–5.6)
POTASSIUM SERPL-SCNC: 4.8 MMOL/L (ref 3.5–5.2)
SODIUM SERPL-SCNC: 140 MMOL/L (ref 136–145)

## 2021-06-17 ENCOUNTER — TELEPHONE (OUTPATIENT)
Dept: FAMILY MEDICINE CLINIC | Facility: CLINIC | Age: 81
End: 2021-06-17

## 2021-06-17 RX ORDER — METFORMIN HYDROCHLORIDE 500 MG/1
500 TABLET, EXTENDED RELEASE ORAL 2 TIMES DAILY WITH MEALS
Qty: 180 TABLET | Refills: 0 | Status: SHIPPED | OUTPATIENT
Start: 2021-06-17 | End: 2021-06-17

## 2021-06-17 RX ORDER — EMPAGLIFLOZIN 10 MG/1
10 TABLET, FILM COATED ORAL DAILY
Qty: 30 TABLET | Refills: 2 | Status: SHIPPED | OUTPATIENT
Start: 2021-06-17 | End: 2021-08-27 | Stop reason: SDUPTHER

## 2021-06-17 NOTE — TELEPHONE ENCOUNTER
Caller: Benita Arreguin    Relationship to patient: Emergency Contact    Best call back number:[phone]     Patient is needing: PATIENT'S DAUGHTER BENITA IS CALLING TO SAY THE PATIENT DOES NOT WANT TO GO BACK ON THE METFORMIN.  SHE STATES THAT IT MADE HIM SICK WHEN HE TOOK IT PREVIOUSLY.  SHE IS WANTING TO KNOW IF THERE IS SOMETHING ELSE MS HERNANDEZ CAN RECOMMEND.      [facility] #[id number] [place], [place] - [address] AT [facility] - [phone] [facility] [phone]   [phone]    PLEASE ADVISE.

## 2021-06-17 NOTE — TELEPHONE ENCOUNTER
LDM on daughter's cell phone and if she has any questions about the medication to call the office.

## 2021-07-19 NOTE — TELEPHONE ENCOUNTER
Caller: Maribel Arreguin    Relationship: Emergency Contact    Best call back number: [phone]    Medication needed:   Requested Prescriptions     Pending Prescriptions Disp Refills   • clopidogrel (PLAVIX) 75 MG tablet 90 tablet 1     Sig: Take 1 tablet by mouth Daily.   • atorvastatin (LIPITOR) 20 MG tablet 90 tablet 1     Sig: Take 1 tablet by mouth Daily.       When do you need the refill by: ASAP    Does the patient have less than a 3 day supply:  [x] Yes  [] No    What is the patient's preferred pharmacy: [facility] #[id number] [facility] [address] AT [facility] [address] [phone] [facility] [phone]

## 2021-07-20 RX ORDER — ATORVASTATIN CALCIUM 20 MG/1
20 TABLET, FILM COATED ORAL DAILY
Qty: 90 TABLET | Refills: 1 | Status: SHIPPED | OUTPATIENT
Start: 2021-07-20 | End: 2021-09-14 | Stop reason: DRUGHIGH

## 2021-07-20 RX ORDER — CLOPIDOGREL BISULFATE 75 MG/1
75 TABLET ORAL DAILY
Qty: 90 TABLET | Refills: 1 | Status: SHIPPED | OUTPATIENT
Start: 2021-07-20 | End: 2022-01-14 | Stop reason: SDUPTHER

## 2021-08-16 NOTE — TELEPHONE ENCOUNTER
Rx Refill Note  Requested Prescriptions     Pending Prescriptions Disp Refills   • nateglinide (STARLIX) 60 MG tablet 180 tablet 3     Sig: Take 1 tablet by mouth 2 (Two) Times a Day With Meals.      Last office visit with prescribing clinician: 6/15/2021      Next office visit with prescribing clinician: 8/16/2021            Clem Trevino MA  08/16/21, 10:16 EDT

## 2021-08-17 RX ORDER — NATEGLINIDE 60 MG/1
60 TABLET ORAL 2 TIMES DAILY WITH MEALS
Qty: 180 TABLET | Refills: 3 | Status: SHIPPED | OUTPATIENT
Start: 2021-08-17 | End: 2022-04-05 | Stop reason: SDUPTHER

## 2021-08-27 ENCOUNTER — OFFICE VISIT (OUTPATIENT)
Dept: FAMILY MEDICINE CLINIC | Facility: CLINIC | Age: 81
End: 2021-08-27

## 2021-08-27 VITALS
DIASTOLIC BLOOD PRESSURE: 58 MMHG | TEMPERATURE: 97.7 F | HEART RATE: 65 BPM | RESPIRATION RATE: 16 BRPM | SYSTOLIC BLOOD PRESSURE: 130 MMHG | BODY MASS INDEX: 31.44 KG/M2 | WEIGHT: 232.1 LBS | OXYGEN SATURATION: 100 % | HEIGHT: 72 IN

## 2021-08-27 DIAGNOSIS — E11.59 TYPE 2 DIABETES MELLITUS WITH OTHER CIRCULATORY COMPLICATION, WITHOUT LONG-TERM CURRENT USE OF INSULIN (HCC): Primary | ICD-10-CM

## 2021-08-27 DIAGNOSIS — E78.2 MIXED HYPERLIPIDEMIA: ICD-10-CM

## 2021-08-27 DIAGNOSIS — I10 HYPERTENSION, ESSENTIAL: ICD-10-CM

## 2021-08-27 PROCEDURE — 99214 OFFICE O/P EST MOD 30 MIN: CPT | Performed by: NURSE PRACTITIONER

## 2021-08-27 RX ORDER — FUROSEMIDE 40 MG/1
TABLET ORAL
Qty: 90 TABLET | Refills: 1 | Status: SHIPPED | OUTPATIENT
Start: 2021-08-27 | End: 2021-11-29 | Stop reason: SDUPTHER

## 2021-08-27 NOTE — PROGRESS NOTES
"Chief Complaint  Diabetes (6mo FU)    Subjective          Dereck Ramírez presents to [facility]  Dereck presents for follow up diabetes and hypertension. He is doing well, reports no concerns at the present time. He does report hx of covid vaccine.    Diabetes  He presents for his follow-up diabetic visit. He has type 2 diabetes mellitus. His disease course has been stable. Pertinent negatives for hypoglycemia include no headaches or sweats. There are no diabetic associated symptoms. Pertinent negatives for diabetes include no blurred vision and no chest pain. There are no hypoglycemic complications. Symptoms are stable. Diabetic complications include a CVA. Risk factors for coronary artery disease include diabetes mellitus, dyslipidemia, male sex and hypertension. He is compliant with treatment most of the time.   Hypertension  This is a chronic problem. The current episode started more than 1 year ago. The problem is unchanged. The problem is controlled. Associated symptoms include anxiety and peripheral edema. Pertinent negatives include no blurred vision, chest pain, headaches, malaise/fatigue, neck pain, orthopnea, palpitations, PND, shortness of breath or sweats. There are no associated agents to hypertension. Risk factors for coronary artery disease include diabetes mellitus, dyslipidemia and obesity. Past treatments include diuretics and beta blockers. Current antihypertension treatment includes beta blockers and diuretics. The current treatment provides moderate improvement. There are no compliance problems.  Hypertensive end-organ damage includes CVA.       Objective   Vital Signs:   /58   Pulse 65   Temp 97.7 °F (36.5 °C) (Temporal)   Resp 16   Ht 182.9 cm (72\")   Wt 105 kg (232 lb 1.6 oz)   SpO2 100%   BMI 31.48 kg/m²     Physical Exam  Vitals reviewed.   Constitutional:       Appearance: Normal appearance.   HENT:      Right Ear: Tympanic membrane and ear " canal normal.      Left Ear: Tympanic membrane and ear canal normal.      Nose: Nose normal.      Mouth/Throat:      Mouth: Mucous membranes are moist.      Pharynx: Oropharynx is clear.   Eyes:      Pupils: Pupils are equal, round, and reactive to light.   Cardiovascular:      Rate and Rhythm: Normal rate and regular rhythm.      Heart sounds: No murmur heard.   No friction rub. No gallop.    Pulmonary:      Effort: Pulmonary effort is normal. No respiratory distress.      Breath sounds: Normal breath sounds. No wheezing, rhonchi or rales.   Abdominal:      General: Bowel sounds are normal. There is no distension.      Palpations: Abdomen is soft. There is no mass.      Tenderness: There is no abdominal tenderness.      Hernia: No hernia is present.   Neurological:      Mental Status: He is alert and oriented to person, place, and time.   Psychiatric:         Mood and Affect: Mood normal.        Result Review :                 Assessment and Plan    Diagnoses and all orders for this visit:    1. Type 2 diabetes mellitus with other circulatory complication, without long-term current use of insulin (CMS/[facility]) (Primary)  -     Basic metabolic panel  -     Hemoglobin A1c  -     CBC & Differential    2. Hypertension, essential  -     Basic metabolic panel  -     Hemoglobin A1c  -     CBC & Differential    3. Mixed hyperlipidemia  -     Lipid Panel With LDL / HDL Ratio  -     CBC & Differential    Other orders  -     empagliflozin (Jardiance) 10 MG tablet tablet; Take 1 tablet by mouth Daily.  Dispense: 90 tablet; Refill: 1  -     furosemide (LASIX) 40 MG tablet; Take 1 tab po qam and 1/2 tab po qpm  Dispense: 90 tablet; Refill: 1        Follow Up   No follow-ups on file.  Patient was given instructions and counseling regarding his condition or for health maintenance advice. Please see specific information pulled into the AVS if appropriate.     Follow up in six months  Will repeat A1C and CMP to ensure levels are  stable  HTN: chronic, controlled, Unable to take lasix twice daily, only taking 40 mg daily, unable to cut pill in half. BP is stable, swelling is improved, will continue 40 mg daily, continue nifedipine and carvedilol  Diabetes: last A1C was elevated. Weight is down over past six months 8 lbs, will evaluate A1C today

## 2021-08-28 LAB
BASOPHILS # BLD AUTO: 0.06 10*3/MM3 (ref 0–0.2)
BASOPHILS NFR BLD AUTO: 1 % (ref 0–1.5)
BUN SERPL-MCNC: 35 MG/DL (ref 8–23)
BUN/CREAT SERPL: 21 (ref 7–25)
CALCIUM SERPL-MCNC: 9.3 MG/DL (ref 8.6–10.5)
CHLORIDE SERPL-SCNC: 103 MMOL/L (ref 98–107)
CHOLEST SERPL-MCNC: 167 MG/DL (ref 0–200)
CO2 SERPL-SCNC: 20.8 MMOL/L (ref 22–29)
CREAT SERPL-MCNC: 1.67 MG/DL (ref 0.76–1.27)
EOSINOPHIL # BLD AUTO: 0.13 10*3/MM3 (ref 0–0.4)
EOSINOPHIL NFR BLD AUTO: 2.1 % (ref 0.3–6.2)
ERYTHROCYTE [DISTWIDTH] IN BLOOD BY AUTOMATED COUNT: 13.5 % (ref 12.3–15.4)
GLUCOSE SERPL-MCNC: 220 MG/DL (ref 65–99)
HBA1C MFR BLD: 9 % (ref 4.8–5.6)
HCT VFR BLD AUTO: 35.6 % (ref 37.5–51)
HDLC SERPL-MCNC: 31 MG/DL (ref 40–60)
HGB BLD-MCNC: 11.1 G/DL (ref 13–17.7)
IMM GRANULOCYTES # BLD AUTO: 0.04 10*3/MM3 (ref 0–0.05)
IMM GRANULOCYTES NFR BLD AUTO: 0.6 % (ref 0–0.5)
LDLC SERPL CALC-MCNC: 85 MG/DL (ref 0–100)
LDLC/HDLC SERPL: 2.41 {RATIO}
LYMPHOCYTES # BLD AUTO: 1.33 10*3/MM3 (ref 0.7–3.1)
LYMPHOCYTES NFR BLD AUTO: 21.6 % (ref 19.6–45.3)
MCH RBC QN AUTO: 27.5 PG (ref 26.6–33)
MCHC RBC AUTO-ENTMCNC: 31.2 G/DL (ref 31.5–35.7)
MCV RBC AUTO: 88.1 FL (ref 79–97)
MONOCYTES # BLD AUTO: 0.62 10*3/MM3 (ref 0.1–0.9)
MONOCYTES NFR BLD AUTO: 10.1 % (ref 5–12)
NEUTROPHILS # BLD AUTO: 3.98 10*3/MM3 (ref 1.7–7)
NEUTROPHILS NFR BLD AUTO: 64.6 % (ref 42.7–76)
NRBC BLD AUTO-RTO: 0 /100 WBC (ref 0–0.2)
PLATELET # BLD AUTO: 269 10*3/MM3 (ref 140–450)
POTASSIUM SERPL-SCNC: 4.7 MMOL/L (ref 3.5–5.2)
RBC # BLD AUTO: 4.04 10*6/MM3 (ref 4.14–5.8)
SODIUM SERPL-SCNC: 137 MMOL/L (ref 136–145)
TRIGL SERPL-MCNC: 306 MG/DL (ref 0–150)
VLDLC SERPL CALC-MCNC: 51 MG/DL (ref 5–40)
WBC # BLD AUTO: 6.16 10*3/MM3 (ref 3.4–10.8)

## 2021-08-30 NOTE — TELEPHONE ENCOUNTER
Rx Refill Note  Requested Prescriptions     Pending Prescriptions Disp Refills   • empagliflozin (Jardiance) 10 MG tablet tablet 90 tablet 1     Sig: Take 1 tablet by mouth Daily.   • tamsulosin (FLOMAX) 0.4 MG capsule 24 hr capsule 90 capsule 1     Sig: Take 1 capsule by mouth Daily.   • carvedilol (COREG) 12.5 MG tablet 180 tablet 3     Sig: Take 1 tablet by mouth 2 (Two) Times a Day With Meals.   • sertraline (ZOLOFT) 50 MG tablet 135 tablet 1     Sig: Take 1.5 tablets by mouth Daily.   • clopidogrel (PLAVIX) 75 MG tablet 90 tablet 1     Sig: Take 1 tablet by mouth Daily.   • busPIRone (BUSPAR) 5 MG tablet 270 tablet 1     Sig: Take 1 tablet by mouth 3 (Three) Times a Day As Needed (anxiety).   • NIFEdipine XL (PROCARDIA XL) 30 MG 24 hr tablet 90 tablet 1     Sig: Take 1 tablet by mouth Daily.   • atorvastatin (LIPITOR) 20 MG tablet 90 tablet 1     Sig: Take 1 tablet by mouth Daily.      Last office visit with prescribing clinician: 8/27/2021      Next office visit with prescribing clinician: 3/1/2022            Clem Trevino MA  08/30/21, 10:00 EDT

## 2021-08-30 NOTE — TELEPHONE ENCOUNTER
Spoke with daughter and informed her that the medication request have  been sent to Amber to refill and to inform her that Amber is not in the office on Mondays and that she will probably refill them tomorrow when she is back in the office.

## 2021-08-30 NOTE — TELEPHONE ENCOUNTER
PATIENTS DAUGHTER WILL BE GOING OUT OF TOWN Thursday AND NEEDS TO  PATIENTS REFILLS BEFORE SHE LEAVES.    empagliflozin (Jardiance) 10 MG tablet tablet [810610]  tamsulosin (FLOMAX) 0.4 MG capsule 24 hr capsule [168677]  carvedilol (COREG) 12.5 MG tablet [18768]  sertraline (ZOLOFT) 50 MG tablet [90710]  clopidogrel (PLAVIX) 75 MG tablet [19043]    busPIRone (BUSPAR) 5 MG tablet [2071]  NIFEdipine XL (PROCARDIA XL) 30 MG 24 hr tablet [[id number]]  atorvastatin (LIPITOR) 20 MG tablet [40674]    [facility] #[id number] - [place], [place] - [address] AT [facility] - [phone]  - [phone] [place]     SILVER DROPPED OFF PAPERWORK FOR THE PATIENT TO BE ABLE TO GET AN ID TO TRAVEL.    PLEASE CALL DAUGHTER WHEN READY TO  [phone]

## 2021-08-31 RX ORDER — NIFEDIPINE 30 MG/1
30 TABLET, EXTENDED RELEASE ORAL DAILY
Qty: 90 TABLET | Refills: 1 | Status: SHIPPED | OUTPATIENT
Start: 2021-08-31 | End: 2022-01-14 | Stop reason: SDUPTHER

## 2021-08-31 RX ORDER — CARVEDILOL 12.5 MG/1
TABLET ORAL
Qty: 180 TABLET | Refills: 3 | Status: SHIPPED | OUTPATIENT
Start: 2021-08-31 | End: 2022-07-28 | Stop reason: SDUPTHER

## 2021-08-31 RX ORDER — CARVEDILOL 12.5 MG/1
12.5 TABLET ORAL 2 TIMES DAILY WITH MEALS
Qty: 180 TABLET | Refills: 3 | OUTPATIENT
Start: 2021-08-31

## 2021-08-31 RX ORDER — BUSPIRONE HYDROCHLORIDE 5 MG/1
5 TABLET ORAL 3 TIMES DAILY PRN
Qty: 270 TABLET | Refills: 1 | Status: SHIPPED | OUTPATIENT
Start: 2021-08-31 | End: 2022-10-19 | Stop reason: SDUPTHER

## 2021-08-31 RX ORDER — TAMSULOSIN HYDROCHLORIDE 0.4 MG/1
1 CAPSULE ORAL DAILY
Qty: 90 CAPSULE | Refills: 1 | OUTPATIENT
Start: 2021-08-31

## 2021-08-31 RX ORDER — ATORVASTATIN CALCIUM 20 MG/1
20 TABLET, FILM COATED ORAL DAILY
Qty: 90 TABLET | Refills: 1 | OUTPATIENT
Start: 2021-08-31

## 2021-08-31 RX ORDER — CLOPIDOGREL BISULFATE 75 MG/1
75 TABLET ORAL DAILY
Qty: 90 TABLET | Refills: 1 | OUTPATIENT
Start: 2021-08-31

## 2021-09-14 RX ORDER — ATORVASTATIN CALCIUM 40 MG/1
40 TABLET, FILM COATED ORAL DAILY
Qty: 90 TABLET | Refills: 1 | Status: SHIPPED | OUTPATIENT
Start: 2021-09-14 | End: 2022-01-14 | Stop reason: SDUPTHER

## 2021-10-20 ENCOUNTER — TELEPHONE (OUTPATIENT)
Dept: FAMILY MEDICINE CLINIC | Facility: CLINIC | Age: 81
End: 2021-10-20

## 2021-10-20 DIAGNOSIS — E11.59 TYPE 2 DIABETES MELLITUS WITH OTHER CIRCULATORY COMPLICATION, WITHOUT LONG-TERM CURRENT USE OF INSULIN (HCC): ICD-10-CM

## 2021-10-20 RX ORDER — GABAPENTIN 100 MG/1
200 CAPSULE ORAL 3 TIMES DAILY
Qty: 540 CAPSULE | Refills: 1 | Status: SHIPPED | OUTPATIENT
Start: 2021-10-20 | End: 2022-01-14 | Stop reason: SDUPTHER

## 2021-10-20 NOTE — TELEPHONE ENCOUNTER
Caller: Maribel Arreguin    Relationship: Emergency Contact      requested Prescriptions:   Requested Prescriptions     Pending Prescriptions Disp Refills   • gabapentin (NEURONTIN) 100 MG capsule 540 capsule 1     Sig: Take 2 capsules by mouth 3 (Three) Times a Day.        Pharmacy where request should be sent: [id number] [place], [place] - [address] AT [facility] - [phone]  - [phone]   [phone]    Additional details provided by patient: SAYS HAS BEEN TRYING TO GET FOR A WEEK  HE IS TOTALLY OUT NOW    Best call back number: [phone]     Does the patient have less than a 3 day supply:  [x] Yes  [] No    Melinda Fischer Rep   10/20/21 13:21 EDT

## 2021-10-20 NOTE — TELEPHONE ENCOUNTER
Rx Refill Note  Requested Prescriptions     Pending Prescriptions Disp Refills   • gabapentin (NEURONTIN) 100 MG capsule 540 capsule 1     Sig: Take 2 capsules by mouth 3 (Three) Times a Day.      Last office visit with prescribing clinician: 8/27/2021      Next office visit with prescribing clinician: 3/1/2022            Clem Trevino MA  10/20/21, 16:52 EDT

## 2021-10-22 ENCOUNTER — TELEPHONE (OUTPATIENT)
Dept: FAMILY MEDICINE CLINIC | Facility: CLINIC | Age: 81
End: 2021-10-22

## 2021-10-22 NOTE — TELEPHONE ENCOUNTER
PATIENTS DAUGHTER BENITA GAMEZ CALLING IN REGARDS TO SETUP A FLU SHOT FOR THE PATIENT PLEASE CALL BACK. PLEASE ADVISE THANK You!

## 2021-10-22 NOTE — TELEPHONE ENCOUNTER
TASNEEMM to have pt's daughter to set up an appointment for pt's flu shot. Advised her that the flu clinic are between the hours of 2pm-4pm.

## 2021-10-25 ENCOUNTER — FLU SHOT (OUTPATIENT)
Dept: FAMILY MEDICINE CLINIC | Facility: CLINIC | Age: 81
End: 2021-10-25

## 2021-10-25 DIAGNOSIS — Z23 NEED FOR VACCINATION: Primary | ICD-10-CM

## 2021-10-25 PROCEDURE — G0008 ADMIN INFLUENZA VIRUS VAC: HCPCS | Performed by: NURSE PRACTITIONER

## 2021-10-25 PROCEDURE — 90662 IIV NO PRSV INCREASED AG IM: CPT | Performed by: NURSE PRACTITIONER

## 2021-11-29 RX ORDER — FUROSEMIDE 20 MG/1
20 TABLET ORAL DAILY
Qty: 30 TABLET | Refills: 11 | OUTPATIENT
Start: 2021-11-29

## 2021-11-29 RX ORDER — FUROSEMIDE 40 MG/1
TABLET ORAL
Qty: 90 TABLET | Refills: 3 | Status: SHIPPED | OUTPATIENT
Start: 2021-11-29 | End: 2022-11-15

## 2022-01-11 NOTE — TELEPHONE ENCOUNTER
Rx Refill Note  Requested Prescriptions     Pending Prescriptions Disp Refills   • sertraline (ZOLOFT) 50 MG tablet 135 tablet 1     Sig: Take 1.5 tablets by mouth Daily.      Last office visit with prescribing clinician: 8/27/2021      Next office visit with prescribing clinician: 3/1/2022            Nevaeh Ortiz MA  01/11/22, 17:33 EST

## 2022-01-14 DIAGNOSIS — E11.59 TYPE 2 DIABETES MELLITUS WITH OTHER CIRCULATORY COMPLICATION, WITHOUT LONG-TERM CURRENT USE OF INSULIN: ICD-10-CM

## 2022-01-14 NOTE — TELEPHONE ENCOUNTER
Rx Refill Note  Requested Prescriptions     Pending Prescriptions Disp Refills   • clopidogrel (PLAVIX) 75 MG tablet 90 tablet 1     Sig: Take 1 tablet by mouth Daily.   • atorvastatin (Lipitor) 40 MG tablet 90 tablet 1     Sig: Take 1 tablet by mouth Daily.   • gabapentin (NEURONTIN) 100 MG capsule 540 capsule 1     Sig: Take 2 capsules by mouth 3 (Three) Times a Day.   • NIFEdipine XL (PROCARDIA XL) 30 MG 24 hr tablet 90 tablet 1     Sig: Take 1 tablet by mouth Daily.      Last office visit with prescribing clinician: 8/27/2021      Next office visit with prescribing clinician: 3/1/2022            Delaney Finley MA  01/14/22, 16:28 EST

## 2022-01-14 NOTE — TELEPHONE ENCOUNTER
Caller: ArreguinMaribel    Relationship: Emergency Contact    Best call back number:[phone]     Requested Prescriptions:   Requested Prescriptions     Pending Prescriptions Disp Refills   • clopidogrel (PLAVIX) 75 MG tablet 90 tablet 1     Sig: Take 1 tablet by mouth Daily.   • atorvastatin (Lipitor) 40 MG tablet 90 tablet 1     Sig: Take 1 tablet by mouth Daily.   • gabapentin (NEURONTIN) 100 MG capsule 540 capsule 1     Sig: Take 2 capsules by mouth 3 (Three) Times a Day.   • NIFEdipine XL (PROCARDIA XL) 30 MG 24 hr tablet 90 tablet 1     Sig: Take 1 tablet by mouth Daily.        Pharmacy where request should be sent: [facility] #[id number] [address] AT [facility] [phone] [facility] [phone] FX     Additional details provided by patient: PATIENT IS OUT OF MEDICATION     Does the patient have less than a 3 day supply:  [x] Yes  [] No    Melinda Lyles Rep   01/14/22 10:56 EST

## 2022-01-17 NOTE — TELEPHONE ENCOUNTER
Rx Refill Note  Requested Prescriptions     Pending Prescriptions Disp Refills   • clopidogrel (PLAVIX) 75 MG tablet 90 tablet 1     Sig: Take 1 tablet by mouth Daily.   • atorvastatin (Lipitor) 40 MG tablet 90 tablet 1     Sig: Take 1 tablet by mouth Daily.   • gabapentin (NEURONTIN) 100 MG capsule 540 capsule 1     Sig: Take 2 capsules by mouth 3 (Three) Times a Day.   • NIFEdipine XL (PROCARDIA XL) 30 MG 24 hr tablet 90 tablet 1     Sig: Take 1 tablet by mouth Daily.      Last office visit with prescribing clinician: 8/27/2021      Next office visit with prescribing clinician: 3/1/2022            Ewa Flower LPN  01/17/22, 11:57 EST

## 2022-01-17 NOTE — TELEPHONE ENCOUNTER
PATIENT'S DAUGHTER BENITA IS CALLING TO [facility] TOLD HER THE FOLLOWING MEDICATION WAS DENIED BY [facility].  SHE IS WANTING TO KNOW IF PATIENT DOES NOT NEED TO TAKE IT ANY LONGER.  SHE STATES HE IS COMPLETELY OUT.     clopidogrel (PLAVIX) 75 MG tablet     [facility] #[id number] - [place], [place] - [address] AT [facility] - [phone]  - [phone]   [phone]    PLEASE ADVISE.

## 2022-01-17 NOTE — TELEPHONE ENCOUNTER
PATIENTS DAUGHTER CALLING STATING HE IS OUT OF THE clopidogrel (PLAVIX) 75 MG tablet    PLEASE ADVISE   [phone]

## 2022-01-18 RX ORDER — CLOPIDOGREL BISULFATE 75 MG/1
75 TABLET ORAL DAILY
Qty: 90 TABLET | Refills: 1 | Status: SHIPPED | OUTPATIENT
Start: 2022-01-18 | End: 2022-06-30

## 2022-01-18 RX ORDER — NIFEDIPINE 30 MG/1
30 TABLET, EXTENDED RELEASE ORAL DAILY
Qty: 90 TABLET | Refills: 1 | Status: SHIPPED | OUTPATIENT
Start: 2022-01-18 | End: 2022-04-15 | Stop reason: SDUPTHER

## 2022-01-18 RX ORDER — GABAPENTIN 100 MG/1
200 CAPSULE ORAL 3 TIMES DAILY
Qty: 540 CAPSULE | Refills: 1 | Status: SHIPPED | OUTPATIENT
Start: 2022-01-18 | End: 2022-07-18 | Stop reason: SDUPTHER

## 2022-01-18 RX ORDER — ATORVASTATIN CALCIUM 40 MG/1
40 TABLET, FILM COATED ORAL DAILY
Qty: 90 TABLET | Refills: 1 | Status: SHIPPED | OUTPATIENT
Start: 2022-01-18 | End: 2022-12-27

## 2022-01-27 ENCOUNTER — OFFICE VISIT (OUTPATIENT)
Dept: CARDIOLOGY | Facility: CLINIC | Age: 82
End: 2022-01-27

## 2022-01-27 VITALS
DIASTOLIC BLOOD PRESSURE: 62 MMHG | SYSTOLIC BLOOD PRESSURE: 122 MMHG | WEIGHT: 228.8 LBS | HEART RATE: 58 BPM | HEIGHT: 72 IN | BODY MASS INDEX: 30.99 KG/M2

## 2022-01-27 DIAGNOSIS — I50.32 CHRONIC DIASTOLIC HEART FAILURE: Primary | ICD-10-CM

## 2022-01-27 DIAGNOSIS — E78.2 MIXED HYPERLIPIDEMIA: ICD-10-CM

## 2022-01-27 DIAGNOSIS — I10 HYPERTENSION, ESSENTIAL: ICD-10-CM

## 2022-01-27 DIAGNOSIS — I25.10 CHRONIC CORONARY ARTERY DISEASE: ICD-10-CM

## 2022-01-27 DIAGNOSIS — N18.31 STAGE 3A CHRONIC KIDNEY DISEASE: ICD-10-CM

## 2022-01-27 PROCEDURE — 93000 ELECTROCARDIOGRAM COMPLETE: CPT | Performed by: INTERNAL MEDICINE

## 2022-01-27 PROCEDURE — 99214 OFFICE O/P EST MOD 30 MIN: CPT | Performed by: INTERNAL MEDICINE

## 2022-01-27 NOTE — PROGRESS NOTES
[facility] Follow Up Office Note     Encounter Date:22  Patient:Dereck Ramírez  :1940  MRN:[id number]      Chief Complaint:   Chief Complaint   Patient presents with   • Coronary Artery Disease     6 month f/u   • Congestive Heart Failure     History of Presenting Illness:     Mr. Ramírez is a 81 y.o. gentleman with past medical history notable for coronary artery disease which is been medically managed, hypertension, mixed hyperlipidemia, chronic kidney disease, chronic anemia, history of CVA, and diabetes with neuropathy who presents for follow up visit.  Since his last appointment with us he has been doing reasonably well.  His blood pressure is well controlled on his current regimen and his leg swelling seems to be doing okay.  Although as of late its been slightly worse.  Nonetheless he has had some improvement in his weight and clinically doing reasonably well.    Review of Systems:  Review of Systems   Constitutional: Negative.   HENT: Negative.    Eyes: Negative.    Cardiovascular: Positive for leg swelling.   Respiratory: Negative.    Endocrine: Negative.    Hematologic/Lymphatic: Negative.    Skin: Negative.    Musculoskeletal: Negative.    Gastrointestinal: Negative.    Genitourinary: Negative.    Neurological: Positive for loss of balance.   Psychiatric/Behavioral: Negative.    Allergic/Immunologic: Negative.        Current Outpatient Medications on File Prior to Visit   Medication Sig Dispense Refill   • atorvastatin (Lipitor) 40 MG tablet Take 1 tablet by mouth Daily. 90 tablet 1   • busPIRone (BUSPAR) 5 MG tablet Take 1 tablet by mouth 3 (Three) Times a Day As Needed (anxiety). 270 tablet 1   • carvedilol (COREG) 12.5 MG tablet TAKE 1 TABLET BY MOUTH TWICE DAILY WITH MEALS 180 tablet 3   • clopidogrel (PLAVIX) 75 MG tablet Take 1 tablet by mouth Daily. 90 tablet 1   • empagliflozin (Jardiance) 10 MG tablet tablet Take 1 tablet by mouth Daily. 90 tablet 1   • ferrous gluconate  (FERGON) 324 MG tablet Take 1 tablet by mouth Daily With Breakfast. 90 tablet 1   • furosemide (LASIX) 40 MG tablet Take 1 tab po qam and 1/2 tab po qpm 90 tablet 3   • gabapentin (NEURONTIN) 100 MG capsule Take 2 capsules by mouth 3 (Three) Times a Day. 540 capsule 1   • glucose blood test strip Check blood sugar twice daily and as needed 100 each 12   • nateglinide (STARLIX) 60 MG tablet Take 1 tablet by mouth 2 (Two) Times a Day With Meals. 180 tablet 3   • NIFEdipine XL (PROCARDIA XL) 30 MG 24 hr tablet Take 1 tablet by mouth Daily. 90 tablet 1   • nitroglycerin (NITROSTAT) 0.4 MG SL tablet Place 1 tablet under the tongue Every 5 (Five) Minutes As Needed for Chest Pain. 100 tablet 11   • sertraline (ZOLOFT) 50 MG tablet Take 1.5 tablets by mouth Daily. 135 tablet 1   • tamsulosin (FLOMAX) 0.4 MG capsule 24 hr capsule Take 1 capsule by mouth Daily. 90 capsule 1   • vitamin B-12 (CYANOCOBALAMIN) 1000 MCG tablet Take 1 tablet by mouth Daily. 90 tablet 1     No current facility-administered medications on file prior to visit.       No Known Allergies    Past Medical History:   Diagnosis Date   • Arthritis    • Cerebellar artery occlusion    • Diabetes mellitus (HCC)    • Enlarged prostate    • Hyperlipidemia    • Hypertension    • Stroke (HCC) 2011, 2012       Past Surgical History:   Procedure Laterality Date   • CARDIAC CATHETERIZATION      lesion 1: intervention outcome   • HERNIA REPAIR         Social History     Socioeconomic History   • Marital status:    Tobacco Use   • Smoking status: Never Smoker   • Smokeless tobacco: Never Used   Substance and Sexual Activity   • Alcohol use: No   • Drug use: No   • Sexual activity: Defer       Family History   Problem Relation Age of Onset   • Heart disease Mother    • Stroke Mother    • Crohn's disease Father    • Stroke Father    • Cancer Other    • Stroke Other    • Diabetes Other    • Heart defect Other    • Hypertension Other    • Thyroid disease Other    •  "Cancer Maternal Uncle         bone   • Dementia Maternal Grandmother        The following portions of the patient's history were reviewed and updated as appropriate: allergies, current medications, past family history, past medical history, past social history, past surgical history and problem list.       Objective:       Vitals:    01/27/22 1126   BP: 122/62   BP Location: Right arm   Patient Position: Sitting   Pulse: 58   Weight: 104 kg (228 lb 12.8 oz)   Height: 182.9 cm (72\")     Body mass index is 31.03 kg/m².     Physical Exam:  Constitutional:  Somewhat ill, frail, no acute distress   HENT: Oropharynx clear and membrane moist  Eyes: Normal conjunctiva, no sclera icterus.  Neck: Supple, no carotid bruit bilaterally.  Cardiovascular: Regular rate and rhythm, No Murmur, 1+ bilateral lower extremity edema.  Pulmonary: Normal respiratory effort, normal lung sounds, no wheezing.  Abdominal: Soft, nontender, no hepatosplenomegaly, liver is non-pulsatile.  Neurological: Alert and orient x 3.   Poor memory  Skin: Warm, dry, no ecchymosis, no rash.  Psych: Appropriate mood and affect. Normal judgment and insight.         Lab Results   Component Value Date    GLUCOSE 220 (H) 08/27/2021    BUN 35 (H) 08/27/2021    CREATININE 1.67 (H) 08/27/2021    EGFRIFNONA 40 (L) 08/27/2021    EGFRIFAFRI 48 (L) 08/27/2021    BCR 21.0 08/27/2021    K 4.7 08/27/2021    CO2 20.8 (L) 08/27/2021    CALCIUM 9.3 08/27/2021    PROTENTOTREF 6.6 02/25/2021    ALBUMIN 4.00 02/25/2021    LABIL2 1.5 02/25/2021    AST 16 02/25/2021    ALT 18 02/25/2021       Lab Results   Component Value Date    WBC 6.16 08/27/2021    HGB 11.1 (L) 08/27/2021    HCT 35.6 (L) 08/27/2021    MCV 88.1 08/27/2021     08/27/2021       Lab Results   Component Value Date    CKTOTAL 84 07/18/2014    CKMB 3.5 07/18/2014    TROPONINT <0.010 08/22/2019       Lab Results   Component Value Date    CHLPL 167 08/27/2021    CHLPL 158 02/25/2021    CHLPL 163 08/20/2020 "     Lab Results   Component Value Date    TRIG 306 (H) 08/27/2021    TRIG 385 (H) 02/25/2021    TRIG 317 (H) 08/20/2020     Lab Results   Component Value Date    HDL 31 (L) 08/27/2021    HDL 28 (L) 02/25/2021    HDL 32 (L) 08/20/2020     Lab Results   Component Value Date    LDL 85 08/27/2021    LDL 69 02/25/2021    LDL 68 08/20/2020       Lab Results   Component Value Date    TSH 3.750 08/20/2020       ECG 12 Lead    Date/Time: 1/27/2022 12:12 PM  Performed by: Josafat Mendez MD  Authorized by: Josafat Mendez MD   Comparison: compared with previous ECG from 4/8/2021  Similar to previous ECG  Rhythm: sinus rhythm  Conduction: non-specific intraventricular conduction delay        Echocardiogram 1/28/2021:  · Estimated left ventricular EF = 65% Left ventricular systolic function is normal.  · Left ventricular diastolic function was normal.  · Left ventricular wall thickness is consistent with concentric hypertrophy.  · Left atrial volume is mildly increased.    Mobile Telemetry Monitor 2 week 10/14/2019:  · Overall relatively normal 2-week mobile telemetry monitor.   · Normal amount of ectopy with short self-limited runs of SVT and VT.   · No etiology for syncope noted on the study.    Echocardiogram report 9/13/2019:  · Left ventricular systolic function is normal. Calculated EF = 61.0%. Normal left ventricular cavity size, wall thickness, mass and mass index noted. All left ventricular wall segments contract normally. Septal wall motion is normal. Left ventricular diastolic function is normal. Normal left atrial pressure.  · Right ventricular cavity is mild-to-moderately dilated  · Right atrial cavity size is mildly dilated.  · Left atrial cavity size is mild-to-moderately dilated.  · Mild mitral valve regurgitation is present.  · No prior study available for comparison.     Carotid duplex 9/13/2019:  • Right ICA Prox: Imaging indicates 16%-49% stenosis.   • Left ICA Prox: Imaging indicates 16-49%  stenosis.     Stress Lexiscan nuclear 9/5/2015:  · Normal Lexiscan Cardiolite stress test.   · No evidence of ischemia or infarction.   · Normal wall motion.   · Normal ejection fraction.   · Normal thickening.      Cardiac catheterization report 5/24/2013:  · Moderate to severe disease of the ramus off the first diagonal and ramus off the first obtuse marginal.   · Minimal segmental disease of the right coronary artery. There is 100% occlusion of the posterior descending artery with distal filling by the left coronary artery.   · Normal left ventricular systolic function.     Assessment:          Diagnosis Plan   1. Chronic diastolic heart failure (HCC)  ECG 12 Lead   2. Chronic coronary artery disease     3. Hypertension, essential     4. Mixed hyperlipidemia     5. Stage 3a chronic kidney disease ([facility])            Plan:       Mr. Ramírez is a 81 y.o. gentleman with past medical history notable for coronary artery disease which is been medically managed, hypertension, mixed hyperlipidemia, chronic kidney disease, chronic anemia, history of CVA, and diabetes with neuropathy who presents for follow-up.  Fortunately he is doing well from a cardiac perspective.  He is a little bit of mild leg swelling more so on the right than the left but I think we can just do a couple of days of 80 mg of Lasix and then go back to his 40 mg dosing.  Otherwise no changes are needed at this time.  I would hold off on further up titration of his Lasix chronically given his underlying kidney dysfunction       Chronic diastolic congestive heart failure:  · Compression socks for dependent edema to avoid episodic orthostasis  · Echocardiogram 1/2021 demonstrates normal LV function with grade 1 diastolic dysfunction  · Continue Lasix 40 mg daily but will do 3 days of 80 mg daily  · Follow-up BMP 8/2021 demonstrates stable creatinine at 1.6 and normal potassium.       Coronary artery disease without angina:  · Stress testing in 2015  demonstrating no ischemia.  Patient with known coronary artery disease which is been medically managed.  · Continue clopidogrel, labetalol, and atorvastatin     Hypertension:  · Better control today and will continue current medical therapy.  · BMP 8/2021 demonstrates elevated creatinine but stable with normal sodium and potassium.     Mixed hyperlipidemia:  · Continue high potency statin as   · Lipid panel 2/2021 demonstrates good control total cholesterol and LDL   · CMP 2/2021 demonstrates normal ALT and AST    Chronic kidney disease:  · We will closely monitor kidney function and limit excessive titration of diuretics    Follow-up:  6 month      Josafat Mendez MD  [facility]  01/27/22  12:15 EST

## 2022-03-01 NOTE — TELEPHONE ENCOUNTER
Rx Refill Note  Requested Prescriptions     Pending Prescriptions Disp Refills   • empagliflozin (Jardiance) 10 MG tablet tablet 90 tablet 1     Sig: Take 1 tablet by mouth Daily.      Last office visit with prescribing clinician: 8/27/2021      Next office visit with prescribing clinician: 3/2/2022       {TIP  Please add Last Relevant Lab Date if appropriate: 08/27/21    Fanta Lee MA  03/01/22, 15:53 EST

## 2022-03-02 ENCOUNTER — OFFICE VISIT (OUTPATIENT)
Dept: FAMILY MEDICINE CLINIC | Facility: CLINIC | Age: 82
End: 2022-03-02

## 2022-03-02 VITALS
HEART RATE: 50 BPM | OXYGEN SATURATION: 97 % | TEMPERATURE: 97 F | WEIGHT: 224.7 LBS | HEIGHT: 72 IN | DIASTOLIC BLOOD PRESSURE: 64 MMHG | BODY MASS INDEX: 30.43 KG/M2 | SYSTOLIC BLOOD PRESSURE: 130 MMHG | RESPIRATION RATE: 18 BRPM

## 2022-03-02 DIAGNOSIS — Z00.00 MEDICARE ANNUAL WELLNESS VISIT, SUBSEQUENT: Primary | ICD-10-CM

## 2022-03-02 DIAGNOSIS — I10 HYPERTENSION, ESSENTIAL: ICD-10-CM

## 2022-03-02 DIAGNOSIS — H61.23 BILATERAL IMPACTED CERUMEN: ICD-10-CM

## 2022-03-02 DIAGNOSIS — E11.59 TYPE 2 DIABETES MELLITUS WITH OTHER CIRCULATORY COMPLICATION, WITHOUT LONG-TERM CURRENT USE OF INSULIN: ICD-10-CM

## 2022-03-02 DIAGNOSIS — Z13.29 SCREENING FOR THYROID DISORDER: ICD-10-CM

## 2022-03-02 DIAGNOSIS — E78.2 MIXED HYPERLIPIDEMIA: ICD-10-CM

## 2022-03-02 PROCEDURE — G0439 PPPS, SUBSEQ VISIT: HCPCS | Performed by: NURSE PRACTITIONER

## 2022-03-02 PROCEDURE — 1160F RVW MEDS BY RX/DR IN RCRD: CPT | Performed by: NURSE PRACTITIONER

## 2022-03-02 PROCEDURE — 1170F FXNL STATUS ASSESSED: CPT | Performed by: NURSE PRACTITIONER

## 2022-03-02 PROCEDURE — 99214 OFFICE O/P EST MOD 30 MIN: CPT | Performed by: NURSE PRACTITIONER

## 2022-03-02 PROCEDURE — 69209 REMOVE IMPACTED EAR WAX UNI: CPT | Performed by: NURSE PRACTITIONER
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
## 2022-03-02 NOTE — PROGRESS NOTES
"Chief Complaint  Medicare Wellness-subsequent    Subjective          Dereck Ramírez presents to [facility]  Dereck presents for follow up hypertension and diabetes, he is currently taking medication as prescribed. Denies any acute concerns.     Hypertension  This is a chronic problem. The current episode started more than 1 year ago. The problem is unchanged. The problem is controlled. Associated symptoms include peripheral edema (improving on lasix twice daily). Pertinent negatives include no anxiety, blurred vision, chest pain, headaches, malaise/fatigue, neck pain, orthopnea, palpitations, PND, shortness of breath or sweats. There are no associated agents to hypertension. Risk factors for coronary artery disease include diabetes mellitus and dyslipidemia. Past treatments include diuretics, beta blockers and calcium channel blockers. Current antihypertension treatment includes diuretics, beta blockers and calcium channel blockers. The current treatment provides significant improvement. There are no compliance problems.    Diabetes  He presents for his follow-up diabetic visit. He has type 2 diabetes mellitus. His disease course has been stable. Pertinent negatives for hypoglycemia include no headaches or sweats. Pertinent negatives for diabetes include no blurred vision and no chest pain. An ACE inhibitor/angiotensin II receptor blocker is not being taken.       Objective   Vital Signs:   /64 (BP Location: Left arm, Patient Position: Sitting, Cuff Size: Adult)   Pulse 50   Temp 97 °F (36.1 °C) (Temporal)   Resp 18   Ht 182.9 cm (72.01\")   Wt 102 kg (224 lb 11.2 oz)   SpO2 97%   BMI 30.47 kg/m²     Physical Exam  Vitals reviewed.   Constitutional:       Appearance: Normal appearance.   HENT:      Right Ear: There is impacted cerumen.      Left Ear: There is impacted cerumen.   Cardiovascular:      Rate and Rhythm: Normal rate and regular rhythm.      Heart sounds: No " murmur heard.  No gallop.    Pulmonary:      Effort: Pulmonary effort is normal. No respiratory distress.      Breath sounds: Normal breath sounds. No wheezing, rhonchi or rales.   Skin:     General: Skin is warm and dry.   Neurological:      Mental Status: He is alert and oriented to person, place, and time.   Psychiatric:         Mood and Affect: Mood normal.        Result Review :          Ear Cerumen Removal    Date/Time: 3/2/2022 2:08 PM  Performed by: Amber Rodney APRN  Authorized by: Amber Rodney APRN     Anesthesia:  Local Anesthetic: none  Location details: right ear and left ear  Patient tolerance: patient tolerated the procedure well with no immediate complications  Comments: Unable to remove full cerumen in left canal, moderate cerumen remaining, instructed to start debrox  Procedure type: irrigation   Sedation:  Patient sedated: no              Assessment and Plan {CC Problem List  Visit Diagnosis   ROS  Review (Popup)  Health Maintenance  Quality  BestPractice  Medications  SmartSets  SnapShot Encounters  Media :23}   Diagnoses and all orders for this visit:    1. Medicare annual wellness visit, subsequent (Primary)  -     CBC & Differential  -     Comprehensive Metabolic Panel    2. Type 2 diabetes mellitus with other circulatory complication, without long-term current use of insulin (HCC)  -     Hemoglobin A1c  -     Comprehensive Metabolic Panel    3. Hypertension, essential  -     CBC & Differential  -     Comprehensive Metabolic Panel    4. Mixed hyperlipidemia  -     CBC & Differential  -     Comprehensive Metabolic Panel  -     Lipid Panel With LDL / HDL Ratio    5. Screening for thyroid disorder  -     TSH Rfx On Abnormal To Free T4    Other orders  -     Ear Cerumen Removal        Follow Up   No follow-ups on file.  Patient was given instructions and counseling regarding his condition or for health maintenance advice. Please see specific information pulled  into the AVS if appropriate.       HTN: continue current medications, well controlled, recent increase in lasix to twice daily  DM: controlled, taking jardiance and starlix, off metformin  Will continue current treatment, follow up in 6 months

## 2022-03-03 LAB
ALBUMIN SERPL-MCNC: 4.4 G/DL (ref 3.7–4.7)
ALBUMIN/GLOB SERPL: 1.4 {RATIO} (ref 1.2–2.2)
ALP SERPL-CCNC: 279 IU/L (ref 44–121)
ALT SERPL-CCNC: 22 IU/L (ref 0–44)
AST SERPL-CCNC: 21 IU/L (ref 0–40)
BASOPHILS # BLD AUTO: 0.1 X10E3/UL (ref 0–0.2)
BASOPHILS NFR BLD AUTO: 1 %
BILIRUB SERPL-MCNC: 0.3 MG/DL (ref 0–1.2)
BUN SERPL-MCNC: 35 MG/DL (ref 8–27)
BUN/CREAT SERPL: 20 (ref 10–24)
CALCIUM SERPL-MCNC: 9.2 MG/DL (ref 8.6–10.2)
CHLORIDE SERPL-SCNC: 100 MMOL/L (ref 96–106)
CHOLEST SERPL-MCNC: 172 MG/DL (ref 100–199)
CO2 SERPL-SCNC: 23 MMOL/L (ref 20–29)
CREAT SERPL-MCNC: 1.77 MG/DL (ref 0.76–1.27)
EGFR GENE MUT ANL BLD/T: 39 ML/MIN/1.73
EOSINOPHIL # BLD AUTO: 0.2 X10E3/UL (ref 0–0.4)
EOSINOPHIL NFR BLD AUTO: 3 %
ERYTHROCYTE [DISTWIDTH] IN BLOOD BY AUTOMATED COUNT: 14.2 % (ref 11.6–15.4)
GLOBULIN SER CALC-MCNC: 3.1 G/DL (ref 1.5–4.5)
GLUCOSE SERPL-MCNC: 143 MG/DL (ref 65–99)
HBA1C MFR BLD: 7.7 % (ref 4.8–5.6)
HCT VFR BLD AUTO: 36.1 % (ref 37.5–51)
HDLC SERPL-MCNC: 33 MG/DL
HGB BLD-MCNC: 11.9 G/DL (ref 13–17.7)
IMM GRANULOCYTES # BLD AUTO: 0 X10E3/UL (ref 0–0.1)
IMM GRANULOCYTES NFR BLD AUTO: 0 %
LDLC SERPL CALC-MCNC: 89 MG/DL (ref 0–99)
LDLC/HDLC SERPL: 2.7 RATIO (ref 0–3.6)
LYMPHOCYTES # BLD AUTO: 1.3 X10E3/UL (ref 0.7–3.1)
LYMPHOCYTES NFR BLD AUTO: 22 %
MCH RBC QN AUTO: 27.7 PG (ref 26.6–33)
MCHC RBC AUTO-ENTMCNC: 33 G/DL (ref 31.5–35.7)
MCV RBC AUTO: 84 FL (ref 79–97)
MONOCYTES # BLD AUTO: 0.7 X10E3/UL (ref 0.1–0.9)
MONOCYTES NFR BLD AUTO: 11 %
NEUTROPHILS # BLD AUTO: 3.9 X10E3/UL (ref 1.4–7)
NEUTROPHILS NFR BLD AUTO: 63 %
PLATELET # BLD AUTO: 273 X10E3/UL (ref 150–450)
POTASSIUM SERPL-SCNC: 4.6 MMOL/L (ref 3.5–5.2)
PROT SERPL-MCNC: 7.5 G/DL (ref 6–8.5)
RBC # BLD AUTO: 4.29 X10E6/UL (ref 4.14–5.8)
SODIUM SERPL-SCNC: 139 MMOL/L (ref 134–144)
T4 FREE SERPL-MCNC: 1.04 NG/DL (ref 0.82–1.77)
TRIGL SERPL-MCNC: 302 MG/DL (ref 0–149)
TSH SERPL DL<=0.005 MIU/L-ACNC: 4.53 UIU/ML (ref 0.45–4.5)
VLDLC SERPL CALC-MCNC: 50 MG/DL (ref 5–40)
WBC # BLD AUTO: 6.2 X10E3/UL (ref 3.4–10.8)

## 2022-04-05 RX ORDER — NATEGLINIDE 60 MG/1
60 TABLET ORAL 2 TIMES DAILY WITH MEALS
Qty: 180 TABLET | Refills: 3 | Status: SHIPPED | OUTPATIENT
Start: 2022-04-05 | End: 2023-03-30

## 2022-04-05 RX ORDER — LANOLIN ALCOHOL/MO/W.PET/CERES
1000 CREAM (GRAM) TOPICAL DAILY
Qty: 90 TABLET | Refills: 1 | Status: SHIPPED | OUTPATIENT
Start: 2022-04-05 | End: 2022-10-04

## 2022-04-05 NOTE — TELEPHONE ENCOUNTER
Rx Refill Note  Requested Prescriptions     Pending Prescriptions Disp Refills   • vitamin B-12 (CYANOCOBALAMIN) 1000 MCG tablet 90 tablet 1     Sig: Take 1 tablet by mouth Daily.   • nateglinide (STARLIX) 60 MG tablet 180 tablet 3     Sig: Take 1 tablet by mouth 2 (Two) Times a Day With Meals.      Last office visit with prescribing clinician: 3/2/2022      Next office visit with prescribing clinician: Visit date not found       {TIP  Please add Last Relevant Lab Date if appropriate: 03/02/22    Fanta Lee MA  04/05/22, 16:50 EDT

## 2022-04-15 RX ORDER — NIFEDIPINE 30 MG/1
30 TABLET, EXTENDED RELEASE ORAL DAILY
Qty: 90 TABLET | Refills: 1 | Status: SHIPPED | OUTPATIENT
Start: 2022-04-15 | End: 2022-07-28

## 2022-04-15 NOTE — TELEPHONE ENCOUNTER
Rx Refill  Requested Prescriptions     Pending Prescriptions Disp Refills   • NIFEdipine XL (PROCARDIA XL) 30 MG 24 hr tablet 90 tablet 1     Sig: Take 1 tablet by mouth Daily.      Last office visit with prescribing clinician: 3/2/2022      Next office visit with prescribing clinician: Visit date not found            Fanta Lee MA  04/15/22, 14:45 EDT    Fanta Lee MA  04/15/22, 14:44 EDT

## 2022-05-02 NOTE — TELEPHONE ENCOUNTER
Rx Refill Note  Requested Prescriptions     Pending Prescriptions Disp Refills   • ferrous gluconate (FERGON) 324 MG tablet [Pharmacy Med Name: FERROUS GLUC 324MG TABLETS] 90 tablet 1     Sig: TAKE 1 TABLET BY MOUTH DAILY WITH BREAKFAST      Last office visit with prescribing clinician: 3/2/2022      Next office visit with prescribing clinician: Visit date not found            Nevaeh Ortiz MA  05/02/22, 14:55 EDT

## 2022-05-03 RX ORDER — DOXYCYCLINE HYCLATE 50 MG/1
1 CAPSULE, GELATIN COATED ORAL
Qty: 90 TABLET | Refills: 1 | Status: SHIPPED | OUTPATIENT
Start: 2022-05-03 | End: 2022-10-04

## 2022-05-26 RX ORDER — TAMSULOSIN HYDROCHLORIDE 0.4 MG/1
1 CAPSULE ORAL DAILY
Qty: 90 CAPSULE | Refills: 1 | Status: SHIPPED | OUTPATIENT
Start: 2022-05-26 | End: 2022-10-04

## 2022-05-26 RX ORDER — LANOLIN ALCOHOL/MO/W.PET/CERES
1000 CREAM (GRAM) TOPICAL DAILY
Qty: 90 TABLET | Refills: 1 | OUTPATIENT
Start: 2022-05-26

## 2022-05-26 RX ORDER — NATEGLINIDE 60 MG/1
60 TABLET ORAL 2 TIMES DAILY WITH MEALS
Qty: 180 TABLET | Refills: 3 | OUTPATIENT
Start: 2022-05-26

## 2022-05-26 NOTE — TELEPHONE ENCOUNTER
Rx Refill Note  Requested Prescriptions     Pending Prescriptions Disp Refills   • nateglinide (STARLIX) 60 MG tablet 180 tablet 3     Sig: Take 1 tablet by mouth 2 (Two) Times a Day With Meals.   • vitamin B-12 (CYANOCOBALAMIN) 1000 MCG tablet 90 tablet 1     Sig: Take 1 tablet by mouth Daily.   • tamsulosin (FLOMAX) 0.4 MG capsule 24 hr capsule 90 capsule 1     Sig: Take 1 capsule by mouth Daily.      Last office visit with prescribing clinician: 3/2/2022      Next office visit with prescribing clinician: Visit date not found       {TIP  Please add Last Relevant Lab Date if appropriate: 03/02/22   {TIP  Is Refill Pharmacy correct?:23}  Fanta Lee MA  05/26/22, 11:18 EDT

## 2022-05-26 NOTE — TELEPHONE ENCOUNTER
Caller: Daquan Ramírezsebastien CHAVEZ    Relationship: Self    Best call back number: [phone]       Requested Prescriptions:   Requested Prescriptions     Pending Prescriptions Disp Refills   • nateglinide (STARLIX) 60 MG tablet 180 tablet 3     Sig: Take 1 tablet by mouth 2 (Two) Times a Day With Meals.   • vitamin B-12 (CYANOCOBALAMIN) 1000 MCG tablet 90 tablet 1     Sig: Take 1 tablet by mouth Daily.   • tamsulosin (FLOMAX) 0.4 MG capsule 24 hr capsule 90 capsule 1     Sig: Take 1 capsule by mouth Daily.        Pharmacy where request should be sent: [id number] [place], [place] - [facility] [address] [facility] AT [facility] [address]  - [phone]  - [phone] FX     Additional details provided by patient: PATIENT HAS A 3 DAY SUPPLY OF NATEGLINIDE, A 2 DAY SUPPLY ON VITAMIN B-12 AND HAS A WEEK SUPPLY OF FLOMAX       Does the patient have less than a 3 day supply:  [x] Yes  [] No    Melinda Perry Rep   05/26/22 10:19 EDT

## 2022-06-30 RX ORDER — CLOPIDOGREL BISULFATE 75 MG/1
75 TABLET ORAL DAILY
Qty: 90 TABLET | Refills: 1 | Status: SHIPPED | OUTPATIENT
Start: 2022-06-30 | End: 2022-10-04

## 2022-06-30 NOTE — TELEPHONE ENCOUNTER
Rx Refill Note  Requested Prescriptions     Pending Prescriptions Disp Refills   • clopidogrel (PLAVIX) 75 MG tablet [Pharmacy Med Name: CLOPIDOGREL 75MG TABLETS] 90 tablet 1     Sig: TAKE 1 TABLET BY MOUTH DAILY      Last office visit with prescribing clinician: 3/2/2022      Next office visit with prescribing clinician: Visit date not found       {TIP  Please add Last Relevant Lab Date if appropriate: 03/02/22    Fanta Lee MA  06/30/22, 12:35 EDT

## 2022-07-05 NOTE — TELEPHONE ENCOUNTER
Rx Refill Note  Requested Prescriptions     Pending Prescriptions Disp Refills   • sertraline (ZOLOFT) 50 MG tablet [Pharmacy Med Name: SERTRALINE 50MG TABLETS] 135 tablet 1     Sig: TAKE 1 AND 1/2 TABLETS BY MOUTH DAILY      Last office visit with prescribing clinician: 3/2/2022      Next office visit with prescribing clinician: Visit date not found       {TIP  Please add Last Relevant Lab Date if appropriate: 03/02/22    Fanta Lee MA  07/05/22, 09:41 EDT

## 2022-07-18 DIAGNOSIS — E11.59 TYPE 2 DIABETES MELLITUS WITH OTHER CIRCULATORY COMPLICATION, WITHOUT LONG-TERM CURRENT USE OF INSULIN: ICD-10-CM

## 2022-07-18 NOTE — TELEPHONE ENCOUNTER
Rx Refill Note  Requested Prescriptions     Pending Prescriptions Disp Refills   • gabapentin (NEURONTIN) 100 MG capsule 540 capsule 1     Sig: Take 2 capsules by mouth 3 (Three) Times a Day.      Last office visit with prescribing clinician: 3/2/2022      Next office visit with prescribing clinician: Visit date not found            Raquel Rasmussen MA  07/18/22, 14:12 EDT

## 2022-07-18 NOTE — TELEPHONE ENCOUNTER
Caller: Maribel Arreguin    Relationship: Emergency Contact    Best call back number: [phone]    Requested Prescriptions:   Requested Prescriptions     Pending Prescriptions Disp Refills   • gabapentin (NEURONTIN) 100 MG capsule 540 capsule 1     Sig: Take 2 capsules by mouth 3 (Three) Times a Day.        Pharmacy where request should be sent: [facility] #[id number] - [place], [place] - [address] AT [facility] [address]  - [phone]  - [phone] FX     Does the patient have less than a 3 day supply:  [] Yes  [x] No    Melinda Berry Rep   07/18/22 13:39 EDT

## 2022-07-19 RX ORDER — GABAPENTIN 100 MG/1
200 CAPSULE ORAL 3 TIMES DAILY
Qty: 540 CAPSULE | Refills: 1 | Status: SHIPPED | OUTPATIENT
Start: 2022-07-19 | End: 2023-01-19

## 2022-07-28 ENCOUNTER — OFFICE VISIT (OUTPATIENT)
Dept: CARDIOLOGY | Facility: CLINIC | Age: 82
End: 2022-07-28

## 2022-07-28 VITALS
BODY MASS INDEX: 30.18 KG/M2 | OXYGEN SATURATION: 99 % | HEIGHT: 72 IN | SYSTOLIC BLOOD PRESSURE: 130 MMHG | HEART RATE: 71 BPM | WEIGHT: 222.8 LBS | DIASTOLIC BLOOD PRESSURE: 70 MMHG

## 2022-07-28 DIAGNOSIS — R60.0 BILATERAL LOWER EXTREMITY EDEMA: ICD-10-CM

## 2022-07-28 DIAGNOSIS — I10 HYPERTENSION, ESSENTIAL: Chronic | ICD-10-CM

## 2022-07-28 DIAGNOSIS — I50.32 CHRONIC DIASTOLIC HEART FAILURE: Primary | ICD-10-CM

## 2022-07-28 PROCEDURE — 93000 ELECTROCARDIOGRAM COMPLETE: CPT | Performed by: NURSE PRACTITIONER

## 2022-07-28 PROCEDURE — 99214 OFFICE O/P EST MOD 30 MIN: CPT | Performed by: NURSE PRACTITIONER

## 2022-07-28 RX ORDER — CARVEDILOL 25 MG/1
25 TABLET ORAL 2 TIMES DAILY WITH MEALS
Qty: 180 TABLET | Refills: 0 | Status: SHIPPED | OUTPATIENT
Start: 2022-07-28 | End: 2022-11-01

## 2022-07-28 NOTE — PROGRESS NOTES
Date of Office Visit: 2022  Encounter Provider: KAVON Castro  Place of Service: [facility] CARDIOLOGY  Patient Name: Dereck Ramírez  :1940    Chief Complaint   Patient presents with   • Congestive Heart Failure   :     HPI: Dereck Ramírez is a 81 y.o. male.  He is a patient of Dr. Mendez's whom we follow for the management of hypertension, hyperlipidemia, chronic diastolic CHF, and coronary artery disease.  Additionally, he has CKD, diabetes, and chronic anemia.   He was last seen in the office by Dr. Mendez in January at which time he was doing well.  He denied any symptoms of angina or heart failure.  He did have increased mild leg swelling more so on the right than the left.  Dr. Mendez felt we could increase his Lasix to 80 mg for a couple of days and then   resume the 40 mg daily dosing.  Otherwise, no changes were made to his regimen.  He was advised to follow-up in 6 months.   Overall, he has been doing well.  He denies any chest pain, palpitations, dizziness, syncope, bleeding difficulties or melena.  He reports dyspnea on exertion that is chronic and unchanged.  He also reports chronic bilateral lower extremity edema.  Reportedly it waxes and wanes.  However, today it is much better.    Past Medical History:   Diagnosis Date   • Arthritis    • Cerebellar artery occlusion    • Diabetes mellitus (HCC)    • Enlarged prostate    • Hyperlipidemia    • Hypertension    • Stroke (HCC) 2012       Past Surgical History:   Procedure Laterality Date   • CARDIAC CATHETERIZATION      lesion 1: intervention outcome   • HERNIA REPAIR         Social History     Socioeconomic History   • Marital status:    Tobacco Use   • Smoking status: Never Smoker   • Smokeless tobacco: Never Used   Substance and Sexual Activity   • Alcohol use: No   • Drug use: No   • Sexual activity: Defer       Family History   Problem Relation Age of Onset   • Heart disease  Mother    • Stroke Mother    • Crohn's disease Father    • Stroke Father    • Cancer Other    • Stroke Other    • Diabetes Other    • Heart defect Other    • Hypertension Other    • Thyroid disease Other    • Cancer Maternal Uncle         bone   • Dementia Maternal Grandmother        Review of Systems   Constitutional: Negative.   Cardiovascular: Positive for dyspnea on exertion and leg swelling. Negative for chest pain, orthopnea, paroxysmal nocturnal dyspnea and syncope.   Respiratory: Negative.    Hematologic/Lymphatic: Negative for bleeding problem.   Musculoskeletal: Negative for falls.   Gastrointestinal: Negative for melena.   Neurological: Negative for dizziness and light-headedness.       No Known Allergies      Current Outpatient Medications:   •  atorvastatin (Lipitor) 40 MG tablet, Take 1 tablet by mouth Daily., Disp: 90 tablet, Rfl: 1  •  busPIRone (BUSPAR) 5 MG tablet, Take 1 tablet by mouth 3 (Three) Times a Day As Needed (anxiety)., Disp: 270 tablet, Rfl: 1  •  carvedilol (COREG) 25 MG tablet, Take 1 tablet by mouth 2 (Two) Times a Day With Meals., Disp: 180 tablet, Rfl: 0  •  clopidogrel (PLAVIX) 75 MG tablet, TAKE 1 TABLET BY MOUTH DAILY, Disp: 90 tablet, Rfl: 1  •  empagliflozin (Jardiance) 10 MG tablet tablet, Take 1 tablet by mouth Daily., Disp: 90 tablet, Rfl: 1  •  ferrous gluconate (FERGON) 324 MG tablet, TAKE 1 TABLET BY MOUTH DAILY WITH BREAKFAST, Disp: 90 tablet, Rfl: 1  •  furosemide (LASIX) 40 MG tablet, Take 1 tab po qam and 1/2 tab po qpm, Disp: 90 tablet, Rfl: 3  •  gabapentin (NEURONTIN) 100 MG capsule, Take 2 capsules by mouth 3 (Three) Times a Day., Disp: 540 capsule, Rfl: 1  •  glucose blood test strip, Check blood sugar twice daily and as needed, Disp: 100 each, Rfl: 12  •  nateglinide (STARLIX) 60 MG tablet, Take 1 tablet by mouth 2 (Two) Times a Day With Meals., Disp: 180 tablet, Rfl: 3  •  sertraline (ZOLOFT) 50 MG tablet, TAKE 1 AND 1/2 TABLETS BY MOUTH DAILY, Disp: 135  "tablet, Rfl: 1  •  tamsulosin (FLOMAX) 0.4 MG capsule 24 hr capsule, Take 1 capsule by mouth Daily., Disp: 90 capsule, Rfl: 1  •  vitamin B-12 (CYANOCOBALAMIN) 1000 MCG tablet, Take 1 tablet by mouth Daily., Disp: 90 tablet, Rfl: 1      Objective:     Vitals:    07/28/22 0826   BP: 130/70   BP Location: Right arm   Patient Position: Sitting   Cuff Size: Adult   Pulse: 71   SpO2: 99%   Weight: 101 kg (222 lb 12.8 oz)   Height: 182.9 cm (72\")     Body mass index is 30.22 kg/m².    PHYSICAL EXAM:    Neck:      Vascular: No JVD.   Pulmonary:      Effort: Pulmonary effort is normal.      Breath sounds: Normal breath sounds.   Cardiovascular:      Normal rate. Regular rhythm.      Murmurs: There is no murmur.      No gallop. No click. No rub.   Pulses:     Intact distal pulses.   Edema:     Peripheral edema present.          ECG 12 Lead    Date/Time: 7/28/2022 8:39 AM  Performed by: Lorna Posada APRN  Authorized by: Lorna Posada APRN   Comparison: compared with previous ECG from 1/27/2022  Similar to previous ECG  Rhythm: sinus rhythm  Rate: normal  BPM: 68  Conduction: non-specific intraventricular conduction delay              Assessment:       Diagnosis Plan   1. Chronic diastolic heart failure (HCC)  ECG 12 Lead   2. Bilateral lower extremity edema     3. Hypertension, essential       Orders Placed This Encounter   Procedures   • ECG 12 Lead     This order was created via procedure documentation     Order Specific Question:   Release to patient     Answer:   Immediate          Plan:       1.  Chronic diastolic CHF.  He appears euvolemic on exam.  He denies any PND or orthopnea.  Continue Lasix at current dose.      2.  Bilateral lower extremity edema.  Multifactorial.  I discussed the possibility of referring him to the lymphedema clinic.  However, he is not interested at this time.  I do think it would be reasonable to try stopping the nifedipine to see if it helps.      3.  Hypertension.  His blood " pressure looks great.  Since I am stopping the nifedipine, I will increase the carvedilol to 25 mg twice daily.      I think he is doing well overall.  He denies any symptoms of angina or heart failure.  He will follow-up with Dr. Mendez in 6 months.      As always, it has been a pleasure to participate in your patient's care.      Sincerely,         KAVON Keller

## 2022-08-17 RX ORDER — EMPAGLIFLOZIN 10 MG/1
TABLET, FILM COATED ORAL
Qty: 90 TABLET | Refills: 1 | Status: SHIPPED | OUTPATIENT
Start: 2022-08-17 | End: 2023-03-16

## 2022-08-17 NOTE — TELEPHONE ENCOUNTER
Rx Refill Note  Requested Prescriptions     Pending Prescriptions Disp Refills   • Jardiance 10 MG tablet tablet [Pharmacy Med Name: JARDIANCE 10MG TABLETS] 90 tablet 1     Sig: TAKE 1 TABLET BY MOUTH DAILY      Last office visit with prescribing clinician: 3/2/2022      Next office visit with prescribing clinician: 9/7/2022       {TIP  Please add Last Relevant Lab Date if appropriate: 03/02/22    Fanta Lee MA  08/17/22, 16:46 EDT

## 2022-08-23 ENCOUNTER — TELEPHONE (OUTPATIENT)
Dept: FAMILY MEDICINE CLINIC | Facility: CLINIC | Age: 82
End: 2022-08-23

## 2022-08-23 NOTE — TELEPHONE ENCOUNTER
PATIENT STATES: THAT HE IS IN THE HOUSE WITH TWO KIDS THAT HAVE COVID SO HE THINK HE HAS COVID HE WOULD LIKE A CALL BACK TO SEE WHAT HE CAN TAKE FOR A RUNNING NOSE AND SEE WHAT HE SHOULD BE DOING PLEASE ADVISE      PATIENT CAN BE REACHED ON: [phone]    PHARMACY [id number] - [place], [place] - [address] AT [facility] [address] - [phone]  - [phone]   [phone]

## 2022-08-24 ENCOUNTER — TELEMEDICINE (OUTPATIENT)
Dept: FAMILY MEDICINE CLINIC | Facility: CLINIC | Age: 82
End: 2022-08-24

## 2022-08-24 ENCOUNTER — TELEPHONE (OUTPATIENT)
Dept: FAMILY MEDICINE CLINIC | Facility: CLINIC | Age: 82
End: 2022-08-24

## 2022-08-24 DIAGNOSIS — Z20.822 EXPOSURE TO COVID-19 VIRUS: ICD-10-CM

## 2022-08-24 DIAGNOSIS — U07.1 COVID-19: Primary | ICD-10-CM

## 2022-08-24 LAB
EXPIRATION DATE: ABNORMAL
INTERNAL CONTROL: ABNORMAL
Lab: ABNORMAL
SARS-COV-2 AG UPPER RESP QL IA.RAPID: DETECTED

## 2022-08-24 PROCEDURE — 99442 PR PHYS/QHP TELEPHONE EVALUATION 11-20 MIN: CPT | Performed by: NURSE PRACTITIONER

## 2022-08-24 PROCEDURE — 87426 SARSCOV CORONAVIRUS AG IA: CPT | Performed by: NURSE PRACTITIONER

## 2022-08-24 NOTE — TELEPHONE ENCOUNTER
Caller: Maribel Arreguin    Relationship: Emergency Contact    Best call back number: [phone]    What is the best time to reach you:ANYTIME      Who are you requesting to speak with (clinical staff, provider,  specific staff member):CLIINCAL STAFF    Do you know the name of the person who called: SELF    What was the call regarding:PATIENTS DAUGHTER WAS CALLING FOR PATIENTS COVID RESULTS. PLEASE CALL    Do you require a callback:YES

## 2022-08-24 NOTE — PROGRESS NOTES
"Chief Complaint  Covid-19 Home Monitoring Phone Call (Runny nose, congestion, headache, diarrhea  fatigue, has been living with his grandchildren that has tested positive.)    Subjective        Dereck Ramírez presents to [facility]  Dereck presents with recent covid exposure, both grandsons are currently positive with covid. Reports runny nose, diarrhea. Denies nasal congestion. Denies headache, body aches or fatigue.     Symptoms began yesterday.       Objective   Vital Signs:  There were no vitals taken for this visit.  Estimated body mass index is 30.22 kg/m² as calculated from the following:    Height as of 7/28/22: 182.9 cm (72\").    Weight as of 7/28/22: 101 kg (222 lb 12.8 oz).          Physical Exam  Neurological:      Mental Status: He is alert and oriented to person, place, and time.        Result Review :                Assessment and Plan   Diagnoses and all orders for this visit:    1. COVID-19 (Primary)  -     POCT SARS-CoV-2 Antigen ZAHRAA    2. Exposure to COVID-19 virus  -     POCT SARS-CoV-2 Antigen ZAHRAA    Other orders  -     Molnupiravir (LAGEVRIO) 200 MG capsule; Take 4 capsules by mouth Every 12 (Twelve) Hours for 5 days.  Dispense: 40 capsule; Refill: 0             Follow Up   No follow-ups on file.  Patient was given instructions and counseling regarding his condition or for health maintenance advice. Please see specific information pulled into the AVS if appropriate.     You have chosen to receive care through a telephone visit. Do you consent to use a telephone visit for your medical care today? Yes  Time spent 12 minutes    Recent exposure to covid, recommend covid testing, discussed at home vs in office, patient would like to come in for covid test, will consider antiviral pending outcome of test and potential interaction with medications. Patient is taking plavix and this is contraindication to paxlovid, would recommend malnupirovir.    Addendum: covid test is " positive, reviewed molnupirovir and patient is in agreement to start medication. No renal dosing required. He will follow up for new or worsening symptoms as needed. Reviewed instructions on telephone with patient and daughter

## 2022-08-24 NOTE — TELEPHONE ENCOUNTER
Hub staff attempted to follow warm transfer process and was unsuccessful     Caller: Dereck Ramírez    Relationship to patient: Self    Best call back number: [phone]    Patient is needing: PATIENT IS OUT BACK AT THE [facility] ENTRANCE FOR HIS COVID TEST, PLEASE CALL BACK

## 2022-09-07 ENCOUNTER — OFFICE VISIT (OUTPATIENT)
Dept: FAMILY MEDICINE CLINIC | Facility: CLINIC | Age: 82
End: 2022-09-07

## 2022-09-07 VITALS
HEIGHT: 72 IN | HEART RATE: 63 BPM | BODY MASS INDEX: 30.3 KG/M2 | WEIGHT: 223.7 LBS | SYSTOLIC BLOOD PRESSURE: 122 MMHG | DIASTOLIC BLOOD PRESSURE: 60 MMHG | OXYGEN SATURATION: 99 % | TEMPERATURE: 96.6 F | RESPIRATION RATE: 18 BRPM

## 2022-09-07 DIAGNOSIS — E78.2 MIXED HYPERLIPIDEMIA: ICD-10-CM

## 2022-09-07 DIAGNOSIS — U07.1 COVID-19: ICD-10-CM

## 2022-09-07 DIAGNOSIS — R47.02 DYSPHASIA: ICD-10-CM

## 2022-09-07 DIAGNOSIS — Z86.73 HISTORY OF STROKE: ICD-10-CM

## 2022-09-07 DIAGNOSIS — E11.59 TYPE 2 DIABETES MELLITUS WITH OTHER CIRCULATORY COMPLICATION, WITHOUT LONG-TERM CURRENT USE OF INSULIN: Primary | ICD-10-CM

## 2022-09-07 DIAGNOSIS — I10 HYPERTENSION, ESSENTIAL: ICD-10-CM

## 2022-09-07 PROCEDURE — 99214 OFFICE O/P EST MOD 30 MIN: CPT | Performed by: NURSE PRACTITIONER

## 2022-09-07 NOTE — PROGRESS NOTES
"Chief Complaint  Diabetes and Cough (From covid x 2 weeks ago, off balance, fatigue, feels like someone is pushing him out of bed, elbows are red. Is falling out of bed. Every night)    Subjective        Dereck Ramírez presents to [facility]  Recent covid, now having imbalance, falling out of bed, still with night time cough. Weakness,     Diabetes    Cough        Objective   Vital Signs:  /60 (BP Location: Left arm, Patient Position: Sitting, Cuff Size: Adult)   Pulse 63   Temp 96.6 °F (35.9 °C) (Temporal)   Resp 18   Ht 182.9 cm (72.01\")   Wt 101 kg (223 lb 11.2 oz)   SpO2 99%   BMI 30.33 kg/m²   Estimated body mass index is 30.33 kg/m² as calculated from the following:    Height as of this encounter: 182.9 cm (72.01\").    Weight as of this encounter: 101 kg (223 lb 11.2 oz).          Physical Exam  Constitutional:       Appearance: Normal appearance.   Cardiovascular:      Rate and Rhythm: Normal rate and regular rhythm.      Heart sounds: No murmur heard.    No friction rub. No gallop.   Pulmonary:      Effort: Pulmonary effort is normal. No respiratory distress.      Breath sounds: Normal breath sounds. No wheezing, rhonchi or rales.   Skin:     General: Skin is warm and dry.   Neurological:      Mental Status: He is alert and oriented to person, place, and time.      Gait: Gait normal.        Result Review :                Assessment and Plan   Diagnoses and all orders for this visit:    1. Type 2 diabetes mellitus with other circulatory complication, without long-term current use of insulin (HCC) (Primary)  -     CBC & Differential  -     Comprehensive metabolic panel  -     Hemoglobin A1c    2. COVID-19    3. Hypertension, essential  -     CBC & Differential  -     Comprehensive metabolic panel    4. Mixed hyperlipidemia  -     CBC & Differential  -     Comprehensive metabolic panel    5. History of stroke  -     MRI Brain With & Without Contrast; Future    6. " Dysphasia  -     MRI Brain With & Without Contrast; Future             Follow Up   No follow-ups on file.  Patient was given instructions and counseling regarding his condition or for health maintenance advice. Please see specific information pulled into the AVS if appropriate.     Aphasia, imbalance: previous hx of CVA, last in 2012, recommend MRI of brain, does exhibit left side weakness, minimal, may be secondary to previous stroke  For acute change, proceed to er, recently falling out of bed, stumbling, may be related to deconditioning, but would like further evaluation  HTN: chronic, stable  DM: A1C, ok to obtain with labs from specialist tomorrow  Recent covid, lungs are clear, current mucous and sputum is clear, recommend mucinex to help expectorate, will hold on abx

## 2022-09-09 ENCOUNTER — TELEPHONE (OUTPATIENT)
Dept: FAMILY MEDICINE CLINIC | Facility: CLINIC | Age: 82
End: 2022-09-09

## 2022-09-09 LAB
ALBUMIN SERPL-MCNC: 4.1 G/DL (ref 3.7–4.7)
ALBUMIN/GLOB SERPL: 1.2 {RATIO} (ref 1.2–2.2)
ALP SERPL-CCNC: 251 IU/L (ref 44–121)
ALT SERPL-CCNC: 22 IU/L (ref 0–44)
AST SERPL-CCNC: 21 IU/L (ref 0–40)
BASOPHILS # BLD AUTO: 0.1 X10E3/UL (ref 0–0.2)
BASOPHILS NFR BLD AUTO: 1 %
BILIRUB SERPL-MCNC: 0.3 MG/DL (ref 0–1.2)
BUN SERPL-MCNC: 37 MG/DL (ref 8–27)
BUN/CREAT SERPL: 22 (ref 10–24)
CALCIUM SERPL-MCNC: 8.9 MG/DL (ref 8.6–10.2)
CHLORIDE SERPL-SCNC: 103 MMOL/L (ref 96–106)
CO2 SERPL-SCNC: 21 MMOL/L (ref 20–29)
CREAT SERPL-MCNC: 1.68 MG/DL (ref 0.76–1.27)
EGFRCR-CYS SERPLBLD CKD-EPI 2021: 41 ML/MIN/1.73
EOSINOPHIL # BLD AUTO: 0.3 X10E3/UL (ref 0–0.4)
EOSINOPHIL NFR BLD AUTO: 3 %
ERYTHROCYTE [DISTWIDTH] IN BLOOD BY AUTOMATED COUNT: 13 % (ref 11.6–15.4)
GLOBULIN SER CALC-MCNC: 3.3 G/DL (ref 1.5–4.5)
GLUCOSE SERPL-MCNC: 184 MG/DL (ref 65–99)
HBA1C MFR BLD: 7.6 % (ref 4.8–5.6)
HCT VFR BLD AUTO: 33.3 % (ref 37.5–51)
HGB BLD-MCNC: 10.6 G/DL (ref 13–17.7)
IMM GRANULOCYTES # BLD AUTO: 0 X10E3/UL (ref 0–0.1)
IMM GRANULOCYTES NFR BLD AUTO: 0 %
LYMPHOCYTES # BLD AUTO: 1.4 X10E3/UL (ref 0.7–3.1)
LYMPHOCYTES NFR BLD AUTO: 14 %
MCH RBC QN AUTO: 27.5 PG (ref 26.6–33)
MCHC RBC AUTO-ENTMCNC: 31.8 G/DL (ref 31.5–35.7)
MCV RBC AUTO: 87 FL (ref 79–97)
MONOCYTES # BLD AUTO: 0.8 X10E3/UL (ref 0.1–0.9)
MONOCYTES NFR BLD AUTO: 8 %
NEUTROPHILS # BLD AUTO: 7.2 X10E3/UL (ref 1.4–7)
NEUTROPHILS NFR BLD AUTO: 74 %
PLATELET # BLD AUTO: 274 X10E3/UL (ref 150–450)
POTASSIUM SERPL-SCNC: 4.6 MMOL/L (ref 3.5–5.2)
PROT SERPL-MCNC: 7.4 G/DL (ref 6–8.5)
RBC # BLD AUTO: 3.85 X10E6/UL (ref 4.14–5.8)
SODIUM SERPL-SCNC: 141 MMOL/L (ref 134–144)
WBC # BLD AUTO: 9.7 X10E3/UL (ref 3.4–10.8)

## 2022-09-09 NOTE — TELEPHONE ENCOUNTER
Caller: Maribel Arreguin    Relationship: Emergency Contact    Best call back number: [phone]     Caller requesting test results: YES    What test was performed: LABS    When was the test performed: 09/08/22    Where was the test performed: IN OFFICE

## 2022-09-21 NOTE — TELEPHONE ENCOUNTER
Rx Refill Note  Requested Prescriptions     Pending Prescriptions Disp Refills   • sertraline (ZOLOFT) 50 MG tablet [Pharmacy Med Name: SERTRALINE 50MG TABLETS] 135 tablet 1     Sig: TAKE 1 AND 1/2 TABLETS BY MOUTH DAILY      Last office visit with prescribing clinician: 9/7/2022      Next office visit with prescribing clinician: Visit date not found       {TIP  Please add Last Relevant Lab Date if appropriate: 09/08/22    Fanta Lee MA  09/21/22, 12:13 EDT

## 2022-10-03 NOTE — TELEPHONE ENCOUNTER
Rx Refill Note  Requested Prescriptions     Pending Prescriptions Disp Refills   • tamsulosin (FLOMAX) 0.4 MG capsule 24 hr capsule [Pharmacy Med Name: TAMSULOSIN 0.4MG CAPSULES] 90 capsule 1     Sig: TAKE 1 CAPSULE BY MOUTH DAILY   • ferrous gluconate (FERGON) 324 MG tablet [Pharmacy Med Name: FERROUS GLUC 324MG TABLETS] 90 tablet 1     Sig: TAKE 1 TABLET BY MOUTH DAILY WITH BREAKFAST   • clopidogrel (PLAVIX) 75 MG tablet [Pharmacy Med Name: CLOPIDOGREL 75MG TABLETS] 90 tablet 1     Sig: TAKE 1 TABLET BY MOUTH DAILY   • vitamin B-12 (CYANOCOBALAMIN) 1000 MCG tablet [Pharmacy Med Name: VITAMIN B-12 1000MCG TABLETS] 90 tablet 1     Sig: TAKE 1 TABLET BY MOUTH DAILY      Last office visit with prescribing clinician: 9/7/2022      Next office visit with prescribing clinician: Visit date not found            Ewa Flower LPN  10/03/22, 08:35 EDT

## 2022-10-04 RX ORDER — TAMSULOSIN HYDROCHLORIDE 0.4 MG/1
1 CAPSULE ORAL DAILY
Qty: 90 CAPSULE | Refills: 0 | Status: SHIPPED | OUTPATIENT
Start: 2022-10-04 | End: 2023-03-14

## 2022-10-04 RX ORDER — DOXYCYCLINE HYCLATE 50 MG/1
1 CAPSULE, GELATIN COATED ORAL
Qty: 90 TABLET | Refills: 0 | Status: SHIPPED | OUTPATIENT
Start: 2022-10-04 | End: 2023-01-20

## 2022-10-04 RX ORDER — CLOPIDOGREL BISULFATE 75 MG/1
75 TABLET ORAL DAILY
Qty: 90 TABLET | Refills: 0 | Status: SHIPPED | OUTPATIENT
Start: 2022-10-04 | End: 2023-03-29 | Stop reason: SDUPTHER

## 2022-10-04 RX ORDER — LANOLIN ALCOHOL/MO/W.PET/CERES
1000 CREAM (GRAM) TOPICAL DAILY
Qty: 90 TABLET | Refills: 0 | Status: SHIPPED | OUTPATIENT
Start: 2022-10-04 | End: 2023-01-25

## 2022-10-12 ENCOUNTER — APPOINTMENT (OUTPATIENT)
Dept: MRI IMAGING | Facility: HOSPITAL | Age: 82
End: 2022-10-12

## 2022-10-19 RX ORDER — BUSPIRONE HYDROCHLORIDE 5 MG/1
5 TABLET ORAL 3 TIMES DAILY PRN
Qty: 270 TABLET | Refills: 1 | Status: SHIPPED | OUTPATIENT
Start: 2022-10-19

## 2022-10-19 RX ORDER — DOXYCYCLINE HYCLATE 50 MG/1
1 CAPSULE, GELATIN COATED ORAL
Qty: 90 TABLET | Refills: 0 | OUTPATIENT
Start: 2022-10-19

## 2022-10-19 NOTE — TELEPHONE ENCOUNTER
Rx Refill Note  Requested Prescriptions     Pending Prescriptions Disp Refills   • busPIRone (BUSPAR) 5 MG tablet 270 tablet 1     Sig: Take 1 tablet by mouth 3 (Three) Times a Day As Needed (anxiety).      Last office visit with prescribing clinician: 9/7/2022      Next office visit with prescribing clinician: Visit date not found       {TIP  Please add Last Relevant Lab Date if appropriate: 09/08/22    Fanta Lee MA  10/19/22, 11:24 EDT

## 2022-10-20 NOTE — TELEPHONE ENCOUNTER
Needs a follow up visit scheduled for march 2023 for his DM, may be able to schedule as physical. Thanks.

## 2022-11-01 RX ORDER — CARVEDILOL 25 MG/1
TABLET ORAL
Qty: 180 TABLET | Refills: 0 | Status: SHIPPED | OUTPATIENT
Start: 2022-11-01 | End: 2023-02-03

## 2022-11-02 ENCOUNTER — CLINICAL SUPPORT (OUTPATIENT)
Dept: FAMILY MEDICINE CLINIC | Facility: CLINIC | Age: 82
End: 2022-11-02

## 2022-11-02 DIAGNOSIS — Z23 NEED FOR VACCINATION: Primary | ICD-10-CM

## 2022-11-02 PROCEDURE — G0008 ADMIN INFLUENZA VIRUS VAC: HCPCS | Performed by: NURSE PRACTITIONER

## 2022-11-02 PROCEDURE — 90662 IIV NO PRSV INCREASED AG IM: CPT | Performed by: NURSE PRACTITIONER

## 2022-11-15 RX ORDER — FUROSEMIDE 40 MG/1
TABLET ORAL
Qty: 90 TABLET | Refills: 3 | Status: SHIPPED | OUTPATIENT
Start: 2022-11-15

## 2022-11-28 ENCOUNTER — TELEPHONE (OUTPATIENT)
Dept: FAMILY MEDICINE CLINIC | Facility: CLINIC | Age: 82
End: 2022-11-28

## 2022-11-28 NOTE — TELEPHONE ENCOUNTER
Caller: Maribel Arreguin    Relationship: Emergency Contact    Best call back number: [phone]    What is the best time to reach you: ANYTIME  Who are you requesting to speak with (clinical staff, provider,  specific staff member): CLINICAL STAFF  Do you know the name of the person who called: DAUGHTER    What was the call regarding: PATIENT DAUGHTER CALLED AND STATED PATIENT IS HAVING A BAD COUGH AND CONGESTION. PATIENT WOULD LIKE FOR YOU TO CALL HIM IN SOME MEDICINE AT [facility]. PLEASE CALL    Do you require a callback: YES

## 2022-11-29 RX ORDER — BENZONATATE 100 MG/1
200 CAPSULE ORAL 3 TIMES DAILY PRN
Qty: 60 CAPSULE | Refills: 0 | Status: SHIPPED | OUTPATIENT
Start: 2022-11-29 | End: 2022-12-05

## 2022-11-29 NOTE — TELEPHONE ENCOUNTER
Sent tessalon perles, one every 8 hours as needed for cough  If concern for flu or covid, recommend follow up in urgent care

## 2022-12-05 ENCOUNTER — OFFICE VISIT (OUTPATIENT)
Dept: FAMILY MEDICINE CLINIC | Facility: CLINIC | Age: 82
End: 2022-12-05

## 2022-12-05 VITALS
SYSTOLIC BLOOD PRESSURE: 119 MMHG | HEIGHT: 72 IN | OXYGEN SATURATION: 96 % | RESPIRATION RATE: 14 BRPM | BODY MASS INDEX: 39.39 KG/M2 | DIASTOLIC BLOOD PRESSURE: 56 MMHG | HEART RATE: 62 BPM | WEIGHT: 290.8 LBS | TEMPERATURE: 97.6 F

## 2022-12-05 DIAGNOSIS — J20.9 ACUTE BRONCHITIS, UNSPECIFIED ORGANISM: ICD-10-CM

## 2022-12-05 DIAGNOSIS — M25.512 ACUTE PAIN OF BOTH SHOULDERS: Primary | ICD-10-CM

## 2022-12-05 DIAGNOSIS — M25.511 ACUTE PAIN OF BOTH SHOULDERS: Primary | ICD-10-CM

## 2022-12-05 DIAGNOSIS — M19.011 OSTEOARTHRITIS OF AC (ACROMIOCLAVICULAR) JOINTS, BILATERAL: ICD-10-CM

## 2022-12-05 DIAGNOSIS — M19.012 OSTEOARTHRITIS OF AC (ACROMIOCLAVICULAR) JOINTS, BILATERAL: ICD-10-CM

## 2022-12-05 PROCEDURE — 99214 OFFICE O/P EST MOD 30 MIN: CPT | Performed by: NURSE PRACTITIONER

## 2022-12-05 RX ORDER — METHYLPREDNISOLONE 4 MG/1
TABLET ORAL
Qty: 21 TABLET | Refills: 0 | Status: SHIPPED | OUTPATIENT
Start: 2022-12-05 | End: 2023-01-30 | Stop reason: ALTCHOICE

## 2022-12-05 RX ORDER — IPRATROPIUM BROMIDE 42 UG/1
2 SPRAY, METERED NASAL 3 TIMES DAILY
Qty: 15 ML | Refills: 12 | Status: SHIPPED | OUTPATIENT
Start: 2022-12-05 | End: 2023-01-30 | Stop reason: ALTCHOICE

## 2022-12-05 RX ORDER — AZITHROMYCIN 250 MG/1
TABLET, FILM COATED ORAL
Qty: 6 TABLET | Refills: 0 | Status: SHIPPED | OUTPATIENT
Start: 2022-12-05 | End: 2023-01-30 | Stop reason: ALTCHOICE

## 2022-12-05 RX ORDER — BENZONATATE 200 MG/1
200 CAPSULE ORAL 3 TIMES DAILY PRN
Qty: 20 CAPSULE | Refills: 1 | Status: SHIPPED | OUTPATIENT
Start: 2022-12-05 | End: 2023-01-30 | Stop reason: ALTCHOICE

## 2022-12-05 NOTE — PROGRESS NOTES
"Chief Complaint  Cough (Going on for 3 weeks. Pt states is not getting any sleep due to coughing. ), URI (Going on for 3 weeks), and Shoulder Pain (Pt states both shoulders hurt like he has done continuous activities)    Subjective        Dereck Ramírez presents to [facility] for evaluation of respiratory symptoms. He is accompanied by his daughter during this visit.     History of Present Illness    The patient has consented to the use of ASHLYN.    The patient complains of productive cough with yellow colored sputum, which started 3 weeks ago. It is associated with congestion. Denies any shortness of breath or difficulty breathing. His daughter states that he takes Tessalon pearls 2 capsules, 3 times as needed for the cough.     He also has bilateral shoulder pain which he localized near his collar bones.     His wife also states that the patient has been rolling and falling out of his bed at night.     The patient received his influenza vaccine, COVID-19 vaccines and booster. He is due for the most recent COVID-19 bivalent vaccine. Patient had COVID-19 infection in 09/2022.     Objective   Vital Signs:  /56   Pulse 62   Temp 97.6 °F (36.4 °C) (Infrared)   Resp 14   Ht 182.9 cm (72.01\")   Wt 132 kg (290 lb 12.8 oz)   SpO2 96%   BMI 39.43 kg/m²   Estimated body mass index is 39.43 kg/m² as calculated from the following:    Height as of this encounter: 182.9 cm (72.01\").    Weight as of this encounter: 132 kg (290 lb 12.8 oz).          Physical Exam  Vitals reviewed.   Constitutional:       Appearance: He is well-developed.   HENT:      Head: Normocephalic.      Nose: Nose normal.   Eyes:      General: No scleral icterus.     Conjunctiva/sclera: Conjunctivae normal.      Pupils: Pupils are equal, round, and reactive to light.   Neck:      Thyroid: No thyromegaly.      Vascular: No JVD.   Cardiovascular:      Rate and Rhythm: Normal rate and regular rhythm.      Heart sounds: " Normal heart sounds. No murmur heard.    No friction rub. No gallop.   Pulmonary:      Effort: Pulmonary effort is normal. No respiratory distress.      Breath sounds: Normal breath sounds. No stridor. No wheezing or rales.   Abdominal:      General: Bowel sounds are normal. There is no distension.      Palpations: Abdomen is soft.      Tenderness: There is no abdominal tenderness.      Comments: No hepatosplenomegaly, no ascites,   Musculoskeletal:         General: No tenderness.      Cervical back: Neck supple.   Lymphadenopathy:      Cervical: No cervical adenopathy.   Skin:     General: Skin is warm and dry.      Findings: No erythema or rash.   Neurological:      Mental Status: He is alert and oriented to person, place, and time.      Deep Tendon Reflexes: Reflexes are normal and symmetric.   Psychiatric:         Behavior: Behavior normal.         Thought Content: Thought content normal.         Judgment: Judgment normal.        Result Review :                Assessment and Plan   Diagnoses and all orders for this visit:    1. Acute pain of both shoulders (Primary)    2. Osteoarthritis of AC (acromioclavicular) joints, bilateral    3. Acute bronchitis, unspecified organism    Other orders  -     methylPREDNISolone (MEDROL) 4 MG dose pack; Take as directed on package instructions.  Dispense: 21 tablet; Refill: 0  -     azithromycin (Zithromax Z-Octavio) 250 MG tablet; Take 2 tablets the first day, then 1 tablet daily for 4 days.  Dispense: 6 tablet; Refill: 0  -     ipratropium (ATROVENT) 0.06 % nasal spray; 2 sprays into the nostril(s) as directed by provider 3 (Three) Times a Day. As needed PND and cough  Dispense: 15 mL; Refill: 12  -     benzonatate (TESSALON) 200 MG capsule; Take 1 capsule by mouth 3 (Three) Times a Day As Needed for Cough.  Dispense: 20 capsule; Refill: 1      1. Acute pain both shoulders.  -Will start patient on Medrol dose pack.     2. Osteoarthritis of AC joints, bilateral.  -Will start the  patient on Medrol dose pack for arthritic pains of his shoulders. If pain is not improving in a couple weeks will be referred to orthopedic surgeon Dr. Humphrey.    3. Acute bronchitis.  -The patient will be prescribed with Zithromax. Advised to use Atrovent nasal spray 3 times a day for the next couple weeks to stop post-nasal drip along with benzonatate 200mg 1 capsule 3 times a day for cough as needed. Discontinue the nasal spray to avoid chronic dryness of the sinuses. Advised patient to inform the office regarding his condition after 1 week. Advised to go to the emergency room if he experiences any increased shortness of breath, high grade fever or chest pain.     Follow-up with Amber JACOBSON for primary care needs.           Follow Up   No follow-ups on file.  Patient was given instructions and counseling regarding his condition or for health maintenance advice. Please see specific information pulled into the AVS if appropriate.     Patient Instructions   Discharge instructions    Medrol Dosepak steroid for arthritis of your shoulders    In the future or if not improving in a couple weeks see Dr. Humphrey orthopedic surgeon    Zithromax take now    Atrovent nasal this will help stop your postnasal drip in the back your throat that frequently aggravates the cough after having an infection  Use this 3 times a day for the next couple weeks anterior cough gets better along with the Tessalon Perle or benzoate    Then discontinue the nasal spray to avoid chronic dryness of the sinuses etc.    Follow-up Amber for primary care needs.    Send me a message in 1 week update your status if not improving soon we will get a chest x-ray high fever chest pain shortness of breath immediately to the emergency room    Extra falls precaution    If acute flu  Tamiflu  If COVID-positive paxlovid ASAP antiviral okay discharge you okay okay and then plan written out for you remember if you forget I will follow you make sure you  get better and worse you last time but daily okay on all yeah great will do is we need to liberalize       Transcribed from ambient dictation for James Epley, APRN by Wili Jean.  12/05/22   12:23 EST    Patient or patient representative verbalized consent to the visit recording.  I have personally performed the services described in this document as transcribed by the above individual, and it is both accurate and complete.

## 2022-12-05 NOTE — PROGRESS NOTES
"Chief Complaint  Cough (Going on for 3 weeks. Pt states is not getting any sleep due to coughing. ), URI (Going on for 3 weeks), and Shoulder Pain (Pt states both shoulders hurt like he has done continuous activities)    Subjective    {Problem List  Visit Diagnosis   Encounters  Notes  Medications  Labs  Result Review Imaging  Media :23}    Dereck Ramírez presents to [facility]  History of Present Illness    Objective   Vital Signs:  /56   Pulse 62   Temp 97.6 °F (36.4 °C) (Infrared)   Resp 14   Ht 182.9 cm (72.01\")   Wt 132 kg (290 lb 12.8 oz)   SpO2 96%   BMI 39.43 kg/m²   Estimated body mass index is 39.43 kg/m² as calculated from the following:    Height as of this encounter: 182.9 cm (72.01\").    Weight as of this encounter: 132 kg (290 lb 12.8 oz).    {Class 2 Severe Obesity (BMI >=35 and <=39.9. (Optional):42761}      Physical Exam  Vitals reviewed.   Constitutional:       Appearance: He is well-developed.   HENT:      Head: Normocephalic.      Nose: Nose normal.   Eyes:      General: No scleral icterus.     Conjunctiva/sclera: Conjunctivae normal.      Pupils: Pupils are equal, round, and reactive to light.   Neck:      Thyroid: No thyromegaly.      Vascular: No JVD.   Cardiovascular:      Rate and Rhythm: Normal rate and regular rhythm.      Heart sounds: Normal heart sounds. No murmur heard.    No friction rub. No gallop.   Pulmonary:      Effort: Pulmonary effort is normal. No respiratory distress.      Breath sounds: Normal breath sounds. No stridor. No wheezing or rales.   Abdominal:      General: There is no distension.      Palpations: Abdomen is soft.      Comments: No hepatosplenomegaly, no ascites,   Musculoskeletal:         General: No tenderness. Normal range of motion.      Cervical back: Neck supple.   Lymphadenopathy:      Cervical: No cervical adenopathy.   Skin:     General: Skin is warm and dry.      Findings: No erythema or rash. "   Neurological:      General: No focal deficit present.      Mental Status: He is alert and oriented to person, place, and time.      Deep Tendon Reflexes: Reflexes are normal and symmetric.   Psychiatric:         Mood and Affect: Mood normal.         Behavior: Behavior normal.         Thought Content: Thought content normal.         Judgment: Judgment normal.        Result Review :{Labs  Result Review  Imaging  Med Tab  Media  Procedures  :23}  {The following data was reviewed by (Optional):97039}  {Ambulatory Labs (Optional):03682}  {Data reviewed (Optional):98097:::1}          Assessment and Plan {CC Problem List  Visit Diagnosis   ROS  Review (Popup)  Health Maintenance  Quality  BestPractice  Medications  SmartSets  SnapShot Encounters  Media :23}  There are no diagnoses linked to this encounter.       {Time Spent (Optional):70268}  Follow Up {Instructions Charge Capture  Follow-up Communications :23}  No follow-ups on file.  Patient was given instructions and counseling regarding his condition or for health maintenance advice. Please see specific information pulled into the AVS if appropriate.     There are no Patient Instructions on file for this visit.

## 2022-12-05 NOTE — PATIENT INSTRUCTIONS
Discharge instructions    Medrol Dosepak steroid for arthritis of your shoulders    In the future or if not improving in a couple weeks see Dr. Humphrey orthopedic surgeon    Zithromax take now    Atrovent nasal this will help stop your postnasal drip in the back your throat that frequently aggravates the cough after having an infection  Use this 3 times a day for the next couple weeks anterior cough gets better along with the Tessalon Perle or benzoate    Then discontinue the nasal spray to avoid chronic dryness of the sinuses etc.    Follow-up Amber for primary care needs.    Send me a message in 1 week update your status if not improving soon we will get a chest x-ray high fever chest pain shortness of breath immediately to the emergency room    Extra falls precaution    If acute flu  Tamiflu  If COVID-positive paxlovid ASAP antiviral okay discharge you okay okay and then plan written out for you remember if you forget I will follow you make sure you get better and worse you last time but daily okay on all yeah great will do is we need to liberalize

## 2022-12-27 RX ORDER — ATORVASTATIN CALCIUM 40 MG/1
40 TABLET, FILM COATED ORAL DAILY
Qty: 90 TABLET | Refills: 1 | Status: SHIPPED | OUTPATIENT
Start: 2022-12-27

## 2023-01-19 DIAGNOSIS — E11.59 TYPE 2 DIABETES MELLITUS WITH OTHER CIRCULATORY COMPLICATION, WITHOUT LONG-TERM CURRENT USE OF INSULIN: ICD-10-CM

## 2023-01-19 RX ORDER — GABAPENTIN 100 MG/1
CAPSULE ORAL
Qty: 540 CAPSULE | Refills: 0 | Status: SHIPPED | OUTPATIENT
Start: 2023-01-19 | End: 2023-03-29 | Stop reason: SDUPTHER

## 2023-01-19 NOTE — TELEPHONE ENCOUNTER
Rx Refill Note  Requested Prescriptions     Pending Prescriptions Disp Refills   • gabapentin (NEURONTIN) 100 MG capsule [Pharmacy Med Name: GABAPENTIN 100MG CAPSULES] 540 capsule      Sig: TAKE 2 CAPSULES BY MOUTH THREE TIMES DAILY      Last office visit with prescribing clinician: 9/7/2022   Last telemedicine visit with prescribing clinician: 3/3/2023   Next office visit with prescribing clinician: 3/3/2023       {TIP  Please add Last Relevant Lab Date if appropriate: 09/08/22                 Would you like a call back once the refill request has been completed: [] Yes [] No    If the office needs to give you a call back, can they leave a voicemail: [] Yes [] No    Fanta Lee MA  01/19/23, 15:24 EST

## 2023-01-20 RX ORDER — DOXYCYCLINE HYCLATE 50 MG/1
1 CAPSULE, GELATIN COATED ORAL
Qty: 90 TABLET | Refills: 1 | Status: SHIPPED | OUTPATIENT
Start: 2023-01-20

## 2023-01-20 NOTE — TELEPHONE ENCOUNTER
Rx Refill Note  Requested Prescriptions     Pending Prescriptions Disp Refills   • ferrous gluconate (FERGON) 324 MG tablet [Pharmacy Med Name: FERROUS GLUC 324MG TABLETS] 90 tablet 0     Sig: TAKE 1 TABLET BY MOUTH DAILY WITH BREAKFAST      Last office visit with prescribing clinician: Visit date not found   Last telemedicine visit with prescribing clinician: 1/19/2023   Next office visit with prescribing clinician: Visit date not found       {TIP  Please add Last Relevant Lab Date if appropriate:                  Would you like a call back once the refill request has been completed: [] Yes [] No    If the office needs to give you a call back, can they leave a voicemail: [] Yes [] No    Nevaeh Ortiz MA  01/20/23, 16:59 EST

## 2023-01-25 RX ORDER — LANOLIN ALCOHOL/MO/W.PET/CERES
1000 CREAM (GRAM) TOPICAL DAILY
Qty: 90 TABLET | Refills: 0 | Status: SHIPPED | OUTPATIENT
Start: 2023-01-25 | End: 2023-03-30

## 2023-01-25 NOTE — TELEPHONE ENCOUNTER
Rx Refill Note  Requested Prescriptions     Pending Prescriptions Disp Refills   • vitamin B-12 (CYANOCOBALAMIN) 1000 MCG tablet [Pharmacy Med Name: VITAMIN B-12 1000MCG TABLETS] 90 tablet 0     Sig: TAKE 1 TABLET BY MOUTH DAILY      Last office visit with prescribing clinician: Visit date not found   Last telemedicine visit with prescribing clinician: 3/3/2023   Next office visit with prescribing clinician: Visit date not found                         Would you like a call back once the refill request has been completed: [] Yes [] No    If the office needs to give you a call back, can they leave a voicemail: [] Yes [] No    Ewa Flower LPN  01/25/23, 08:55 EST

## 2023-01-30 ENCOUNTER — OFFICE VISIT (OUTPATIENT)
Dept: CARDIOLOGY | Facility: CLINIC | Age: 83
End: 2023-01-30
Payer: MEDICARE

## 2023-01-30 VITALS
BODY MASS INDEX: 30.77 KG/M2 | DIASTOLIC BLOOD PRESSURE: 64 MMHG | WEIGHT: 227.2 LBS | SYSTOLIC BLOOD PRESSURE: 120 MMHG | HEART RATE: 58 BPM | HEIGHT: 72 IN

## 2023-01-30 DIAGNOSIS — I50.32 CHRONIC DIASTOLIC HEART FAILURE: ICD-10-CM

## 2023-01-30 DIAGNOSIS — E78.2 MIXED HYPERLIPIDEMIA: ICD-10-CM

## 2023-01-30 DIAGNOSIS — I10 HYPERTENSION, ESSENTIAL: Chronic | ICD-10-CM

## 2023-01-30 DIAGNOSIS — I25.10 CHRONIC CORONARY ARTERY DISEASE: ICD-10-CM

## 2023-01-30 DIAGNOSIS — I65.23 BILATERAL CAROTID ARTERY STENOSIS: Primary | ICD-10-CM

## 2023-01-30 PROCEDURE — 99214 OFFICE O/P EST MOD 30 MIN: CPT | Performed by: INTERNAL MEDICINE

## 2023-01-30 PROCEDURE — 93000 ELECTROCARDIOGRAM COMPLETE: CPT | Performed by: INTERNAL MEDICINE

## 2023-01-30 NOTE — PROGRESS NOTES
[facility] Follow Up Office Note     Encounter Date:23  Patient:Dereck Ramírez  :1940  MRN:[id number]      Chief Complaint:   Chief Complaint   Patient presents with   • Congestive Heart Failure     History of Presenting Illness:     Mr. Ramírez is a 82 y.o. gentleman with past medical history notable for coronary artery disease which is been medically managed, hypertension, mixed hyperlipidemia, chronic kidney disease, chronic anemia, history of CVA, and diabetes with neuropathy who presents for follow up visit.  Patient was last seen approximately 6 months ago at that time he was having a little bit more leg swelling than usual which we upped his Lasix for about 3 days which helped.  Since then he is doing about the same still has some mild leg swelling which comes and goes but in general doing about the same.  He has had a few falls these have all occurred in bed.  He does not have any falls during the day or any other time.    Review of Systems:  Review of Systems   Constitutional: Negative.   HENT: Negative.    Eyes: Negative.    Cardiovascular: Positive for leg swelling.   Respiratory: Negative.    Endocrine: Negative.    Hematologic/Lymphatic: Negative.    Skin: Negative.    Musculoskeletal: Negative.    Gastrointestinal: Negative.    Genitourinary: Negative.    Neurological: Positive for loss of balance.   Psychiatric/Behavioral: Negative.    Allergic/Immunologic: Negative.        Current Outpatient Medications on File Prior to Visit   Medication Sig Dispense Refill   • atorvastatin (LIPITOR) 40 MG tablet TAKE 1 TABLET BY MOUTH DAILY 90 tablet 1   • busPIRone (BUSPAR) 5 MG tablet Take 1 tablet by mouth 3 (Three) Times a Day As Needed (anxiety). 270 tablet 1   • carvedilol (COREG) 25 MG tablet TAKE 1 TABLET BY MOUTH TWICE DAILY WITH MEALS 180 tablet 0   • clopidogrel (PLAVIX) 75 MG tablet TAKE 1 TABLET BY MOUTH DAILY 90 tablet 0   • ferrous gluconate (FERGON) 324 MG tablet TAKE 1  TABLET BY MOUTH DAILY WITH BREAKFAST 90 tablet 1   • furosemide (LASIX) 40 MG tablet TAKE 1 TABLET BY MOUTH EVERY MORNING AND 1/2 EVERY EVENING 90 tablet 3   • gabapentin (NEURONTIN) 100 MG capsule TAKE 2 CAPSULES BY MOUTH THREE TIMES DAILY 540 capsule 0   • glucose blood test strip Check blood sugar twice daily and as needed 100 each 12   • Jardiance 10 MG tablet tablet TAKE 1 TABLET BY MOUTH DAILY 90 tablet 1   • nateglinide (STARLIX) 60 MG tablet Take 1 tablet by mouth 2 (Two) Times a Day With Meals. 180 tablet 3   • sertraline (ZOLOFT) 50 MG tablet TAKE 1 AND 1/2 TABLETS BY MOUTH DAILY 135 tablet 1   • tamsulosin (FLOMAX) 0.4 MG capsule 24 hr capsule TAKE 1 CAPSULE BY MOUTH DAILY 90 capsule 0   • vitamin B-12 (CYANOCOBALAMIN) 1000 MCG tablet TAKE 1 TABLET BY MOUTH DAILY 90 tablet 0   • [DISCONTINUED] azithromycin (Zithromax Z-Octavio) 250 MG tablet Take 2 tablets the first day, then 1 tablet daily for 4 days. 6 tablet 0   • [DISCONTINUED] benzonatate (TESSALON) 200 MG capsule Take 1 capsule by mouth 3 (Three) Times a Day As Needed for Cough. 20 capsule 1   • [DISCONTINUED] ipratropium (ATROVENT) 0.06 % nasal spray 2 sprays into the nostril(s) as directed by provider 3 (Three) Times a Day. As needed PND and cough 15 mL 12   • [DISCONTINUED] methylPREDNISolone (MEDROL) 4 MG dose pack Take as directed on package instructions. 21 tablet 0     No current facility-administered medications on file prior to visit.       No Known Allergies    Past Medical History:   Diagnosis Date   • Arthritis    • Cerebellar artery occlusion    • Diabetes mellitus (HCC)    • Enlarged prostate    • Hyperlipidemia    • Hypertension    • Stroke (HCC) 2011, 2012       Past Surgical History:   Procedure Laterality Date   • CARDIAC CATHETERIZATION      lesion 1: intervention outcome   • HERNIA REPAIR         Social History     Socioeconomic History   • Marital status:    Tobacco Use   • Smoking status: Never   • Smokeless tobacco: Never  "  Substance and Sexual Activity   • Alcohol use: No   • Drug use: No   • Sexual activity: Defer       Family History   Problem Relation Age of Onset   • Heart disease Mother    • Stroke Mother    • Crohn's disease Father    • Stroke Father    • Cancer Other    • Stroke Other    • Diabetes Other    • Heart defect Other    • Hypertension Other    • Thyroid disease Other    • Cancer Maternal Uncle         bone   • Dementia Maternal Grandmother        The following portions of the patient's history were reviewed and updated as appropriate: allergies, current medications, past family history, past medical history, past social history, past surgical history and problem list.       Objective:       Vitals:    01/30/23 1205   BP: 120/64   Pulse: 58   Weight: 103 kg (227 lb 3.2 oz)   Height: 182.9 cm (72\")     Body mass index is 30.81 kg/m².     Physical Exam:  Constitutional:  Somewhat ill, frail, no acute distress   HENT: Oropharynx clear and membrane moist  Eyes: Normal conjunctiva, no sclera icterus.  Neck: Supple, no carotid bruit bilaterally.  Cardiovascular: Regular rate and rhythm, No Murmur, 1+ bilateral lower extremity edema.  Pulmonary: Normal respiratory effort, normal lung sounds, mild wheezing.  Abdominal: Soft, nontender, no hepatosplenomegaly, liver is non-pulsatile.  Neurological: Alert and orient x 3.   Poor memory  Skin: Warm, dry, no ecchymosis, no rash.  Psych: Appropriate mood and affect. Normal judgment and insight.         Lab Results   Component Value Date    GLUCOSE 184 (H) 09/08/2022    BUN 37 (H) 09/08/2022    CREATININE 1.68 (H) 09/08/2022    EGFRIFNONA 40 (L) 08/27/2021    EGFRIFAFRI 48 (L) 08/27/2021    BCR 22 09/08/2022    K 4.6 09/08/2022    CO2 21 09/08/2022    CALCIUM 8.9 09/08/2022    PROTENTOTREF 7.4 09/08/2022    ALBUMIN 4.1 09/08/2022    LABIL2 1.2 09/08/2022    AST 21 09/08/2022    ALT 22 09/08/2022       Lab Results   Component Value Date    WBC 9.7 09/08/2022    HGB 10.6 (L) " 09/08/2022    HCT 33.3 (L) 09/08/2022    MCV 87 09/08/2022     09/08/2022       Lab Results   Component Value Date    CKTOTAL 84 07/18/2014    CKMB 3.5 07/18/2014    TROPONINT <0.010 08/22/2019       Lab Results   Component Value Date    CHLPL 172 03/02/2022    CHLPL 167 08/27/2021    CHLPL 158 02/25/2021     Lab Results   Component Value Date    TRIG 302 (H) 03/02/2022    TRIG 306 (H) 08/27/2021    TRIG 385 (H) 02/25/2021     Lab Results   Component Value Date    HDL 33 (L) 03/02/2022    HDL 31 (L) 08/27/2021    HDL 28 (L) 02/25/2021     Lab Results   Component Value Date    LDL 89 03/02/2022    LDL 85 08/27/2021    LDL 69 02/25/2021       Lab Results   Component Value Date    TSH 4.530 (H) 03/02/2022       ECG 12 Lead    Date/Time: 1/30/2023 12:29 PM  Performed by: Josafat Mendez MD  Authorized by: Josafat Mendez MD   Comparison: compared with previous ECG from 7/28/2022  Similar to previous ECG  Rhythm: sinus rhythm  Conduction: non-specific intraventricular conduction delay        Echocardiogram 1/28/2021:  · Estimated left ventricular EF = 65% Left ventricular systolic function is normal.  · Left ventricular diastolic function was normal.  · Left ventricular wall thickness is consistent with concentric hypertrophy.  · Left atrial volume is mildly increased.    Mobile Telemetry Monitor 2 week 10/14/2019:  · Overall relatively normal 2-week mobile telemetry monitor.   · Normal amount of ectopy with short self-limited runs of SVT and VT.   · No etiology for syncope noted on the study.    Echocardiogram report 9/13/2019:  · Left ventricular systolic function is normal. Calculated EF = 61.0%. Normal left ventricular cavity size, wall thickness, mass and mass index noted. All left ventricular wall segments contract normally. Septal wall motion is normal. Left ventricular diastolic function is normal. Normal left atrial pressure.  · Right ventricular cavity is mild-to-moderately dilated  · Right atrial  cavity size is mildly dilated.  · Left atrial cavity size is mild-to-moderately dilated  · Mild mitral valve regurgitation is present  · No prior study available for comparison.     Carotid duplex 9/13/2019:  · Right ICA Prox: Imaging indicates 16%-49% stenosis.   · Left ICA Prox: Imaging indicates 16-49% stenosis.     Stress Lexiscan nuclear 9/5/2015:  · Normal Lexiscan Cardiolite stress test.   · No evidence of ischemia or infarction.   · Normal wall motion.   · Normal ejection fraction.   · Normal thickening.      Cardiac catheterization report 5/24/2013:  · Moderate to severe disease of the ramus off the first diagonal and ramus off the first obtuse marginal.   · Minimal segmental disease of the right coronary artery. There is 100% occlusion of the posterior descending artery with distal filling by the left coronary artery.   · Normal left ventricular systolic function.       Assessment:         No diagnosis found.       Plan:       Mr. Ramírez is a 82 y.o. gentleman with past medical history notable for coronary artery disease which is been medically managed, hypertension, mixed hyperlipidemia, chronic kidney disease, chronic anemia, history of CVA, and diabetes with neuropathy who presents for follow-up.  Overall patient seems to be doing reasonably well.  The only other thing to follow-up on his chronic carotid disease is been a number of years we will repeat carotid duplex.       Chronic diastolic congestive heart failure:  · Compression socks for dependent edema to avoid episodic orthostasis  · Echocardiogram 1/2021 demonstrates normal LV function with grade 1 diastolic dysfunction  · Continue Lasix 40 mg daily       Coronary artery disease without angina:  · Stress testing in 2015 demonstrating no ischemia.  Patient with known coronary artery disease which is been medically managed.  · Continue clopidogrel, labetalol, and atorvastatin     Hypertension:  · Better control today and will continue current  medical therapy.  · BMP 9/2022 demonstrates elevated creatinine but stable with normal sodium and potassium.     Mixed hyperlipidemia:  · Continue high potency statin as   · Lipid panel 3/2022 demonstrates good control total cholesterol and LDL   · CMP 9/2022 demonstrates normal ALT and AST    Chronic kidney disease:  · We will closely monitor kidney function and limit excessive titration of diuretics      Follow-up:  6 month      Josafat Mendez MD  [facility]  01/30/23  12:17 EST

## 2023-02-03 RX ORDER — CARVEDILOL 25 MG/1
TABLET ORAL
Qty: 180 TABLET | Refills: 3 | Status: SHIPPED | OUTPATIENT
Start: 2023-02-03

## 2023-03-03 ENCOUNTER — TELEMEDICINE (OUTPATIENT)
Dept: FAMILY MEDICINE CLINIC | Facility: CLINIC | Age: 83
End: 2023-03-03
Payer: MEDICARE

## 2023-03-03 DIAGNOSIS — J06.9 ACUTE URI: Primary | ICD-10-CM

## 2023-03-03 PROCEDURE — 99213 OFFICE O/P EST LOW 20 MIN: CPT | Performed by: NURSE PRACTITIONER

## 2023-03-03 RX ORDER — BENZONATATE 100 MG/1
200 CAPSULE ORAL 3 TIMES DAILY PRN
Qty: 60 CAPSULE | Refills: 0 | Status: SHIPPED | OUTPATIENT
Start: 2023-03-03 | End: 2023-03-29

## 2023-03-03 RX ORDER — AZITHROMYCIN 250 MG/1
TABLET, FILM COATED ORAL
Qty: 6 TABLET | Refills: 0 | Status: SHIPPED | OUTPATIENT
Start: 2023-03-03 | End: 2023-03-29

## 2023-03-03 NOTE — PROGRESS NOTES
"Chief Complaint  Cough    Subjective        Dereck Ramírez presents to [facility]  History of Present Illness  Dereck presents with cough and congestion. His symptoms began  Episodes of hot and cold. Cough and nasal drainage, sore throat, denies shortness of breath or wheezing, states feels sore in arms  Ill contacts at home, daughter and son in law, no covid contacts.   Cough  This is a new problem. The current episode started yesterday. The problem has been unchanged. The cough is non-productive. Associated symptoms include chills, nasal congestion, postnasal drip, rhinorrhea, a sore throat and sweats. Pertinent negatives include no chest pain, ear congestion, ear pain, fever, headaches, heartburn, hemoptysis, myalgias, rash, shortness of breath, weight loss or wheezing. Nothing aggravates the symptoms.       Objective   Vital Signs:  There were no vitals taken for this visit.  Estimated body mass index is 30.81 kg/m² as calculated from the following:    Height as of 1/30/23: 182.9 cm (72\").    Weight as of 1/30/23: 103 kg (227 lb 3.2 oz).             Physical Exam  Neurological:      Mental Status: He is alert and oriented to person, place, and time.   Psychiatric:         Mood and Affect: Mood normal.        Result Review :                   Assessment and Plan   Diagnoses and all orders for this visit:    1. Acute URI (Primary)    Other orders  -     benzonatate (Tessalon Perles) 100 MG capsule; Take 2 capsules by mouth 3 (Three) Times a Day As Needed for Cough.  Dispense: 60 capsule; Refill: 0  -     azithromycin (Zithromax Z-Octavio) 250 MG tablet; Take 2 tablets the first day, then 1 tablet daily for 4 days.  Dispense: 6 tablet; Refill: 0    start tessalon perles as directed  Discussed this may be a viral infection however given age recommend abx, in agreement with recommendartion  He is scheduled for a follow up next week for AWV, will reevaluate patient at that time.     "     Follow Up   No follow-ups on file.  Patient was given instructions and counseling regarding his condition or for health maintenance advice. Please see specific information pulled into the AVS if appropriate.     You have chosen to receive care through a telehealth visit.  Do you consent to use a video/audio connection for your medical care today? Yes  Time spent 10 minutes  Unable to connect via video, completed call through telephone

## 2023-03-14 RX ORDER — TAMSULOSIN HYDROCHLORIDE 0.4 MG/1
1 CAPSULE ORAL DAILY
Qty: 90 CAPSULE | Refills: 2 | Status: SHIPPED | OUTPATIENT
Start: 2023-03-14

## 2023-03-14 NOTE — TELEPHONE ENCOUNTER
Rx Refill Note  Requested Prescriptions     Pending Prescriptions Disp Refills   • tamsulosin (FLOMAX) 0.4 MG capsule 24 hr capsule [Pharmacy Med Name: TAMSULOSIN 0.4MG CAPSULES] 90 capsule 0     Sig: TAKE 1 CAPSULE BY MOUTH DAILY      Last office visit with prescribing clinician: 3/3/23 televisit   Last telemedicine visit with prescribing clinician: 3/15/2023   Next office visit with prescribing clinician: Visit date not found                         Would you like a call back once the refill request has been completed: [] Yes [] No    If the office needs to give you a call back, can they leave a voicemail: [] Yes [] No    Karen Rivera MA  03/14/23, 09:05 EDT

## 2023-03-16 RX ORDER — EMPAGLIFLOZIN 10 MG/1
TABLET, FILM COATED ORAL
Qty: 90 TABLET | Refills: 1 | Status: SHIPPED | OUTPATIENT
Start: 2023-03-16

## 2023-03-16 NOTE — TELEPHONE ENCOUNTER
Rx Refill Note  Requested Prescriptions     Pending Prescriptions Disp Refills   • Jardiance 10 MG tablet tablet [Pharmacy Med Name: JARDIANCE 10MG TABLETS] 90 tablet 1     Sig: TAKE 1 TABLET BY MOUTH DAILY      Last office visit with prescribing clinician: 9/7/2022   Last telemedicine visit with prescribing clinician: 3/15/2023   Next office visit with prescribing clinician: 3/15/2023       {TIP  Please add Last Relevant Lab Date if appropriate: 09/08/22                 Would you like a call back once the refill request has been completed: [] Yes [] No    If the office needs to give you a call back, can they leave a voicemail: [] Yes [] No    Fanta Lee MA  03/16/23, 08:31 EDT

## 2023-03-29 ENCOUNTER — HOME HEALTH ADMISSION (OUTPATIENT)
Dept: HOME HEALTH SERVICES | Facility: HOME HEALTHCARE | Age: 83
End: 2023-03-29
Payer: MEDICARE

## 2023-03-29 ENCOUNTER — OFFICE VISIT (OUTPATIENT)
Dept: FAMILY MEDICINE CLINIC | Facility: CLINIC | Age: 83
End: 2023-03-29
Payer: MEDICARE

## 2023-03-29 VITALS
HEIGHT: 72 IN | OXYGEN SATURATION: 95 % | BODY MASS INDEX: 29.46 KG/M2 | WEIGHT: 217.5 LBS | SYSTOLIC BLOOD PRESSURE: 130 MMHG | DIASTOLIC BLOOD PRESSURE: 78 MMHG | RESPIRATION RATE: 18 BRPM | HEART RATE: 59 BPM | TEMPERATURE: 96 F

## 2023-03-29 DIAGNOSIS — E11.59 TYPE 2 DIABETES MELLITUS WITH OTHER CIRCULATORY COMPLICATION, WITHOUT LONG-TERM CURRENT USE OF INSULIN: ICD-10-CM

## 2023-03-29 DIAGNOSIS — I10 HYPERTENSION, ESSENTIAL: ICD-10-CM

## 2023-03-29 DIAGNOSIS — E78.2 MIXED HYPERLIPIDEMIA: ICD-10-CM

## 2023-03-29 DIAGNOSIS — R29.6 RECURRENT FALLS: ICD-10-CM

## 2023-03-29 DIAGNOSIS — Z86.73 HISTORY OF STROKE: ICD-10-CM

## 2023-03-29 DIAGNOSIS — Z00.00 MEDICARE ANNUAL WELLNESS VISIT, SUBSEQUENT: Primary | ICD-10-CM

## 2023-03-29 DIAGNOSIS — M15.9 PRIMARY OSTEOARTHRITIS INVOLVING MULTIPLE JOINTS: ICD-10-CM

## 2023-03-29 RX ORDER — CLOPIDOGREL BISULFATE 75 MG/1
75 TABLET ORAL DAILY
Qty: 90 TABLET | Refills: 3 | Status: SHIPPED | OUTPATIENT
Start: 2023-03-29

## 2023-03-29 RX ORDER — GABAPENTIN 100 MG/1
200 CAPSULE ORAL 3 TIMES DAILY
Qty: 540 CAPSULE | Refills: 1 | Status: SHIPPED | OUTPATIENT
Start: 2023-03-29
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
## 2023-03-30 LAB
ALBUMIN SERPL-MCNC: 4.1 G/DL (ref 3.5–5.2)
ALBUMIN/GLOB SERPL: 1.5 G/DL
ALP SERPL-CCNC: 203 U/L (ref 39–117)
ALT SERPL-CCNC: 25 U/L (ref 1–41)
AST SERPL-CCNC: 26 U/L (ref 1–40)
BASOPHILS # BLD AUTO: 0.04 10*3/MM3 (ref 0–0.2)
BASOPHILS NFR BLD AUTO: 0.8 % (ref 0–1.5)
BILIRUB SERPL-MCNC: 0.3 MG/DL (ref 0–1.2)
BUN SERPL-MCNC: 40 MG/DL (ref 8–23)
BUN/CREAT SERPL: 19.7 (ref 7–25)
CALCIUM SERPL-MCNC: 8.9 MG/DL (ref 8.6–10.5)
CHLORIDE SERPL-SCNC: 104 MMOL/L (ref 98–107)
CHOLEST SERPL-MCNC: 136 MG/DL (ref 0–200)
CO2 SERPL-SCNC: 23.8 MMOL/L (ref 22–29)
CREAT SERPL-MCNC: 2.03 MG/DL (ref 0.76–1.27)
EGFRCR SERPLBLD CKD-EPI 2021: 32.5 ML/MIN/1.73
EOSINOPHIL # BLD AUTO: 0.19 10*3/MM3 (ref 0–0.4)
EOSINOPHIL NFR BLD AUTO: 3.7 % (ref 0.3–6.2)
ERYTHROCYTE [DISTWIDTH] IN BLOOD BY AUTOMATED COUNT: 14.2 % (ref 12.3–15.4)
GLOBULIN SER CALC-MCNC: 2.8 GM/DL
GLUCOSE SERPL-MCNC: 129 MG/DL (ref 65–99)
HBA1C MFR BLD: 7.7 % (ref 4.8–5.6)
HCT VFR BLD AUTO: 33.6 % (ref 37.5–51)
HDLC SERPL-MCNC: 32 MG/DL (ref 40–60)
HGB BLD-MCNC: 10.7 G/DL (ref 13–17.7)
IMM GRANULOCYTES # BLD AUTO: 0.02 10*3/MM3 (ref 0–0.05)
IMM GRANULOCYTES NFR BLD AUTO: 0.4 % (ref 0–0.5)
LDLC SERPL CALC-MCNC: 77 MG/DL (ref 0–100)
LDLC/HDLC SERPL: 2.3 {RATIO}
LYMPHOCYTES # BLD AUTO: 1.58 10*3/MM3 (ref 0.7–3.1)
LYMPHOCYTES NFR BLD AUTO: 30.7 % (ref 19.6–45.3)
MCH RBC QN AUTO: 27.3 PG (ref 26.6–33)
MCHC RBC AUTO-ENTMCNC: 31.8 G/DL (ref 31.5–35.7)
MCV RBC AUTO: 85.7 FL (ref 79–97)
MONOCYTES # BLD AUTO: 0.78 10*3/MM3 (ref 0.1–0.9)
MONOCYTES NFR BLD AUTO: 15.2 % (ref 5–12)
NEUTROPHILS # BLD AUTO: 2.53 10*3/MM3 (ref 1.7–7)
NEUTROPHILS NFR BLD AUTO: 49.2 % (ref 42.7–76)
NRBC BLD AUTO-RTO: 0 /100 WBC (ref 0–0.2)
PLATELET # BLD AUTO: 213 10*3/MM3 (ref 140–450)
POTASSIUM SERPL-SCNC: 4.5 MMOL/L (ref 3.5–5.2)
PROT SERPL-MCNC: 6.9 G/DL (ref 6–8.5)
RBC # BLD AUTO: 3.92 10*6/MM3 (ref 4.14–5.8)
SODIUM SERPL-SCNC: 141 MMOL/L (ref 136–145)
TRIGL SERPL-MCNC: 152 MG/DL (ref 0–150)
VLDLC SERPL CALC-MCNC: 27 MG/DL (ref 5–40)
WBC # BLD AUTO: 5.14 10*3/MM3 (ref 3.4–10.8)

## 2023-03-30 RX ORDER — LANOLIN ALCOHOL/MO/W.PET/CERES
1000 CREAM (GRAM) TOPICAL DAILY
Qty: 90 TABLET | Refills: 1 | Status: SHIPPED | OUTPATIENT
Start: 2023-03-30

## 2023-03-30 RX ORDER — NATEGLINIDE 60 MG/1
TABLET ORAL
Qty: 180 TABLET | Refills: 3 | Status: SHIPPED | OUTPATIENT
Start: 2023-03-30

## 2023-04-20 RX ORDER — BUSPIRONE HYDROCHLORIDE 5 MG/1
TABLET ORAL
Qty: 270 TABLET | Refills: 1 | Status: SHIPPED | OUTPATIENT
Start: 2023-04-20

## 2023-05-22 RX ORDER — FUROSEMIDE 40 MG/1
TABLET ORAL
Qty: 90 TABLET | Refills: 3 | Status: SHIPPED | OUTPATIENT
Start: 2023-05-22

## 2023-06-16 RX ORDER — ATORVASTATIN CALCIUM 40 MG/1
40 TABLET, FILM COATED ORAL DAILY
Qty: 90 TABLET | Refills: 1 | Status: SHIPPED | OUTPATIENT
Start: 2023-06-16

## 2023-07-31 ENCOUNTER — HOSPITAL ENCOUNTER (OUTPATIENT)
Dept: CARDIOLOGY | Facility: HOSPITAL | Age: 83
Discharge: HOME | End: 2023-07-31
Payer: MEDICARE

## 2023-07-31 ENCOUNTER — OFFICE VISIT (OUTPATIENT)
Dept: CARDIOLOGY | Facility: CLINIC | Age: 83
End: 2023-07-31
Payer: MEDICARE

## 2023-07-31 VITALS
HEART RATE: 68 BPM | BODY MASS INDEX: 30.34 KG/M2 | SYSTOLIC BLOOD PRESSURE: 120 MMHG | WEIGHT: 224 LBS | DIASTOLIC BLOOD PRESSURE: 64 MMHG | OXYGEN SATURATION: 97 % | HEIGHT: 72 IN

## 2023-07-31 DIAGNOSIS — I50.32 CHRONIC DIASTOLIC HEART FAILURE: ICD-10-CM

## 2023-07-31 DIAGNOSIS — I25.10 CHRONIC CORONARY ARTERY DISEASE: Primary | ICD-10-CM

## 2023-07-31 DIAGNOSIS — I65.23 BILATERAL CAROTID ARTERY STENOSIS: ICD-10-CM

## 2023-07-31 LAB
ALBUMIN SERPL-MCNC: 3.9 G/DL (ref 3.5–5.2)
ALBUMIN/GLOB SERPL: 1.3 G/DL
ALP SERPL-CCNC: 201 U/L (ref 39–117)
ALT SERPL W P-5'-P-CCNC: 28 U/L (ref 1–41)
ANION GAP SERPL CALCULATED.3IONS-SCNC: 13.6 MMOL/L (ref 5–15)
AST SERPL-CCNC: 28 U/L (ref 1–40)
BH CV XLRA MEAS LEFT DIST CCA EDV: -17.1 CM/SEC
BH CV XLRA MEAS LEFT DIST CCA PSV: -112.9 CM/SEC
BH CV XLRA MEAS LEFT DIST ICA EDV: -14.6 CM/SEC
BH CV XLRA MEAS LEFT DIST ICA PSV: -71.9 CM/SEC
BH CV XLRA MEAS LEFT ICA/CCA RATIO: 0.98
BH CV XLRA MEAS LEFT MID ICA EDV: -19 CM/SEC
BH CV XLRA MEAS LEFT MID ICA PSV: -90.1 CM/SEC
BH CV XLRA MEAS LEFT PROX CCA EDV: -9.5 CM/SEC
BH CV XLRA MEAS LEFT PROX CCA PSV: -91.1 CM/SEC
BH CV XLRA MEAS LEFT PROX ECA PSV: 130 CM/SEC
BH CV XLRA MEAS LEFT PROX ICA EDV: -22.8 CM/SEC
BH CV XLRA MEAS LEFT PROX ICA PSV: -111 CM/SEC
BH CV XLRA MEAS LEFT PROX SCLA PSV: 125 CM/SEC
BH CV XLRA MEAS LEFT VERTEBRAL A PSV: -47.7 CM/SEC
BH CV XLRA MEAS RIGHT DIST CCA EDV: 13 CM/SEC
BH CV XLRA MEAS RIGHT DIST CCA PSV: 92 CM/SEC
BH CV XLRA MEAS RIGHT DIST ICA EDV: -16.5 CM/SEC
BH CV XLRA MEAS RIGHT DIST ICA PSV: -87.5 CM/SEC
BH CV XLRA MEAS RIGHT ICA/CCA RATIO: 0.97
BH CV XLRA MEAS RIGHT MID ICA EDV: 14.3 CM/SEC
BH CV XLRA MEAS RIGHT MID ICA PSV: 84.8 CM/SEC
BH CV XLRA MEAS RIGHT PROX CCA EDV: 13.3 CM/SEC
BH CV XLRA MEAS RIGHT PROX CCA PSV: 91.8 CM/SEC
BH CV XLRA MEAS RIGHT PROX ECA PSV: 149.9 CM/SEC
BH CV XLRA MEAS RIGHT PROX ICA EDV: 15 CM/SEC
BH CV XLRA MEAS RIGHT PROX ICA PSV: 89.2 CM/SEC
BH CV XLRA MEAS RIGHT PROX SCLA PSV: 195.3 CM/SEC
BH CV XLRA MEAS RIGHT VERTEBRAL A PSV: -82.2 CM/SEC
BILIRUB SERPL-MCNC: 0.2 MG/DL (ref 0–1.2)
BUN SERPL-MCNC: 41 MG/DL (ref 8–23)
BUN/CREAT SERPL: 23.4 (ref 7–25)
CALCIUM SPEC-SCNC: 8.6 MG/DL (ref 8.6–10.5)
CHLORIDE SERPL-SCNC: 104 MMOL/L (ref 98–107)
CO2 SERPL-SCNC: 24.4 MMOL/L (ref 22–29)
CREAT SERPL-MCNC: 1.75 MG/DL (ref 0.76–1.27)
EGFRCR SERPLBLD CKD-EPI 2021: 38.9 ML/MIN/1.73
GLOBULIN UR ELPH-MCNC: 2.9 GM/DL
GLUCOSE SERPL-MCNC: 137 MG/DL (ref 65–99)
NT-PROBNP SERPL-MCNC: 381 PG/ML (ref 0–1800)
POTASSIUM SERPL-SCNC: 4.6 MMOL/L (ref 3.5–5.2)
PROT SERPL-MCNC: 6.8 G/DL (ref 6–8.5)
SODIUM SERPL-SCNC: 142 MMOL/L (ref 136–145)

## 2023-07-31 PROCEDURE — 36415 COLL VENOUS BLD VENIPUNCTURE: CPT

## 2023-07-31 PROCEDURE — 83880 ASSAY OF NATRIURETIC PEPTIDE: CPT | Performed by: NURSE PRACTITIONER

## 2023-07-31 PROCEDURE — 3074F SYST BP LT 130 MM HG: CPT | Performed by: NURSE PRACTITIONER

## 2023-07-31 PROCEDURE — 3078F DIAST BP <80 MM HG: CPT | Performed by: NURSE PRACTITIONER

## 2023-07-31 PROCEDURE — 1159F MED LIST DOCD IN RCRD: CPT | Performed by: NURSE PRACTITIONER

## 2023-07-31 PROCEDURE — 93880 EXTRACRANIAL BILAT STUDY: CPT | Performed by: INTERNAL MEDICINE

## 2023-07-31 PROCEDURE — 93880 EXTRACRANIAL BILAT STUDY: CPT

## 2023-07-31 PROCEDURE — 80053 COMPREHEN METABOLIC PANEL: CPT | Performed by: NURSE PRACTITIONER

## 2023-07-31 PROCEDURE — 93000 ELECTROCARDIOGRAM COMPLETE: CPT | Performed by: NURSE PRACTITIONER

## 2023-07-31 PROCEDURE — 1160F RVW MEDS BY RX/DR IN RCRD: CPT | Performed by: NURSE PRACTITIONER

## 2023-07-31 PROCEDURE — 99214 OFFICE O/P EST MOD 30 MIN: CPT | Performed by: NURSE PRACTITIONER

## 2023-07-31 RX ORDER — AZITHROMYCIN 250 MG/1
TABLET, FILM COATED ORAL
COMMUNITY
Start: 2023-04-07 | End: 2023-07-31

## 2023-08-01 ENCOUNTER — TELEPHONE (OUTPATIENT)
Dept: CARDIOLOGY | Facility: CLINIC | Age: 83
End: 2023-08-01
Payer: MEDICARE

## 2023-08-04 DIAGNOSIS — I50.32 CHRONIC DIASTOLIC HEART FAILURE: Primary | ICD-10-CM

## 2023-08-04 RX ORDER — POTASSIUM CHLORIDE 750 MG/1
10 TABLET, FILM COATED, EXTENDED RELEASE ORAL DAILY
Qty: 30 TABLET | Refills: 11 | Status: SHIPPED | OUTPATIENT
Start: 2023-08-04

## 2023-08-07 ENCOUNTER — LAB (OUTPATIENT)
Dept: LAB | Facility: HOSPITAL | Age: 83
End: 2023-08-07
Payer: MEDICARE

## 2023-08-07 DIAGNOSIS — I50.32 CHRONIC DIASTOLIC HEART FAILURE: ICD-10-CM

## 2023-08-07 LAB
ANION GAP SERPL CALCULATED.3IONS-SCNC: 13 MMOL/L (ref 5–15)
BUN SERPL-MCNC: 50 MG/DL (ref 8–23)
BUN/CREAT SERPL: 23.1 (ref 7–25)
CALCIUM SPEC-SCNC: 8.4 MG/DL (ref 8.6–10.5)
CHLORIDE SERPL-SCNC: 100 MMOL/L (ref 98–107)
CO2 SERPL-SCNC: 25 MMOL/L (ref 22–29)
CREAT SERPL-MCNC: 2.16 MG/DL (ref 0.76–1.27)
EGFRCR SERPLBLD CKD-EPI 2021: 30.2 ML/MIN/1.73
GLUCOSE SERPL-MCNC: 171 MG/DL (ref 65–99)
POTASSIUM SERPL-SCNC: 4.3 MMOL/L (ref 3.5–5.2)
SODIUM SERPL-SCNC: 138 MMOL/L (ref 136–145)

## 2023-08-07 PROCEDURE — 80048 BASIC METABOLIC PNL TOTAL CA: CPT

## 2023-08-07 PROCEDURE — 36415 COLL VENOUS BLD VENIPUNCTURE: CPT

## 2023-08-11 ENCOUNTER — TELEPHONE (OUTPATIENT)
Dept: CARDIOLOGY | Facility: CLINIC | Age: 83
End: 2023-08-11
Payer: MEDICARE

## 2023-08-11 ENCOUNTER — OFFICE VISIT (OUTPATIENT)
Dept: CARDIOLOGY | Facility: CLINIC | Age: 83
End: 2023-08-11
Payer: MEDICARE

## 2023-08-11 ENCOUNTER — HOSPITAL ENCOUNTER (OUTPATIENT)
Dept: CARDIOLOGY | Facility: HOSPITAL | Age: 83
Discharge: HOME | End: 2023-08-11
Payer: MEDICARE

## 2023-08-11 VITALS
BODY MASS INDEX: 30.07 KG/M2 | SYSTOLIC BLOOD PRESSURE: 120 MMHG | OXYGEN SATURATION: 98 % | HEIGHT: 72 IN | DIASTOLIC BLOOD PRESSURE: 60 MMHG | WEIGHT: 222 LBS | HEART RATE: 63 BPM

## 2023-08-11 DIAGNOSIS — I50.32 CHRONIC DIASTOLIC HEART FAILURE: ICD-10-CM

## 2023-08-11 DIAGNOSIS — I89.0 LYMPHEDEMA: ICD-10-CM

## 2023-08-11 DIAGNOSIS — I50.32 CHRONIC DIASTOLIC HEART FAILURE: Primary | ICD-10-CM

## 2023-08-11 LAB
ANION GAP SERPL CALCULATED.3IONS-SCNC: 9.5 MMOL/L (ref 5–15)
BUN SERPL-MCNC: 39 MG/DL (ref 8–23)
BUN/CREAT SERPL: 22.7 (ref 7–25)
CALCIUM SPEC-SCNC: 8.9 MG/DL (ref 8.6–10.5)
CHLORIDE SERPL-SCNC: 105 MMOL/L (ref 98–107)
CO2 SERPL-SCNC: 25.5 MMOL/L (ref 22–29)
CREAT SERPL-MCNC: 1.72 MG/DL (ref 0.76–1.27)
EGFRCR SERPLBLD CKD-EPI 2021: 39.7 ML/MIN/1.73
GLUCOSE SERPL-MCNC: 181 MG/DL (ref 65–99)
POTASSIUM SERPL-SCNC: 4.2 MMOL/L (ref 3.5–5.2)
SODIUM SERPL-SCNC: 140 MMOL/L (ref 136–145)

## 2023-08-11 PROCEDURE — 36415 COLL VENOUS BLD VENIPUNCTURE: CPT

## 2023-08-11 PROCEDURE — 80048 BASIC METABOLIC PNL TOTAL CA: CPT | Performed by: NURSE PRACTITIONER

## 2023-08-11 NOTE — TELEPHONE ENCOUNTER
----- Message from KAVON Moreno sent at 8/11/2023 12:09 PM EDT -----  Please let him or his daughter know that his labs look good. His creatinine is back down a little which is good to see.    Thanks

## 2023-08-11 NOTE — TELEPHONE ENCOUNTER
Notified patient's daughter, Maribel, of results, allowed per [facility]. She verbalized understanding.    Cortney Luu RN  Triage [facility]

## 2023-08-11 NOTE — PROGRESS NOTES
[facility] Follow Up Office Note     Encounter Date:23  Patient:Dereck Ramírez  :1940  MRN:[id number]      Chief Complaint:   Chief Complaint   Patient presents with    Follow-up         History of Presenting Illness:        Dereck Ramírez is a 82 y.o. male who is here for follow-up.  He is a patient of Dr Mendez.    Patient has past medical history that is significant for coronary artery disease which is medically managed, essential hypertension, mixed hyperlipidemia, CKD, chronic anemia, history of CVA and diabetes with neuropathy.     patient had a left heart catheterization which showed 100% occlusion of PDA with distal filling from left to right collaterals.    Patient's most recent echocardiogram in  showed normal LVEF with no significant valve disease.    Patient was seen in the office by Dr. Mendez in January.  He reported he was intermittently having some leg swelling that would resolve overnight as well as some balance issues.    I saw this patient a couple weeks ago.  He was having significant increase in lower extremity edema.  I increased his Lasix to 80 mg twice daily, up from 40 mg twice daily.  He had repeat labs which showed an increase in his serum creatinine and BUN.  His diuretics were reduced and he is back today for follow-up.    Patient is accompanied by daughter today.  He remains unsteady on his feet and he feels like someone is pushing his back when he walks.  He continues to have significant lower extremity edema but no dyspnea on exertion, PND orthopnea.  He does report urinary response to his current Lasix dose.  He does not have dizziness, chest pain or palpitations.      Review of Systems:  Review of Systems   Cardiovascular:  Positive for leg swelling. Negative for chest pain, orthopnea and palpitations.   Respiratory:  Negative for shortness of breath.      Current Outpatient Medications on File Prior to Visit   Medication Sig Dispense Refill     atorvastatin (LIPITOR) 40 MG tablet TAKE 1 TABLET BY MOUTH DAILY 90 tablet 1    busPIRone (BUSPAR) 5 MG tablet TAKE 1 TABLET BY MOUTH THREE TIMES DAILY AS NEEDED FOR ANXIETY 270 tablet 1    carvedilol (COREG) 25 MG tablet TAKE 1 TABLET BY MOUTH TWICE DAILY WITH MEALS 180 tablet 3    clopidogrel (PLAVIX) 75 MG tablet Take 1 tablet by mouth Daily. 90 tablet 3    ferrous gluconate (FERGON) 324 MG tablet TAKE 1 TABLET BY MOUTH DAILY WITH BREAKFAST 90 tablet 1    furosemide (LASIX) 40 MG tablet TAKE 1 TABLET BY MOUTH EVERY MORNING AND THEN TAKE 1/2 TABLET BY MOUTH EVERY EVENING 90 tablet 3    gabapentin (NEURONTIN) 100 MG capsule Take 2 capsules by mouth 3 (Three) Times a Day. 540 capsule 1    glucose blood test strip Check blood sugar twice daily and as needed 100 each 12    nateglinide (STARLIX) 60 MG tablet TAKE 1 TABLET BY MOUTH TWICE DAILY WITH MEALS 180 tablet 3    potassium chloride 10 MEQ CR tablet Take 1 tablet by mouth Daily. Take while taking double Lasix dose 30 tablet 11    sertraline (ZOLOFT) 50 MG tablet Take 1.5 tablets by mouth Daily. 135 tablet 1    tamsulosin (FLOMAX) 0.4 MG capsule 24 hr capsule TAKE 1 CAPSULE BY MOUTH DAILY 90 capsule 2    vitamin B-12 (CYANOCOBALAMIN) 1000 MCG tablet TAKE 1 TABLET BY MOUTH DAILY 90 tablet 1     No current facility-administered medications on file prior to visit.       No Known Allergies    Past Medical History:   Diagnosis Date    Arthritis     Cerebellar artery occlusion     Diabetes mellitus     Enlarged prostate     Hyperlipidemia     Hypertension     Stroke 2011, 2012       Past Surgical History:   Procedure Laterality Date    CARDIAC CATHETERIZATION      lesion 1: intervention outcome    HERNIA REPAIR         Social History     Socioeconomic History    Marital status:    Tobacco Use    Smoking status: Never    Smokeless tobacco: Never   Substance and Sexual Activity    Alcohol use: No    Drug use: No    Sexual activity: Never       Family History  "  Problem Relation Age of Onset    Heart disease Mother     Stroke Mother     Crohn's disease Father     Stroke Father     Arthritis Father     Cancer Other     Stroke Other     Diabetes Other     Heart defect Other     Hypertension Other     Thyroid disease Other     Cancer Maternal Uncle         bone    Dementia Maternal Grandmother        The following portions of the patient's history were reviewed and updated as appropriate: allergies, current medications, past family history, past medical history, past social history, past surgical history and problem list.       Objective:       Vitals:    08/11/23 1015   BP: 120/60   Pulse: 63   SpO2: 98%   Weight: 101 kg (222 lb)   Height: 182.9 cm (72\")         Physical Exam:  Constitutional: Well appearing, well developed, no acute distress   HENT: Oropharynx clear and membrane moist  Eyes: Normal conjunctiva, no sclera icterus  Neck: Supple, no carotid bruit bilaterally  Cardiovascular: Regular rate and rhythm, No Murmur, 2+ bilateral lower extremity edema  Pulmonary: Normal respiratory effort, normal lung sounds, no wheezing  Neurological: Alert and orient x 3  Skin: Warm, dry, no ecchymosis, no rash  Psych: Appropriate mood and affect. Normal judgment and insight         Lab Results   Component Value Date     08/07/2023     07/31/2023    K 4.3 08/07/2023    K 4.6 07/31/2023     08/07/2023     07/31/2023    CO2 25.0 08/07/2023    CO2 24.4 07/31/2023    BUN 50 (H) 08/07/2023    BUN 41 (H) 07/31/2023    CREATININE 2.16 (H) 08/07/2023    CREATININE 1.75 (H) 07/31/2023    EGFRIFNONA 40 (L) 08/27/2021    EGFRIFNONA 43 (L) 06/15/2021    EGFRIFAFRI 48 (L) 08/27/2021    EGFRIFAFRI 52 (L) 06/15/2021    GLUCOSE 171 (H) 08/07/2023    GLUCOSE 137 (H) 07/31/2023    CALCIUM 8.4 (L) 08/07/2023    CALCIUM 8.6 07/31/2023    PROTENTOTREF 6.9 03/29/2023    PROTENTOTREF 7.4 09/08/2022    ALBUMIN 3.9 07/31/2023    ALBUMIN 4.1 03/29/2023    BILITOT 0.2 07/31/2023    " BILITOT 0.3 03/29/2023    AST 28 07/31/2023    AST 26 03/29/2023    ALT 28 07/31/2023    ALT 25 03/29/2023     Lab Results   Component Value Date    WBC 5.14 03/29/2023    WBC 9.7 09/08/2022    HGB 10.7 (L) 03/29/2023    HGB 10.6 (L) 09/08/2022    HCT 33.6 (L) 03/29/2023    HCT 33.3 (L) 09/08/2022    MCV 85.7 03/29/2023    MCV 87 09/08/2022     03/29/2023     09/08/2022     Lab Results   Component Value Date    TRIG 152 (H) 03/29/2023    TRIG 302 (H) 03/02/2022    HDL 32 (L) 03/29/2023    HDL 33 (L) 03/02/2022    LDL 77 03/29/2023    LDL 89 03/02/2022     Lab Results   Component Value Date    PROBNP 381.0 07/31/2023    PROBNP 276 02/25/2021     Lab Results   Component Value Date    CKTOTAL 84 07/18/2014    CKMB 3.5 07/18/2014    TROPONINT <0.010 08/22/2019     Lab Results   Component Value Date    TSH 4.530 (H) 03/02/2022    TSH 3.750 08/20/2020           ECG 12 Lead    Date/Time: 8/11/2023 10:32 AM  Performed by: Nichol Trammell APRN  Authorized by: Nichol Trammell APRN   Comparison: compared with previous ECG from 7/31/2023  Similar to previous ECG  Rhythm: sinus rhythm  Rate: normal  BPM: 63  Conduction: non-specific intraventricular conduction delay  ST Segments: ST segments normal           Assessment:          Diagnosis Plan   1. Chronic diastolic heart failure  ECG 12 Lead    Basic Metabolic Panel    Adult Transthoracic Echo Complete w/ Color, Spectral and Contrast if Necessary Per Protocol      2. Lymphedema  Ambulatory Referral to Lymphedema Clinic             Plan:       Lower extremity swelling -significant bilateral swelling with normal proBNP.  I will  repeat echocardiogram.  He is on 40 mg Lasix twice daily, previously creatinine increased on higher dose.  He will have repeat labs today.  I also referred him to lymphedema clinic and we discussed keeping legs elevated, reducing sodium, compression  Chronic diastolic congestive heart failure -repeat echocardiogram ordered.  Most  recent echo in 2021 with preserved LVEF  Coronary artery disease -most recent stress test 2015 no ischemia.  Known CAD that is medically managed.  Continue Plavix, labetalol, atorvastatin.  He denies chest pain  Essential hypertension -blood pressure is controlled  Mixed hyperlipidemia -continue statin    Patient is seen today for follow-up of swelling.  I have ordered repeat echo.  He will have repeat labs today.  Referred to lymphedema clinic.  Follow-up in 3 months or earlier with problems    Orders Placed This Encounter   Procedures    Basic Metabolic Panel     Standing Status:   Future     Number of Occurrences:   1     Standing Expiration Date:   8/11/2024     Order Specific Question:   Release to patient     Answer:   Routine Release [[id number]]    Ambulatory Referral to Lymphedema Clinic     Referral Priority:   Routine     Referral Type:   Physical Therapy     Number of Visits Requested:   1    ECG 12 Lead     This order was created via procedure documentation     Order Specific Question:   Release to patient     Answer:   Routine Release [[id number]]    Adult Transthoracic Echo Complete w/ Color, Spectral and Contrast if Necessary Per Protocol     Standing Status:   Future     Standing Expiration Date:   8/11/2024     Order Specific Question:   Reason for exam?     Answer:   Heart Failure, Cardiomyopathy, or Sytemic or Pulmonary Hypertension     Order Specific Question:   Release to patient     Answer:   Routine Release [[id number]]            KAVON Phoenix  [facility]  08/11/23  10:54 EDT

## 2023-08-11 NOTE — PROGRESS NOTES
Please let him or his daughter know that his labs look good. His creatinine is back down a little which is good to see.    Thanks

## 2023-08-11 NOTE — TELEPHONE ENCOUNTER
Called and left VM. Will continue to try to reach patient.     Cortney Luu RN  Triage [facility] – [place]

## 2023-08-16 ENCOUNTER — HOSPITAL ENCOUNTER (OUTPATIENT)
Dept: OCCUPATIONAL THERAPY | Facility: HOSPITAL | Age: 83
Discharge: HOME | End: 2023-08-16
Admitting: NURSE PRACTITIONER
Payer: MEDICARE

## 2023-08-16 DIAGNOSIS — I89.0 LYMPHEDEMA OF BOTH LOWER EXTREMITIES: ICD-10-CM

## 2023-08-16 DIAGNOSIS — I89.0 LYMPHEDEMA, NOT ELSEWHERE CLASSIFIED: Primary | ICD-10-CM

## 2023-08-16 PROCEDURE — 97166 OT EVAL MOD COMPLEX 45 MIN: CPT

## 2023-08-16 PROCEDURE — 97535 SELF CARE MNGMENT TRAINING: CPT
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
## 2023-08-25 RX ORDER — FUROSEMIDE 40 MG/1
40 TABLET ORAL 2 TIMES DAILY
Qty: 180 TABLET | Refills: 3 | Status: SHIPPED | OUTPATIENT
Start: 2023-08-25

## 2023-08-31 ENCOUNTER — HOSPITAL ENCOUNTER (OUTPATIENT)
Dept: CARDIOLOGY | Facility: HOSPITAL | Age: 83
Discharge: HOME | End: 2023-08-31
Payer: MEDICARE

## 2023-08-31 VITALS
BODY MASS INDEX: 30.07 KG/M2 | HEART RATE: 75 BPM | WEIGHT: 222 LBS | HEIGHT: 72 IN | DIASTOLIC BLOOD PRESSURE: 70 MMHG | SYSTOLIC BLOOD PRESSURE: 128 MMHG

## 2023-08-31 DIAGNOSIS — I50.32 CHRONIC DIASTOLIC HEART FAILURE: ICD-10-CM

## 2023-08-31 PROCEDURE — 93306 TTE W/DOPPLER COMPLETE: CPT

## 2023-09-01 ENCOUNTER — TELEPHONE (OUTPATIENT)
Dept: CARDIOLOGY | Facility: CLINIC | Age: 83
End: 2023-09-01
Payer: MEDICARE

## 2023-09-01 LAB
AORTIC ARCH: 2.2 CM
ASCENDING AORTA: 3.4 CM
BH CV ECHO MEAS - ACS: 2.11 CM
BH CV ECHO MEAS - AO MAX PG: 8.2 MMHG
BH CV ECHO MEAS - AO MEAN PG: 4 MMHG
BH CV ECHO MEAS - AO ROOT DIAM: 3.7 CM
BH CV ECHO MEAS - AO V2 MAX: 143 CM/SEC
BH CV ECHO MEAS - AO V2 VTI: 32.2 CM
BH CV ECHO MEAS - AVA(I,D): 2.6 CM2
BH CV ECHO MEAS - EDV(CUBED): 117.6 ML
BH CV ECHO MEAS - EDV(MOD-SP2): 62 ML
BH CV ECHO MEAS - EDV(MOD-SP4): 95 ML
BH CV ECHO MEAS - EF(MOD-BP): 63.2 %
BH CV ECHO MEAS - EF(MOD-SP2): 61.3 %
BH CV ECHO MEAS - EF(MOD-SP4): 63.2 %
BH CV ECHO MEAS - ESV(CUBED): 20.8 ML
BH CV ECHO MEAS - ESV(MOD-SP2): 24 ML
BH CV ECHO MEAS - ESV(MOD-SP4): 35 ML
BH CV ECHO MEAS - FS: 43.9 %
BH CV ECHO MEAS - IVS/LVPW: 1.07 CM
BH CV ECHO MEAS - IVSD: 1.5 CM
BH CV ECHO MEAS - LAT PEAK E' VEL: 10 CM/SEC
BH CV ECHO MEAS - LV DIASTOLIC VOL/BSA (35-75): 42.7 CM2
BH CV ECHO MEAS - LV MASS(C)D: 297.5 GRAMS
BH CV ECHO MEAS - LV MAX PG: 3.7 MMHG
BH CV ECHO MEAS - LV MEAN PG: 2 MMHG
BH CV ECHO MEAS - LV SYSTOLIC VOL/BSA (12-30): 15.7 CM2
BH CV ECHO MEAS - LV V1 MAX: 96.4 CM/SEC
BH CV ECHO MEAS - LV V1 VTI: 21.8 CM
BH CV ECHO MEAS - LVIDD: 4.9 CM
BH CV ECHO MEAS - LVIDS: 2.8 CM
BH CV ECHO MEAS - LVOT AREA: 3.8 CM2
BH CV ECHO MEAS - LVOT DIAM: 2.2 CM
BH CV ECHO MEAS - LVPWD: 1.4 CM
BH CV ECHO MEAS - MED PEAK E' VEL: 6.4 CM/SEC
BH CV ECHO MEAS - MR MAX PG: 21.8 MMHG
BH CV ECHO MEAS - MR MAX VEL: 233.5 CM/SEC
BH CV ECHO MEAS - MV A DUR: 0.12 SEC
BH CV ECHO MEAS - MV A MAX VEL: 111 CM/SEC
BH CV ECHO MEAS - MV DEC SLOPE: 469.3 CM/SEC2
BH CV ECHO MEAS - MV DEC TIME: 0.25 MSEC
BH CV ECHO MEAS - MV E MAX VEL: 64.3 CM/SEC
BH CV ECHO MEAS - MV E/A: 0.58
BH CV ECHO MEAS - MV MAX PG: 6.8 MMHG
BH CV ECHO MEAS - MV MEAN PG: 2.9 MMHG
BH CV ECHO MEAS - MV P1/2T: 70.3 MSEC
BH CV ECHO MEAS - MV V2 VTI: 37.7 CM
BH CV ECHO MEAS - MVA(P1/2T): 3.1 CM2
BH CV ECHO MEAS - MVA(VTI): 2.21 CM2
BH CV ECHO MEAS - PA ACC TIME: 0.12 SEC
BH CV ECHO MEAS - PA V2 MAX: 118.1 CM/SEC
BH CV ECHO MEAS - PULM A REVS DUR: 0.1 SEC
BH CV ECHO MEAS - PULM A REVS VEL: 28.9 CM/SEC
BH CV ECHO MEAS - PULM DIAS VEL: 56.9 CM/SEC
BH CV ECHO MEAS - PULM S/D: 1.4
BH CV ECHO MEAS - PULM SYS VEL: 79.9 CM/SEC
BH CV ECHO MEAS - QP/QS: 0.56
BH CV ECHO MEAS - RAP SYSTOLE: 3 MMHG
BH CV ECHO MEAS - RV MAX PG: 2.25 MMHG
BH CV ECHO MEAS - RV V1 MAX: 75 CM/SEC
BH CV ECHO MEAS - RV V1 VTI: 14.7 CM
BH CV ECHO MEAS - RVOT DIAM: 2 CM
BH CV ECHO MEAS - RVSP: 36 MMHG
BH CV ECHO MEAS - SI(MOD-SP2): 17.1 ML/M2
BH CV ECHO MEAS - SI(MOD-SP4): 26.9 ML/M2
BH CV ECHO MEAS - SUP REN AO DIAM: 3 CM
BH CV ECHO MEAS - SV(LVOT): 83.2 ML
BH CV ECHO MEAS - SV(MOD-SP2): 38 ML
BH CV ECHO MEAS - SV(MOD-SP4): 60 ML
BH CV ECHO MEAS - SV(RVOT): 46.3 ML
BH CV ECHO MEAS - TAPSE (>1.6): 1.99 CM
BH CV ECHO MEAS - TR MAX PG: 33.1 MMHG
BH CV ECHO MEAS - TR MAX VEL: 287.8 CM/SEC
BH CV ECHO MEASUREMENTS AVERAGE E/E' RATIO: 7.84
BH CV XLRA - RV BASE: 3.6 CM
BH CV XLRA - RV LENGTH: 7.9 CM
BH CV XLRA - RV MID: 2.8 CM
BH CV XLRA - TDI S': 15.7 CM/SEC
LEFT ATRIUM VOLUME INDEX: 45.7 ML/M2
SINUS: 3.5 CM
STJ: 2.41 CM

## 2023-09-01 NOTE — TELEPHONE ENCOUNTER
----- Message from KAVON Moreno sent at 9/1/2023  8:35 AM EDT -----  Please let the patient or his daughter know that I reviewed his echo. It is fairly stable from prior echo in 2021.  He has a normal ejection fraction as well as no significant valvular disease. There is a mild relaxation abnormality and mild elevation in right heart pressures. We tried higher diuretics and his kidneys did not respond well to this so I would not recommend any changes.

## 2023-09-01 NOTE — TELEPHONE ENCOUNTER
Notified patient's daughter of results/recommendations. She verbalized understanding.    Cortney Luu RN  Triage [facility] [place]

## 2023-09-01 NOTE — TELEPHONE ENCOUNTER
Called and left  for patient's daughter, Maribel. Will continue to try to reach her.     Cortney Luu RN  Triage [facility] [place]

## 2023-09-01 NOTE — PROGRESS NOTES
Please let the patient or his daughter know that I reviewed his echo. It is fairly stable from prior echo in 2021.  He has a normal ejection fraction as well as no significant valvular disease. There is a mild relaxation abnormality and mild elevation in right heart pressures. We tried higher diuretics and his kidneys did not respond well to this so I would not recommend any changes.

## 2023-09-26 RX ORDER — LANOLIN ALCOHOL/MO/W.PET/CERES
1000 CREAM (GRAM) TOPICAL DAILY
Qty: 90 TABLET | Refills: 1 | Status: SHIPPED | OUTPATIENT
Start: 2023-09-26

## 2023-09-28 ENCOUNTER — OFFICE VISIT (OUTPATIENT)
Dept: FAMILY MEDICINE CLINIC | Facility: CLINIC | Age: 83
End: 2023-09-28
Payer: MEDICARE

## 2023-09-28 VITALS
SYSTOLIC BLOOD PRESSURE: 178 MMHG | HEIGHT: 72 IN | BODY MASS INDEX: 30.18 KG/M2 | WEIGHT: 222.8 LBS | HEART RATE: 62 BPM | TEMPERATURE: 97.1 F | RESPIRATION RATE: 18 BRPM | OXYGEN SATURATION: 99 % | DIASTOLIC BLOOD PRESSURE: 78 MMHG

## 2023-09-28 DIAGNOSIS — Z23 NEED FOR IMMUNIZATION AGAINST INFLUENZA: ICD-10-CM

## 2023-09-28 DIAGNOSIS — I10 HYPERTENSION, ESSENTIAL: ICD-10-CM

## 2023-09-28 DIAGNOSIS — E11.59 TYPE 2 DIABETES MELLITUS WITH OTHER CIRCULATORY COMPLICATION, WITHOUT LONG-TERM CURRENT USE OF INSULIN: Primary | ICD-10-CM

## 2023-09-28 DIAGNOSIS — H05.231 PERIORBITAL HEMATOMA OF RIGHT EYE: ICD-10-CM

## 2023-09-28 PROCEDURE — 3078F DIAST BP <80 MM HG: CPT | Performed by: NURSE PRACTITIONER

## 2023-09-28 PROCEDURE — 90662 IIV NO PRSV INCREASED AG IM: CPT | Performed by: NURSE PRACTITIONER

## 2023-09-28 PROCEDURE — 3077F SYST BP >= 140 MM HG: CPT | Performed by: NURSE PRACTITIONER

## 2023-09-28 PROCEDURE — G0008 ADMIN INFLUENZA VIRUS VAC: HCPCS | Performed by: NURSE PRACTITIONER

## 2023-09-28 PROCEDURE — 99214 OFFICE O/P EST MOD 30 MIN: CPT | Performed by: NURSE PRACTITIONER

## 2023-09-28 NOTE — PROGRESS NOTES
"Chief Complaint  Eye Problem (Swollen Rt eye woke up like this. )    Subjective        Dereck Ramírez presents to [facility]  History of Present Illness  Dereck presents with swelling and bruising  to his eye. No known injury reported, however he does have episodes of falls at night. He does not recall falling out of bed recently.    He is also following up on his diabetes and hypertension. He takes gabapentin for neuropathy. He is tolerating this well. No acute concerns.     Objective   Vital Signs:  /78 (BP Location: Left arm, Patient Position: Sitting, Cuff Size: Adult)   Pulse 62   Temp 97.1 °F (36.2 °C) (Temporal)   Resp 18   Ht 182.9 cm (72.01\")   Wt 101 kg (222 lb 12.8 oz)   SpO2 99%   BMI 30.21 kg/m²   Estimated body mass index is 30.21 kg/m² as calculated from the following:    Height as of this encounter: 182.9 cm (72.01\").    Weight as of this encounter: 101 kg (222 lb 12.8 oz).               Physical Exam  Constitutional:       General: He is not in acute distress.     Appearance: He is well-developed. He is not diaphoretic.   Eyes:      Comments: Right periorbital ecchymosis   Cardiovascular:      Rate and Rhythm: Normal rate and regular rhythm.      Heart sounds: Normal heart sounds. No murmur heard.    No friction rub. No gallop.   Pulmonary:      Effort: Pulmonary effort is normal. No respiratory distress.      Breath sounds: Normal breath sounds. No wheezing or rales.   Musculoskeletal:      Cervical back: Neck supple.   Skin:     General: Skin is warm and dry.   Neurological:      Mental Status: He is alert and oriented to person, place, and time.      Result Review :                   Assessment and Plan   Diagnoses and all orders for this visit:    1. Type 2 diabetes mellitus with other circulatory complication, without long-term current use of insulin (Primary)  -     Hemoglobin A1c  -     Comprehensive metabolic panel  -     CBC & Differential    2. " Hypertension, essential  -     Comprehensive metabolic panel  -     CBC & Differential    3. Need for immunization against influenza  -     Fluzone High-Dose 65+yrs (9546-2443)    4. Periorbital hematoma of right eye    DM: chronic, stable, continue starlix and repeat A1c today  HTN: uncontrolled today, however patient is present with two grandchildren, may be a single elevation, elevation compared to baseline, recommend monitoring at home, continuing coreg and furosemide  Periorbital hematoma, suspect trauma while patient was sleeping, recommend continuing to monitor, apply cool compress to reduce swelling           Follow Up   No follow-ups on file.  Patient was given instructions and counseling regarding his condition or for health maintenance advice. Please see specific information pulled into the AVS if appropriate.

## 2023-09-29 LAB
ALBUMIN SERPL-MCNC: 4.6 G/DL (ref 3.5–5.2)
ALBUMIN/GLOB SERPL: 1.7 G/DL
ALP SERPL-CCNC: 213 U/L (ref 39–117)
ALT SERPL-CCNC: 30 U/L (ref 1–41)
AST SERPL-CCNC: 26 U/L (ref 1–40)
BASOPHILS # BLD AUTO: 0.06 10*3/MM3 (ref 0–0.2)
BASOPHILS NFR BLD AUTO: 1.1 % (ref 0–1.5)
BILIRUB SERPL-MCNC: 0.2 MG/DL (ref 0–1.2)
BUN SERPL-MCNC: 40 MG/DL (ref 8–23)
BUN/CREAT SERPL: 25.2 (ref 7–25)
CALCIUM SERPL-MCNC: 9.2 MG/DL (ref 8.6–10.5)
CHLORIDE SERPL-SCNC: 103 MMOL/L (ref 98–107)
CO2 SERPL-SCNC: 24.7 MMOL/L (ref 22–29)
CREAT SERPL-MCNC: 1.59 MG/DL (ref 0.76–1.27)
EGFRCR SERPLBLD CKD-EPI 2021: 43.3 ML/MIN/1.73
EOSINOPHIL # BLD AUTO: 0.21 10*3/MM3 (ref 0–0.4)
EOSINOPHIL NFR BLD AUTO: 3.8 % (ref 0.3–6.2)
ERYTHROCYTE [DISTWIDTH] IN BLOOD BY AUTOMATED COUNT: 13.2 % (ref 12.3–15.4)
GLOBULIN SER CALC-MCNC: 2.7 GM/DL
GLUCOSE SERPL-MCNC: 106 MG/DL (ref 65–99)
HBA1C MFR BLD: 6.9 % (ref 4.8–5.6)
HCT VFR BLD AUTO: 30.4 % (ref 37.5–51)
HGB BLD-MCNC: 10.3 G/DL (ref 13–17.7)
IMM GRANULOCYTES # BLD AUTO: 0.02 10*3/MM3 (ref 0–0.05)
IMM GRANULOCYTES NFR BLD AUTO: 0.4 % (ref 0–0.5)
LYMPHOCYTES # BLD AUTO: 1.48 10*3/MM3 (ref 0.7–3.1)
LYMPHOCYTES NFR BLD AUTO: 26.9 % (ref 19.6–45.3)
MCH RBC QN AUTO: 29.3 PG (ref 26.6–33)
MCHC RBC AUTO-ENTMCNC: 33.9 G/DL (ref 31.5–35.7)
MCV RBC AUTO: 86.6 FL (ref 79–97)
MONOCYTES # BLD AUTO: 0.56 10*3/MM3 (ref 0.1–0.9)
MONOCYTES NFR BLD AUTO: 10.2 % (ref 5–12)
NEUTROPHILS # BLD AUTO: 3.18 10*3/MM3 (ref 1.7–7)
NEUTROPHILS NFR BLD AUTO: 57.6 % (ref 42.7–76)
NRBC BLD AUTO-RTO: 0 /100 WBC (ref 0–0.2)
PLATELET # BLD AUTO: 246 10*3/MM3 (ref 140–450)
POTASSIUM SERPL-SCNC: 4.6 MMOL/L (ref 3.5–5.2)
PROT SERPL-MCNC: 7.3 G/DL (ref 6–8.5)
RBC # BLD AUTO: 3.51 10*6/MM3 (ref 4.14–5.8)
SODIUM SERPL-SCNC: 140 MMOL/L (ref 136–145)
WBC # BLD AUTO: 5.51 10*3/MM3 (ref 3.4–10.8)

## 2023-10-12 DIAGNOSIS — E11.59 TYPE 2 DIABETES MELLITUS WITH OTHER CIRCULATORY COMPLICATION, WITHOUT LONG-TERM CURRENT USE OF INSULIN: ICD-10-CM

## 2023-10-12 RX ORDER — DOXYCYCLINE HYCLATE 50 MG/1
1 CAPSULE, GELATIN COATED ORAL
Qty: 90 TABLET | Refills: 1 | Status: SHIPPED | OUTPATIENT
Start: 2023-10-12

## 2023-10-12 RX ORDER — GABAPENTIN 100 MG/1
200 CAPSULE ORAL 3 TIMES DAILY
Qty: 540 CAPSULE | Refills: 1 | Status: SHIPPED | OUTPATIENT
Start: 2023-10-12

## 2023-10-12 NOTE — TELEPHONE ENCOUNTER
Rx Refill Note  Requested Prescriptions     Pending Prescriptions Disp Refills    gabapentin (NEURONTIN) 100 MG capsule [Pharmacy Med Name: GABAPENTIN 100MG CAPSULES] 540 capsule      Sig: TAKE 2 CAPSULES BY MOUTH THREE TIMES DAILY    ferrous gluconate (FERGON) 324 MG tablet [Pharmacy Med Name: FERROUS GLUC 324MG TABLETS] 90 tablet 1     Sig: TAKE 1 TABLET BY MOUTH DAILY WITH BREAKFAST      Last office visit with prescribing clinician: 9/28/2023   Last telemedicine visit with prescribing clinician: Visit date not found   Next office visit with prescribing clinician: 3/29/2024                         Would you like a call back once the refill request has been completed: [] Yes [] No    If the office needs to give you a call back, can they leave a voicemail: [] Yes [] No    Darius Cardoso MA  10/12/23, 10:13 EDT     4 = No assist / stand by assistance

## 2023-10-16 ENCOUNTER — TELEPHONE (OUTPATIENT)
Dept: FAMILY MEDICINE CLINIC | Facility: CLINIC | Age: 83
End: 2023-10-16
Payer: MEDICARE

## 2023-10-16 DIAGNOSIS — Z86.73 HISTORY OF STROKE: ICD-10-CM

## 2023-10-16 DIAGNOSIS — R29.6 RECURRENT FALLS: Primary | ICD-10-CM

## 2023-10-16 NOTE — TELEPHONE ENCOUNTER
Caller: Maribel Arreguin    Relationship: Emergency Contact    Best call back number:     What orders are you requesting (i.e. lab or imaging): WHEELCHAIR DUE TO FALLS    In what timeframe would the patient need to come in: ASAP    Where will you receive your lab/imaging services: NONE    Additional notes: TROUBLE WALKING.

## 2023-10-18 ENCOUNTER — TELEPHONE (OUTPATIENT)
Dept: FAMILY MEDICINE CLINIC | Facility: CLINIC | Age: 83
End: 2023-10-18
Payer: MEDICARE

## 2023-11-15 ENCOUNTER — OFFICE VISIT (OUTPATIENT)
Age: 83
End: 2023-11-15
Payer: MEDICARE

## 2023-11-15 VITALS
BODY MASS INDEX: 30.2 KG/M2 | DIASTOLIC BLOOD PRESSURE: 60 MMHG | WEIGHT: 223 LBS | HEART RATE: 68 BPM | HEIGHT: 72 IN | SYSTOLIC BLOOD PRESSURE: 122 MMHG

## 2023-11-15 DIAGNOSIS — N18.31 STAGE 3A CHRONIC KIDNEY DISEASE: ICD-10-CM

## 2023-11-15 DIAGNOSIS — E78.2 MIXED HYPERLIPIDEMIA: ICD-10-CM

## 2023-11-15 DIAGNOSIS — I50.32 CHRONIC DIASTOLIC HEART FAILURE: ICD-10-CM
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
## 2023-11-15 DIAGNOSIS — I10 HYPERTENSION, ESSENTIAL: ICD-10-CM

## 2023-11-15 DIAGNOSIS — I25.10 CHRONIC CORONARY ARTERY DISEASE: ICD-10-CM

## 2023-11-15 PROCEDURE — 93000 ELECTROCARDIOGRAM COMPLETE: CPT | Performed by: INTERNAL MEDICINE

## 2023-11-15 PROCEDURE — 3074F SYST BP LT 130 MM HG: CPT | Performed by: INTERNAL MEDICINE

## 2023-11-15 PROCEDURE — 1159F MED LIST DOCD IN RCRD: CPT | Performed by: INTERNAL MEDICINE

## 2023-11-15 PROCEDURE — 99214 OFFICE O/P EST MOD 30 MIN: CPT | Performed by: INTERNAL MEDICINE

## 2023-11-15 PROCEDURE — 3078F DIAST BP <80 MM HG: CPT | Performed by: INTERNAL MEDICINE

## 2023-11-15 PROCEDURE — 1160F RVW MEDS BY RX/DR IN RCRD: CPT | Performed by: INTERNAL MEDICINE

## 2023-11-15 NOTE — PROGRESS NOTES
[facility] Follow Up Office Note     Encounter Date:11/15/23  Patient:Dereck Ramírez  :1940  MRN:[id number]      Chief Complaint:   Chief Complaint   Patient presents with    Congestive Heart Failure     3 month f/u     History of Presenting Illness:     Mr. Ramírez is a 83 y.o. gentleman with past medical history notable for coronary artery disease which is been medically managed, hypertension, mixed hyperlipidemia, chronic kidney disease, chronic anemia, history of CVA, and diabetes with neuropathy who presents for follow up visit.  Overall patient is doing okay from a cardiac standpoint still has leg swelling was referred to the lymphedema clinic but has not had any follow-up with them.  We will try and see if we can do about that.  Having a lot more falls.  I noted back in January he was having some balance issues this is progressed over the last year he is almost to the point where he is needing a wheelchair for most times his primary care physician did get him a prescription for this.  No syncope with it mainly just mechanical falls        Review of Systems:  Review of Systems   Constitutional: Negative.   HENT: Negative.     Eyes: Negative.    Cardiovascular:  Positive for leg swelling.   Respiratory: Negative.     Endocrine: Negative.    Hematologic/Lymphatic: Negative.    Skin: Negative.    Musculoskeletal: Negative.  Positive for falls.   Gastrointestinal: Negative.    Genitourinary: Negative.    Neurological:  Positive for loss of balance.   Psychiatric/Behavioral: Negative.     Allergic/Immunologic: Negative.        Current Outpatient Medications on File Prior to Visit   Medication Sig Dispense Refill    atorvastatin (LIPITOR) 40 MG tablet TAKE 1 TABLET BY MOUTH DAILY 90 tablet 1    busPIRone (BUSPAR) 5 MG tablet TAKE 1 TABLET BY MOUTH THREE TIMES DAILY AS NEEDED FOR ANXIETY 270 tablet 1    carvedilol (COREG) 25 MG tablet TAKE 1 TABLET BY MOUTH TWICE DAILY WITH MEALS 180 tablet 3     clopidogrel (PLAVIX) 75 MG tablet Take 1 tablet by mouth Daily. 90 tablet 3    ferrous gluconate (FERGON) 324 MG tablet TAKE 1 TABLET BY MOUTH DAILY WITH BREAKFAST 90 tablet 1    furosemide (LASIX) 40 MG tablet Take 1 tablet by mouth 2 (Two) Times a Day. 180 tablet 3    gabapentin (NEURONTIN) 100 MG capsule TAKE 2 CAPSULES BY MOUTH THREE TIMES DAILY 540 capsule 1    glucose blood test strip Check blood sugar twice daily and as needed 100 each 12    nateglinide (STARLIX) 60 MG tablet TAKE 1 TABLET BY MOUTH TWICE DAILY WITH MEALS 180 tablet 3    potassium chloride 10 MEQ CR tablet Take 1 tablet by mouth Daily. Take while taking double Lasix dose 30 tablet 11    sertraline (ZOLOFT) 50 MG tablet Take 1.5 tablets by mouth Daily. 135 tablet 1    tamsulosin (FLOMAX) 0.4 MG capsule 24 hr capsule TAKE 1 CAPSULE BY MOUTH DAILY 90 capsule 2    vitamin B-12 (CYANOCOBALAMIN) 1000 MCG tablet TAKE 1 TABLET BY MOUTH DAILY 90 tablet 1     No current facility-administered medications on file prior to visit.       No Known Allergies    Past Medical History:   Diagnosis Date    Arthritis     Cerebellar artery occlusion     Coronary artery disease     Diabetes mellitus     Enlarged prostate     Hyperlipidemia     Hypertension     Stroke 2011, 2012       Past Surgical History:   Procedure Laterality Date    CARDIAC CATHETERIZATION      lesion 1: intervention outcome    HERNIA REPAIR         Social History     Socioeconomic History    Marital status:    Tobacco Use    Smoking status: Never    Smokeless tobacco: Never   Substance and Sexual Activity    Alcohol use: No    Drug use: No    Sexual activity: Never       Family History   Problem Relation Age of Onset    Heart disease Mother     Stroke Mother     Crohn's disease Father     Stroke Father     Arthritis Father     Cancer Other     Stroke Other     Diabetes Other     Heart defect Other     Hypertension Other     Thyroid disease Other     Cancer Maternal Uncle         bone     "Dementia Maternal Grandmother        The following portions of the patient's history were reviewed and updated as appropriate: allergies, current medications, past family history, past medical history, past social history, past surgical history and problem list.       Objective:       Vitals:    11/15/23 1024   BP: 122/60   BP Location: Left arm   Patient Position: Sitting   Pulse: 68   Weight: 101 kg (223 lb)   Height: 182.9 cm (72.01\")     Body mass index is 30.24 kg/m².     Physical Exam:  Constitutional: Well appearing, Well-developed, No acute distress   HENT: Oropharynx clear and membrane moist  Eyes: Normal conjunctiva, no sclera icterus.  Neck: Supple, no carotid bruit bilaterally.  Cardiovascular: Regular rate and rhythm, No Murmur, 1+ bilateral lower extremity edema.  Pulmonary: Normal respiratory effort, normal lung sounds, no wheezing.  Neurological: Alert and orient x 3.   Skin: Warm, dry, no ecchymosis, no rash.  Psych: Appropriate mood and affect. Normal judgment and insight.          Lab Results   Component Value Date    GLUCOSE 106 (H) 09/28/2023    BUN 40 (H) 09/28/2023    CREATININE 1.59 (H) 09/28/2023    EGFRIFNONA 40 (L) 08/27/2021    EGFRIFAFRI 48 (L) 08/27/2021    BCR 25.2 (H) 09/28/2023    K 4.6 09/28/2023    CO2 24.7 09/28/2023    CALCIUM 9.2 09/28/2023    PROTENTOTREF 7.3 09/28/2023    ALBUMIN 4.6 09/28/2023    LABIL2 1.7 09/28/2023    AST 26 09/28/2023    ALT 30 09/28/2023       Lab Results   Component Value Date    WBC 5.51 09/28/2023    HGB 10.3 (L) 09/28/2023    HCT 30.4 (L) 09/28/2023    MCV 86.6 09/28/2023     09/28/2023       Lab Results   Component Value Date    CKTOTAL 84 07/18/2014    CKMB 3.5 07/18/2014    TROPONINT <0.010 08/22/2019       Lab Results   Component Value Date    CHLPL 136 03/29/2023    CHLPL 172 03/02/2022    CHLPL 167 08/27/2021     Lab Results   Component Value Date    TRIG 152 (H) 03/29/2023    TRIG 302 (H) 03/02/2022    TRIG 306 (H) 08/27/2021     Lab " Results   Component Value Date    HDL 32 (L) 03/29/2023    HDL 33 (L) 03/02/2022    HDL 31 (L) 08/27/2021     Lab Results   Component Value Date    LDL 77 03/29/2023    LDL 89 03/02/2022    LDL 85 08/27/2021       Lab Results   Component Value Date    TSH 4.530 (H) 03/02/2022       ECG 12 Lead    Date/Time: 11/15/2023 10:48 AM  Performed by: Josafat Mendez MD    Authorized by: Josafat Mendez MD  Comparison: compared with previous ECG from 8/11/2023  Similar to previous ECG  Rhythm: sinus rhythm  Other findings: non-specific ST-T wave changes          Echocardiogram 9/1/2023 images reviewed by myself:  Left ventricular systolic function is normal. Calculated left ventricular EF = 63.2  Left ventricular wall thickness is consistent with moderate concentric hypertrophy.  Left ventricular diastolic function is consistent with (grade I) impaired relaxation.  The left atrial cavity is moderately dilated.  Estimated right ventricular systolic pressure from tricuspid regurgitation is mildly elevated (35-45 mmHg).    Echocardiogram 1/28/2021:  Estimated left ventricular EF = 65% Left ventricular systolic function is normal.  Left ventricular diastolic function was normal.  Left ventricular wall thickness is consistent with concentric hypertrophy.  Left atrial volume is mildly increased.    Mobile Telemetry Monitor 2 week 10/14/2019:  Overall relatively normal 2-week mobile telemetry monitor.   Normal amount of ectopy with short self-limited runs of SVT and VT.   No etiology for syncope noted on the study.    Echocardiogram report 9/13/2019:  Left ventricular systolic function is normal. Calculated EF = 61.0%. Normal left ventricular cavity size, wall thickness, mass and mass index noted. All left ventricular wall segments contract normally. Septal wall motion is normal. Left ventricular diastolic function is normal. Normal left atrial pressure.  Right ventricular cavity is mild-to-moderately dilated  Right atrial  cavity size is mildly dilated.  Left atrial cavity size is mild-to-moderately dilated  Mild mitral valve regurgitation is present  No prior study available for comparison.     Carotid duplex 9/13/2019:  Right ICA Prox: Imaging indicates 16%-49% stenosis.   Left ICA Prox: Imaging indicates 16-49% stenosis.     Stress Lexiscan nuclear 9/5/2015:  Normal Lexiscan Cardiolite stress test.   No evidence of ischemia or infarction.   Normal wall motion.   Normal ejection fraction.   Normal thickening.      Cardiac catheterization report 5/24/2013:  Moderate to severe disease of the ramus off the first diagonal and ramus off the first obtuse marginal.   Minimal segmental disease of the right coronary artery. There is 100% occlusion of the posterior descending artery with distal filling by the left coronary artery.   Normal left ventricular systolic function.       Assessment:          Diagnosis Plan   1. Fall, initial encounter  Ambulatory Referral to Physical Therapy Evaluate and treat; Strengthening; Full weight bearing      2. Chronic diastolic heart failure  ECG 12 Lead      3. Chronic coronary artery disease        4. Hypertension, essential        5. Mixed hyperlipidemia        6. Stage 3a chronic kidney disease               Plan:       Mr. Ramírez is a 83 y.o. gentleman with past medical history notable for coronary artery disease which is been medically managed, hypertension, mixed hyperlipidemia, chronic kidney disease, chronic anemia, history of CVA, and diabetes with neuropathy who presents for follow-up.  Overall patient is doing decent from a cardiac perspective I would not escalate his diuretic therapy as I think most of his swellings more dependent edema based upon his recent echocardiogram.  We will do our best with compression socks and lifestyle modification.  Concerned about amount of falls that he is having I think at least having PT evaluate to see if there is any exercises that we can do to help out is  likely a combination of his dependent edema, prior stroke, and neuropathy as well as overall deconditioning..       Chronic diastolic congestive heart failure:  Compression socks for dependent edema to avoid episodic orthostasis  Echocardiogram 1/2021 demonstrates normal LV function with grade 1 diastolic dysfunction  Continue current dose of diuretic further increase in diuretics have not helped the swelling and only exacerbated his chronic renal insufficiency     Coronary artery disease without angina:  Stress testing in 2015 demonstrating no ischemia.  Patient with known coronary artery disease which is been medically managed.  Continue clopidogrel, labetalol, and atorvastatin     Hypertension:  Better control today and will continue current medical therapy.  BMP 9/2023 demonstrates elevated creatinine but stable with normal sodium and potassium.     Mixed hyperlipidemia:  Continue high potency statin as   Lipid panel 3/2023 demonstrates good control total cholesterol and LDL   CMP 9/2023 demonstrates normal ALT and AST    Chronic kidney disease:  We will closely monitor kidney function and limit excessive titration of diuretics      Follow-up:  6 month      Josafat Mendez MD  [facility]  11/15/23  11:12 EST

## 2023-11-28 ENCOUNTER — TREATMENT (OUTPATIENT)
Dept: PHYSICAL THERAPY | Facility: CLINIC | Age: 83
End: 2023-11-28
Payer: MEDICARE

## 2023-11-28 DIAGNOSIS — R26.89 IMBALANCE: ICD-10-CM

## 2023-11-28 DIAGNOSIS — Z91.81 HISTORY OF FALL WITHIN PAST 90 DAYS: Primary | ICD-10-CM

## 2023-11-28 DIAGNOSIS — R26.9 GAIT DIFFICULTY: ICD-10-CM

## 2023-11-28 PROCEDURE — 97112 NEUROMUSCULAR REEDUCATION: CPT | Performed by: PHYSICAL THERAPIST

## 2023-11-28 PROCEDURE — 97161 PT EVAL LOW COMPLEX 20 MIN: CPT | Performed by: PHYSICAL THERAPIST

## 2023-11-28 NOTE — PROGRESS NOTES
"Physical Therapy Initial Evaluation and Plan of Care  [address], [address], [place] [address]    Patient: Dereck Ramírez   : 1940  Diagnosis/ICD-10 Code:  History of fall within past 90 days [Z91.81]  Referring practitioner: Josafat Mendez MD    Subjective Evaluation    History of Present Illness  Onset date: progressively worsening within past 6 months.  Mechanism of injury: Patient presents to PT today with his daughter Maribel with whom he lives.  She reports he has been falling frequently, the most recent being 2 weeks ago in the bathroom.  He had 2 CVAs in  and initially was given a walker but he carried it over his head instead, so he now uses a STC.  Patient's daughter believes he may have also had a more recent one which has led to his decline in walking. An order has been placed for a w/c to use for community distance.  Patient describes the feeling of (B) LEs getting weak/giving way at times and also describes a feeling like someone is pushing him and he is \"flying through the air\" when he falls.    PMH notable for CHF, CAD, HTN, HLD, CKD stage 3, hx CVA, and DM 2 with neuropathy.  He also has chronic LE edema.       Patient Occupation: N/A Quality of life: fair    Pain  Pain scale: patient denies any significant pain issues.  Aggravating factors: stairs, standing and ambulation    Social Support  Lives in: one-story house (3 steps from garage into house with rail)  Lives with: adult children (daughter Maribel)    Treatments  Previous treatment: physical therapy (following CVAs)  Patient Goals  Patient goals for therapy: increased strength and improved balance  Patient goal: get stronger, stop falling, be able to walk better         Subjective Questionnaire: Carrera Balance:    Objective          Strength/Myotome Testing     Left Hip   Planes of Motion   Flexion: 3+  Abduction: 3+  Adduction: 3+    Right Hip   Planes of Motion   Flexion: 3+  Abduction: 3+  Adduction: " 3+    Left Knee   Flexion: 4-  Extension: 4-  Quadriceps contraction: poor    Right Knee   Flexion: 4-  Extension: 4-  Quadriceps contraction: poor    Left Ankle/Foot   Dorsiflexion: 4-    Right Ankle/Foot   Dorsiflexion: 3+    Ambulation     Observational Gait     Additional Observational Gait Details  Patient ambulates with STC used on either side at various times, forward flexed posture, and decreased (B) LE hip and knee flexion leading to reduced LE clearance.    Functional Assessment     Comments  5 x sit to stand with (B) UE assist = 33 sec with CGA and mild unsteadiness          Assessment & Plan       Assessment  Impairments: abnormal coordination, abnormal gait, abnormal muscle firing, activity intolerance, impaired balance, impaired physical strength and safety issue   Functional limitations: walking and standing   Assessment details: Patient is an 83 y.o male with CHF, CAD, HTN, HLD, CKD stage 3, hx CVA, DM 2 with neuropathy, and chronic LE edema who has been experiencing a progressive decline in his ambulation ability and more frequently falling within the past 6 months.  His most recent fall was 2 weeks ago in the bathroom.  Patient denies any notable pain issues but states his LEs feel weak at times and give way suddenly while standing or walking.  He ambulates with STC used on either side at various times, forward flexed posture, and decreased (B) LE hip and knee flexion leading to reduced LE clearance. His Carrera Balance score is 11/56 indicating a significantly elevated fall risk.  Patient will benefit from skilled PT services to address these deficits and assist patient in improved independence and safety with short community distance ambulation.    Prognosis: good    Goals  Plan Goals: STGs: to be met in 6 weeks  1. Patient and daughter will be independent with initial HEP  2. Patient will report </= 1 fall since beginning PT for reduced falls and injury risk  3. Patient will perform 5 x sit to  stand </= 20 sec with (B) UE assist for evidence of improved proximal LE strength and reduced fall risk  4. Patient will demonstrate 25% improved upright posture and stability of gait with least restrictive AD    LTGs: to be met in 12 weeks  1. Patient and daughter will be independent with progressed HEP  2. Patient will report no falls x 4 weeks for evidence of improved stability of gait and balance  3. Patient will ambulate short community distances independently with least restrictive AD with supervision for improved ability to navigate within the community  4. Patient will perform 30 minutes of standing balance activities with CGA to supervision for evidence of improved stability and muscular endurance of trunk and LE muscles  5. Patient will have improved Carrera Balance score >/= 20/56 for evidence of improved stability of gait and decreased fall risk    Plan  Therapy options: will be seen for skilled therapy services  Planned therapy interventions: abdominal trunk stabilization, ADL retraining, balance/weight-bearing training, fine motor coordination training, gait training, home exercise program, neuromuscular re-education, strengthening, therapeutic activities and transfer training  Frequency: 2x week  Duration in weeks: 12  Treatment plan discussed with: patient and caregiver (daughter Maribel)        Manual Therapy:         mins  23561;  Therapeutic Exercise:         mins  74653;     Neuromuscular Jorje:    14    mins  77330;    Therapeutic Activity:          mins  81119;     Gait Training:           mins  98508;     Ultrasound:          mins  55181;    Electrical Stimulation:         mins  37015 ( );  Dry Needling          mins self-pay    Timed Treatment:   14   mins   Total Treatment:     36   mins    PT SIGNATURE: Inge Howard PT, DPT, OCS  Electronically signed by: Inge Howard PT, 11/28/23, 9:52 AM EST  [place] License #[id number]     DATE TREATMENT INITIATED: 11/28/2023    Medicare Initial  Certification  Certification Period: 11/28/2023 thru 2/25/2024  I certify that the therapy services are furnished while this patient is under my care.  The services outlined above are required by this patient, and will be reviewed every 90 days.     PHYSICIAN: Josafat Mendez MD  [phone]                                          DATE:     Please sign and return via fax to [phone]. Thank you, [facility].

## 2023-11-30 NOTE — TELEPHONE ENCOUNTER
Rx Refill Note  Requested Prescriptions     Pending Prescriptions Disp Refills    sertraline (ZOLOFT) 50 MG tablet [Pharmacy Med Name: SERTRALINE 50MG TABLETS] 135 tablet 1     Sig: TAKE 1 AND 1/2 TABLETS BY MOUTH DAILY      Last office visit with prescribing clinician: 9/28/2023   Last telemedicine visit with prescribing clinician: Visit date not found   Next office visit with prescribing clinician: 3/29/2024                         Would you like a call back once the refill request has been completed: [] Yes [] No    If the office needs to give you a call back, can they leave a voicemail: [] Yes [] No    Darius Cardoso MA  11/30/23, 08:36 EST

## 2023-12-01 ENCOUNTER — TREATMENT (OUTPATIENT)
Dept: PHYSICAL THERAPY | Facility: CLINIC | Age: 83
End: 2023-12-01
Payer: MEDICARE

## 2023-12-01 DIAGNOSIS — R26.89 IMBALANCE: ICD-10-CM

## 2023-12-01 DIAGNOSIS — Z91.81 HISTORY OF FALL WITHIN PAST 90 DAYS: Primary | ICD-10-CM

## 2023-12-01 DIAGNOSIS — R26.9 GAIT DIFFICULTY: ICD-10-CM

## 2023-12-01 PROCEDURE — 97110 THERAPEUTIC EXERCISES: CPT | Performed by: PHYSICAL THERAPIST

## 2023-12-01 NOTE — PROGRESS NOTES
Physical Therapy Daily Treatment Note      [address] [facility], [address], [place] [id number]    Patient: Dereck Ramírez   : 1940  Referring practitioner: Josafat Mendez MD  Date of Initial Visit: Type: THERAPY  Noted: 2023  Today's Date: 2023  Patient seen for 2 sessions         Visit Diagnoses:     ICD-10-CM ICD-9-CM   1. History of fall within past 90 days  Z91.81 V15.88   2. Gait difficulty  R26.9 781.2   3. Imbalance  R26.89 781.2         Subjective Evaluation    History of Present Illness    Subjective comment: Doing ok today/no c/o. Ready to learn some exercises to get my legs stronger.       Objective   See Exercise, Manual, and Modality Logs for complete treatment.       Assessment & Plan       Assessment  Assessment details: Patient exhibits significant (B) LE weakness proximally > distally. This is accompanied by early onset of muscular fatigue with few exercise repetitions.  Patient exhibits (B) knee flexion contractures and therefore is unable to achieve TKE with open or closed chain quadriceps strengthening activities.  Discussed the importance of improving knee extension ROM and quadriceps muscle strength to improve stability of stance phase of gait as well as decrease the frequency of (B) LEs giving way while ambulating.          Progress per Plan of Care. Assess HEP compliance and tolerance to exercise initiation.         Timed:  Manual Therapy:         mins  54519;  Therapeutic Exercise:    32     mins  29352;     Neuromuscular Jorje:        mins  84831;    Therapeutic Activity:          mins  16640;     Gait Training:           mins  49843;     Ultrasound:          mins  29414;    Untimed:  Electrical Stimulation:         mins  24126 ( );  Mechanical Traction:         mins  72299;   Dry Needling              ___  mins   59901    Timed Treatment:   32   mins   Total Treatment:     32   mins    Inge Howard, PT, DPT, OCS  Physical Therapist  [place] License  #[id number]  Electronically signed by: Inge Howard PT, 12/01/23, 9:22 AM EST

## 2023-12-04 ENCOUNTER — TREATMENT (OUTPATIENT)
Dept: PHYSICAL THERAPY | Facility: CLINIC | Age: 83
End: 2023-12-04
Payer: MEDICARE

## 2023-12-04 DIAGNOSIS — R26.89 IMBALANCE: ICD-10-CM

## 2023-12-04 DIAGNOSIS — Z91.81 HISTORY OF FALL WITHIN PAST 90 DAYS: Primary | ICD-10-CM

## 2023-12-04 DIAGNOSIS — R26.9 GAIT DIFFICULTY: ICD-10-CM

## 2023-12-04 PROCEDURE — 97110 THERAPEUTIC EXERCISES: CPT | Performed by: PHYSICAL THERAPIST

## 2023-12-04 NOTE — PROGRESS NOTES
Physical Therapy Daily Treatment Note               [address] [facility] [address]                                                                                                                                             [place] [address]    Patient: Dereck Ramírez   : 1940  Referring practitioner: Josafat Mendez MD  Date of Initial Visit: Type: THERAPY  Noted: 2023  Today's Date: 2023  Patient seen for 3 sessions       Visit Diagnoses:    ICD-10-CM ICD-9-CM   1. History of fall within past 90 days  Z91.81 V15.88   2. Gait difficulty  R26.9 781.2   3. Imbalance  R26.89 781.2       Subjective Evaluation    History of Present Illness    Subjective comment: Pt reports that he has been doing his HEP with no issues.       Objective   See Exercise, Manual, and Modality Logs for complete treatment.   Added  seated thoracic extension,and sit to stand.    Assessment & Plan       Assessment  Assessment details: Pt completed treatment with no c/o pain in (B) LE's.  Pt tolerated progressions and additions to his program with no issue.  Pt benefited from vc during seated thoracic extensions to look straight head and not at the floor.  Pt's HEP was updated and given to him.          Timed:         Manual Therapy:    0     mins  56896;     Therapeutic Exercise:    31     mins  87325;     Neuromuscular Jorje:    0    mins  99601;    Therapeutic Activity:     0     mins  24296;     Gait Trainin     mins  57511;     Ultrasound:     0     mins  20861;    E Stim                            0    mins   94985( g0283)  Work Peraza/Cond      0    mins   50857        Timed Treatment:   31   mins   Total Treatment:     31   mins    Reji Lino PTA  [place] License: [id number]

## 2023-12-05 RX ORDER — ATORVASTATIN CALCIUM 40 MG/1
40 TABLET, FILM COATED ORAL DAILY
Qty: 90 TABLET | Refills: 1 | Status: SHIPPED | OUTPATIENT
Start: 2023-12-05

## 2023-12-06 ENCOUNTER — TREATMENT (OUTPATIENT)
Dept: PHYSICAL THERAPY | Facility: CLINIC | Age: 83
End: 2023-12-06
Payer: MEDICARE

## 2023-12-06 DIAGNOSIS — R26.9 GAIT DIFFICULTY: ICD-10-CM

## 2023-12-06 DIAGNOSIS — R26.89 IMBALANCE: ICD-10-CM

## 2023-12-06 DIAGNOSIS — Z91.81 HISTORY OF FALL WITHIN PAST 90 DAYS: Primary | ICD-10-CM

## 2023-12-06 PROCEDURE — 97530 THERAPEUTIC ACTIVITIES: CPT | Performed by: PHYSICAL THERAPIST

## 2023-12-06 PROCEDURE — 97110 THERAPEUTIC EXERCISES: CPT | Performed by: PHYSICAL THERAPIST

## 2023-12-06 NOTE — PROGRESS NOTES
Physical Therapy Daily Treatment Note      [address] [facility], [address], [place] [id number]    Patient: Dereck Ramírez   : 1940  Referring practitioner: Josafat Mendez MD  Date of Initial Visit: Type: THERAPY  Noted: 2023  Today's Date: 2023  Patient seen for 4 sessions         Visit Diagnoses:     ICD-10-CM ICD-9-CM   1. History of fall within past 90 days  Z91.81 V15.88   2. Gait difficulty  R26.9 781.2   3. Imbalance  R26.89 781.2         Subjective Evaluation    History of Present Illness    Subjective comment: Patient indicates he has been compliant with HEP and denies any falls since beginning PT.  States the colder weather is affecting his arthritis some.       Objective   See Exercise, Manual, and Modality Logs for complete treatment.   *Initiated HS curls vs band and seated resisted rows  *Increased exercise repetitions as appropriate    Assessment & Plan       Assessment  Assessment details: Patient and his daughter both report HEP compliance.  Patient benefits from frequent verbal and tactile cues as well as demo of PT performing exercise simultaneously for improved technique for anticipated maximal benefit.  He is able to progress strengthening activities this date with good tolerance. Improvement is noted with his sit to stand ability as he achieves improved upright posture during stance and performs 5 reps of this activity without UE assist upon rising to stand.  Plan to further progress lower extremity and trunk muscle strength for improved transfer ability and posture to facilitate progression to balance-specific tasks once appropriate.          Progress strengthening /stabilization /functional activity         Timed:  Manual Therapy:         mins  81766;  Therapeutic Exercise:    29     mins  28412;     Neuromuscular Jorje:        mins  69860;    Therapeutic Activity:     9     mins  83118;     Gait Training:           mins  97222;     Ultrasound:          mins  77105;     Untimed:  Electrical Stimulation:         mins  07300 ( );  Mechanical Traction:         mins  56628;   Dry Needling              ___  mins   81449    Timed Treatment:   38   mins   Total Treatment:     38   mins    Inge Howard PT, DPT, [facility]  Physical Therapist  [place] License #[id number]  Electronically signed by: Inge Howard PT, 12/06/23, 9:16 AM EST

## 2023-12-11 ENCOUNTER — TREATMENT (OUTPATIENT)
Dept: PHYSICAL THERAPY | Facility: CLINIC | Age: 83
End: 2023-12-11
Payer: MEDICARE

## 2023-12-11 DIAGNOSIS — R26.9 GAIT DIFFICULTY: ICD-10-CM

## 2023-12-11 DIAGNOSIS — R26.89 IMBALANCE: ICD-10-CM

## 2023-12-11 DIAGNOSIS — Z91.81 HISTORY OF FALL WITHIN PAST 90 DAYS: Primary | ICD-10-CM

## 2023-12-11 PROCEDURE — 97530 THERAPEUTIC ACTIVITIES: CPT | Performed by: PHYSICAL THERAPIST

## 2023-12-11 PROCEDURE — 97110 THERAPEUTIC EXERCISES: CPT | Performed by: PHYSICAL THERAPIST

## 2023-12-11 NOTE — PROGRESS NOTES
Physical Therapy Daily Treatment Note               [address] [facility] [address]                                                                                                                                             [place] [address]    Patient: Dereck Ramírez   : 1940  Referring practitioner: Josafat Mendez MD  Date of Initial Visit: Type: THERAPY  Noted: 2023  Today's Date: 2023  Patient seen for 5 sessions       Visit Diagnoses:    ICD-10-CM ICD-9-CM   1. History of fall within past 90 days  Z91.81 V15.88   2. Gait difficulty  R26.9 781.2   3. Imbalance  R26.89 781.2       Subjective Evaluation    History of Present Illness    Subjective comment: Pt reports that he is feeling fine today.       Objective   See Exercise, Manual, and Modality Logs for complete treatment.       Assessment & Plan       Assessment  Assessment details: Pt completed treatment with no c/o pain in (B) LE.  Pt tolerated the addition of leg presses, and band pulls for increased strength.  Pt's HEP was updated, and explained to pt and daughter.  Continue to progress as tolerated.          Timed:         Manual Therapy:    0     mins  98917;     Therapeutic Exercise:    33     mins  60967;     Neuromuscular Jorje:    0    mins  88514;    Therapeutic Activity:     13     mins  69377;     Gait Trainin     mins  53987;     Ultrasound:     0     mins  41766;    E Stim                            0    mins   28237( g0283)  Work Peraza/Cond      0    mins   04069        Timed Treatment:   46   mins   Total Treatment:     46   mins    Reji Lino PTA  [place] License: [id number]

## 2023-12-13 ENCOUNTER — TREATMENT (OUTPATIENT)
Dept: PHYSICAL THERAPY | Facility: CLINIC | Age: 83
End: 2023-12-13
Payer: MEDICARE

## 2023-12-13 DIAGNOSIS — R26.9 GAIT DIFFICULTY: ICD-10-CM

## 2023-12-13 DIAGNOSIS — Z91.81 HISTORY OF FALL WITHIN PAST 90 DAYS: Primary | ICD-10-CM

## 2023-12-13 DIAGNOSIS — R26.89 IMBALANCE: ICD-10-CM

## 2023-12-13 PROCEDURE — 97530 THERAPEUTIC ACTIVITIES: CPT | Performed by: PHYSICAL THERAPIST

## 2023-12-13 PROCEDURE — 97110 THERAPEUTIC EXERCISES: CPT | Performed by: PHYSICAL THERAPIST

## 2023-12-13 NOTE — PROGRESS NOTES
Physical Therapy Daily Treatment Note               [address] [facility] [address]                                                                                                                                             [place] [address]    Patient: Dereck Ramírez   : 1940  Referring practitioner: Josafat Mendez MD  Date of Initial Visit: Type: THERAPY  Noted: 2023  Today's Date: 2023  Patient seen for 6 sessions       Visit Diagnoses:    ICD-10-CM ICD-9-CM   1. History of fall within past 90 days  Z91.81 V15.88   2. Gait difficulty  R26.9 781.2   3. Imbalance  R26.89 781.2       Subjective     Objective   See Exercise, Manual, and Modality Logs for complete treatment.   Added Nu step    Assessment & Plan       Assessment  Assessment details: Pt completed treatment with no c/o pain in (B) LE.   Pt was able to progress reps on several exercises. Pt is able to complete the prescribed reps without breaking into sets.   Pt tolerated the addition of Nu step for improved (B) LE strength and muscular endurance.  Continue to progress per POC.          Timed:         Manual Therapy:    0     mins  46922;     Therapeutic Exercise:    31     mins  96837;     Neuromuscular Jorje:    0    mins  16873;    Therapeutic Activity:     14     mins  65120;     Gait Trainin     mins  40872;     Ultrasound:     0     mins  31034;    E Stim                            0    mins   12476( g0283)  Work Peraza/Cond      0    mins   70165        Timed Treatment:   45   mins   Total Treatment:     45   mins    Reji Lino PTA  [place] License: [id number]

## 2023-12-18 ENCOUNTER — TREATMENT (OUTPATIENT)
Dept: PHYSICAL THERAPY | Facility: CLINIC | Age: 83
End: 2023-12-18
Payer: MEDICARE

## 2023-12-18 DIAGNOSIS — Z91.81 HISTORY OF FALL WITHIN PAST 90 DAYS: Primary | ICD-10-CM

## 2023-12-18 DIAGNOSIS — R26.9 GAIT DIFFICULTY: ICD-10-CM

## 2023-12-18 DIAGNOSIS — R26.89 IMBALANCE: ICD-10-CM

## 2023-12-18 PROCEDURE — 97110 THERAPEUTIC EXERCISES: CPT | Performed by: PHYSICAL THERAPIST

## 2023-12-18 PROCEDURE — 97530 THERAPEUTIC ACTIVITIES: CPT | Performed by: PHYSICAL THERAPIST

## 2023-12-18 NOTE — PROGRESS NOTES
Physical Therapy Daily Treatment Note      [address] [facility], [address], [place] [id number]    Patient: Dereck Ramírez   : 1940  Referring practitioner: Josafat Mendez MD  Date of Initial Visit: Type: THERAPY  Noted: 2023  Today's Date: 2023  Patient seen for 7 sessions         Visit Diagnoses:     ICD-10-CM ICD-9-CM   1. History of fall within past 90 days  Z91.81 V15.88   2. Gait difficulty  R26.9 781.2   3. Imbalance  R26.89 781.2         Subjective Evaluation    History of Present Illness    Subjective comment: Patient reports his legs feel a little stronger and he believes his walking and balance are slightly improved. He still has difficulty walking and feels off balance, especially first thing in the morning, but this improves with time and activity.       Objective   See Exercise, Manual, and Modality Logs for complete treatment.       Assessment & Plan       Assessment  Assessment details: Patient demonstrates ability to ambulate approximately 75 ft with STC with 25% improved upright posture, CGA, and without rest break indicating improving functional strength and cardiorespiratory endurance.  He also exhibits improved muscular endurance during performance of NuStep activity (involving UEs and LEs) as well as upper body and lower body strengthening activities.  Plan to continue progression of functional UE, LE, and trunk strengthening to improve functional mobility tolerance and safety.          Progress strengthening /stabilization /functional activity         Timed:  Manual Therapy:         mins  09466;  Therapeutic Exercise:    28     mins  16288;     Neuromuscular Jorje:        mins  97304;    Therapeutic Activity:     12     mins  24204;     Gait Training:           mins  04260;     Ultrasound:          mins  16796;    Untimed:  Electrical Stimulation:         mins  81085 ( );  Mechanical Traction:         mins  97948;   Dry Needling              ___  mins    55014    Timed Treatment:   40   mins   Total Treatment:     40   mins    Inge Howard PT, DPT, OCS  Physical Therapist  [place] License #[id number]  Electronically signed by: Inge Howard PT, 12/18/23, 9:15 AM EST

## 2023-12-20 ENCOUNTER — TREATMENT (OUTPATIENT)
Dept: PHYSICAL THERAPY | Facility: CLINIC | Age: 83
End: 2023-12-20
Payer: MEDICARE

## 2023-12-20 DIAGNOSIS — R26.89 IMBALANCE: ICD-10-CM

## 2023-12-20 DIAGNOSIS — Z91.81 HISTORY OF FALL WITHIN PAST 90 DAYS: Primary | ICD-10-CM

## 2023-12-20 DIAGNOSIS — R26.9 GAIT DIFFICULTY: ICD-10-CM

## 2023-12-20 PROCEDURE — 97530 THERAPEUTIC ACTIVITIES: CPT | Performed by: PHYSICAL THERAPIST

## 2023-12-20 PROCEDURE — 97110 THERAPEUTIC EXERCISES: CPT | Performed by: PHYSICAL THERAPIST

## 2023-12-20 NOTE — PROGRESS NOTES
Physical Therapy Daily Treatment Note      [address] [facility], [address], [place] [id number]    Patient: Dereck Ramírez   : 1940  Referring practitioner: Josafat Mendez MD  Date of Initial Visit: Type: THERAPY  Noted: 2023  Today's Date: 2023  Patient seen for 8 sessions         Visit Diagnoses:     ICD-10-CM ICD-9-CM   1. History of fall within past 90 days  Z91.81 V15.88   2. Gait difficulty  R26.9 781.2   3. Imbalance  R26.89 781.2         Subjective Evaluation    History of Present Illness    Subjective comment: Feeling pretty good today. Legs are feeling stronger.       Objective   See Exercise, Manual, and Modality Logs for complete treatment.   *Increased exercise repetitions as appropriate    Assessment & Plan       Assessment  Assessment details: Patient demonstrates approximately 25% improved upright posture during ambulation within PT clinic.  This allows him improved LE clearance for decreased falls risk.  He does use STC (R) UE but primarily for steadying/balance assist rather than physical support. He does benefit from intermittent cueing to improve ROM arcs with exercise for greater anticipated strength gain. He demonstrates functional improvement in sit to stand ability as he is able to perform 10 repetitions without UE support or rest break, achieving nearly upright posture with each and exhibiting fair eccentric control upon return to sitting.          Progress strengthening /stabilization /functional activity         Timed:  Manual Therapy:         mins  30290;  Therapeutic Exercise:    34     mins  06638;     Neuromuscular Jorje:        mins  36739;    Therapeutic Activity:     14     mins  39879;     Gait Training:           mins  03240;     Ultrasound:          mins  03233;    Untimed:  Electrical Stimulation:         mins  79862 ( );  Mechanical Traction:         mins  28685;   Dry Needling              ___  mins   32157    Timed Treatment:   46   mins    Total Treatment:     46   mins    Inge Howard PT, DPT, OCS  Physical Therapist  [place] License #[id number]  Electronically signed by: Inge Howard PT, 12/20/23, 9:31 AM EST

## 2023-12-27 ENCOUNTER — TREATMENT (OUTPATIENT)
Dept: PHYSICAL THERAPY | Facility: CLINIC | Age: 83
End: 2023-12-27
Payer: MEDICARE

## 2023-12-27 DIAGNOSIS — R26.9 GAIT DIFFICULTY: ICD-10-CM

## 2023-12-27 DIAGNOSIS — R26.89 IMBALANCE: ICD-10-CM

## 2023-12-27 DIAGNOSIS — Z91.81 HISTORY OF FALL WITHIN PAST 90 DAYS: Primary | ICD-10-CM

## 2023-12-27 PROCEDURE — 97110 THERAPEUTIC EXERCISES: CPT | Performed by: PHYSICAL THERAPIST

## 2023-12-27 PROCEDURE — 97530 THERAPEUTIC ACTIVITIES: CPT | Performed by: PHYSICAL THERAPIST

## 2023-12-27 NOTE — PROGRESS NOTES
Physical Therapy Daily Treatment Note               [address] [facility] [address]                                                                                                                                             [place] [address]    Patient: Dereck Ramírez   : 1940  Referring practitioner: Josafat Mendez MD  Date of Initial Visit: Type: THERAPY  Noted: 2023  Today's Date: 2023  Patient seen for 9 sessions       Visit Diagnoses:    ICD-10-CM ICD-9-CM   1. History of fall within past 90 days  Z91.81 V15.88   2. Gait difficulty  R26.9 781.2   3. Imbalance  R26.89 781.2       Subjective Evaluation    History of Present Illness    Subjective comment: Pt reports that his (B) LE are a little sore today.       Objective   See Exercise, Manual, and Modality Logs for complete treatment.       Assessment & Plan       Assessment  Assessment details: Pt completed treatment with no c/o increased pain in LE. Pt was able to progress band resistance with no issues on several exercises.  He tolerated increased time on Nu step for improved LE strength.  Continue to progress per POC.          Timed:         Manual Therapy:    0     mins  88314;     Therapeutic Exercise:    36     mins  87987;     Neuromuscular Jorje:    0    mins  97736;    Therapeutic Activity:     15     mins  87922;     Gait Trainin     mins  85937;     Ultrasound:     0     mins  02549;    E Stim                            0    mins   90859( g0283)  Work Peraza/Cond      0    mins   87526        Timed Treatment:   51   mins   Total Treatment:     51   mins    Reji Lino PTA  [place] License: [id number]

## 2023-12-29 ENCOUNTER — TREATMENT (OUTPATIENT)
Dept: PHYSICAL THERAPY | Facility: CLINIC | Age: 83
End: 2023-12-29
Payer: MEDICARE

## 2023-12-29 DIAGNOSIS — R26.89 IMBALANCE: ICD-10-CM

## 2023-12-29 DIAGNOSIS — Z91.81 HISTORY OF FALL WITHIN PAST 90 DAYS: Primary | ICD-10-CM

## 2023-12-29 DIAGNOSIS — R26.9 GAIT DIFFICULTY: ICD-10-CM

## 2023-12-29 PROCEDURE — 97110 THERAPEUTIC EXERCISES: CPT | Performed by: PHYSICAL THERAPIST

## 2023-12-29 PROCEDURE — 97530 THERAPEUTIC ACTIVITIES: CPT | Performed by: PHYSICAL THERAPIST

## 2023-12-29 NOTE — PROGRESS NOTES
Physical Therapy Daily Treatment Note               [address] [facility] [address]                                                                                                                                             [place] [address]    Patient: Dereck Ramírez   : 1940  Referring practitioner: Josafat Mendez MD  Date of Initial Visit: Type: THERAPY  Noted: 2023  Today's Date: 2023  Patient seen for 10 sessions       Visit Diagnoses:    ICD-10-CM ICD-9-CM   1. History of fall within past 90 days  Z91.81 V15.88   2. Gait difficulty  R26.9 781.2   3. Imbalance  R26.89 781.2       Subjective Evaluation    History of Present Illness    Subjective comment: Pt reports that he is feeling pretty good today.       Objective   See Exercise, Manual, and Modality Logs for complete treatment.       Assessment & Plan       Assessment  Assessment details: Pt completed treatment with no c/o pain in (B) LE.  Pt was able to progress reps on several exercises indicating increased LE strength.  Pt benefits from vc and help counting reps to stay on task.  Pt's gait and walking posture have improved since starting therapy.  Plan to progress to gait training nxt treatment.          Timed:         Manual Therapy:    0     mins  29076;     Therapeutic Exercise:    40     mins  53500;     Neuromuscular Jorje:    0    mins  58818;    Therapeutic Activity:     15     mins  21876;     Gait Trainin     mins  89098;     Ultrasound:     0     mins  73600;    E Stim                            0    mins   92510( g0283)  Work Peraza/Cond      0    mins   66465        Timed Treatment:   55   mins   Total Treatment:     55   mins    Reji Lino PTA  [place] License: [id number]

## 2024-01-03 ENCOUNTER — TREATMENT (OUTPATIENT)
Dept: PHYSICAL THERAPY | Facility: CLINIC | Age: 84
End: 2024-01-03
Payer: MEDICARE

## 2024-01-03 DIAGNOSIS — R26.9 GAIT DIFFICULTY: ICD-10-CM

## 2024-01-03 DIAGNOSIS — R26.89 IMBALANCE: ICD-10-CM

## 2024-01-03 DIAGNOSIS — Z91.81 HISTORY OF FALL WITHIN PAST 90 DAYS: Primary | ICD-10-CM

## 2024-01-03 PROCEDURE — 97530 THERAPEUTIC ACTIVITIES: CPT | Performed by: PHYSICAL THERAPIST

## 2024-01-03 PROCEDURE — 97110 THERAPEUTIC EXERCISES: CPT | Performed by: PHYSICAL THERAPIST

## 2024-01-03 PROCEDURE — 97112 NEUROMUSCULAR REEDUCATION: CPT | Performed by: PHYSICAL THERAPIST

## 2024-01-03 NOTE — PROGRESS NOTES
Re-Assessment / Re-Certification  [address] [facility], [address], [place] [address]      Patient: Dereck Ramírez   : 1940  Diagnosis/ICD-10 Code:  History of fall within past 90 days [Z91.81]  Referring practitioner: Josafat Mendez MD  Date of Initial Visit: 1/3/2024  Today's Date: 1/3/2024  Patient seen for 11 sessions      Subjective:   Carrera Balance:   Clinical Progress: improved  Home Program Compliance: Yes  Treatment has included: therapeutic exercise, neuromuscular re-education, therapeutic activity, and gait training    Subjective Evaluation    History of Present Illness    Subjective comment: My legs are feeling stronger and I'm a little steadier with my walking. [Patient denies any falls since beginning PT.]Pain  Current pain ratin         Objective          Strength/Myotome Testing     Left Hip   Planes of Motion   Flexion: 4  Abduction: 4-  Adduction: 4-    Right Hip   Planes of Motion   Flexion: 4-  Abduction: 4-  Adduction: 4-    Left Knee   Flexion: 4-  Extension: 4    Right Knee   Flexion: 4-  Extension: 4    Left Ankle/Foot   Dorsiflexion: 4    Right Ankle/Foot   Dorsiflexion: 4-    Functional Assessment     Comments  5 x sit to stand = 23 sec without UE assist      Assessment & Plan       Assessment  Assessment details: Patient has remained motivated and compliant with PT interventions.  Patient denies any falls since beginning PT and reports perceived improvement in (B) LE strength and steadiness of gait.  He exhibits approximately 25% improved upright posture during standing and ambulation.  He continues to rely on STC for safety and steadying during ambulation.  He demonstrates improved strength of (B) LEs, 13 point improvement on Carrera Balance scale, and a mild improvement in exercise tolerance.  However, deficits in strength and balance for which patient will benefit from continued skilled PT services to improve independence and safety with functional  mobility.    Goals  Plan Goals: STGs: to be met in 4 weeks  1. Patient and daughter will be independent with initial HEP - MET  2. Patient will report </= 1 fall since beginning PT for reduced falls and injury risk - MET/NONE SINCE BEGINNING PT  3. Patient will perform 5 x sit to stand </= 20 sec with (B) UE assist for evidence of improved proximal LE strength and reduced fall risk - NOT MET/IMPROVED  4. Patient will demonstrate 25% improved upright posture and stability of gait with least restrictive AD - MET     LTGs: to be met in 8 weeks  1. Patient and daughter will be independent with progressed HEP - NOT MET  2. Patient will report no falls x 4 weeks for evidence of improved stability of gait and balance - MET THUS FAR  3. Patient will ambulate short community distances independently with least restrictive AD with supervision for improved ability to navigate within the community - NOT MET/IMPROVING  4. Patient will perform 30 minutes of standing balance activities with CGA to supervision for evidence of improved stability and muscular endurance of trunk and LE muscles - NOT MET/NOT ADDRESSED  5. Patient will have improved Carrera Balance score >/= 20/56 for evidence of improved stability of gait and decreased fall risk - MET  --> NEW GOAL: Patient will have improved Carrera Balance score >/= 30/56 for evidence of improved stability of gait and decreased fall risk - NOT MET        Progress toward previous goals: Partially Met  Recommendations: Continue as planned  Timeframe: 2 months  Prognosis to achieve goals: good    PT Signature: Inge Howard, PT, DPT, OCS  [place] License # [id number]      Based upon review of the patient's progress and continued therapy plan, it is my medical opinion that Dereck Ramírez [facility]  [address] [place] [address] [phone].    Signature: __________________________________   Josafat Mendez MD    Manual Therapy:         mins  91449;  Therapeutic Exercise:    32     mins  38418;     Neuromuscular Jorje:    10    mins  41680;    Therapeutic Activity:     15     mins  55832;     Gait Training:           mins  79785;     Ultrasound:          mins  22762;    Electrical Stimulation:         mins  19454 ( );  Dry Needling          mins self-pay    Timed Treatment:   57   mins   Total Treatment:     57   mins    Please sign and return via fax to [phone]. Thank you, [facility].

## 2024-01-08 ENCOUNTER — TREATMENT (OUTPATIENT)
Dept: PHYSICAL THERAPY | Facility: CLINIC | Age: 84
End: 2024-01-08
Payer: MEDICARE

## 2024-01-08 DIAGNOSIS — Z91.81 HISTORY OF FALL WITHIN PAST 90 DAYS: Primary | ICD-10-CM

## 2024-01-08 DIAGNOSIS — R26.9 GAIT DIFFICULTY: ICD-10-CM

## 2024-01-08 DIAGNOSIS — R26.89 IMBALANCE: ICD-10-CM

## 2024-01-08 PROCEDURE — 97110 THERAPEUTIC EXERCISES: CPT | Performed by: PHYSICAL THERAPIST

## 2024-01-08 PROCEDURE — 97530 THERAPEUTIC ACTIVITIES: CPT | Performed by: PHYSICAL THERAPIST

## 2024-01-08 NOTE — PROGRESS NOTES
Physical Therapy Daily Treatment Note      [address] [facility], [address], [place] [id number]    Patient: Dereck Ramírez   : 1940  Referring practitioner: Josafat Mendez MD  Date of Initial Visit: Type: THERAPY  Noted: 2023  Today's Date: 2024  Patient seen for 12 sessions         Visit Diagnoses:     ICD-10-CM ICD-9-CM   1. History of fall within past 90 days  Z91.81 V15.88   2. Gait difficulty  R26.9 781.2   3. Imbalance  R26.89 781.2         Subjective Evaluation    History of Present Illness    Subjective comment: Feeling good today.  My knees are not wanting to give way as much when I walk/stand so they are feeling stronger.       Objective   See Exercise, Manual, and Modality Logs for complete treatment.   *Increased time spent performing NuStep this date  *Initiated standing LE strengthening activities    Assessment & Plan       Assessment  Assessment details: Patient is able to increase time on NuStep this date as well as progress strengthening activities to a more functional standing position.  He does require CGA during all activities to ensure safety as well as frequent but brief rest breaks (approximately 45 sec -1 minute every 2 exercises).  He benefits from frequent verbal and tactile cueing to improve upright posture during all standing strengthening activities as well as to minimize trunk compensation during hip AROM and strengthening.  He exhibits early onset of muscular fatigue and exhibits moderate fatigue signs by end of session.  He does however demonstrate ability to ambulate approximately 5-6 steps without use of STC with CGA.           Progress strengthening /stabilization /functional activity. Assess tolerance to initiation of standing strengthening activities.         Timed:  Manual Therapy:         mins  65622;  Therapeutic Exercise:    28     mins  21612;     Neuromuscular Jorje:        mins  18909;    Therapeutic Activity:     13     mins  42128;     Gait  Training:           mins  27508;     Ultrasound:          mins  46131;    Untimed:  Electrical Stimulation:         mins  56545 ( );  Mechanical Traction:         mins  59637;   Dry Needling              ___  mins   20561    Timed Treatment:   41   mins   Total Treatment:     45   mins    Inge Howard PT, DPT, OCS  Physical Therapist  [place] License #[id number]  Electronically signed by: Inge Howard PT, 01/08/24, 9:12 AM EST

## 2024-01-10 ENCOUNTER — TREATMENT (OUTPATIENT)
Dept: PHYSICAL THERAPY | Facility: CLINIC | Age: 84
End: 2024-01-10
Payer: MEDICARE

## 2024-01-10 DIAGNOSIS — Z91.81 HISTORY OF FALL WITHIN PAST 90 DAYS: Primary | ICD-10-CM

## 2024-01-10 DIAGNOSIS — R26.9 GAIT DIFFICULTY: ICD-10-CM

## 2024-01-10 DIAGNOSIS — R26.89 IMBALANCE: ICD-10-CM

## 2024-01-10 PROCEDURE — 97110 THERAPEUTIC EXERCISES: CPT | Performed by: PHYSICAL THERAPIST

## 2024-01-10 PROCEDURE — 97530 THERAPEUTIC ACTIVITIES: CPT | Performed by: PHYSICAL THERAPIST

## 2024-01-10 NOTE — PROGRESS NOTES
Physical Therapy Daily Treatment Note      [address], [address], [place] [address]    Patient: Dereck Ramírez   : 1940  Referring practitioner: Josafat Mendez MD  Date of Initial Visit: Type: THERAPY  Noted: 2023  Today's Date: 1/10/2024  Patient seen for 13 sessions         Visit Diagnoses:     ICD-10-CM ICD-9-CM   1. History of fall within past 90 days  Z91.81 V15.88   2. Gait difficulty  R26.9 781.2   3. Imbalance  R26.89 781.2         Subjective Evaluation    History of Present Illness    Subjective comment: Feeling good today.       Objective   See Exercise, Manual, and Modality Logs for complete treatment.       Assessment & Plan       Assessment  Assessment details: Patient demonstrates improved upright posture and ability to ambulate in forward plane without UE support for very short distance; instead, he is able to hover his hand above the bar to ensure he is able to grab it quickly in case of LOB episode which is not experienced this date.  He does require CGA during all activities due to general unsteadiness however.  Alternating step tapping is initiated this date with single UE support to facilitate improved weightshifting ability, tolerance to single limb stance position, and LE clearance during ambulation.            Progress strengthening /stabilization /functional activity         Timed:  Manual Therapy:         mins  43554;  Therapeutic Exercise:    28     mins  92031;     Neuromuscular Jorje:        mins  47498;    Therapeutic Activity:     10     mins  44474;     Gait Training:           mins  65634;     Ultrasound:          mins  85487;    Untimed:  Electrical Stimulation:         mins  06565 ( );  Mechanical Traction:         mins  64311;   Dry Needling              ___  mins   11053    Timed Treatment:   38   mins   Total Treatment:     38   mins    Inge Howard PT, DPT, OCS  Physical Therapist  [place] License #[id number]  Electronically signed by: Inge Howard  PT, 01/10/24, 9:28 AM EST

## 2024-01-15 ENCOUNTER — TELEPHONE (OUTPATIENT)
Dept: PHYSICAL THERAPY | Facility: CLINIC | Age: 84
End: 2024-01-15

## 2024-01-17 ENCOUNTER — TREATMENT (OUTPATIENT)
Dept: PHYSICAL THERAPY | Facility: CLINIC | Age: 84
End: 2024-01-17
Payer: MEDICARE

## 2024-01-17 DIAGNOSIS — R26.9 GAIT DIFFICULTY: ICD-10-CM

## 2024-01-17 DIAGNOSIS — R26.89 IMBALANCE: ICD-10-CM

## 2024-01-17 DIAGNOSIS — Z91.81 HISTORY OF FALL WITHIN PAST 90 DAYS: Primary | ICD-10-CM

## 2024-01-17 PROCEDURE — 97530 THERAPEUTIC ACTIVITIES: CPT | Performed by: PHYSICAL THERAPIST

## 2024-01-17 PROCEDURE — 97110 THERAPEUTIC EXERCISES: CPT | Performed by: PHYSICAL THERAPIST

## 2024-01-17 NOTE — PROGRESS NOTES
Physical Therapy Daily Treatment Note      [address] [facility], [address], [place] [id number]    Patient: Dereck Ramírez   : 1940  Referring practitioner: Josafat Mendez MD  Date of Initial Visit: Type: THERAPY  Noted: 2023  Today's Date: 2024  Patient seen for 14 sessions         Visit Diagnoses:     ICD-10-CM ICD-9-CM   1. History of fall within past 90 days  Z91.81 V15.88   2. Gait difficulty  R26.9 781.2   3. Imbalance  R26.89 781.2         Subjective Evaluation    History of Present Illness    Subjective comment: Patient's daughter reports patient fell 2 nights ago while walking into the living room without his cane.  He fell forward.  He denies hitting anything or injuring anything.  Patient's daughter does report patient overall is seeming stronger and moving around better at home.  She believes the physical therapy is helping him.       Objective   See Exercise, Manual, and Modality Logs for complete treatment.       Assessment & Plan       Assessment  Assessment details: Calcaneal adduction during ambulation narrows OREN and patient nearly at times clips opposite heel, causing some unsteadiness. He persists with tendency to stand with (B) hips and knees remaining in flexion as well as adduction of rearfoot and abduction of forefoot (B). Patient does not demonstrate notable ability to self-correct this despite verbal and tactile cueing.  Plan to progress strength and stability activities to reduce fall risk and improve safety and independence with mobility within his home.          Progress strengthening /stabilization /functional activity         Timed:  Manual Therapy:         mins  87687;  Therapeutic Exercise:    30     mins  01124;     Neuromuscular Jorje:        mins  23856;    Therapeutic Activity:     12     mins  93094;     Gait Training:           mins  86727;     Ultrasound:          mins  54899;    Untimed:  Electrical Stimulation:         mins  60646 (  );  Mechanical Traction:         mins  88282;   Dry Needling              ___  mins   67238    Timed Treatment:   42   mins   Total Treatment:     42   mins    Inge Howard PT, DPT, OCS  Physical Therapist  [place] License #[id number]  Electronically signed by: Inge Howard PT, 01/17/24, 9:32 AM EST

## 2024-01-22 ENCOUNTER — TREATMENT (OUTPATIENT)
Dept: PHYSICAL THERAPY | Facility: CLINIC | Age: 84
End: 2024-01-22
Payer: MEDICARE

## 2024-01-22 DIAGNOSIS — R26.89 IMBALANCE: ICD-10-CM

## 2024-01-22 DIAGNOSIS — R26.9 GAIT DIFFICULTY: ICD-10-CM

## 2024-01-22 DIAGNOSIS — Z91.81 HISTORY OF FALL WITHIN PAST 90 DAYS: Primary | ICD-10-CM

## 2024-01-22 PROCEDURE — 97110 THERAPEUTIC EXERCISES: CPT | Performed by: PHYSICAL THERAPIST

## 2024-01-22 PROCEDURE — 97530 THERAPEUTIC ACTIVITIES: CPT | Performed by: PHYSICAL THERAPIST

## 2024-01-22 RX ORDER — CARVEDILOL 25 MG/1
TABLET ORAL
Qty: 180 TABLET | Refills: 3 | Status: SHIPPED | OUTPATIENT
Start: 2024-01-22

## 2024-01-22 NOTE — PROGRESS NOTES
"   Physical Therapy Daily Treatment Note      [address] [facility], [address], [place] [address]    Patient: Dereck Ramírez   : 1940  Referring practitioner: Josafat Mendez MD  Date of Initial Visit: Type: THERAPY  Noted: 2023  Today's Date: 2024  Patient seen for 15 sessions         Visit Diagnoses:     ICD-10-CM ICD-9-CM   1. History of fall within past 90 days  Z91.81 V15.88   2. Gait difficulty  R26.9 781.2   3. Imbalance  R26.89 781.2         Subjective Evaluation    History of Present Illness    Subjective comment: \"Feeling pretty good today.\"       Objective   See Exercise, Manual, and Modality Logs for complete treatment.   *Increased NuStep resistance to lvl 4  *Increased exercise repetitions as appropriate  *Initiated 4\" FW step-ups with single UE rail support (opposite)    Assessment & Plan       Assessment  Assessment details: Patient demonstrates improved exercise tolerance this date as he is able to perform NuStep with increased resistance, increased exercise repetitions as appropriate, and initiate step-up activity at 4\" height.  He performs step-ups with single (opposite) UE assist on handrail and close CGA of PT.  He also requires verbal cues to properly sequence his stepping.  He is noted to independently stand from chairs without UE assist and briefly pause prior to ambulating as he has been instructed in PT.          Progress strengthening /stabilization /functional activity         Timed:  Manual Therapy:         mins  58127;  Therapeutic Exercise:    28     mins  00524;     Neuromuscular Jorje:        mins  79684;    Therapeutic Activity:     16     mins  16788;     Gait Training:           mins  25707;     Ultrasound:          mins  98951;    Untimed:  Electrical Stimulation:         mins  39412 ( );  Mechanical Traction:         mins  26487;   Dry Needling              ___  mins   42090    Timed Treatment:   44   mins   Total Treatment:     44   mins    Inge " Lee PT, DPT, OCS  Physical Therapist  [place] License #[id number]  Electronically signed by: Inge Howard PT, 01/22/24, 9:21 AM EST

## 2024-01-24 ENCOUNTER — TREATMENT (OUTPATIENT)
Dept: PHYSICAL THERAPY | Facility: CLINIC | Age: 84
End: 2024-01-24
Payer: MEDICARE

## 2024-01-24 DIAGNOSIS — Z91.81 HISTORY OF FALL WITHIN PAST 90 DAYS: Primary | ICD-10-CM

## 2024-01-24 DIAGNOSIS — R26.9 GAIT DIFFICULTY: ICD-10-CM

## 2024-01-24 DIAGNOSIS — R26.89 IMBALANCE: ICD-10-CM

## 2024-01-24 PROCEDURE — 97110 THERAPEUTIC EXERCISES: CPT | Performed by: PHYSICAL THERAPIST

## 2024-01-24 PROCEDURE — 97530 THERAPEUTIC ACTIVITIES: CPT | Performed by: PHYSICAL THERAPIST

## 2024-01-24 NOTE — PROGRESS NOTES
Physical Therapy Daily Treatment Note      [address] [facility], [address], [place] [address]    Patient: Dereck Ramírez   : 1940  Referring practitioner: Josafat Mendez MD  Date of Initial Visit: Type: THERAPY  Noted: 2023  Today's Date: 2024  Patient seen for 16 sessions         Visit Diagnoses:     ICD-10-CM ICD-9-CM   1. History of fall within past 90 days  Z91.81 V15.88   2. Gait difficulty  R26.9 781.2   3. Imbalance  R26.89 781.2         Subjective Evaluation    History of Present Illness    Subjective comment: Feeling good today.       Objective   See Exercise, Manual, and Modality Logs for complete treatment.   *Increased exercise repetitions as appropriate    Assessment & Plan       Assessment  Assessment details: Patient continues to give good effort with all PT activities.  He demonstrates sign of improving muscular endurance and functional trunk/LE strength as evidenced by decreased frequency of seated rest breaks necessary to complete all tasks this date despite increased exercise repetitions.  He does not yet demonstrate sufficient stability of gait to render him a safe ambulator without AD.          Progress strengthening /stabilization /functional activity         Timed:  Manual Therapy:         mins  23809;  Therapeutic Exercise:    27     mins  18166;     Neuromuscular Jorje:        mins  70954;    Therapeutic Activity:     15     mins  13560;     Gait Training:           mins  19128;     Ultrasound:          mins  02666;    Untimed:  Electrical Stimulation:         mins  53481 ( );  Mechanical Traction:         mins  94881;   Dry Needling              ___  mins   61132    Timed Treatment:   42   mins   Total Treatment:     42   mins    Inge Howard PT, DPT, OCS  Physical Therapist  [place] License #[id number]  Electronically signed by: Inge Howard PT, 24, 9:14 AM EST

## 2024-01-29 ENCOUNTER — TREATMENT (OUTPATIENT)
Dept: PHYSICAL THERAPY | Facility: CLINIC | Age: 84
End: 2024-01-29
Payer: MEDICARE

## 2024-01-29 DIAGNOSIS — R26.9 GAIT DIFFICULTY: ICD-10-CM

## 2024-01-29 DIAGNOSIS — R26.89 IMBALANCE: ICD-10-CM

## 2024-01-29 DIAGNOSIS — Z91.81 HISTORY OF FALL WITHIN PAST 90 DAYS: Primary | ICD-10-CM

## 2024-01-29 PROCEDURE — 97110 THERAPEUTIC EXERCISES: CPT | Performed by: PHYSICAL THERAPIST

## 2024-01-29 PROCEDURE — 97530 THERAPEUTIC ACTIVITIES: CPT | Performed by: PHYSICAL THERAPIST

## 2024-01-29 NOTE — PROGRESS NOTES
Physical Therapy Daily Treatment Note      [address] [facility], [address], [place] [id number]    Patient: Dereck Ramírez   : 1940  Referring practitioner: Josafat Mendez MD  Date of Initial Visit: Type: THERAPY  Noted: 2023  Today's Date: 2024  Patient seen for 17 sessions         Visit Diagnoses:     ICD-10-CM ICD-9-CM   1. History of fall within past 90 days  Z91.81 V15.88   2. Gait difficulty  R26.9 781.2   3. Imbalance  R26.89 781.2         Subjective Evaluation    History of Present Illness    Subjective comment: Feeling a little steadier on my feet.       Objective   See Exercise, Manual, and Modality Logs for complete treatment.       Assessment & Plan       Assessment  Assessment details: Patient requires only one brief seated rest break to complete all activities this date, indicating improving muscular strength and endurance. He is noted to have decreased (L) LE clearance during (R) sidestepping during which he benefits from verbal cueing, and he exhibits decreased neuromuscular control of (L) during repeated step activities and hip abduction strengthening.  He utilizes apparent increased effort during activities involving (L) LE versus (R) LE.  He experiences one episode of (L) heel clipping (R) heel during forward advancement of (L) LE due to overall increased forefoot abduction and calcaneal adduction.           Progress strengthening /stabilization /functional activity         Timed:  Manual Therapy:         mins  32546;  Therapeutic Exercise:    26     mins  40661;     Neuromuscular Jorje:        mins  49943;    Therapeutic Activity:     12     mins  30810;     Gait Training:           mins  88550;     Ultrasound:          mins  71702;    Untimed:  Electrical Stimulation:         mins  72444 ( );  Mechanical Traction:         mins  45618;   Dry Needling              ___  mins   73761    Timed Treatment:   38   mins   Total Treatment:     38   mins    Inge Howard PT,  DPT, OCS  Physical Therapist  [place] License #[id number]  Electronically signed by: Inge Howard PT, 01/29/24, 9:06 AM EST

## 2024-01-31 ENCOUNTER — TREATMENT (OUTPATIENT)
Dept: PHYSICAL THERAPY | Facility: CLINIC | Age: 84
End: 2024-01-31
Payer: MEDICARE

## 2024-01-31 DIAGNOSIS — R26.89 IMBALANCE: ICD-10-CM

## 2024-01-31 DIAGNOSIS — Z91.81 HISTORY OF FALL WITHIN PAST 90 DAYS: Primary | ICD-10-CM

## 2024-01-31 DIAGNOSIS — R26.9 GAIT DIFFICULTY: ICD-10-CM

## 2024-01-31 PROCEDURE — 97110 THERAPEUTIC EXERCISES: CPT | Performed by: PHYSICAL THERAPIST

## 2024-01-31 PROCEDURE — 97530 THERAPEUTIC ACTIVITIES: CPT | Performed by: PHYSICAL THERAPIST

## 2024-01-31 NOTE — PROGRESS NOTES
Physical Therapy Daily Treatment Note      [address] [facility], [address], [place] [id number]    Patient: Dereck Ramírez   : 1940  Referring practitioner: Josafat Mendez MD  Date of Initial Visit: Type: THERAPY  Noted: 2023  Today's Date: 2024  Patient seen for 18 sessions         Visit Diagnoses:     ICD-10-CM ICD-9-CM   1. History of fall within past 90 days  Z91.81 V15.88   2. Gait difficulty  R26.9 781.2   3. Imbalance  R26.89 781.2         Subjective Evaluation    History of Present Illness    Subjective comment: Feeling pretty good today.  [Patient's daughter reports patient is standing up from chairs better but his ambulation distance/endurance remains limited.]       Objective   See Exercise, Manual, and Modality Logs for complete treatment.   *Increased reps of sit to stand activity  *Increased ambulation distance at bar (forward and sidestepping)    Assessment & Plan       Assessment  Assessment details: Patient demonstrates improved ambulation ability at bar for sidestepping and forward walking.  Patient utilizes single UE on bar intermittently for balance assist only rather than physical support.  Persistent neuromuscular control deficits are evident (L) LE with decreased clearance during advancement and earlier onset of muscular fatigue vs (R) LE.  Spoke with patient's daughter regarding functional progress at this time and she does not feel his ambulation distance and endurance have significantly improved, therefore she is likely in favor of taking a break from PT at this time.          Other - potential D/C.          Timed:  Manual Therapy:         mins  51749;  Therapeutic Exercise:    28     mins  82901;     Neuromuscular Jorje:        mins  83304;    Therapeutic Activity:     14     mins  24315;     Gait Training:           mins  26090;     Ultrasound:          mins  39548;    Untimed:  Electrical Stimulation:         mins  76793 ( );  Mechanical Traction:          mins  23956;   Dry Needling              ___  mins   54850    Timed Treatment:   43   mins   Total Treatment:     43   mins    Inge Howard PT, DPT, OCS  Physical Therapist  [place] License #[id number]  Electronically signed by: Inge Howard PT, 01/31/24, 9:13 AM EST

## 2024-03-19 RX ORDER — CLOPIDOGREL BISULFATE 75 MG/1
75 TABLET ORAL DAILY
Qty: 90 TABLET | Refills: 3 | Status: SHIPPED | OUTPATIENT
Start: 2024-03-19

## 2024-03-26 RX ORDER — NATEGLINIDE 60 MG/1
60 TABLET ORAL 2 TIMES DAILY WITH MEALS
Qty: 180 TABLET | Refills: 3 | Status: SHIPPED | OUTPATIENT
Start: 2024-03-26

## 2024-03-26 RX ORDER — LANOLIN ALCOHOL/MO/W.PET/CERES
1000 CREAM (GRAM) TOPICAL DAILY
Qty: 90 TABLET | Refills: 1 | Status: SHIPPED | OUTPATIENT
Start: 2024-03-26

## 2024-03-26 NOTE — TELEPHONE ENCOUNTER
Left vm to call back and let me know which pharmacy his refills of B12 and Starlix needs to go to.    RELAY

## 2024-03-26 NOTE — TELEPHONE ENCOUNTER
Rx Refill Note  Requested Prescriptions     Pending Prescriptions Disp Refills    vitamin B-12 (CYANOCOBALAMIN) 1000 MCG tablet 90 tablet 1     Sig: Take 1 tablet by mouth Daily.    nateglinide (STARLIX) 60 MG tablet 180 tablet 3     Sig: Take 1 tablet by mouth 2 (Two) Times a Day With Meals.      Last office visit with prescribing clinician: 9/28/2023   Last telemedicine visit with prescribing clinician: Visit date not found   Next office visit with prescribing clinician: 3/29/2024                         Would you like a call back once the refill request has been completed: [] Yes [] No    If the office needs to give you a call back, can they leave a voicemail: [] Yes [] No    Darius Cardoso MA  03/26/24, 11:39 EDT

## 2024-03-26 NOTE — TELEPHONE ENCOUNTER
Name: Benita Arreguin      Relationship: Emergency Contact      Best Callback Number: [phone]       HUB PROVIDED THE RELAY MESSAGE FROM THE OFFICE      PATIENT: VOICED UNDERSTANDING AND HAS NO FURTHER QUESTIONS AT THIS TIME    ADDITIONAL INFORMATION:  BENITA STATES IT NEEDS TO GO TO THE [id number], [place] - [address] AT [facility] OF [address] - [phone]  - [phone] FX

## 2024-03-29 ENCOUNTER — OFFICE VISIT (OUTPATIENT)
Dept: FAMILY MEDICINE CLINIC | Facility: CLINIC | Age: 84
End: 2024-03-29
Payer: MEDICARE

## 2024-03-29 VITALS
RESPIRATION RATE: 18 BRPM | BODY MASS INDEX: 30.24 KG/M2 | HEART RATE: 58 BPM | OXYGEN SATURATION: 99 % | DIASTOLIC BLOOD PRESSURE: 74 MMHG | SYSTOLIC BLOOD PRESSURE: 124 MMHG | HEIGHT: 72 IN | TEMPERATURE: 96.7 F

## 2024-03-29 DIAGNOSIS — E11.59 TYPE 2 DIABETES MELLITUS WITH OTHER CIRCULATORY COMPLICATION, WITHOUT LONG-TERM CURRENT USE OF INSULIN: Primary | ICD-10-CM

## 2024-03-29 DIAGNOSIS — R42 DIZZINESS: ICD-10-CM

## 2024-03-29 DIAGNOSIS — R53.1 GENERALIZED WEAKNESS: ICD-10-CM

## 2024-03-29 DIAGNOSIS — Z12.5 SCREENING FOR PROSTATE CANCER: ICD-10-CM

## 2024-03-29 DIAGNOSIS — I10 HYPERTENSION, ESSENTIAL: ICD-10-CM

## 2024-03-29 DIAGNOSIS — E78.2 MIXED HYPERLIPIDEMIA: ICD-10-CM

## 2024-03-29 DIAGNOSIS — Z86.73 HISTORY OF STROKE: ICD-10-CM
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
## 2024-03-30 LAB
ALBUMIN SERPL-MCNC: 4.1 G/DL (ref 3.7–4.7)
ALBUMIN/GLOB SERPL: 1.5 {RATIO} (ref 1.2–2.2)
ALP SERPL-CCNC: 203 IU/L (ref 44–121)
ALT SERPL-CCNC: 27 IU/L (ref 0–44)
AST SERPL-CCNC: 21 IU/L (ref 0–40)
BASOPHILS # BLD AUTO: 0.1 X10E3/UL (ref 0–0.2)
BASOPHILS NFR BLD AUTO: 1 %
BILIRUB SERPL-MCNC: 0.3 MG/DL (ref 0–1.2)
BUN SERPL-MCNC: 49 MG/DL (ref 8–27)
BUN/CREAT SERPL: 24 (ref 10–24)
CALCIUM SERPL-MCNC: 8.8 MG/DL (ref 8.6–10.2)
CHLORIDE SERPL-SCNC: 102 MMOL/L (ref 96–106)
CHOLEST SERPL-MCNC: 139 MG/DL (ref 100–199)
CO2 SERPL-SCNC: 21 MMOL/L (ref 20–29)
CREAT SERPL-MCNC: 2.07 MG/DL (ref 0.76–1.27)
EGFRCR SERPLBLD CKD-EPI 2021: 32 ML/MIN/1.73
EOSINOPHIL # BLD AUTO: 0.2 X10E3/UL (ref 0–0.4)
EOSINOPHIL NFR BLD AUTO: 4 %
ERYTHROCYTE [DISTWIDTH] IN BLOOD BY AUTOMATED COUNT: 13.5 % (ref 11.6–15.4)
GLOBULIN SER CALC-MCNC: 2.8 G/DL (ref 1.5–4.5)
GLUCOSE SERPL-MCNC: 164 MG/DL (ref 70–99)
HBA1C MFR BLD: 7.9 % (ref 4.8–5.6)
HCT VFR BLD AUTO: 31.6 % (ref 37.5–51)
HDLC SERPL-MCNC: 33 MG/DL
HGB BLD-MCNC: 10.4 G/DL (ref 13–17.7)
IMM GRANULOCYTES # BLD AUTO: 0 X10E3/UL (ref 0–0.1)
IMM GRANULOCYTES NFR BLD AUTO: 0 %
LDLC SERPL CALC-MCNC: 73 MG/DL (ref 0–99)
LDLC/HDLC SERPL: 2.2 RATIO (ref 0–3.6)
LYMPHOCYTES # BLD AUTO: 1.3 X10E3/UL (ref 0.7–3.1)
LYMPHOCYTES NFR BLD AUTO: 19 %
MCH RBC QN AUTO: 28.5 PG (ref 26.6–33)
MCHC RBC AUTO-ENTMCNC: 32.9 G/DL (ref 31.5–35.7)
MCV RBC AUTO: 87 FL (ref 79–97)
MONOCYTES # BLD AUTO: 0.8 X10E3/UL (ref 0.1–0.9)
MONOCYTES NFR BLD AUTO: 12 %
NEUTROPHILS # BLD AUTO: 4.2 X10E3/UL (ref 1.4–7)
NEUTROPHILS NFR BLD AUTO: 64 %
PLATELET # BLD AUTO: 211 X10E3/UL (ref 150–450)
POTASSIUM SERPL-SCNC: 4.5 MMOL/L (ref 3.5–5.2)
PROT SERPL-MCNC: 6.9 G/DL (ref 6–8.5)
PSA SERPL-MCNC: 8.5 NG/ML (ref 0–4)
RBC # BLD AUTO: 3.65 X10E6/UL (ref 4.14–5.8)
SODIUM SERPL-SCNC: 137 MMOL/L (ref 134–144)
TRIGL SERPL-MCNC: 196 MG/DL (ref 0–149)
TSH SERPL DL<=0.005 MIU/L-ACNC: 4.11 UIU/ML (ref 0.45–4.5)
UNABLE TO VOID: NORMAL
VLDLC SERPL CALC-MCNC: 33 MG/DL (ref 5–40)
WBC # BLD AUTO: 6.5 X10E3/UL (ref 3.4–10.8)

## 2024-04-02 DIAGNOSIS — N28.9 FUNCTION KIDNEY DECREASED: ICD-10-CM

## 2024-04-02 DIAGNOSIS — R97.20 ELEVATED PSA: Primary | ICD-10-CM

## 2024-04-02 DIAGNOSIS — E11.59 TYPE 2 DIABETES MELLITUS WITH OTHER CIRCULATORY COMPLICATION, WITHOUT LONG-TERM CURRENT USE OF INSULIN: ICD-10-CM

## 2024-04-18 ENCOUNTER — TRANSCRIBE ORDERS (OUTPATIENT)
Dept: ADMINISTRATIVE | Facility: HOSPITAL | Age: 84
End: 2024-04-18
Payer: MEDICARE

## 2024-04-18 DIAGNOSIS — N18.32 STAGE 3B CHRONIC KIDNEY DISEASE: Primary | ICD-10-CM

## 2024-04-24 ENCOUNTER — HOSPITAL ENCOUNTER (OUTPATIENT)
Dept: ULTRASOUND IMAGING | Facility: HOSPITAL | Age: 84
Discharge: HOME | End: 2024-04-24
Admitting: INTERNAL MEDICINE
Payer: MEDICARE

## 2024-04-24 DIAGNOSIS — N18.32 STAGE 3B CHRONIC KIDNEY DISEASE: ICD-10-CM

## 2024-04-24 PROCEDURE — 76775 US EXAM ABDO BACK WALL LIM: CPT

## 2024-04-25 RX ORDER — BUSPIRONE HYDROCHLORIDE 5 MG/1
5 TABLET ORAL 3 TIMES DAILY PRN
Qty: 270 TABLET | Refills: 1 | Status: SHIPPED | OUTPATIENT
Start: 2024-04-25

## 2024-04-25 NOTE — TELEPHONE ENCOUNTER
Rx Refill Note  Requested Prescriptions     Pending Prescriptions Disp Refills    busPIRone (BUSPAR) 5 MG tablet 270 tablet 1     Sig: Take 1 tablet by mouth 3 (Three) Times a Day As Needed.      Last office visit with prescribing clinician: 3/29/2024   Last telemedicine visit with prescribing clinician: Visit date not found   Next office visit with prescribing clinician: Visit date not found                         Would you like a call back once the refill request has been completed: [] Yes [] No    If the office needs to give you a call back, can they leave a voicemail: [] Yes [] No    Darius Cardoso MA  04/25/24, 08:25 EDT

## 2024-04-25 NOTE — TELEPHONE ENCOUNTER
Caller: Maribel Arreguin    Relationship: Emergency Contact    Best call back number: [phone]     Requested Prescriptions:   Requested Prescriptions     Pending Prescriptions Disp Refills    busPIRone (BUSPAR) 5 MG tablet 270 tablet 1     Sig: Take 1 tablet by mouth 3 (Three) Times a Day As Needed.        Pharmacy where request should be sent: [id number] - [place], [place] - [address] [facility] [address] [phone] [facility] [phone] FX     Last office visit with prescribing clinician: 3/29/2024   Last telemedicine visit with prescribing clinician: Visit date not found   Next office visit with prescribing clinician: Visit date not found     Additional details provided by patient: PATIENTS DAUGHTER SAID [facility] WILL NOT FILL THE MEDICATION.  CALLED TO SEE WHY.      Does the patient have less than a 3 day supply:  [] Yes  [] No    Would you like a call back once the refill request has been completed: [] Yes [] No    If the office needs to give you a call back, can they leave a voicemail: [] Yes [] No    Melinda Sandoval Rep   04/25/24 08:17 EDT

## 2024-05-07 ENCOUNTER — HOSPITAL ENCOUNTER (OUTPATIENT)
Dept: MRI IMAGING | Facility: HOSPITAL | Age: 84
Discharge: HOME | End: 2024-05-07
Admitting: NURSE PRACTITIONER
Payer: MEDICARE

## 2024-05-07 DIAGNOSIS — Z86.73 HISTORY OF STROKE: ICD-10-CM

## 2024-05-07 DIAGNOSIS — R42 DIZZINESS: ICD-10-CM

## 2024-05-07 DIAGNOSIS — R53.1 GENERALIZED WEAKNESS: ICD-10-CM

## 2024-05-07 PROCEDURE — 82565 ASSAY OF CREATININE: CPT

## 2024-05-07 PROCEDURE — A9577 INJ MULTIHANCE: HCPCS | Performed by: NURSE PRACTITIONER

## 2024-05-07 PROCEDURE — 70553 MRI BRAIN STEM W/O & W/DYE: CPT

## 2024-05-07 PROCEDURE — 0 GADOBENATE DIMEGLUMINE 529 MG/ML SOLUTION: Performed by: NURSE PRACTITIONER

## 2024-05-07 RX ORDER — TAMSULOSIN HYDROCHLORIDE 0.4 MG/1
1 CAPSULE ORAL DAILY
Qty: 90 CAPSULE | Refills: 1 | Status: SHIPPED | OUTPATIENT
Start: 2024-05-07

## 2024-05-07 RX ADMIN — GADOBENATE DIMEGLUMINE 20 ML: 529 INJECTION, SOLUTION INTRAVENOUS at 22:19

## 2024-05-09 LAB — CREAT BLDA-MCNC: 1.7 MG/DL (ref 0.6–1.3)

## 2024-05-10 ENCOUNTER — TELEPHONE (OUTPATIENT)
Dept: FAMILY MEDICINE CLINIC | Facility: CLINIC | Age: 84
End: 2024-05-10

## 2024-05-10 NOTE — TELEPHONE ENCOUNTER
Caller: Maribel Arreguin    Relationship: Emergency Contact    Best call back number: [phone]     What test was performed: MRI     When was the test performed: 5/7/24    Where was the test performed: HOSPITAL

## 2024-05-10 NOTE — TELEPHONE ENCOUNTER
Spoke with the daughter in detail. Voiced understanding. This nurse could not get to result note.

## 2024-05-16 ENCOUNTER — OFFICE VISIT (OUTPATIENT)
Dept: CARDIOLOGY | Facility: CLINIC | Age: 84
End: 2024-05-16
Payer: MEDICARE

## 2024-05-16 VITALS
SYSTOLIC BLOOD PRESSURE: 144 MMHG | HEIGHT: 72 IN | OXYGEN SATURATION: 96 % | HEART RATE: 62 BPM | BODY MASS INDEX: 30.24 KG/M2 | DIASTOLIC BLOOD PRESSURE: 70 MMHG

## 2024-05-16 DIAGNOSIS — I50.32 CHRONIC DIASTOLIC HEART FAILURE: Primary | ICD-10-CM

## 2024-05-16 PROCEDURE — 1160F RVW MEDS BY RX/DR IN RCRD: CPT | Performed by: NURSE PRACTITIONER

## 2024-05-16 PROCEDURE — 3077F SYST BP >= 140 MM HG: CPT | Performed by: NURSE PRACTITIONER

## 2024-05-16 PROCEDURE — 93000 ELECTROCARDIOGRAM COMPLETE: CPT | Performed by: NURSE PRACTITIONER

## 2024-05-16 PROCEDURE — 1159F MED LIST DOCD IN RCRD: CPT | Performed by: NURSE PRACTITIONER

## 2024-05-16 PROCEDURE — 3078F DIAST BP <80 MM HG: CPT | Performed by: NURSE PRACTITIONER

## 2024-05-16 PROCEDURE — 99214 OFFICE O/P EST MOD 30 MIN: CPT | Performed by: NURSE PRACTITIONER

## 2024-05-16 RX ORDER — AMLODIPINE BESYLATE 10 MG/1
10 TABLET ORAL DAILY
COMMUNITY

## 2024-05-16 NOTE — PROGRESS NOTES
[facility] Follow Up Office Note     Encounter Date:24  Patient:Dereck Ramírez  :1940  MRN:[id number]      Chief Complaint:   No chief complaint on file.        History of Presenting Illness:        Dereck Ramírez is a 83 y.o. male who is here for follow-up.  He is a patient of Dr Mendez.    Patient has past medical history that is significant for coronary artery disease which is medically managed, essential hypertension, mixed hyperlipidemia, CKD, chronic anemia, history of CVA and diabetes with neuropathy.     patient had a left heart catheterization which showed 100% occlusion of PDA with distal filling from left to right collaterals.    Patient's most recent echocardiogram 2023 showed normal LVEF with no significant valve disease.    Over the past year patient has had significant increase in lower extremity edema.  His creatinine maged with increasing diuretics.  His proBNP was normal and his echo did not show any significant findings.  He was referred to lymphedema clinic.    Patient was last seen in November by Dr. Mendez.  He was less steady on his feet and referred to physical therapy.  He was stable from a cardiac perspective.    Patient daughter today.  He is essentially in a wheelchair now because his mobility has become so poor.  He has a lot of balance issues.  He actually an MRI of the brain last week which primarily showed atrophy, small vessel disease and age-related changes.  He has stable lower extremity edema.  He is now seeing a nephrologist for his worsening kidney function.  Nephrology started him on amlodipine, he does not feel his swelling has worsened since starting this medication.  He is not particularly active now but denies shortness of breath, will PND orthopnea.    Review of Systems:  Review of Systems   Cardiovascular:  Positive for leg swelling. Negative for chest pain, orthopnea and palpitations.   Respiratory:  Negative for shortness of breath.         Current Outpatient Medications on File Prior to Visit   Medication Sig Dispense Refill    amLODIPine (NORVASC) 10 MG tablet Take 1 tablet by mouth Daily.      atorvastatin (LIPITOR) 40 MG tablet TAKE 1 TABLET BY MOUTH DAILY 90 tablet 1    busPIRone (BUSPAR) 5 MG tablet Take 1 tablet by mouth 3 (Three) Times a Day As Needed (anxiety). 270 tablet 1    carvedilol (COREG) 25 MG tablet TAKE 1 TABLET BY MOUTH TWICE DAILY WITH MEALS 180 tablet 3    clopidogrel (PLAVIX) 75 MG tablet TAKE 1 TABLET BY MOUTH DAILY 90 tablet 3    ferrous gluconate (FERGON) 324 MG tablet TAKE 1 TABLET BY MOUTH DAILY WITH BREAKFAST 90 tablet 1    furosemide (LASIX) 40 MG tablet Take 1 tablet by mouth 2 (Two) Times a Day. 180 tablet 3    gabapentin (NEURONTIN) 100 MG capsule TAKE 2 CAPSULES BY MOUTH THREE TIMES DAILY 540 capsule 1    glucose blood test strip Check blood sugar twice daily and as needed 100 each 12    nateglinide (STARLIX) 60 MG tablet Take 1 tablet by mouth 2 (Two) Times a Day With Meals. 180 tablet 3    potassium chloride 10 MEQ CR tablet Take 1 tablet by mouth Daily. Take while taking double Lasix dose 30 tablet 11    sertraline (ZOLOFT) 50 MG tablet TAKE 1 AND 1/2 TABLETS BY MOUTH DAILY 135 tablet 1    tamsulosin (FLOMAX) 0.4 MG capsule 24 hr capsule TAKE 1 CAPSULE BY MOUTH DAILY 90 capsule 1    vitamin B-12 (CYANOCOBALAMIN) 1000 MCG tablet Take 1 tablet by mouth Daily. 90 tablet 1     No current facility-administered medications on file prior to visit.       No Known Allergies    Past Medical History:   Diagnosis Date    Arthritis     Cerebellar artery occlusion     Coronary artery disease     Diabetes mellitus     Enlarged prostate     Hyperlipidemia     Hypertension     Stroke 2011, 2012       Past Surgical History:   Procedure Laterality Date    CARDIAC CATHETERIZATION      lesion 1: intervention outcome    HERNIA REPAIR         Social History     Socioeconomic History    Marital status:    Tobacco Use     "Smoking status: Never    Smokeless tobacco: Never   Substance and Sexual Activity    Alcohol use: No    Drug use: No    Sexual activity: Never       Family History   Problem Relation Age of Onset    Heart disease Mother     Stroke Mother     Crohn's disease Father     Stroke Father     Arthritis Father     Cancer Other     Stroke Other     Diabetes Other     Heart defect Other     Hypertension Other     Thyroid disease Other     Cancer Maternal Uncle         bone    Dementia Maternal Grandmother        The following portions of the patient's history were reviewed and updated as appropriate: allergies, current medications, past family history, past medical history, past social history, past surgical history and problem list.       Objective:       Vitals:    05/16/24 1005   BP: 144/70   Pulse: 62   SpO2: 96%   Height: 182.9 cm (72\")           Physical Exam:  Constitutional: Elderly male in wheelchair, pleasant and conversant  HENT: Oropharynx clear and membrane moist  Eyes: Normal conjunctiva, no sclera icterus  Neck: Supple, no carotid bruit bilaterally  Cardiovascular: Regular rate and rhythm, No Murmur, 1+ bilateral lower extremity edema  Pulmonary: Normal respiratory effort, normal lung sounds, no wheezing  Neurological: Alert and orient x 3  Skin: Warm, dry, no ecchymosis, no rash  Psych: Appropriate mood and affect. Normal judgment and insight         Lab Results   Component Value Date     03/29/2024     09/28/2023    K 4.5 03/29/2024    K 4.6 09/28/2023     03/29/2024     09/28/2023    CO2 21 03/29/2024    CO2 24.7 09/28/2023    BUN 49 (H) 03/29/2024    BUN 40 (H) 09/28/2023    CREATININE 1.70 (H) 05/07/2024    CREATININE 2.07 (H) 03/29/2024    EGFRIFNONA 40 (L) 08/27/2021    EGFRIFNONA 43 (L) 06/15/2021    EGFRIFAFRI 48 (L) 08/27/2021    EGFRIFAFRI 52 (L) 06/15/2021    GLUCOSE 164 (H) 03/29/2024    GLUCOSE 106 (H) 09/28/2023    CALCIUM 8.8 03/29/2024    CALCIUM 9.2 09/28/2023    " PROTENTOTREF 6.9 03/29/2024    PROTENTOTREF 7.3 09/28/2023    ALBUMIN 4.1 03/29/2024    ALBUMIN 4.6 09/28/2023    BILITOT 0.3 03/29/2024    BILITOT 0.2 09/28/2023    AST 21 03/29/2024    AST 26 09/28/2023    ALT 27 03/29/2024    ALT 30 09/28/2023     Lab Results   Component Value Date    WBC 6.5 03/29/2024    WBC 5.51 09/28/2023    HGB 10.4 (L) 03/29/2024    HGB 10.3 (L) 09/28/2023    HCT 31.6 (L) 03/29/2024    HCT 30.4 (L) 09/28/2023    MCV 87 03/29/2024    MCV 86.6 09/28/2023     03/29/2024     09/28/2023     Lab Results   Component Value Date    TRIG 196 (H) 03/29/2024    TRIG 152 (H) 03/29/2023    HDL 33 (L) 03/29/2024    HDL 32 (L) 03/29/2023    LDL 73 03/29/2024    LDL 77 03/29/2023     Lab Results   Component Value Date    PROBNP 381.0 07/31/2023    PROBNP 276 02/25/2021     Lab Results   Component Value Date    CKTOTAL 84 07/18/2014    CKMB 3.5 07/18/2014    TROPONINT <0.010 08/22/2019     Lab Results   Component Value Date    TSH 4.110 03/29/2024    TSH 4.530 (H) 03/02/2022     Echocardiogram 9/1/2023:  Left ventricular systolic function is normal. Calculated left ventricular EF = 63.2  Left ventricular wall thickness is consistent with moderate concentric hypertrophy.  Left ventricular diastolic function is consistent with (grade I) impaired relaxation.  The left atrial cavity is moderately dilated.  Estimated right ventricular systolic pressure from tricuspid regurgitation is mildly elevated (35-45 mmHg).     Echocardiogram 1/28/2021:  Estimated left ventricular EF = 65% Left ventricular systolic function is normal.  Left ventricular diastolic function was normal.  Left ventricular wall thickness is consistent with concentric hypertrophy.  Left atrial volume is mildly increased.     Mobile Telemetry Monitor 2 week 10/14/2019:  Overall relatively normal 2-week mobile telemetry monitor.   Normal amount of ectopy with short self-limited runs of SVT and VT.   No etiology for syncope noted on the  study.     Echocardiogram report 9/13/2019:  Left ventricular systolic function is normal. Calculated EF = 61.0%. Normal left ventricular cavity size, wall thickness, mass and mass index noted. All left ventricular wall segments contract normally. Septal wall motion is normal. Left ventricular diastolic function is normal. Normal left atrial pressure.  Right ventricular cavity is mild-to-moderately dilated  Right atrial cavity size is mildly dilated.  Left atrial cavity size is mild-to-moderately dilated  Mild mitral valve regurgitation is present  No prior study available for comparison.     Carotid duplex 9/13/2019:  Right ICA Prox: Imaging indicates 16%-49% stenosis.   Left ICA Prox: Imaging indicates 16-49% stenosis.     Stress Lexiscan nuclear 9/5/2015:  Normal Lexiscan Cardiolite stress test.   No evidence of ischemia or infarction.   Normal wall motion.   Normal ejection fraction.   Normal thickening.      Cardiac catheterization report 5/24/2013:  Moderate to severe disease of the ramus off the first diagonal and ramus off the first obtuse marginal.   Minimal segmental disease of the right coronary artery. There is 100% occlusion of the posterior descending artery with distal filling by the left coronary artery.   Normal left ventricular systolic function.      ECG 12 Lead    Date/Time: 5/16/2024 10:23 AM  Performed by: Nichol Trammell APRN    Authorized by: Nichol Trammell APRN  Comparison: compared with previous ECG from 11/15/2023  Similar to previous ECG  Rhythm: sinus rhythm  BPM: 62             Assessment:          Diagnosis Plan   1. Chronic diastolic heart failure  ECG 12 Lead               Plan:       Chronic diastolic congestive heart failure -preserved LVEF on most recent echo 8/2023.  Continue current dose of diuretics, previously creatinine maged with increase in diuretics.  Due to worsening CKD is actually now established with nephrology  Coronary artery disease -most recent stress test  2015 no ischemia.  Known CAD that is medically managed.  Continue Plavix, labetalol, atorvastatin.  He denies chest pain  Essential hypertension -blood pressure is appropriately controlled for age.  Nephrology started amlodipine.  Patient declines worsening lower extremity edema on this medication  Mixed hyperlipidemia -continue statin    Patient is seen today for follow-up.  He is stable from a cardiac perspective.  I am not recommending changes.  Follow-up with Dr. Mendez in 6 months, earlier with problems    Orders Placed This Encounter   Procedures    ECG 12 Lead     This order was created via procedure documentation     Order Specific Question:   Release to patient     Answer:   Routine Release [[id number]]            KAVON Phoenix  [facility]  05/16/24  10:44 EDT

## 2024-06-07 DIAGNOSIS — E11.59 TYPE 2 DIABETES MELLITUS WITH OTHER CIRCULATORY COMPLICATION, WITHOUT LONG-TERM CURRENT USE OF INSULIN: ICD-10-CM

## 2024-06-10 RX ORDER — TAMSULOSIN HYDROCHLORIDE 0.4 MG/1
1 CAPSULE ORAL DAILY
Qty: 90 CAPSULE | Refills: 1 | Status: SHIPPED | OUTPATIENT
Start: 2024-06-10

## 2024-06-10 RX ORDER — GABAPENTIN 100 MG/1
200 CAPSULE ORAL 3 TIMES DAILY
Qty: 540 CAPSULE | Refills: 0 | Status: SHIPPED | OUTPATIENT
Start: 2024-06-10

## 2024-06-10 RX ORDER — ATORVASTATIN CALCIUM 40 MG/1
40 TABLET, FILM COATED ORAL DAILY
Qty: 90 TABLET | Refills: 1 | Status: SHIPPED | OUTPATIENT
Start: 2024-06-10

## 2024-07-10 RX ORDER — FERROUS GLUCONATE 324(38)MG
1 TABLET ORAL
Qty: 90 TABLET | Refills: 1 | Status: SHIPPED | OUTPATIENT
Start: 2024-07-10

## 2024-08-07 RX ORDER — FUROSEMIDE 40 MG/1
40 TABLET ORAL 2 TIMES DAILY
Qty: 180 TABLET | Refills: 3 | Status: SHIPPED | OUTPATIENT
Start: 2024-08-07

## 2024-08-12 RX ORDER — POTASSIUM CHLORIDE 750 MG/1
10 TABLET, FILM COATED, EXTENDED RELEASE ORAL DAILY
Qty: 90 TABLET | Refills: 1 | Status: SHIPPED | OUTPATIENT
Start: 2024-08-12

## 2024-09-26 ENCOUNTER — OFFICE VISIT (OUTPATIENT)
Dept: FAMILY MEDICINE CLINIC | Facility: CLINIC | Age: 84
End: 2024-09-26
Payer: MEDICARE

## 2024-09-26 VITALS
DIASTOLIC BLOOD PRESSURE: 66 MMHG | OXYGEN SATURATION: 97 % | WEIGHT: 223 LBS | SYSTOLIC BLOOD PRESSURE: 114 MMHG | HEART RATE: 59 BPM | BODY MASS INDEX: 30.2 KG/M2 | HEIGHT: 72 IN

## 2024-09-26 DIAGNOSIS — I10 HYPERTENSION, ESSENTIAL: ICD-10-CM

## 2024-09-26 DIAGNOSIS — N28.9 FUNCTION KIDNEY DECREASED: ICD-10-CM

## 2024-09-26 DIAGNOSIS — E78.2 MIXED HYPERLIPIDEMIA: ICD-10-CM

## 2024-09-26 DIAGNOSIS — E11.59 TYPE 2 DIABETES MELLITUS WITH OTHER CIRCULATORY COMPLICATION, WITHOUT LONG-TERM CURRENT USE OF INSULIN: Primary | ICD-10-CM

## 2024-09-26 PROCEDURE — 3074F SYST BP LT 130 MM HG: CPT | Performed by: NURSE PRACTITIONER

## 2024-09-26 PROCEDURE — 3078F DIAST BP <80 MM HG: CPT | Performed by: NURSE PRACTITIONER

## 2024-09-26 PROCEDURE — 90662 IIV NO PRSV INCREASED AG IM: CPT | Performed by: NURSE PRACTITIONER

## 2024-09-26 PROCEDURE — 99214 OFFICE O/P EST MOD 30 MIN: CPT | Performed by: NURSE PRACTITIONER

## 2024-09-26 PROCEDURE — G0008 ADMIN INFLUENZA VIRUS VAC: HCPCS | Performed by: NURSE PRACTITIONER

## 2024-09-26 PROCEDURE — 1125F AMNT PAIN NOTED PAIN PRSNT: CPT | Performed by: NURSE PRACTITIONER

## 2024-09-26 RX ORDER — AMLODIPINE BESYLATE 10 MG/1
10 TABLET ORAL DAILY
Qty: 90 TABLET | Refills: 3 | Status: SHIPPED | OUTPATIENT
Start: 2024-09-26

## 2024-09-26 RX ORDER — GABAPENTIN 100 MG/1
200 CAPSULE ORAL 3 TIMES DAILY
Qty: 540 CAPSULE | Refills: 1 | Status: SHIPPED | OUTPATIENT
Start: 2024-09-26

## 2024-09-27 LAB
ALBUMIN SERPL-MCNC: 4.1 G/DL (ref 3.5–5.2)
ALBUMIN/GLOB SERPL: 1.7 G/DL
ALP SERPL-CCNC: 183 U/L (ref 39–117)
ALT SERPL-CCNC: 22 U/L (ref 1–41)
AST SERPL-CCNC: 22 U/L (ref 1–40)
BASOPHILS # BLD AUTO: 0.04 10*3/MM3 (ref 0–0.2)
BASOPHILS NFR BLD AUTO: 0.7 % (ref 0–1.5)
BILIRUB SERPL-MCNC: 0.2 MG/DL (ref 0–1.2)
BUN SERPL-MCNC: 33 MG/DL (ref 8–23)
BUN/CREAT SERPL: 20.5 (ref 7–25)
CALCIUM SERPL-MCNC: 8.5 MG/DL (ref 8.6–10.5)
CHLORIDE SERPL-SCNC: 103 MMOL/L (ref 98–107)
CHOLEST SERPL-MCNC: 131 MG/DL (ref 0–200)
CO2 SERPL-SCNC: 25.3 MMOL/L (ref 22–29)
CREAT SERPL-MCNC: 1.61 MG/DL (ref 0.76–1.27)
EGFRCR SERPLBLD CKD-EPI 2021: 42.4 ML/MIN/1.73
EOSINOPHIL # BLD AUTO: 0.25 10*3/MM3 (ref 0–0.4)
EOSINOPHIL NFR BLD AUTO: 4.6 % (ref 0.3–6.2)
ERYTHROCYTE [DISTWIDTH] IN BLOOD BY AUTOMATED COUNT: 13.4 % (ref 12.3–15.4)
GLOBULIN SER CALC-MCNC: 2.4 GM/DL
GLUCOSE SERPL-MCNC: 93 MG/DL (ref 65–99)
HBA1C MFR BLD: 7.1 % (ref 4.8–5.6)
HCT VFR BLD AUTO: 30.5 % (ref 37.5–51)
HDLC SERPL-MCNC: 33 MG/DL (ref 40–60)
HGB BLD-MCNC: 9.8 G/DL (ref 13–17.7)
IMM GRANULOCYTES # BLD AUTO: 0.02 10*3/MM3 (ref 0–0.05)
IMM GRANULOCYTES NFR BLD AUTO: 0.4 % (ref 0–0.5)
LDLC SERPL CALC-MCNC: 79 MG/DL (ref 0–100)
LDLC/HDLC SERPL: 2.37 {RATIO}
LYMPHOCYTES # BLD AUTO: 1.13 10*3/MM3 (ref 0.7–3.1)
LYMPHOCYTES NFR BLD AUTO: 21 % (ref 19.6–45.3)
MCH RBC QN AUTO: 28.2 PG (ref 26.6–33)
MCHC RBC AUTO-ENTMCNC: 32.1 G/DL (ref 31.5–35.7)
MCV RBC AUTO: 87.6 FL (ref 79–97)
MONOCYTES # BLD AUTO: 0.7 10*3/MM3 (ref 0.1–0.9)
MONOCYTES NFR BLD AUTO: 13 % (ref 5–12)
NEUTROPHILS # BLD AUTO: 3.24 10*3/MM3 (ref 1.7–7)
NEUTROPHILS NFR BLD AUTO: 60.3 % (ref 42.7–76)
NRBC BLD AUTO-RTO: 0 /100 WBC (ref 0–0.2)
PLATELET # BLD AUTO: 219 10*3/MM3 (ref 140–450)
POTASSIUM SERPL-SCNC: 4.3 MMOL/L (ref 3.5–5.2)
PROT SERPL-MCNC: 6.5 G/DL (ref 6–8.5)
RBC # BLD AUTO: 3.48 10*6/MM3 (ref 4.14–5.8)
SODIUM SERPL-SCNC: 140 MMOL/L (ref 136–145)
TRIGL SERPL-MCNC: 99 MG/DL (ref 0–150)
TSH SERPL DL<=0.005 MIU/L-ACNC: 3.73 UIU/ML (ref 0.27–4.2)
VLDLC SERPL CALC-MCNC: 19 MG/DL (ref 5–40)
WBC # BLD AUTO: 5.38 10*3/MM3 (ref 3.4–10.8)

## 2024-10-02 ENCOUNTER — TELEPHONE (OUTPATIENT)
Dept: FAMILY MEDICINE CLINIC | Facility: CLINIC | Age: 84
End: 2024-10-02
Payer: MEDICARE

## 2024-10-02 NOTE — TELEPHONE ENCOUNTER
Patients daughter called office back. Went over labs. Spoke in detail and pt daughter voiced understanding.

## 2024-10-02 NOTE — TELEPHONE ENCOUNTER
Hub staff attempted to follow warm transfer process and was unsuccessful     Caller: Maribel Arreguin    Relationship to patient: Emergency Contact    Best call back number: [phone]     Patient is needing: RETURNING A MISSED CALL FROM [facility] FROM YESTERDAY 10*1*24.    PLEASE CALL

## 2024-11-07 RX ORDER — LANOLIN ALCOHOL/MO/W.PET/CERES
1000 CREAM (GRAM) TOPICAL DAILY
Qty: 90 TABLET | Refills: 1 | Status: SHIPPED | OUTPATIENT
Start: 2024-11-07

## 2024-11-07 NOTE — TELEPHONE ENCOUNTER
Rx Refill Note  Requested Prescriptions     Pending Prescriptions Disp Refills    vitamin B-12 (CYANOCOBALAMIN) 1000 MCG tablet [Pharmacy Med Name: VITAMIN B-12 1000MCG TABLETS] 90 tablet 1     Sig: TAKE 1 TABLET BY MOUTH DAILY      Last office visit with prescribing clinician: 9/26/2024   Last telemedicine visit with prescribing clinician: Visit date not found   Next office visit with prescribing clinician: 3/27/2025                         Would you like a call back once the refill request has been completed: [] Yes [] No    If the office needs to give you a call back, can they leave a voicemail: [] Yes [] No    Darius Cardoso MA  11/07/24, 09:14 EST

## 2024-11-17 ENCOUNTER — HOSPITAL ENCOUNTER (OUTPATIENT)
Facility: HOSPITAL | Age: 84
Setting detail: OBSERVATION
Discharge: HOME HEALTH | End: 2024-11-19
Attending: EMERGENCY MEDICINE | Admitting: STUDENT IN AN ORGANIZED HEALTH CARE EDUCATION/TRAINING PROGRAM
Payer: MEDICARE

## 2024-11-17 ENCOUNTER — APPOINTMENT (OUTPATIENT)
Dept: GENERAL RADIOLOGY | Facility: HOSPITAL | Age: 84
End: 2024-11-17
Payer: MEDICARE

## 2024-11-17 ENCOUNTER — APPOINTMENT (OUTPATIENT)
Dept: MRI IMAGING | Facility: HOSPITAL | Age: 84
End: 2024-11-17
Payer: MEDICARE

## 2024-11-17 ENCOUNTER — APPOINTMENT (OUTPATIENT)
Dept: CT IMAGING | Facility: HOSPITAL | Age: 84
End: 2024-11-17
Payer: MEDICARE

## 2024-11-17 DIAGNOSIS — R41.0 CONFUSION: Primary | ICD-10-CM

## 2024-11-17 DIAGNOSIS — R60.0 PEDAL EDEMA: ICD-10-CM

## 2024-11-17 DIAGNOSIS — Z79.899 MEDICATION MANAGEMENT: ICD-10-CM

## 2024-11-17 DIAGNOSIS — M19.012 OSTEOARTHRITIS OF AC (ACROMIOCLAVICULAR) JOINTS, BILATERAL: ICD-10-CM

## 2024-11-17 DIAGNOSIS — F03.90 DEMENTIA WITHOUT BEHAVIORAL DISTURBANCE: ICD-10-CM

## 2024-11-17 DIAGNOSIS — M25.512 ACUTE PAIN OF BOTH SHOULDERS: ICD-10-CM

## 2024-11-17 DIAGNOSIS — R53.1 GENERALIZED WEAKNESS: ICD-10-CM

## 2024-11-17 DIAGNOSIS — I50.32 CHRONIC DIASTOLIC HEART FAILURE: ICD-10-CM

## 2024-11-17 DIAGNOSIS — M19.011 OSTEOARTHRITIS OF AC (ACROMIOCLAVICULAR) JOINTS, BILATERAL: ICD-10-CM

## 2024-11-17 DIAGNOSIS — M25.511 ACUTE PAIN OF BOTH SHOULDERS: ICD-10-CM

## 2024-11-17 PROBLEM — R41.82 ALTERED MENTAL STATUS: Status: ACTIVE | Noted: 2024-11-17

## 2024-11-17 LAB
ALBUMIN SERPL-MCNC: 4.1 G/DL (ref 3.5–5.2)
ALBUMIN/GLOB SERPL: 1.5 G/DL
ALP SERPL-CCNC: 183 U/L (ref 39–117)
ALT SERPL W P-5'-P-CCNC: 17 U/L (ref 1–41)
AMMONIA BLD-SCNC: 19 UMOL/L (ref 16–60)
AMPHET+METHAMPHET UR QL: NEGATIVE
ANION GAP SERPL CALCULATED.3IONS-SCNC: 13 MMOL/L (ref 5–15)
AST SERPL-CCNC: 17 U/L (ref 1–40)
B PARAPERT DNA SPEC QL NAA+PROBE: NOT DETECTED
B PERT DNA SPEC QL NAA+PROBE: NOT DETECTED
BARBITURATES UR QL SCN: NEGATIVE
BASOPHILS # BLD AUTO: 0.06 10*3/MM3 (ref 0–0.2)
BASOPHILS NFR BLD AUTO: 0.8 % (ref 0–1.5)
BENZODIAZ UR QL SCN: NEGATIVE
BILIRUB SERPL-MCNC: 0.3 MG/DL (ref 0–1.2)
BILIRUB UR QL STRIP: NEGATIVE
BUN SERPL-MCNC: 32 MG/DL (ref 8–23)
BUN/CREAT SERPL: 19.4 (ref 7–25)
C PNEUM DNA NPH QL NAA+NON-PROBE: NOT DETECTED
CALCIUM SPEC-SCNC: 8.5 MG/DL (ref 8.6–10.5)
CANNABINOIDS SERPL QL: NEGATIVE
CHLORIDE SERPL-SCNC: 100 MMOL/L (ref 98–107)
CLARITY UR: CLEAR
CO2 SERPL-SCNC: 24 MMOL/L (ref 22–29)
COCAINE UR QL: NEGATIVE
COLOR UR: YELLOW
CREAT SERPL-MCNC: 1.65 MG/DL (ref 0.76–1.27)
DEPRECATED RDW RBC AUTO: 41.4 FL (ref 37–54)
EGFRCR SERPLBLD CKD-EPI 2021: 41.2 ML/MIN/1.73
EOSINOPHIL # BLD AUTO: 0.26 10*3/MM3 (ref 0–0.4)
EOSINOPHIL NFR BLD AUTO: 3.4 % (ref 0.3–6.2)
ERYTHROCYTE [DISTWIDTH] IN BLOOD BY AUTOMATED COUNT: 12.9 % (ref 12.3–15.4)
FENTANYL UR-MCNC: NEGATIVE NG/ML
FLUAV SUBTYP SPEC NAA+PROBE: NOT DETECTED
FLUBV RNA ISLT QL NAA+PROBE: NOT DETECTED
GLOBULIN UR ELPH-MCNC: 2.8 GM/DL
GLUCOSE BLDC GLUCOMTR-MCNC: 107 MG/DL (ref 70–130)
GLUCOSE BLDC GLUCOMTR-MCNC: 118 MG/DL (ref 70–130)
GLUCOSE BLDC GLUCOMTR-MCNC: 173 MG/DL (ref 70–130)
GLUCOSE SERPL-MCNC: 193 MG/DL (ref 65–99)
GLUCOSE UR STRIP-MCNC: NEGATIVE MG/DL
HADV DNA SPEC NAA+PROBE: NOT DETECTED
HCOV 229E RNA SPEC QL NAA+PROBE: NOT DETECTED
HCOV HKU1 RNA SPEC QL NAA+PROBE: NOT DETECTED
HCOV NL63 RNA SPEC QL NAA+PROBE: NOT DETECTED
HCOV OC43 RNA SPEC QL NAA+PROBE: NOT DETECTED
HCT VFR BLD AUTO: 29.4 % (ref 37.5–51)
HGB BLD-MCNC: 9.5 G/DL (ref 13–17.7)
HGB UR QL STRIP.AUTO: NEGATIVE
HMPV RNA NPH QL NAA+NON-PROBE: NOT DETECTED
HOLD SPECIMEN: NORMAL
HPIV1 RNA ISLT QL NAA+PROBE: NOT DETECTED
HPIV2 RNA SPEC QL NAA+PROBE: NOT DETECTED
HPIV3 RNA NPH QL NAA+PROBE: NOT DETECTED
HPIV4 P GENE NPH QL NAA+PROBE: NOT DETECTED
IMM GRANULOCYTES # BLD AUTO: 0.03 10*3/MM3 (ref 0–0.05)
IMM GRANULOCYTES NFR BLD AUTO: 0.4 % (ref 0–0.5)
KETONES UR QL STRIP: NEGATIVE
LEUKOCYTE ESTERASE UR QL STRIP.AUTO: NEGATIVE
LYMPHOCYTES # BLD AUTO: 1.22 10*3/MM3 (ref 0.7–3.1)
LYMPHOCYTES NFR BLD AUTO: 16.2 % (ref 19.6–45.3)
M PNEUMO IGG SER IA-ACNC: NOT DETECTED
MCH RBC QN AUTO: 28.6 PG (ref 26.6–33)
MCHC RBC AUTO-ENTMCNC: 32.3 G/DL (ref 31.5–35.7)
MCV RBC AUTO: 88.6 FL (ref 79–97)
METHADONE UR QL SCN: NEGATIVE
MONOCYTES # BLD AUTO: 0.66 10*3/MM3 (ref 0.1–0.9)
MONOCYTES NFR BLD AUTO: 8.7 % (ref 5–12)
NEUTROPHILS NFR BLD AUTO: 5.32 10*3/MM3 (ref 1.7–7)
NEUTROPHILS NFR BLD AUTO: 70.5 % (ref 42.7–76)
NITRITE UR QL STRIP: NEGATIVE
NRBC BLD AUTO-RTO: 0 /100 WBC (ref 0–0.2)
OPIATES UR QL: NEGATIVE
OXYCODONE UR QL SCN: NEGATIVE
PH UR STRIP.AUTO: 5.5 [PH] (ref 5–8)
PLATELET # BLD AUTO: 226 10*3/MM3 (ref 140–450)
PMV BLD AUTO: 9.5 FL (ref 6–12)
POTASSIUM SERPL-SCNC: 4.4 MMOL/L (ref 3.5–5.2)
PROT SERPL-MCNC: 6.9 G/DL (ref 6–8.5)
PROT UR QL STRIP: NEGATIVE
RBC # BLD AUTO: 3.32 10*6/MM3 (ref 4.14–5.8)
RHINOVIRUS RNA SPEC NAA+PROBE: NOT DETECTED
RSV RNA NPH QL NAA+NON-PROBE: NOT DETECTED
SARS-COV-2 RNA NPH QL NAA+NON-PROBE: NOT DETECTED
SODIUM SERPL-SCNC: 137 MMOL/L (ref 136–145)
SP GR UR STRIP: 1.01 (ref 1–1.03)
TSH SERPL DL<=0.05 MIU/L-ACNC: 4.18 UIU/ML (ref 0.27–4.2)
UROBILINOGEN UR QL STRIP: NORMAL
WBC NRBC COR # BLD AUTO: 7.55 10*3/MM3 (ref 3.4–10.8)
WHOLE BLOOD HOLD COAG: NORMAL
WHOLE BLOOD HOLD SPECIMEN: NORMAL

## 2024-11-17 PROCEDURE — 80307 DRUG TEST PRSMV CHEM ANLYZR: CPT | Performed by: EMERGENCY MEDICINE

## 2024-11-17 PROCEDURE — G0378 HOSPITAL OBSERVATION PER HR: HCPCS

## 2024-11-17 PROCEDURE — 70551 MRI BRAIN STEM W/O DYE: CPT

## 2024-11-17 PROCEDURE — 82948 REAGENT STRIP/BLOOD GLUCOSE: CPT

## 2024-11-17 PROCEDURE — 80053 COMPREHEN METABOLIC PANEL: CPT | Performed by: EMERGENCY MEDICINE

## 2024-11-17 PROCEDURE — 0202U NFCT DS 22 TRGT SARS-COV-2: CPT | Performed by: STUDENT IN AN ORGANIZED HEALTH CARE EDUCATION/TRAINING PROGRAM

## 2024-11-17 PROCEDURE — 99285 EMERGENCY DEPT VISIT HI MDM: CPT

## 2024-11-17 PROCEDURE — P9612 CATHETERIZE FOR URINE SPEC: HCPCS

## 2024-11-17 PROCEDURE — 81003 URINALYSIS AUTO W/O SCOPE: CPT | Performed by: EMERGENCY MEDICINE

## 2024-11-17 PROCEDURE — 84443 ASSAY THYROID STIM HORMONE: CPT | Performed by: STUDENT IN AN ORGANIZED HEALTH CARE EDUCATION/TRAINING PROGRAM

## 2024-11-17 PROCEDURE — 85025 COMPLETE CBC W/AUTO DIFF WBC: CPT | Performed by: EMERGENCY MEDICINE

## 2024-11-17 PROCEDURE — 82140 ASSAY OF AMMONIA: CPT | Performed by: EMERGENCY MEDICINE

## 2024-11-17 PROCEDURE — 71045 X-RAY EXAM CHEST 1 VIEW: CPT

## 2024-11-17 PROCEDURE — 70450 CT HEAD/BRAIN W/O DYE: CPT

## 2024-11-17 RX ORDER — UREA 10 %
1000 LOTION (ML) TOPICAL DAILY
Status: DISCONTINUED | OUTPATIENT
Start: 2024-11-18 | End: 2024-11-19 | Stop reason: HOSPADM

## 2024-11-17 RX ORDER — NICOTINE POLACRILEX 4 MG
15 LOZENGE BUCCAL
Status: DISCONTINUED | OUTPATIENT
Start: 2024-11-17 | End: 2024-11-19 | Stop reason: HOSPADM

## 2024-11-17 RX ORDER — IBUPROFEN 600 MG/1
1 TABLET ORAL
Status: DISCONTINUED | OUTPATIENT
Start: 2024-11-17 | End: 2024-11-19 | Stop reason: HOSPADM

## 2024-11-17 RX ORDER — DEXTROSE MONOHYDRATE 25 G/50ML
25 INJECTION, SOLUTION INTRAVENOUS
Status: DISCONTINUED | OUTPATIENT
Start: 2024-11-17 | End: 2024-11-19 | Stop reason: HOSPADM

## 2024-11-17 RX ORDER — SODIUM CHLORIDE 0.9 % (FLUSH) 0.9 %
10 SYRINGE (ML) INJECTION AS NEEDED
Status: DISCONTINUED | OUTPATIENT
Start: 2024-11-17 | End: 2024-11-19 | Stop reason: HOSPADM

## 2024-11-17 RX ORDER — QUETIAPINE FUMARATE 25 MG/1
25 TABLET, FILM COATED ORAL NIGHTLY
Status: DISCONTINUED | OUTPATIENT
Start: 2024-11-17 | End: 2024-11-19 | Stop reason: HOSPADM

## 2024-11-17 RX ORDER — AMLODIPINE BESYLATE 5 MG/1
10 TABLET ORAL DAILY
Status: DISCONTINUED | OUTPATIENT
Start: 2024-11-18 | End: 2024-11-19 | Stop reason: HOSPADM

## 2024-11-17 RX ORDER — CARVEDILOL 25 MG/1
25 TABLET ORAL 2 TIMES DAILY WITH MEALS
Status: DISCONTINUED | OUTPATIENT
Start: 2024-11-17 | End: 2024-11-19 | Stop reason: HOSPADM

## 2024-11-17 RX ORDER — GABAPENTIN 100 MG/1
200 CAPSULE ORAL 3 TIMES DAILY
Status: DISCONTINUED | OUTPATIENT
Start: 2024-11-17 | End: 2024-11-19 | Stop reason: HOSPADM

## 2024-11-17 RX ORDER — FUROSEMIDE 20 MG/1
40 TABLET ORAL
Status: DISCONTINUED | OUTPATIENT
Start: 2024-11-17 | End: 2024-11-19 | Stop reason: HOSPADM

## 2024-11-17 RX ORDER — CLOPIDOGREL BISULFATE 75 MG/1
75 TABLET ORAL DAILY
Status: DISCONTINUED | OUTPATIENT
Start: 2024-11-18 | End: 2024-11-19 | Stop reason: HOSPADM

## 2024-11-17 RX ORDER — INSULIN LISPRO 100 [IU]/ML
2-7 INJECTION, SOLUTION INTRAVENOUS; SUBCUTANEOUS
Status: DISCONTINUED | OUTPATIENT
Start: 2024-11-17 | End: 2024-11-19 | Stop reason: HOSPADM

## 2024-11-17 RX ORDER — BUSPIRONE HYDROCHLORIDE 5 MG/1
5 TABLET ORAL 3 TIMES DAILY PRN
Status: DISCONTINUED | OUTPATIENT
Start: 2024-11-17 | End: 2024-11-19 | Stop reason: HOSPADM

## 2024-11-17 RX ORDER — ATORVASTATIN CALCIUM 20 MG/1
40 TABLET, FILM COATED ORAL DAILY
Status: DISCONTINUED | OUTPATIENT
Start: 2024-11-18 | End: 2024-11-19 | Stop reason: HOSPADM

## 2024-11-17 RX ORDER — TAMSULOSIN HYDROCHLORIDE 0.4 MG/1
0.4 CAPSULE ORAL DAILY
Status: DISCONTINUED | OUTPATIENT
Start: 2024-11-18 | End: 2024-11-19 | Stop reason: HOSPADM

## 2024-11-17 RX ADMIN — GABAPENTIN 200 MG: 100 CAPSULE ORAL at 21:41

## 2024-11-17 RX ADMIN — QUETIAPINE FUMARATE 25 MG: 25 TABLET ORAL at 21:41

## 2024-11-17 RX ADMIN — GABAPENTIN 200 MG: 100 CAPSULE ORAL at 16:19

## 2024-11-17 RX ADMIN — FUROSEMIDE 40 MG: 20 TABLET ORAL at 21:41

## 2024-11-17 RX ADMIN — CARVEDILOL 25 MG: 25 TABLET, FILM COATED ORAL at 21:41

## 2024-11-17 NOTE — PLAN OF CARE
Problem: Adult Inpatient Plan of Care  Goal: Plan of Care Review  Flowsheets (Taken 11/17/2024 1601)  Outcome Evaluation: VSS. Confused, [employer] of [employer]. ACHS, Bed alarm on. No c/o pain or SOA. Call light within reach  Goal: Absence of Hospital-Acquired Illness or Injury  Intervention: Identify and Manage Fall Risk  Recent Flowsheet Documentation  Taken 11/17/2024 1345 by Ila Gomez RN  Safety Promotion/Fall Prevention:   safety round/check completed   nonskid shoes/slippers when out of bed   fall prevention program maintained  Intervention: Prevent Skin Injury  Recent Flowsheet Documentation  Taken 11/17/2024 1345 by Ila Gomez RN  Body Position: sitting up in bed  Skin Protection: drying agents applied  Intervention: Prevent Infection  Recent Flowsheet Documentation  Taken 11/17/2024 1345 by Ila Gomez RN  Infection Prevention:   rest/sleep promoted   single patient room provided   hand hygiene promoted  Goal: Optimal Comfort and Wellbeing  Intervention: Provide Person-Centered Care  Recent Flowsheet Documentation  Taken 11/17/2024 1345 by Ila Gomez RN  Trust Relationship/Rapport:   choices provided   care explained  Goal: Readiness for Transition of Care  Intervention: Mutually Develop Transition Plan  Recent Flowsheet Documentation  Taken 11/17/2024 1404 by Ila Gomez RN  Transportation Anticipated: family or friend will provide  Patient/Family Anticipated Services at Transition: none  Patient/Family Anticipates Transition to: home with family  Taken 11/17/2024 1359 by Ila Gomez RN  Equipment Currently Used at Home: walker, rolling  Goal: Plan of Care Review  Recent Flowsheet Documentation  Taken 11/17/2024 1601 by Ila Gomez RN  Outcome Evaluation: VSS. Confused, [employer] of B[employer]B. ACHS, Bed alarm on. No c/o pain or SOA. Call light within reach  Goal: Absence of Hospital-Acquired Illness or Injury  Intervention: Identify and Manage Fall Risk  Recent Flowsheet  Documentation  Taken 11/17/2024 1345 by Ila Gomez RN  Safety Promotion/Fall Prevention:   safety round/check completed   nonskid shoes/slippers when out of bed   fall prevention program maintained  Intervention: Prevent Skin Injury  Recent Flowsheet Documentation  Taken 11/17/2024 1345 by Ila Gomez RN  Body Position: sitting up in bed  Skin Protection: drying agents applied  Intervention: Prevent Infection  Recent Flowsheet Documentation  Taken 11/17/2024 1345 by Ila Gomez RN  Infection Prevention:   rest/sleep promoted   single patient room provided   hand hygiene promoted  Goal: Optimal Comfort and Wellbeing  Intervention: Provide Person-Centered Care  Recent Flowsheet Documentation  Taken 11/17/2024 1345 by Ila Gomez RN  Trust Relationship/Rapport:   choices provided   care explained  Goal: Readiness for Transition of Care  Intervention: Mutually Develop Transition Plan  Recent Flowsheet Documentation  Taken 11/17/2024 1404 by Ila Gomez RN  Transportation Anticipated: family or friend will provide  Patient/Family Anticipated Services at Transition: none  Patient/Family Anticipates Transition to: home with family  Taken 11/17/2024 1359 by Ila Gomez RN  Equipment Currently Used at Home: walker, rolling   Goal Outcome Evaluation:              Outcome Evaluation: VSS. [employer]. ACHS, Bed alarm on. No c/o pain or SOA. Call light within reach

## 2024-11-17 NOTE — ED NOTES
"Nursing report ED to floor  Dereck Ramírez  84 y.o.  male    HPI :  HPI  Stated Reason for Visit: AMS, Headache since 11/10/2024    Chief Complaint  Chief Complaint   Patient presents with    Altered Mental Status    Headache       Admitting doctor:   Ric Lopez MD    Admitting diagnosis:   The primary encounter diagnosis was Confusion. Diagnoses of Generalized weakness and Pedal edema were also pertinent to this visit.    Code status:   Current Code Status       Date Active Code Status Order ID Comments User Context       Prior            Allergies:   Patient has no known allergies.    Isolation:   No active isolations    Intake and Output  No intake or output data in the 24 hours ending 11/17/24 1116    Weight:       11/17/24  0917   Weight: 101 kg (223 lb)       Most recent vitals:   Vitals:    11/17/24 0917 11/17/24 0924 11/17/24 1000 11/17/24 1101   BP:  152/94 157/79 156/69   Pulse:   75 70   Resp:   18 18   Temp:       TempSrc:       SpO2:   96% 92%   Weight: 101 kg (223 lb)      Height: 182.9 cm (72\")          Active LDAs/IV Access:   Lines, Drains & Airways       Active LDAs       Name Placement date Placement time Site Days    Peripheral IV 11/17/24 0923 Left Antecubital 11/17/24 0923  Antecubital  less than 1                    Labs (abnormal labs have a star):   Labs Reviewed   COMPREHENSIVE METABOLIC PANEL - Abnormal; Notable for the following components:       Result Value    Glucose 193 (*)     BUN 32 (*)     Creatinine 1.65 (*)     Calcium 8.5 (*)     Alkaline Phosphatase 183 (*)     eGFR 41.2 (*)     All other components within normal limits    Narrative:     GFR Normal >60  Chronic Kidney Disease <60  Kidney Failure <15    The GFR formula is only valid for adults with stable renal function between ages 18 and 70.   CBC WITH AUTO DIFFERENTIAL - Abnormal; Notable for the following components:    RBC 3.32 (*)     Hemoglobin 9.5 (*)     Hematocrit 29.4 (*)     Lymphocyte % 16.2 (*)     All other " components within normal limits   POCT GLUCOSE FINGERSTICK - Abnormal; Notable for the following components:    Glucose 173 (*)     All other components within normal limits   URINALYSIS W/ MICROSCOPIC IF INDICATED (NO CULTURE) - Normal    Narrative:     Urine microscopic not indicated.   AMMONIA - Normal   RESPIRATORY PANEL PCR W/ COVID-19 (SARS-COV-2), NP SWAB IN UTM/VTP, 2 HR TAT   RAINBOW DRAW    Narrative:     The following orders were created for panel order [place] Draw.  Procedure                               Abnormality         Status                     ---------                               -----------         ------                     Green Top (Gel)[[id number]]                                  Final result               Lavender Top[[id number]]                                     Final result               Gold Top - SST[[id number]]                                                              Light Blue Top[[id number]]                                   Final result                 Please view results for these tests on the individual orders.   URINE DRUG SCREEN   TSH RFX ON ABNORMAL TO FREE T4   POCT GLUCOSE FINGERSTICK   CBC AND DIFFERENTIAL    Narrative:     The following orders were created for panel order CBC & Differential.  Procedure                               Abnormality         Status                     ---------                               -----------         ------                     CBC Auto Differential[[id number]]        Abnormal            Final result                 Please view results for these tests on the individual orders.   GREEN TOP   LAVENDER TOP   LIGHT BLUE TOP   GOLD TOP - SST       EKG:   Telemetry Scan   Final Result          Meds given in ED:   Medications   sodium chloride 0.9 % flush 10 mL (has no administration in time range)   amLODIPine (NORVASC) tablet 10 mg (has no administration in time range)   atorvastatin (LIPITOR) tablet 40 mg (has no administration in time  range)   busPIRone (BUSPAR) tablet 5 mg (has no administration in time range)   carvedilol (COREG) tablet 25 mg (has no administration in time range)   clopidogrel (PLAVIX) tablet 75 mg (has no administration in time range)   furosemide (LASIX) tablet 40 mg (has no administration in time range)   gabapentin (NEURONTIN) capsule 200 mg (has no administration in time range)   sertraline (ZOLOFT) tablet 75 mg (has no administration in time range)   tamsulosin (FLOMAX) 24 hr capsule 0.4 mg (has no administration in time range)   vitamin B-12 (CYANOCOBALAMIN) tablet 1,000 mcg (has no administration in time range)       Imaging results:  XR Chest 1 View    Result Date: 11/17/2024  No evidence for acute pulmonary process. Follow-up as clinical indications persist.  This report was finalized on 11/17/2024 10:15 AM by Dr. Facundo Guzman M.D on Workstation: [email or web address]      CT Head Without Contrast    Result Date: 11/17/2024   No acute intracranial hemorrhage or hydrocephalus. If there is further clinical concern, MRI could be considered for further evaluation.  This report was finalized on 11/17/2024 9:58 AM by Dr. Facundo Guzman M.D on Workstation: [email or web address]       Ambulatory status:   - assist    Social issues:   Social History     Socioeconomic History    Marital status:    Tobacco Use    Smoking status: Never    Smokeless tobacco: Never   Vaping Use    Vaping status: Never Used   Substance and Sexual Activity    Alcohol use: No    Drug use: No    Sexual activity: Never       Peripheral Neurovascular  Peripheral Neurovascular (Adult)  Peripheral Neurovascular WDL: WDL, pulse assessment  Pulse Assessment: dorsalis pedis, posterior tibial    Neuro Cognitive  Neuro Cognitive (Adult)  Cognitive/Neuro/Behavioral WDL: .WDL except, orientation  Orientation: disoriented to, situation, time    Learning  Learning Assessment  Learning Readiness and Ability: cognitive limitation noted    Respiratory  Respiratory  Airway WDL:  WDL  Respiratory WDL  Respiratory WDL: WDL    Abdominal Pain       Pain Assessments  Pain (Adult)  (0-10) Pain Rating: Rest: 10  (0-10) Pain Rating: Activity: 10    NIH Stroke Scale       Almita Almeida RN  11/17/24 11:16 EST
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
## 2024-11-17 NOTE — PROGRESS NOTES
Clinical Pharmacy Services: Medication History    Dereck Ramírez is a 84 y.o. male presenting to [facility] for Altered mental status [R41.82]    He  has a past medical history of Arthritis, Cerebellar artery occlusion, Coronary artery disease, Diabetes mellitus, Enlarged prostate, Hyperlipidemia, Hypertension, and Stroke (2011, 2012).    Allergies as of 11/17/2024    (No Known Allergies)       Medication information was obtained from: Patient family at bedside  Pharmacy and Phone Number:     Prior to Admission Medications       Prescriptions Last Dose Informant Patient Reported? Taking?    amLODIPine (NORVASC) 10 MG tablet 11/17/2024 Family Member No Yes    Take 1 tablet by mouth Daily.    atorvastatin (LIPITOR) 40 MG tablet 11/17/2024 Family Member No Yes    TAKE 1 TABLET BY MOUTH DAILY    busPIRone (BUSPAR) 5 MG tablet 11/17/2024 Family Member No Yes    Take 1 tablet by mouth 3 (Three) Times a Day As Needed (anxiety).    Patient taking differently:  Take 1 tablet by mouth 2 (Two) Times a Day.    carvedilol (COREG) 25 MG tablet 11/17/2024 Family Member No Yes    TAKE 1 TABLET BY MOUTH TWICE DAILY WITH MEALS    clopidogrel (PLAVIX) 75 MG tablet 11/17/2024 Family Member No Yes    TAKE 1 TABLET BY MOUTH DAILY    ferrous gluconate (FERGON) 324 MG tablet 11/17/2024 Family Member No Yes    TAKE 1 TABLET BY MOUTH DAILY WITH BREAKFAST    furosemide (LASIX) 40 MG tablet 11/17/2024 Self No Yes    TAKE 1 TABLET BY MOUTH TWICE DAILY    gabapentin (NEURONTIN) 100 MG capsule 11/17/2024 Family Member No Yes    Take 2 capsules by mouth 3 (Three) Times a Day.    nateglinide (STARLIX) 60 MG tablet 11/17/2024 Family Member No Yes    Take 1 tablet by mouth 2 (Two) Times a Day With Meals.    potassium chloride 10 MEQ CR tablet 11/17/2024 Self No Yes    Take 1 tablet by mouth Daily. Take while taking double Lasix dose    Patient taking differently:  Take 1 tablet by mouth Daily.    sertraline (ZOLOFT) 50 MG tablet 11/17/2024  Family Member No Yes    TAKE 1 AND 1/2 TABLETS BY MOUTH DAILY    tamsulosin (FLOMAX) 0.4 MG capsule 24 hr capsule 11/17/2024 Family Member No Yes    TAKE 1 CAPSULE BY MOUTH DAILY    vitamin B-12 (CYANOCOBALAMIN) 1000 MCG tablet 11/17/2024 Family Member No Yes    TAKE 1 TABLET BY MOUTH DAILY              Medication notes:     This medication list is complete to the best of my knowledge as of 11/17/2024    Please call if questions.    Isma Galarza  Pharmacy Intern  Phone [phone]  11/17/2024 12:54 EST

## 2024-11-17 NOTE — H&P
"    Patient Name:  Dereck Ramírez  YOB: 1940  MRN:  [id number]  Admit Date:  11/17/2024  Patient Care Team:  Amber Rodney APRN as PCP - General (Family Medicine)      Subjective   History Present Illness     Chief Complaint   Patient presents with    Altered Mental Status    Headache       Mr. Ramírez is a 84 y.o. male with a history of CAD, diastolic CHF, CKD, CVA, diabetes type 2, HLD, HTN, FEDERICO, osteoarthritis, RLS, BPH that presents to [facility] due to reports from his daughter of altered mental status for the past 5 days and worsening last night.  He lives with his daughter.  He was complaining of a headache 2 days ago and was acting, \" erratic\" and walking around the house in his underwear.  He asked for his mother who has been dead for many years.  His mentation seemed to improve a little bit for couple days and then returned with erratic behavior.  He did have a fall about 3 weeks ago.  He is oriented to self and year and tells me he has a headache all over his head.  Daughter reports he has been having more trouble getting around with his walker and just generally confused.  She did not note any slurred speech, unilateral weakness, word finding difficulty or facial droop at any point.    He has chronic lower extremity swelling for many years.  Per his daughter it is no worse than usual and actually looks a little better than it did about a month ago.  He has not been able to manage his own financial affairs for a while now but was doing well with ADLs until recently.    His workup in the ED has been fairly unremarkable.  Creatinine elevated to 1.65 and this is around his baseline.  TSH is normal.  Normal ammonia level.  His hemoglobin is 9.5, close to baseline.  UA was negative.  RVP negative.  UDS negative.  Chest x-ray negative acute.  CT of the head negative acute.      Review of Systems   Unable to perform ROS: Mental status change        Personal History "     Past Medical History:   Diagnosis Date    Arthritis     Cerebellar artery occlusion     Coronary artery disease     Diabetes mellitus     Enlarged prostate     Hyperlipidemia     Hypertension     Stroke 2011, 2012     Past Surgical History:   Procedure Laterality Date    CARDIAC CATHETERIZATION      lesion 1: intervention outcome    HERNIA REPAIR       Family History   Problem Relation Age of Onset    Heart disease Mother     Stroke Mother     Crohn's disease Father     Stroke Father     Arthritis Father     Cancer Other     Stroke Other     Diabetes Other     Heart defect Other     Hypertension Other     Thyroid disease Other     Cancer Maternal Uncle         bone    Dementia Maternal Grandmother      Social History     Tobacco Use    Smoking status: Never    Smokeless tobacco: Never   Vaping Use    Vaping status: Never Used   Substance Use Topics    Alcohol use: No    Drug use: No     No current facility-administered medications on file prior to encounter.     Current Outpatient Medications on File Prior to Encounter   Medication Sig Dispense Refill    amLODIPine (NORVASC) 10 MG tablet Take 1 tablet by mouth Daily. 90 tablet 3    atorvastatin (LIPITOR) 40 MG tablet TAKE 1 TABLET BY MOUTH DAILY 90 tablet 1    busPIRone (BUSPAR) 5 MG tablet Take 1 tablet by mouth 3 (Three) Times a Day As Needed (anxiety). (Patient taking differently: Take 1 tablet by mouth 2 (Two) Times a Day.) 270 tablet 1    carvedilol (COREG) 25 MG tablet TAKE 1 TABLET BY MOUTH TWICE DAILY WITH MEALS 180 tablet 3    clopidogrel (PLAVIX) 75 MG tablet TAKE 1 TABLET BY MOUTH DAILY 90 tablet 3    ferrous gluconate (FERGON) 324 MG tablet TAKE 1 TABLET BY MOUTH DAILY WITH BREAKFAST 90 tablet 1    furosemide (LASIX) 40 MG tablet TAKE 1 TABLET BY MOUTH TWICE DAILY 180 tablet 3    gabapentin (NEURONTIN) 100 MG capsule Take 2 capsules by mouth 3 (Three) Times a Day. 540 capsule 1    nateglinide (STARLIX) 60 MG tablet Take 1 tablet by mouth 2 (Two)  Times a Day With Meals. 180 tablet 3    potassium chloride 10 MEQ CR tablet Take 1 tablet by mouth Daily. Take while taking double Lasix dose (Patient taking differently: Take 1 tablet by mouth Daily.) 90 tablet 1    sertraline (ZOLOFT) 50 MG tablet TAKE 1 AND 1/2 TABLETS BY MOUTH DAILY 135 tablet 1    tamsulosin (FLOMAX) 0.4 MG capsule 24 hr capsule TAKE 1 CAPSULE BY MOUTH DAILY 90 capsule 1    vitamin B-12 (CYANOCOBALAMIN) 1000 MCG tablet TAKE 1 TABLET BY MOUTH DAILY 90 tablet 1    [DISCONTINUED] glucose blood test strip Check blood sugar twice daily and as needed 100 each 12     No Known Allergies    Objective    Objective     Vital Signs  Temp:  [97.6 °F (36.4 °C)] 97.6 °F (36.4 °C)  Heart Rate:  [70-76] 70  Resp:  [18] 18  BP: (152-157)/(69-94) 156/69  SpO2:  [92 %-96 %] 92 %  on   ;   Device (Oxygen Therapy): room air  Body mass index is 30.24 kg/m².    Physical Exam  Constitutional:       General: He is not in acute distress.     Appearance: He is not ill-appearing or toxic-appearing.   HENT:      Head: Normocephalic and atraumatic.      Mouth/Throat:      Mouth: Mucous membranes are moist.   Eyes:      Pupils: Pupils are equal, round, and reactive to light.   Cardiovascular:      Rate and Rhythm: Normal rate and regular rhythm.      Pulses: Normal pulses.      Heart sounds: Normal heart sounds.   Pulmonary:      Effort: Pulmonary effort is normal. No respiratory distress.      Breath sounds: Normal breath sounds.   Abdominal:      General: Bowel sounds are normal. There is no distension.      Palpations: Abdomen is soft.   Musculoskeletal:         General: No tenderness.      Comments: Moderate +3 edema to lower legs, ankles, feet   Skin:     General: Skin is warm and dry.      Comments: Skin is thin and frail   Neurological:      General: No focal deficit present.      Mental Status: He is alert. He is disoriented.         Results Review:  I reviewed the patient's new clinical results.      Lab Results  (last 24 hours)       Procedure Component Value Units Date/Time    CBC & Differential [[id number]]  (Abnormal) Collected: 11/17/24 0924    Specimen: Blood Updated: 11/17/24 0934    Narrative:      The following orders were created for panel order CBC & Differential.  Procedure                               Abnormality         Status                     ---------                               -----------         ------                     CBC Auto Differential[[id number]]        Abnormal            Final result                 Please view results for these tests on the individual orders.    Comprehensive Metabolic Panel [[id number]]  (Abnormal) Collected: 11/17/24 0924    Specimen: Blood Updated: 11/17/24 0954     Glucose 193 mg/dL      BUN 32 mg/dL      Creatinine 1.65 mg/dL      Sodium 137 mmol/L      Potassium 4.4 mmol/L      Chloride 100 mmol/L      CO2 24.0 mmol/L      Calcium 8.5 mg/dL      Total Protein 6.9 g/dL      Albumin 4.1 g/dL      ALT (SGPT) 17 U/L      AST (SGOT) 17 U/L      Alkaline Phosphatase 183 U/L      Total Bilirubin 0.3 mg/dL      Globulin 2.8 gm/dL      A/G Ratio 1.5 g/dL      BUN/Creatinine Ratio 19.4     Anion Gap 13.0 mmol/L      eGFR 41.2 mL/min/1.73     Narrative:      GFR Normal >60  Chronic Kidney Disease <60  Kidney Failure <15    The GFR formula is only valid for adults with stable renal function between ages 18 and 70.    CBC Auto Differential [[id number]]  (Abnormal) Collected: 11/17/24 0924    Specimen: Blood Updated: 11/17/24 0934     WBC 7.55 10*3/mm3      RBC 3.32 10*6/mm3      Hemoglobin 9.5 g/dL      Hematocrit 29.4 %      MCV 88.6 fL      MCH 28.6 pg      MCHC 32.3 g/dL      RDW 12.9 %      RDW-SD 41.4 fl      MPV 9.5 fL      Platelets 226 10*3/mm3      Neutrophil % 70.5 %      Lymphocyte % 16.2 %      Monocyte % 8.7 %      Eosinophil % 3.4 %      Basophil % 0.8 %      Immature Grans % 0.4 %      Neutrophils, Absolute 5.32 10*3/mm3      Lymphocytes, Absolute 1.22 10*3/mm3       Monocytes, Absolute 0.66 10*3/mm3      Eosinophils, Absolute 0.26 10*3/mm3      Basophils, Absolute 0.06 10*3/mm3      Immature Grans, Absolute 0.03 10*3/mm3      nRBC 0.0 /100 WBC     TSH Rfx On Abnormal To Free T4 [[id number]]  (Normal) Collected: 11/17/24 0924    Specimen: Blood Updated: 11/17/24 1138     TSH 4.180 uIU/mL     POC Glucose Once [[id number]]  (Abnormal) Collected: 11/17/24 0930    Specimen: Blood Updated: 11/17/24 0931     Glucose 173 mg/dL     Urinalysis With Microscopic If Indicated (No Culture) - Urine, Catheter [[id number]]  (Normal) Collected: 11/17/24 1022    Specimen: Urine, Catheter Updated: 11/17/24 1030     Color, UA Yellow     Appearance, UA Clear     pH, UA 5.5     Specific Gravity, UA 1.007     Glucose, UA Negative     Ketones, UA Negative     Bilirubin, UA Negative     Blood, UA Negative     Protein, UA Negative     Leuk Esterase, UA Negative     Nitrite, UA Negative     Urobilinogen, UA 0.2 E.U./dL    Narrative:      Urine microscopic not indicated.    Ammonia [[id number]]  (Normal) Collected: 11/17/24 1045    Specimen: Blood Updated: 11/17/24 1108     Ammonia 19 umol/L     Urine Drug Screen - Urine, Clean Catch [[id number]]  (Normal) Collected: 11/17/24 1110    Specimen: Urine, Clean Catch Updated: 11/17/24 1134     Amphet/Methamphet, Screen Negative     Barbiturates Screen, Urine Negative     Benzodiazepine Screen, Urine Negative     Cocaine Screen, Urine Negative     Opiate Screen Negative     THC, Screen, Urine Negative     Methadone Screen, Urine Negative     Oxycodone Screen, Urine Negative     Fentanyl, Urine Negative    Narrative:      Negative Thresholds Per Drugs Screened:    Amphetamines                 500 ng/ml  Barbiturates                 200 ng/ml  Benzodiazepines              100 ng/ml  Cocaine                      300 ng/ml  Methadone                    300 ng/ml  Opiates                      300 ng/ml  Oxycodone                    100 ng/ml  THC                            50 ng/ml  Fentanyl                       5 ng/ml      The Normal Value for all drugs tested is negative. This report includes final unconfirmed screening results to be used for medical treatment purposes only. Unconfirmed results must not be used for non-medical purposes such as employment or legal testing. Clinical consideration should be applied to any drug of abuse test, particularly when unconfirmed results are used.            Respiratory Panel PCR w/COVID-19(SARS-CoV-2) KIMBERLY/SKINNY/ROBBIE/PAD/COR/CALIXTO In-House, NP Swab in UTM/VTM, 2 HR TAT - Swab, Nasopharynx [[id number]]  (Normal) Collected: 11/17/24 1122    Specimen: Swab from Nasopharynx Updated: 11/17/24 1243     ADENOVIRUS, PCR Not Detected     Coronavirus 229E Not Detected     Coronavirus HKU1 Not Detected     Coronavirus NL63 Not Detected     Coronavirus OC43 Not Detected     COVID19 Not Detected     Human Metapneumovirus Not Detected     Human Rhinovirus/Enterovirus Not Detected     Influenza A PCR Not Detected     Influenza B PCR Not Detected     Parainfluenza Virus 1 Not Detected     Parainfluenza Virus 2 Not Detected     Parainfluenza Virus 3 Not Detected     Parainfluenza Virus 4 Not Detected     RSV, PCR Not Detected     Bordetella pertussis pcr Not Detected     Bordetella parapertussis PCR Not Detected     Chlamydophila pneumoniae PCR Not Detected     Mycoplasma pneumo by PCR Not Detected    Narrative:      In the setting of a positive respiratory panel with a viral infection PLUS a negative procalcitonin without other underlying concern for bacterial infection, consider observing off antibiotics or discontinuation of antibiotics and continue supportive care. If the respiratory panel is positive for atypical bacterial infection (Bordetella pertussis, Chlamydophila pneumoniae, or Mycoplasma pneumoniae), consider antibiotic de-escalation to target atypical bacterial infection.            Imaging Results (Last 24 Hours)       Procedure Component Value Units  Date/Time    XR Chest 1 View [[id number]] Collected: 11/17/24 1012     Updated: 11/17/24 1018    Narrative:      XR CHEST 1 VW-     HISTORY: Male who is 84 years-old, altered mental status     TECHNIQUE: Frontal view of the chest     COMPARISON: 8/22/2019     FINDINGS: The heart size is normal. Aorta is calcified. Pulmonary  vasculature is unremarkable. No focal pulmonary consolidation, pleural  effusion, or pneumothorax. Mild hiatal hernia is apparent. No acute  osseous process.       Impression:      No evidence for acute pulmonary process. Follow-up as  clinical indications persist.     This report was finalized on 11/17/2024 10:15 AM by Dr. Facundo Guzman M.D on Workstation: [id number]       CT Head Without Contrast [[id number]] Collected: 11/17/24 0955     Updated: 11/17/24 1001    Narrative:      CT HEAD WO CONTRAST-     INDICATIONS: Headache     TECHNIQUE: Radiation dose reduction techniques were utilized, including  automated exposure control and exposure modulation based on body size.  Noncontrast head CT     COMPARISON: 8/22/2019     FINDINGS:     No acute intracranial hemorrhage, midline shift or mass effect. No acute  territorial infarct is identified.     Mild periventricular hypodensities suggest chronic small vessel ischemic  change in a patient this age.     Arterial calcifications are seen at the base of the brain.     Ventricles, cisterns, cerebral sulci are unremarkable for patient age.     The visualized paranasal sinuses, orbits, mastoid air cells are  unremarkable.       Impression:         No acute intracranial hemorrhage or hydrocephalus. If there is further  clinical concern, MRI could be considered for further evaluation.     This report was finalized on 11/17/2024 9:58 AM by Dr. Facundo Guzman M.D on Workstation: [id number]               Results for orders placed during the hospital encounter of 08/31/23    Adult Transthoracic Echo Complete w/ Color, Spectral and Contrast if Necessary  Per Protocol    Interpretation Summary    Left ventricular systolic function is normal. Calculated left ventricular EF = 63.2%    Left ventricular wall thickness is consistent with moderate concentric hypertrophy.    Left ventricular diastolic function is consistent with (grade I) impaired relaxation.    The left atrial cavity is moderately dilated.    Estimated right ventricular systolic pressure from tricuspid regurgitation is mildly elevated (35-45 mmHg).      Telemetry Scan   Final Result      Telemetry Scan   Final Result           Assessment/Plan     Active Hospital Problems    Diagnosis  POA    **Altered mental status [R41.82]  Yes    Chronic diastolic heart failure [I50.32]  Yes    CKD (chronic kidney disease) stage 3, GFR 30-59 ml/min [N18.30]  Yes    Iron deficiency anemia [D50.9]  Yes    CVA (cerebrovascular accident) (with right-sided weakness) [I63.9]  Yes    Chronic coronary artery disease [I25.10]  Yes    DM II (diabetes mellitus, type II), controlled [E11.9]  Yes    Hyperlipidemia [E78.5]  Yes    Hypertension, essential [I10]  Yes      Resolved Hospital Problems   No resolved problems to display.     This is a pleasantly confused 84-year-old gentleman with multiple chronic health problems but no previously documented or reported diagnosis of dementia who presents with AMS and essentially unremarkable workup today.  Neurological exam is somewhat limited due to he does not follow all commands.  No obvious focal deficits.  We will check an MRI of the brain.  We will check for syphilis and HIV.  Differential diagnoses includes worsening of an underlying dementia vs other.     Consult PT/OT/CCP.    Last A1c in September was 7.1.  Hold Starlix.  Cover blood glucose with lispro low-dose SSI.    Resume antihypertensive regimen of Norvasc, Coreg, furosemide.    Resume statin and Plavix.    Resume iron.    Resume Flomax.      I discussed the patient's findings and my recommendations with family.    VTE  Prophylaxis - SCDs.  Code Status - Full code.       KAVON Drew  [facility]  11/17/24  13:34 EST

## 2024-11-18 LAB
ANION GAP SERPL CALCULATED.3IONS-SCNC: 12.6 MMOL/L (ref 5–15)
BUN SERPL-MCNC: 26 MG/DL (ref 8–23)
BUN/CREAT SERPL: 17.9 (ref 7–25)
CALCIUM SPEC-SCNC: 8.6 MG/DL (ref 8.6–10.5)
CHLORIDE SERPL-SCNC: 102 MMOL/L (ref 98–107)
CO2 SERPL-SCNC: 23.4 MMOL/L (ref 22–29)
CREAT SERPL-MCNC: 1.45 MG/DL (ref 0.76–1.27)
DEPRECATED RDW RBC AUTO: 41.1 FL (ref 37–54)
EGFRCR SERPLBLD CKD-EPI 2021: 48.1 ML/MIN/1.73
ERYTHROCYTE [DISTWIDTH] IN BLOOD BY AUTOMATED COUNT: 13.3 % (ref 12.3–15.4)
GLUCOSE BLDC GLUCOMTR-MCNC: 133 MG/DL (ref 70–130)
GLUCOSE BLDC GLUCOMTR-MCNC: 138 MG/DL (ref 70–130)
GLUCOSE BLDC GLUCOMTR-MCNC: 172 MG/DL (ref 70–130)
GLUCOSE BLDC GLUCOMTR-MCNC: 188 MG/DL (ref 70–130)
GLUCOSE SERPL-MCNC: 154 MG/DL (ref 65–99)
HCT VFR BLD AUTO: 30.3 % (ref 37.5–51)
HGB BLD-MCNC: 10 G/DL (ref 13–17.7)
HIV 1+2 AB+HIV1 P24 AG SERPL QL IA: NORMAL
MCH RBC QN AUTO: 28.5 PG (ref 26.6–33)
MCHC RBC AUTO-ENTMCNC: 33 G/DL (ref 31.5–35.7)
MCV RBC AUTO: 86.3 FL (ref 79–97)
PLATELET # BLD AUTO: 243 10*3/MM3 (ref 140–450)
PMV BLD AUTO: 9.5 FL (ref 6–12)
POTASSIUM SERPL-SCNC: 3.9 MMOL/L (ref 3.5–5.2)
RBC # BLD AUTO: 3.51 10*6/MM3 (ref 4.14–5.8)
RPR SER QL: NORMAL
SODIUM SERPL-SCNC: 138 MMOL/L (ref 136–145)
VIT B12 BLD-MCNC: 1487 PG/ML (ref 211–946)
WBC NRBC COR # BLD AUTO: 9.24 10*3/MM3 (ref 3.4–10.8)

## 2024-11-18 PROCEDURE — 82607 VITAMIN B-12: CPT | Performed by: STUDENT IN AN ORGANIZED HEALTH CARE EDUCATION/TRAINING PROGRAM

## 2024-11-18 PROCEDURE — 96372 THER/PROPH/DIAG INJ SC/IM: CPT

## 2024-11-18 PROCEDURE — 97110 THERAPEUTIC EXERCISES: CPT

## 2024-11-18 PROCEDURE — 86592 SYPHILIS TEST NON-TREP QUAL: CPT | Performed by: STUDENT IN AN ORGANIZED HEALTH CARE EDUCATION/TRAINING PROGRAM

## 2024-11-18 PROCEDURE — 97162 PT EVAL MOD COMPLEX 30 MIN: CPT

## 2024-11-18 PROCEDURE — 82948 REAGENT STRIP/BLOOD GLUCOSE: CPT

## 2024-11-18 PROCEDURE — 80048 BASIC METABOLIC PNL TOTAL CA: CPT | Performed by: STUDENT IN AN ORGANIZED HEALTH CARE EDUCATION/TRAINING PROGRAM

## 2024-11-18 PROCEDURE — G0378 HOSPITAL OBSERVATION PER HR: HCPCS

## 2024-11-18 PROCEDURE — 63710000001 INSULIN LISPRO (HUMAN) PER 5 UNITS: Performed by: STUDENT IN AN ORGANIZED HEALTH CARE EDUCATION/TRAINING PROGRAM

## 2024-11-18 PROCEDURE — 36415 COLL VENOUS BLD VENIPUNCTURE: CPT | Performed by: STUDENT IN AN ORGANIZED HEALTH CARE EDUCATION/TRAINING PROGRAM

## 2024-11-18 PROCEDURE — 85027 COMPLETE CBC AUTOMATED: CPT | Performed by: STUDENT IN AN ORGANIZED HEALTH CARE EDUCATION/TRAINING PROGRAM

## 2024-11-18 PROCEDURE — 25010000002 ENOXAPARIN PER 10 MG: Performed by: STUDENT IN AN ORGANIZED HEALTH CARE EDUCATION/TRAINING PROGRAM

## 2024-11-18 PROCEDURE — 97165 OT EVAL LOW COMPLEX 30 MIN: CPT

## 2024-11-18 PROCEDURE — G0432 EIA HIV-1/HIV-2 SCREEN: HCPCS | Performed by: STUDENT IN AN ORGANIZED HEALTH CARE EDUCATION/TRAINING PROGRAM

## 2024-11-18 RX ORDER — ENOXAPARIN SODIUM 100 MG/ML
30 INJECTION SUBCUTANEOUS EVERY 24 HOURS
Status: DISCONTINUED | OUTPATIENT
Start: 2024-11-18 | End: 2024-11-19 | Stop reason: HOSPADM

## 2024-11-18 RX ORDER — ACETAMINOPHEN 500 MG
1000 TABLET ORAL EVERY 8 HOURS PRN
Status: DISCONTINUED | OUTPATIENT
Start: 2024-11-18 | End: 2024-11-19 | Stop reason: HOSPADM

## 2024-11-18 RX ADMIN — GABAPENTIN 200 MG: 100 CAPSULE ORAL at 08:02

## 2024-11-18 RX ADMIN — CARVEDILOL 25 MG: 25 TABLET, FILM COATED ORAL at 08:02

## 2024-11-18 RX ADMIN — CARVEDILOL 25 MG: 25 TABLET, FILM COATED ORAL at 16:46

## 2024-11-18 RX ADMIN — TAMSULOSIN HYDROCHLORIDE 0.4 MG: 0.4 CAPSULE ORAL at 08:01

## 2024-11-18 RX ADMIN — SERTRALINE 75 MG: 50 TABLET, FILM COATED ORAL at 08:01

## 2024-11-18 RX ADMIN — INSULIN LISPRO 2 UNITS: 100 INJECTION, SOLUTION INTRAVENOUS; SUBCUTANEOUS at 12:13

## 2024-11-18 RX ADMIN — QUETIAPINE FUMARATE 25 MG: 25 TABLET ORAL at 20:21

## 2024-11-18 RX ADMIN — FUROSEMIDE 40 MG: 20 TABLET ORAL at 08:01

## 2024-11-18 RX ADMIN — GABAPENTIN 200 MG: 100 CAPSULE ORAL at 16:46

## 2024-11-18 RX ADMIN — AMLODIPINE BESYLATE 10 MG: 5 TABLET ORAL at 08:02

## 2024-11-18 RX ADMIN — ENOXAPARIN SODIUM 30 MG: 100 INJECTION SUBCUTANEOUS at 13:30

## 2024-11-18 RX ADMIN — INSULIN LISPRO 2 UNITS: 100 INJECTION, SOLUTION INTRAVENOUS; SUBCUTANEOUS at 16:47

## 2024-11-18 RX ADMIN — CLOPIDOGREL BISULFATE 75 MG: 75 TABLET ORAL at 08:02

## 2024-11-18 RX ADMIN — GABAPENTIN 200 MG: 100 CAPSULE ORAL at 20:21

## 2024-11-18 RX ADMIN — CYANOCOBALAMIN TAB 500 MCG 1000 MCG: 500 TAB at 08:02

## 2024-11-18 RX ADMIN — ATORVASTATIN CALCIUM 40 MG: 20 TABLET, FILM COATED ORAL at 08:01
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
## 2024-11-18 NOTE — PROGRESS NOTES
Name: Dereck Ramírez ADMIT: 2024   : 1940  PCP: Amber Rodney APRN    MRN: [id number] LOS: 0 days   AGE/SEX: 84 y.o. male  ROOM: [facility]     Subjective   Subjective   Patient resting comfortably in bed.  Just got back from physical therapy where he is able to walk about 20 feet which is near baseline.  Patient's mental status is similar to yesterday's.  He is alert and orient x 3 but quickly dozes off and pleasantly confused.  Daughter is at bedside he does not see really any change since yesterday.    Besides headache patient has no complaints.  Tolerating a diet.    Review of Systems  As above     Objective   Objective   Vital Signs  Temp:  [97.5 °F (36.4 °C)-99.3 °F (37.4 °C)] 97.5 °F (36.4 °C)  Heart Rate:  [65-82] 73  Resp:  [18] 18  BP: (119-170)/(50-97) 170/59  SpO2:  [95 %-97 %] 96 %  on   ;   Device (Oxygen Therapy): room air  Body mass index is 30.24 kg/m².  Physical Exam  Vitals and nursing note reviewed.   Constitutional:       General: He is not in acute distress.     Appearance: He is ill-appearing (Chronically).   Cardiovascular:      Rate and Rhythm: Normal rate and regular rhythm.   Pulmonary:      Effort: Pulmonary effort is normal. No respiratory distress.   Abdominal:      General: Abdomen is flat. There is no distension.      Tenderness: There is no abdominal tenderness.   Musculoskeletal:         General: No swelling or deformity.   Skin:     General: Skin is warm and dry.   Neurological:      General: No focal deficit present.      Mental Status: He is alert and oriented to person, place, and time. Mental status is at baseline.      Comments: Pleasantly confused, rambling.         Results Review     I reviewed the patient's new clinical results.  Results from last 7 days   Lab Units 24  0708 24   WBC 10*3/mm3 9.24 7.55   HEMOGLOBIN g/dL 10.0* 9.5*   PLATELETS 10*3/mm3 243 226     Results from last 7 days   Lab Units 24  0708 24    SODIUM mmol/L 138 137   POTASSIUM mmol/L 3.9 4.4   CHLORIDE mmol/L 102 100   CO2 mmol/L 23.4 24.0   BUN mg/dL 26* 32*   CREATININE mg/dL 1.45* 1.65*   GLUCOSE mg/dL 154* 193*   Estimated Creatinine Clearance: 48.3 mL/min (A) (by C-G formula based on SCr of 1.45 mg/dL (H)).  Results from last 7 days   Lab Units 11/17/24  0924   ALBUMIN g/dL 4.1   BILIRUBIN mg/dL 0.3   ALK PHOS U/L 183*   AST (SGOT) U/L 17   ALT (SGPT) U/L 17     Results from last 7 days   Lab Units 11/18/24  0708 11/17/24  0924   CALCIUM mg/dL 8.6 8.5*   ALBUMIN g/dL  --  4.1       COVID19   Date Value Ref Range Status   11/17/2024 Not Detected Not Detected - Ref. Range Final     Glucose   Date/Time Value Ref Range Status   11/18/2024 1052 188 (H) 70 - 130 mg/dL Final   11/18/2024 0616 138 (H) 70 - 130 mg/dL Final   11/17/2024 2023 107 70 - 130 mg/dL Final   11/17/2024 1546 118 70 - 130 mg/dL Final   11/17/2024 0930 173 (H) 70 - 130 mg/dL Final       MRI Brain Without Contrast  Narrative: MRI BRAIN WO CONTRAST-     INDICATIONS: Altered mental status     TECHNIQUE: Multiplanar multisequence noncontrast magnetic resonance  imaging of the brain.     COMPARISON: Head CT from same date, MRI from 5/7/2024     FINDINGS:     Several of the sequences are degraded by motion artifact, limiting the  assessment.     The diffusion-weighted images are significantly degraded by motion  artifact, with no definite restricted diffusion identified.     The midline structures appear unremarkable.     The brain parenchyma shows mild periventricular white matter T2 FLAIR  signal hyperintensities, compatible with chronic small vessel ischemic  change in a patient this age. Old infarct changes noted in the lauren.     Flow voids in the major arteries at the base of the brain are not well  characterized owing to extensive motion artifact.     The paranasal sinuses, mastoid air cells, and orbits appear  unremarkable.     Impression:    The exam is significantly limited by  motion artifact, with no definite  acute infarct identified. Chronic ischemic changes of the brain.     This report was finalized on 11/17/2024 3:44 PM by Dr. Facundo Guzman M.D on Workstation: [email or web address]     XR Chest 1 View  Narrative: XR CHEST 1 VW-     HISTORY: Male who is 84 years-old, altered mental status     TECHNIQUE: Frontal view of the chest     COMPARISON: 8/22/2019     FINDINGS: The heart size is normal. Aorta is calcified. Pulmonary  vasculature is unremarkable. No focal pulmonary consolidation, pleural  effusion, or pneumothorax. Mild hiatal hernia is apparent. No acute  osseous process.     Impression: No evidence for acute pulmonary process. Follow-up as  clinical indications persist.     This report was finalized on 11/17/2024 10:15 AM by Dr. Facundo Guzman M.D on Workstation: [email or web address]     CT Head Without Contrast  Narrative: CT HEAD WO CONTRAST-     INDICATIONS: Headache     TECHNIQUE: Radiation dose reduction techniques were utilized, including  automated exposure control and exposure modulation based on body size.  Noncontrast head CT     COMPARISON: 8/22/2019     FINDINGS:     No acute intracranial hemorrhage, midline shift or mass effect. No acute  territorial infarct is identified.     Mild periventricular hypodensities suggest chronic small vessel ischemic  change in a patient this age.     Arterial calcifications are seen at the base of the brain.     Ventricles, cisterns, cerebral sulci are unremarkable for patient age.     The visualized paranasal sinuses, orbits, mastoid air cells are  unremarkable.     Impression:    No acute intracranial hemorrhage or hydrocephalus. If there is further  clinical concern, MRI could be considered for further evaluation.     This report was finalized on 11/17/2024 9:58 AM by Dr. Facundo Guzman M.D on Workstation: [email or web address]       I reviewed the patient's daily medications.  Scheduled Medications  amLODIPine, 10 mg, Oral, Daily  atorvastatin, 40 mg,  Oral, Daily  carvedilol, 25 mg, Oral, BID With Meals  clopidogrel, 75 mg, Oral, Daily  furosemide, 40 mg, Oral, BID  gabapentin, 200 mg, Oral, TID  insulin lispro, 2-7 Units, Subcutaneous, 4x Daily AC & at Bedtime  QUEtiapine, 25 mg, Oral, Nightly  sertraline, 75 mg, Oral, Daily  tamsulosin, 0.4 mg, Oral, Daily  vitamin B-12, 1,000 mcg, Oral, Daily    Infusions   Diet  Diet: Cardiac, Diabetic; Healthy Heart (2-3 Na+); Consistent Carbohydrate; Fluid Consistency: Thin (IDDSI 0)         I have personally reviewed:  [x]  Laboratory   []  Microbiology   [x]  Radiology   []  EKG/Telemetry   []  Cardiology/Vascular   []  Pathology   [x]  Records     Assessment/Plan     Active Hospital Problems    Diagnosis  POA    **Altered mental status [R41.82]  Yes    Chronic diastolic heart failure [I50.32]  Yes    CKD (chronic kidney disease) stage 3, GFR 30-59 ml/min [N18.30]  Yes    Iron deficiency anemia [D50.9]  Yes    CVA (cerebrovascular accident) (with right-sided weakness) [I63.9]  Yes    Chronic coronary artery disease [I25.10]  Yes    DM II (diabetes mellitus, type II), controlled [E11.9]  Yes    Hyperlipidemia [E78.5]  Yes    Hypertension, essential [I10]  Yes      Resolved Hospital Problems   No resolved problems to display.       84 y.o. male admitted with Altered mental status.    Acute encephalopathy  -No infectious cause.  UA bland.  RVP negative chest x-ray with no acute findings  -TSH wnl.  Ammonia within normal limits.  B12 elevated, on supplementation.  HIV negative, syphilis pending  -MRI brain with chronic changes but no acute findings  -Suspect this is undiagnosed dementia.    -OT consulted for slums test     Hypertension-continue home amlodipine, Coreg, Lasix     History of CVA  Hyperlipidemia  Continue-atorvastatin, Plavix     Depression-continue home Zoloft     BPH-continue home Flomax      Lovenox 30 mg SC daily for DVT prophylaxis.  Full code.  Discussed with patient, family, nursing staff, and  CCP.  Anticipate discharge home with home health in 1-2 days.    Expected discharge date/ time has not been documented.      Ric Lopez MD  [facility]  11/18/24  12:37 EST

## 2024-11-18 NOTE — PLAN OF CARE
Goal Outcome Evaluation:  Plan of Care Reviewed With: patient           Outcome Evaluation: Pt admitted to [facility] for AMS, edema and generalized weakness. Pt possible poor historian, but reports living w/ dtr in a 1 story home. States he can walk short distances w/ RW, but primarily uses w/c. Mod (I) for BADLs w/ intermittent assist from daughter. Daughter provides near 24/7 assistance. Today, pt was SBA for bed mobility and CGA for STS transfers and short distance mobility w/ RW. Needed assist to don socks at EOB. Mild balance, strength, and activity tolerance deficits noted. OT will f/u to maxmize performance, recommend HHOT and continued assistance from family at d/c.    Anticipated Discharge Disposition (OT): home with home health, home with assist

## 2024-11-18 NOTE — PLAN OF CARE
Problem: Adult Inpatient Plan of Care  Goal: Plan of Care Review  Outcome: Progressing  Flowsheets (Taken 11/18/2024 1529)  Progress: improving  Outcome Evaluation: Patient has been pleasant and cooperative during shift. No complaints of pain, nausea or SOA. AOx2-3, SR, room air, assist x1. Ambulated with PT to restroom, 20 feet. Patient trying to get out of bed often this morning but has since calmed down around 1200. Daughter visited patient today. Will continue to monitor and assist patient as needed.  Plan of Care Reviewed With: patient  Goal: Patient-Specific Goal (Individualized)  Outcome: Progressing  Goal: Absence of Hospital-Acquired Illness or Injury  Outcome: Progressing  Intervention: Identify and Manage Fall Risk  Recent Flowsheet Documentation  Taken 11/18/2024 1400 by Dg Dunham RN  Safety Promotion/Fall Prevention:   assistive device/personal items within reach   fall prevention program maintained   nonskid shoes/slippers when out of bed   safety round/check completed  Taken 11/18/2024 1200 by Dg Dunham RN  Safety Promotion/Fall Prevention:   assistive device/personal items within reach   fall prevention program maintained   nonskid shoes/slippers when out of bed   safety round/check completed  Taken 11/18/2024 1000 by Dg Dunham RN  Safety Promotion/Fall Prevention:   assistive device/personal items within reach   fall prevention program maintained   nonskid shoes/slippers when out of bed   safety round/check completed  Taken 11/18/2024 0804 by Dg Dunham RN  Safety Promotion/Fall Prevention:   assistive device/personal items within reach   fall prevention program maintained   nonskid shoes/slippers when out of bed   safety round/check completed  Intervention: Prevent Skin Injury  Recent Flowsheet Documentation  Taken 11/18/2024 1400 by Dg Dunham RN  Body Position: supine  Skin Protection:   transparent dressing maintained   incontinence pads utilized  Taken  11/18/2024 1200 by Dg Dunham RN  Body Position: supine  Taken 11/18/2024 1000 by Dg Dunham RN  Body Position: supine  Taken 11/18/2024 0804 by Dg Dunham RN  Body Position: supine  Skin Protection: incontinence pads utilized  Goal: Optimal Comfort and Wellbeing  Outcome: Progressing  Goal: Readiness for Transition of Care  Outcome: Progressing  Goal: Plan of Care Review  Outcome: Progressing  Flowsheets (Taken 11/18/2024 1529)  Progress: improving  Outcome Evaluation: Patient has been pleasant and cooperative during shift. No complaints of pain, nausea or SOA. AOx2-3, SR, room air, assist x1. Ambulated with PT to restroom, 20 feet. Patient trying to get out of bed often this morning but has since calmed down around 1200. Daughter visited patient today. Will continue to monitor and assist patient as needed.  Plan of Care Reviewed With: patient  Goal: Patient-Specific Goal (Individualized)  Outcome: Progressing  Goal: Absence of Hospital-Acquired Illness or Injury  Outcome: Progressing  Intervention: Identify and Manage Fall Risk  Recent Flowsheet Documentation  Taken 11/18/2024 1400 by Dg Dunham RN  Safety Promotion/Fall Prevention:   assistive device/personal items within reach   fall prevention program maintained   nonskid shoes/slippers when out of bed   safety round/check completed  Taken 11/18/2024 1200 by Dg Dunham RN  Safety Promotion/Fall Prevention:   assistive device/personal items within reach   fall prevention program maintained   nonskid shoes/slippers when out of bed   safety round/check completed  Taken 11/18/2024 1000 by Dg Dunham RN  Safety Promotion/Fall Prevention:   assistive device/personal items within reach   fall prevention program maintained   nonskid shoes/slippers when out of bed   safety round/check completed  Taken 11/18/2024 0804 by Dg Dunham RN  Safety Promotion/Fall Prevention:   assistive device/personal items within reach   fall  prevention program maintained   nonskid shoes/slippers when out of bed   safety round/check completed  Intervention: Prevent Skin Injury  Recent Flowsheet Documentation  Taken 11/18/2024 1400 by Dg Dunham RN  Body Position: supine  Skin Protection:   transparent dressing maintained   incontinence pads utilized  Taken 11/18/2024 1200 by Dg Dunham RN  Body Position: supine  Taken 11/18/2024 1000 by Dg Dunham RN  Body Position: supine  Taken 11/18/2024 0804 by Dg Dunham RN  Body Position: supine  Skin Protection: incontinence pads utilized  Goal: Optimal Comfort and Wellbeing  Outcome: Progressing  Goal: Readiness for Transition of Care  Outcome: Progressing     Problem: Fall Injury Risk  Goal: Absence of Fall and Fall-Related Injury  Outcome: Progressing  Intervention: Promote Injury-Free Environment  Recent Flowsheet Documentation  Taken 11/18/2024 1400 by Dg Dunham RN  Safety Promotion/Fall Prevention:   assistive device/personal items within reach   fall prevention program maintained   nonskid shoes/slippers when out of bed   safety round/check completed  Taken 11/18/2024 1200 by Dg Dunham RN  Safety Promotion/Fall Prevention:   assistive device/personal items within reach   fall prevention program maintained   nonskid shoes/slippers when out of bed   safety round/check completed  Taken 11/18/2024 1000 by Dg Dunham RN  Safety Promotion/Fall Prevention:   assistive device/personal items within reach   fall prevention program maintained   nonskid shoes/slippers when out of bed   safety round/check completed  Taken 11/18/2024 0804 by Dg Dunham RN  Safety Promotion/Fall Prevention:   assistive device/personal items within reach   fall prevention program maintained   nonskid shoes/slippers when out of bed   safety round/check completed     Problem: Pain Acute  Goal: Optimal Pain Control and Function  Outcome: Progressing     Problem: Skin Injury Risk  Increased  Goal: Skin Health and Integrity  Outcome: Progressing  Intervention: Optimize Skin Protection  Recent Flowsheet Documentation  Taken 11/18/2024 1400 by Dg Dunham RN  Pressure Reduction Techniques:   frequent weight shift encouraged   weight shift assistance provided  Head of Bed (HOB) Positioning: HOB at 20-30 degrees  Pressure Reduction Devices: pressure-redistributing mattress utilized  Skin Protection:   transparent dressing maintained   incontinence pads utilized  Taken 11/18/2024 1200 by Dg Dunham RN  Head of Bed (HOB) Positioning: HOB at 45 degrees  Taken 11/18/2024 1000 by Dg Dunham RN  Head of Bed (HOB) Positioning: HOB at 30 degrees  Taken 11/18/2024 0804 by Dg Dunham RN  Pressure Reduction Techniques:   frequent weight shift encouraged   weight shift assistance provided  Head of Bed (HOB) Positioning: HOB at 30 degrees  Pressure Reduction Devices: pressure-redistributing mattress utilized  Skin Protection: incontinence pads utilized     Problem: Violence Risk or Actual  Goal: Anger and Impulse Control  Outcome: Progressing  Intervention: Minimize Safety Risk  Recent Flowsheet Documentation  Taken 11/18/2024 1400 by Dg Dunham RN  Enhanced Safety Measures: bed alarm set  Taken 11/18/2024 1200 by Dg Dunham RN  Enhanced Safety Measures:   bed alarm set   room near unit station  Taken 11/18/2024 1000 by Dg Dunham RN  Enhanced Safety Measures:   bed alarm set   room near unit station  Taken 11/18/2024 0804 by Dg Dunham RN  Enhanced Safety Measures:   bed alarm set   room near unit station   Goal Outcome Evaluation:  Plan of Care Reviewed With: patient        Progress: improving  Outcome Evaluation: Patient has been pleasant and cooperative during shift. No complaints of pain, nausea or SOA. AOx2-3, SR, room air, assist x1. Ambulated with PT to restroom, 20 feet. Patient trying to get out of bed often this morning but has since calmed down  around 1200. Daughter visited patient today. Will continue to monitor and assist patient as needed.

## 2024-11-18 NOTE — PLAN OF CARE
Goal Outcome Evaluation:      Patient is a 84 y.o. male admitted to [facility] on 11/17/2024 for AMS, edema and generalized weakness. Patient is ambulatory with a  cane for short distances at baseline and lives at home with daughter- daughter at bedside and assisted with PLOF information. Patient uses a wheelchair for longer distances and out in the community. Today, patient performed bed mobility with SBA, required [place] for transfers, and ambulated 20' [place] with a RW. Balance and strength deficits noted. Although near baseline, patient may benefit from skilled PT services acutely to address functional deficits as well as improve level of independence prior to discharge. Anticipate returning back home with assist from daughter and possible  PT upon DC.    Anticipated Discharge Disposition (PT): home with assist, home with home health (pending patient progress made and hospital course)
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
## 2024-11-19 ENCOUNTER — READMISSION MANAGEMENT (OUTPATIENT)
Dept: CALL CENTER | Facility: HOSPITAL | Age: 84
End: 2024-11-19
Payer: MEDICARE

## 2024-11-19 VITALS
HEIGHT: 72 IN | WEIGHT: 223 LBS | DIASTOLIC BLOOD PRESSURE: 73 MMHG | RESPIRATION RATE: 18 BRPM | HEART RATE: 64 BPM | SYSTOLIC BLOOD PRESSURE: 128 MMHG | TEMPERATURE: 98.6 F | OXYGEN SATURATION: 95 % | BODY MASS INDEX: 30.2 KG/M2

## 2024-11-19 PROBLEM — F03.90 DEMENTIA WITHOUT BEHAVIORAL DISTURBANCE: Status: ACTIVE | Noted: 2024-11-19

## 2024-11-19 LAB
ANION GAP SERPL CALCULATED.3IONS-SCNC: 10.8 MMOL/L (ref 5–15)
BUN SERPL-MCNC: 22 MG/DL (ref 8–23)
BUN/CREAT SERPL: 16.4 (ref 7–25)
CALCIUM SPEC-SCNC: 8.1 MG/DL (ref 8.6–10.5)
CHLORIDE SERPL-SCNC: 106 MMOL/L (ref 98–107)
CO2 SERPL-SCNC: 22.2 MMOL/L (ref 22–29)
CREAT SERPL-MCNC: 1.34 MG/DL (ref 0.76–1.27)
DEPRECATED RDW RBC AUTO: 40.5 FL (ref 37–54)
EGFRCR SERPLBLD CKD-EPI 2021: 52.9 ML/MIN/1.73
ERYTHROCYTE [DISTWIDTH] IN BLOOD BY AUTOMATED COUNT: 13.1 % (ref 12.3–15.4)
GLUCOSE BLDC GLUCOMTR-MCNC: 163 MG/DL (ref 70–130)
GLUCOSE BLDC GLUCOMTR-MCNC: 219 MG/DL (ref 70–130)
GLUCOSE SERPL-MCNC: 142 MG/DL (ref 65–99)
HCT VFR BLD AUTO: 29.4 % (ref 37.5–51)
HGB BLD-MCNC: 9.9 G/DL (ref 13–17.7)
MCH RBC QN AUTO: 29.1 PG (ref 26.6–33)
MCHC RBC AUTO-ENTMCNC: 33.7 G/DL (ref 31.5–35.7)
MCV RBC AUTO: 86.5 FL (ref 79–97)
PLATELET # BLD AUTO: 222 10*3/MM3 (ref 140–450)
PMV BLD AUTO: 9.5 FL (ref 6–12)
POTASSIUM SERPL-SCNC: 3.7 MMOL/L (ref 3.5–5.2)
RBC # BLD AUTO: 3.4 10*6/MM3 (ref 4.14–5.8)
SODIUM SERPL-SCNC: 139 MMOL/L (ref 136–145)
WBC NRBC COR # BLD AUTO: 8.95 10*3/MM3 (ref 3.4–10.8)

## 2024-11-19 PROCEDURE — 63710000001 INSULIN LISPRO (HUMAN) PER 5 UNITS: Performed by: STUDENT IN AN ORGANIZED HEALTH CARE EDUCATION/TRAINING PROGRAM

## 2024-11-19 PROCEDURE — G0378 HOSPITAL OBSERVATION PER HR: HCPCS

## 2024-11-19 PROCEDURE — 85027 COMPLETE CBC AUTOMATED: CPT | Performed by: STUDENT IN AN ORGANIZED HEALTH CARE EDUCATION/TRAINING PROGRAM

## 2024-11-19 PROCEDURE — 97530 THERAPEUTIC ACTIVITIES: CPT

## 2024-11-19 PROCEDURE — 82948 REAGENT STRIP/BLOOD GLUCOSE: CPT

## 2024-11-19 PROCEDURE — 80048 BASIC METABOLIC PNL TOTAL CA: CPT | Performed by: STUDENT IN AN ORGANIZED HEALTH CARE EDUCATION/TRAINING PROGRAM

## 2024-11-19 RX ORDER — QUETIAPINE FUMARATE 25 MG/1
25 TABLET, FILM COATED ORAL NIGHTLY
Qty: 30 TABLET | Refills: 0 | Status: ON HOLD | OUTPATIENT
Start: 2024-11-19 | End: 2024-12-19

## 2024-11-19 RX ADMIN — FUROSEMIDE 40 MG: 20 TABLET ORAL at 08:56

## 2024-11-19 RX ADMIN — SERTRALINE 75 MG: 50 TABLET, FILM COATED ORAL at 08:56

## 2024-11-19 RX ADMIN — GABAPENTIN 200 MG: 100 CAPSULE ORAL at 08:56

## 2024-11-19 RX ADMIN — CYANOCOBALAMIN TAB 500 MCG 1000 MCG: 500 TAB at 08:56

## 2024-11-19 RX ADMIN — AMLODIPINE BESYLATE 10 MG: 5 TABLET ORAL at 08:56

## 2024-11-19 RX ADMIN — CARVEDILOL 25 MG: 25 TABLET, FILM COATED ORAL at 08:56

## 2024-11-19 RX ADMIN — INSULIN LISPRO 3 UNITS: 100 INJECTION, SOLUTION INTRAVENOUS; SUBCUTANEOUS at 12:27

## 2024-11-19 RX ADMIN — ATORVASTATIN CALCIUM 40 MG: 20 TABLET, FILM COATED ORAL at 08:56

## 2024-11-19 RX ADMIN — CLOPIDOGREL BISULFATE 75 MG: 75 TABLET ORAL at 08:56

## 2024-11-19 RX ADMIN — TAMSULOSIN HYDROCHLORIDE 0.4 MG: 0.4 CAPSULE ORAL at 08:56

## 2024-11-19 NOTE — DISCHARGE SUMMARY
Patient Name: Dereck Ramírez  : 1940  MRN: [id number]    Date of Admission: 2024  Date of Discharge:  2024  Primary Care Physician: Amber Rodney APRN      Chief Complaint:   Altered Mental Status and Headache      Discharge Diagnoses     Active Hospital Problems    Diagnosis  POA    **Altered mental status [R41.82]  Yes    Dementia without behavioral disturbance [F03.90]  Yes    Chronic diastolic heart failure [I50.32]  Yes    CKD (chronic kidney disease) stage 3, GFR 30-59 ml/min [N18.30]  Yes    Iron deficiency anemia [D50.9]  Yes    CVA (cerebrovascular accident) (with right-sided weakness) [I63.9]  Yes    Chronic coronary artery disease [I25.10]  Yes    DM II (diabetes mellitus, type II), controlled [E11.9]  Yes    Hyperlipidemia [E78.5]  Yes    Hypertension, essential [I10]  Yes      Resolved Hospital Problems   No resolved problems to display.        Hospital Course     Mr. Ramírez is a 84 y.o. male with a history of history of CVA, hyperlipidemia, hypertension, BPH presenting with intermittent confusion for the last 5 days.  Worse at night when he has been taking his clothes off and walking around in his underwear around 11 PM.  Not sleeping well at night and that is been a chronic problem for him.  Something similar like this happened earlier this year and he had an MRI at that time with no acute stroke and showed atrophy and moderate small vessel ischemic disease.  Patient was admitted for further evaluation  and ultimately this appears to be undiagnosed dementia.  No infectious cause found. TSH WNL. Ammonia within normal limits. Normal B12. HIV and syphilis negative.  MRI brain with no acute findings chronic ischemic disease.  Patient has a family history of  dementia with his mother, grandfather and brother.  PT/OT saw patient and plan to go home with home health PT.  Will need follow-up with his PCP and recommend formal neuropsych testing.    At the time of discharge  patient was told to take all medications as prescribed, keep all follow-up appointments, and call their doctor or return to the hospital with any worsening or concerning symptoms.    Day of Discharge     Subjective:  No acute events overnight.  Patient resting comfortably in bed.  Pleasantly confused.        Physical Exam:  Temp:  [98.1 °F (36.7 °C)-98.6 °F (37 °C)] 98.6 °F (37 °C)  Heart Rate:  [64-68] 64  Resp:  [16-18] 18  BP: ()/(54-73) 128/73  Body mass index is 30.24 kg/m².  Physical Exam  Vitals and nursing note reviewed.   Constitutional:       General: He is not in acute distress.     Appearance: He is ill-appearing (Chronically).   Cardiovascular:      Rate and Rhythm: Normal rate and regular rhythm.   Pulmonary:      Effort: Pulmonary effort is normal. No respiratory distress.   Abdominal:      General: Abdomen is flat. There is no distension.      Tenderness: There is no abdominal tenderness.   Musculoskeletal:         General: No swelling or deformity.   Skin:     General: Skin is warm and dry.   Neurological:      General: No focal deficit present.      Mental Status: He is alert and oriented to person, place, and time. Mental status is at baseline.      Comments: Pleasantly confused    Consultants     Consult Orders (all) (From admission, onward)       Start     Ordered    11/17/24 1336  Inpatient Case Management  Consult  Once        Provider:  (Not yet assigned)    11/17/24 1335    11/17/24 1039  [facility] (on-call MD unless specified) Details  Once        Specialty:  Hospitalist  Provider:  Ric Lopez MD    11/17/24 1038                  Procedures     Imaging Results (All)       Procedure Component Value Units Date/Time    MRI Brain Without Contrast [[id number]] Collected: 11/17/24 1536     Updated: 11/17/24 1547    Narrative:      MRI BRAIN WO CONTRAST-     INDICATIONS: Altered mental status     TECHNIQUE: Multiplanar multisequence noncontrast magnetic resonance  imaging of the  brain.     COMPARISON: Head CT from same date, MRI from 5/7/2024     FINDINGS:     Several of the sequences are degraded by motion artifact, limiting the  assessment.     The diffusion-weighted images are significantly degraded by motion  artifact, with no definite restricted diffusion identified.     The midline structures appear unremarkable.     The brain parenchyma shows mild periventricular white matter T2 FLAIR  signal hyperintensities, compatible with chronic small vessel ischemic  change in a patient this age. Old infarct changes noted in the lauren.     Flow voids in the major arteries at the base of the brain are not well  characterized owing to extensive motion artifact.     The paranasal sinuses, mastoid air cells, and orbits appear  unremarkable.       Impression:         The exam is significantly limited by motion artifact, with no definite  acute infarct identified. Chronic ischemic changes of the brain.     This report was finalized on 11/17/2024 3:44 PM by Dr. Facundo Guzman M.D on Workstation: [id number]       XR Chest 1 View [[id number]] Collected: 11/17/24 1012     Updated: 11/17/24 1018    Narrative:      XR CHEST 1 VW-     HISTORY: Male who is 84 years-old, altered mental status     TECHNIQUE: Frontal view of the chest     COMPARISON: 8/22/2019     FINDINGS: The heart size is normal. Aorta is calcified. Pulmonary  vasculature is unremarkable. No focal pulmonary consolidation, pleural  effusion, or pneumothorax. Mild hiatal hernia is apparent. No acute  osseous process.       Impression:      No evidence for acute pulmonary process. Follow-up as  clinical indications persist.     This report was finalized on 11/17/2024 10:15 AM by Dr. Facundo Guzman M.D on Workstation: [id number]       CT Head Without Contrast [[id number]] Collected: 11/17/24 0955     Updated: 11/17/24 1001    Narrative:      CT HEAD WO CONTRAST-     INDICATIONS: Headache     TECHNIQUE: Radiation dose reduction techniques were  "utilized, including  automated exposure control and exposure modulation based on body size.  Noncontrast head CT     COMPARISON: 8/22/2019     FINDINGS:     No acute intracranial hemorrhage, midline shift or mass effect. No acute  territorial infarct is identified.     Mild periventricular hypodensities suggest chronic small vessel ischemic  change in a patient this age.     Arterial calcifications are seen at the base of the brain.     Ventricles, cisterns, cerebral sulci are unremarkable for patient age.     The visualized paranasal sinuses, orbits, mastoid air cells are  unremarkable.       Impression:         No acute intracranial hemorrhage or hydrocephalus. If there is further  clinical concern, MRI could be considered for further evaluation.     This report was finalized on 11/17/2024 9:58 AM by Dr. Facundo Guzman M.D on Workstation: [id number]               Pertinent Labs     Results from last 7 days   Lab Units 11/19/24  0653 11/18/24  0708 11/17/24  0924   WBC 10*3/mm3 8.95 9.24 7.55   HEMOGLOBIN g/dL 9.9* 10.0* 9.5*   PLATELETS 10*3/mm3 222 243 226     Results from last 7 days   Lab Units 11/19/24  0653 11/18/24  0708 11/17/24  0924   SODIUM mmol/L 139 138 137   POTASSIUM mmol/L 3.7 3.9 4.4   CHLORIDE mmol/L 106 102 100   CO2 mmol/L 22.2 23.4 24.0   BUN mg/dL 22 26* 32*   CREATININE mg/dL 1.34* 1.45* 1.65*   GLUCOSE mg/dL 142* 154* 193*   Estimated Creatinine Clearance: 52.3 mL/min (A) (by C-G formula based on SCr of 1.34 mg/dL (H)).  Results from last 7 days   Lab Units 11/17/24  0924   ALBUMIN g/dL 4.1   BILIRUBIN mg/dL 0.3   ALK PHOS U/L 183*   AST (SGOT) U/L 17   ALT (SGPT) U/L 17     Results from last 7 days   Lab Units 11/19/24  0653 11/18/24  0708 11/17/24  0924   CALCIUM mg/dL 8.1* 8.6 8.5*   ALBUMIN g/dL  --   --  4.1     Results from last 7 days   Lab Units 11/17/24  1045   AMMONIA umol/L 19             Invalid input(s): \"LDLCALC\"        Test Results Pending at Discharge       Discharge " Details        Discharge Medications        New Medications        Instructions Start Date   QUEtiapine 25 MG tablet  Commonly known as: SEROquel   25 mg, Oral, Nightly             Changes to Medications        Instructions Start Date   busPIRone 5 MG tablet  Commonly known as: BUSPAR  What changed: when to take this   5 mg, Oral, 3 Times Daily PRN             Continue These Medications        Instructions Start Date   amLODIPine 10 MG tablet  Commonly known as: NORVASC   10 mg, Oral, Daily      atorvastatin 40 MG tablet  Commonly known as: LIPITOR   40 mg, Oral, Daily      carvedilol 25 MG tablet  Commonly known as: COREG   TAKE 1 TABLET BY MOUTH TWICE DAILY WITH MEALS      clopidogrel 75 MG tablet  Commonly known as: PLAVIX   75 mg, Oral, Daily      ferrous gluconate 324 MG tablet  Commonly known as: FERGON   324 mg, Oral, Daily With Breakfast      furosemide 40 MG tablet  Commonly known as: LASIX   40 mg, Oral, 2 Times Daily      gabapentin 100 MG capsule  Commonly known as: NEURONTIN   200 mg, Oral, 3 Times Daily      nateglinide 60 MG tablet  Commonly known as: STARLIX   60 mg, Oral, 2 Times Daily With Meals      potassium chloride 10 MEQ CR tablet   10 mEq, Oral, Daily, Take while taking double Lasix dose      sertraline 50 MG tablet  Commonly known as: ZOLOFT   75 mg, Oral, Daily      tamsulosin 0.4 MG capsule 24 hr capsule  Commonly known as: FLOMAX   0.4 mg, Oral, Daily      vitamin B-12 1000 MCG tablet  Commonly known as: CYANOCOBALAMIN   1,000 mcg, Oral, Daily               No Known Allergies      Discharge Disposition:      Discharge Diet:  Diet Order   Procedures    Diet: Cardiac, Diabetic; Healthy Heart (2-3 Na+); Consistent Carbohydrate; Fluid Consistency: Thin (IDDSI 0)       Discharge Activity:   As tolerated    CODE STATUS:    Code Status and Medical Interventions: CPR (Attempt to Resuscitate); Full Support   Ordered at: 11/17/24 1214     Code Status (Patient has no pulse and is not breathing):     CPR (Attempt to Resuscitate)     Medical Interventions (Patient has pulse or is breathing):    Full Support       Future Appointments   Date Time Provider Department Center   3/27/2025 10:30 AM Amber Rodney APRN [facility]       Time Spent on Discharge:  Greater than 30 minutes      Ric Lopez MD  [facility]  11/19/24  14:02 EST

## 2024-11-19 NOTE — PLAN OF CARE
Goal Outcome Evaluation:  Plan of Care Reviewed With: patient        Progress: no change          Plan of care adequate for discharge. Education provided to pt and family.

## 2024-11-19 NOTE — PLAN OF CARE
Goal Outcome Evaluation:      No acute changes noted. Patient rested well throughout the night.     Problem: Adult Inpatient Plan of Care  Goal: Plan of Care Review  Outcome: Not Progressing  Goal: Patient-Specific Goal (Individualized)  Outcome: Not Progressing  Goal: Absence of Hospital-Acquired Illness or Injury  Outcome: Not Progressing  Intervention: Identify and Manage Fall Risk  Recent Flowsheet Documentation  Taken 11/18/2024 2020 by Meenu Ochoa RN  Safety Promotion/Fall Prevention:   safety round/check completed   room organization consistent   nonskid shoes/slippers when out of bed   fall prevention program maintained   clutter free environment maintained  Goal: Optimal Comfort and Wellbeing  Outcome: Not Progressing  Intervention: Provide Person-Centered Care  Recent Flowsheet Documentation  Taken 11/18/2024 2020 by Meenu Ochoa RN  Trust Relationship/Rapport:   care explained   choices provided  Goal: Readiness for Transition of Care  Outcome: Not Progressing  Goal: Plan of Care Review  Outcome: Not Progressing  Goal: Patient-Specific Goal (Individualized)  Outcome: Not Progressing  Goal: Absence of Hospital-Acquired Illness or Injury  Outcome: Not Progressing  Intervention: Identify and Manage Fall Risk  Recent Flowsheet Documentation  Taken 11/18/2024 2020 by Meenu Ochoa RN  Safety Promotion/Fall Prevention:   safety round/check completed   room organization consistent   nonskid shoes/slippers when out of bed   fall prevention program maintained   clutter free environment maintained  Goal: Optimal Comfort and Wellbeing  Outcome: Not Progressing  Intervention: Provide Person-Centered Care  Recent Flowsheet Documentation  Taken 11/18/2024 2020 by Meenu Ochoa RN  Trust Relationship/Rapport:   care explained   choices provided  Goal: Readiness for Transition of Care  Outcome: Not Progressing
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
## 2024-11-19 NOTE — PLAN OF CARE
Goal Outcome Evaluation:  Plan of Care Reviewed With: patient, daughter        Progress: no change  Outcome Evaluation: Pt seen this date to address order by MD to complete SLUMs assessment to assess cognitive function. Pt and daughter agreeable. Pt alert, sitting up in bed. SLUMS assessment completed and scored, pt scoring total of 10. Indicating signs of dementia. Will recommend at this time for pt to f/u with neurology for more formal neurological diagnosis as needed. Pt plans home with daughter's 24/7 assist and HH. D/c orders in place.    Anticipated Discharge Disposition (OT): home with home health, home with assist

## 2024-11-19 NOTE — OUTREACH NOTE
Prep Survey      Flowsheet Row Responses   [facility] facility patient discharged from? [place]   Is LACE score < 7 ? No   Eligibility [facility]   Date of Admission 11/17/24   Date of Discharge 11/19/24   Discharge Disposition Home-Health Care Sv   Discharge diagnosis Altered mental status   Does the patient have one of the following disease processes/diagnoses(primary or secondary)? Other   Does the patient have Home health ordered? No   Is there a DME ordered? No   Prep survey completed? Yes            JOSEP A - Registered Nurse

## 2024-11-19 NOTE — SIGNIFICANT NOTE
11/19/24 1436   OTHER   Discipline speech language pathologist   Rehab Time/Intention   Session Not Performed other (see comments)  (OT had just completed SLUMS evaluation. RN Erica, OT, pt, and pt dtr in agreement that pt does not need an additional cog eval. OT likely to rec f/u with neurology for more formal neurological diagnosis as needed. Erica to d/c ST order for now.)

## 2024-11-19 NOTE — DISCHARGE PLACEMENT REQUEST
"Dereck Ramírez (84 y.o. Male)       Date of Birth   1940    Social Security Number       Address   [address] [id number]    Home Phone   [phone]    MRN   [id number]       [employer]   Other    Marital Status                               Admission Date   11/17/24    Admission Type   Emergency    Admitting Provider   Ric Lopez MD    Attending Provider   Ric Lopez MD    Department, Room/Bed   [address], [address]       Discharge Date       Discharge Disposition   Home-Health Care [facility]    Discharge Destination                                 Attending Provider: Ric Lopez MD    Allergies: No Known Allergies    Isolation: None   Infection: None   Code Status: CPR    Ht: 182.9 cm (72\")   Wt: 101 kg (223 lb)    Admission Cmt: None   Principal Problem: Altered mental status [R41.82]                   Active Insurance as of 11/17/2024       Primary Coverage       Payor Plan Insurance Group Employer/Plan Group    MEDICARE MEDICARE A & B        Payor Plan Address Payor Plan Phone Number Payor Plan Fax Number Effective Dates    [address] [phone]  8/1/2007 - None Entered    [facility] [id number]         Subscriber Name Subscriber Birth Date Member ID       DERECK RAMÍREZ 1940 [id number]               Secondary Coverage       Payor Plan Insurance Group Employer/Plan Group    [facility] SUPP [id number]       Payor Plan Address Payor Plan Phone Number Payor Plan Fax Number Effective Dates    [address]   12/1/2016 - None Entered    [facility] [id number]         Subscriber Name Subscriber Birth Date Member ID       DERECK RAMÍREZ 1940 [id number]                     Emergency Contacts        (Rel.) Home Phone Work Phone Mobile Phone    Maribel Arreguin (Daughter) [phone] -- --                "

## 2024-11-20 ENCOUNTER — TRANSITIONAL CARE MANAGEMENT TELEPHONE ENCOUNTER (OUTPATIENT)
Dept: CALL CENTER | Facility: HOSPITAL | Age: 84
End: 2024-11-20
Payer: MEDICARE

## 2024-11-20 ENCOUNTER — TELEPHONE (OUTPATIENT)
Dept: FAMILY MEDICINE CLINIC | Facility: CLINIC | Age: 84
End: 2024-11-20

## 2024-11-20 NOTE — OUTREACH NOTE
Call Center TCM Note      Flowsheet Row Responses   [facility] patient discharged from? [place]   Does the patient have one of the following disease processes/diagnoses(primary or secondary)? Other   TCM attempt successful? No  [vr for Maribel as well as dtr Georgina but name not on contact list]   Unsuccessful attempts Attempt 1   Call Status Left message            Fanta Hernandez RN    11/20/2024, 13:56 EST

## 2024-11-20 NOTE — TELEPHONE ENCOUNTER
Caller: YOLA RUFFIN    Relationship: HOME HEALTH    Best call back number: [phone]    Who are you requesting to speak with (clinical staff, provider,  specific staff member): CLINICAL STAFF    What was the call regarding: STATED THAT THEY NEED TO SEE ABOUT GETTING VERBAL ORDERS FOR SKILLED NURSING AND PHYSICAL THERAPY. PLEASE CALL AND ADVISE

## 2024-11-20 NOTE — OUTREACH NOTE
Call Center TCM Note      Flowsheet Row Responses   [facility] patient discharged from? [place]   Does the patient have one of the following disease processes/diagnoses(primary or secondary)? Other   TCM attempt successful? Yes   Call start time 1452   Call end time 1455   Discharge diagnosis Altered mental status   Person spoke with today (if not patient) and relationship Daughter   Meds reviewed with patient/caregiver? Yes   Is the patient having any side effects they believe may be caused by any medication additions or changes? No   Does the patient have all medications ordered at discharge? Yes   Is the patient taking all medications as directed (includes completed medication regime)? Yes   Comments HOSP DC FU appt 11/26/24 1045 am.   Does the patient have an appointment with their PCP within 7-14 days of discharge? Yes   Has home health visited the patient within 72 hours of discharge? Unsure   Home health comments HH reached out to PCP for orders.   Psychosocial issues? No   Did the patient receive a copy of their discharge instructions? Yes   Nursing interventions Reviewed instructions with patient   What is the patient's perception of their health status since discharge? Same   Is the patient/caregiver able to teach back signs and symptoms related to disease process for when to call PCP? Yes   Is the patient/caregiver able to teach back signs and symptoms related to disease process for when to call 911? Yes   Is the patient/caregiver able to teach back the hierarchy of who to call/visit for symptoms/problems? PCP, Specialist, Home health nurse, Urgent Care, ED, 911 Yes   TCM call completed? Yes   Wrap up additional comments Daughter reports the med helps a little but Pt still confused. Pt had a fall today with out injury . Family called fire dept to assist in getting Pt up.   Call end time 1455            Elizabeth Huber RN    11/20/2024, 14:55 CST

## 2024-11-24 ENCOUNTER — APPOINTMENT (OUTPATIENT)
Dept: OTHER | Facility: HOSPITAL | Age: 84
End: 2024-11-24
Payer: MEDICARE

## 2024-11-24 ENCOUNTER — READMISSION MANAGEMENT (OUTPATIENT)
Dept: CALL CENTER | Facility: HOSPITAL | Age: 84
End: 2024-11-24
Payer: MEDICARE

## 2024-11-24 ENCOUNTER — HOSPITAL ENCOUNTER (INPATIENT)
Facility: HOSPITAL | Age: 84
LOS: 4 days | Discharge: NURSING, REHAB OR LONG-TERM CARE | End: 2024-11-29
Attending: STUDENT IN AN ORGANIZED HEALTH CARE EDUCATION/TRAINING PROGRAM | Admitting: STUDENT IN AN ORGANIZED HEALTH CARE EDUCATION/TRAINING PROGRAM
Payer: MEDICARE

## 2024-11-24 ENCOUNTER — APPOINTMENT (OUTPATIENT)
Dept: MRI IMAGING | Facility: HOSPITAL | Age: 84
End: 2024-11-24
Payer: MEDICARE

## 2024-11-24 DIAGNOSIS — R52 ACUTE PAIN: ICD-10-CM

## 2024-11-24 DIAGNOSIS — M79.2 NEUROPATHIC PAIN: ICD-10-CM

## 2024-11-24 DIAGNOSIS — S12.100A CLOSED ODONTOID FRACTURE, INITIAL ENCOUNTER: Primary | ICD-10-CM

## 2024-11-24 DIAGNOSIS — G25.81 RLS (RESTLESS LEGS SYNDROME): Chronic | ICD-10-CM

## 2024-11-24 PROBLEM — S12.110A ODONTOID FRACTURE: Status: ACTIVE | Noted: 2024-11-24

## 2024-11-24 LAB — GLUCOSE BLDC GLUCOMTR-MCNC: 112 MG/DL (ref 70–130)

## 2024-11-24 PROCEDURE — 72141 MRI NECK SPINE W/O DYE: CPT

## 2024-11-24 PROCEDURE — 82948 REAGENT STRIP/BLOOD GLUCOSE: CPT

## 2024-11-24 PROCEDURE — G0378 HOSPITAL OBSERVATION PER HR: HCPCS

## 2024-11-24 RX ORDER — IBUPROFEN 600 MG/1
1 TABLET ORAL
Status: DISCONTINUED | OUTPATIENT
Start: 2024-11-24 | End: 2024-11-29 | Stop reason: HOSPADM

## 2024-11-24 RX ORDER — SODIUM CHLORIDE 9 MG/ML
40 INJECTION, SOLUTION INTRAVENOUS AS NEEDED
Status: DISCONTINUED | OUTPATIENT
Start: 2024-11-24 | End: 2024-11-29 | Stop reason: HOSPADM

## 2024-11-24 RX ORDER — BISACODYL 10 MG
10 SUPPOSITORY, RECTAL RECTAL DAILY PRN
Status: DISCONTINUED | OUTPATIENT
Start: 2024-11-24 | End: 2024-11-29 | Stop reason: HOSPADM

## 2024-11-24 RX ORDER — INSULIN LISPRO 100 [IU]/ML
2-7 INJECTION, SOLUTION INTRAVENOUS; SUBCUTANEOUS
Status: DISCONTINUED | OUTPATIENT
Start: 2024-11-25 | End: 2024-11-29 | Stop reason: HOSPADM

## 2024-11-24 RX ORDER — ONDANSETRON 2 MG/ML
4 INJECTION INTRAMUSCULAR; INTRAVENOUS EVERY 6 HOURS PRN
Status: DISCONTINUED | OUTPATIENT
Start: 2024-11-24 | End: 2024-11-29 | Stop reason: HOSPADM

## 2024-11-24 RX ORDER — POLYETHYLENE GLYCOL 3350 17 G/17G
17 POWDER, FOR SOLUTION ORAL DAILY PRN
Status: DISCONTINUED | OUTPATIENT
Start: 2024-11-24 | End: 2024-11-29 | Stop reason: HOSPADM

## 2024-11-24 RX ORDER — NICOTINE POLACRILEX 4 MG
15 LOZENGE BUCCAL
Status: DISCONTINUED | OUTPATIENT
Start: 2024-11-24 | End: 2024-11-29 | Stop reason: HOSPADM

## 2024-11-24 RX ORDER — DEXTROSE MONOHYDRATE 25 G/50ML
25 INJECTION, SOLUTION INTRAVENOUS
Status: DISCONTINUED | OUTPATIENT
Start: 2024-11-24 | End: 2024-11-29 | Stop reason: HOSPADM

## 2024-11-24 RX ORDER — AMOXICILLIN 250 MG
2 CAPSULE ORAL 2 TIMES DAILY PRN
Status: DISCONTINUED | OUTPATIENT
Start: 2024-11-24 | End: 2024-11-29 | Stop reason: HOSPADM

## 2024-11-24 RX ORDER — FUROSEMIDE 40 MG/1
40 TABLET ORAL
Status: DISCONTINUED | OUTPATIENT
Start: 2024-11-25 | End: 2024-11-29 | Stop reason: HOSPADM

## 2024-11-24 RX ORDER — ACETAMINOPHEN 650 MG/1
650 SUPPOSITORY RECTAL EVERY 4 HOURS PRN
Status: DISCONTINUED | OUTPATIENT
Start: 2024-11-24 | End: 2024-11-29 | Stop reason: HOSPADM

## 2024-11-24 RX ORDER — GABAPENTIN 100 MG/1
200 CAPSULE ORAL 3 TIMES DAILY
Status: DISCONTINUED | OUTPATIENT
Start: 2024-11-24 | End: 2024-11-29 | Stop reason: HOSPADM

## 2024-11-24 RX ORDER — UREA 10 %
1000 LOTION (ML) TOPICAL DAILY
Status: DISCONTINUED | OUTPATIENT
Start: 2024-11-25 | End: 2024-11-29 | Stop reason: HOSPADM

## 2024-11-24 RX ORDER — AMLODIPINE BESYLATE 10 MG/1
10 TABLET ORAL DAILY
Status: DISCONTINUED | OUTPATIENT
Start: 2024-11-25 | End: 2024-11-29 | Stop reason: HOSPADM

## 2024-11-24 RX ORDER — SODIUM CHLORIDE 0.9 % (FLUSH) 0.9 %
10 SYRINGE (ML) INJECTION EVERY 12 HOURS SCHEDULED
Status: DISCONTINUED | OUTPATIENT
Start: 2024-11-24 | End: 2024-11-29 | Stop reason: HOSPADM

## 2024-11-24 RX ORDER — BUSPIRONE HYDROCHLORIDE 10 MG/1
5 TABLET ORAL 3 TIMES DAILY PRN
Status: DISCONTINUED | OUTPATIENT
Start: 2024-11-24 | End: 2024-11-29 | Stop reason: HOSPADM

## 2024-11-24 RX ORDER — SODIUM CHLORIDE 0.9 % (FLUSH) 0.9 %
10 SYRINGE (ML) INJECTION AS NEEDED
Status: DISCONTINUED | OUTPATIENT
Start: 2024-11-24 | End: 2024-11-29 | Stop reason: HOSPADM

## 2024-11-24 RX ORDER — QUETIAPINE FUMARATE 25 MG/1
25 TABLET, FILM COATED ORAL NIGHTLY
Status: DISCONTINUED | OUTPATIENT
Start: 2024-11-24 | End: 2024-11-29 | Stop reason: HOSPADM

## 2024-11-24 RX ORDER — ONDANSETRON 4 MG/1
4 TABLET, ORALLY DISINTEGRATING ORAL EVERY 6 HOURS PRN
Status: DISCONTINUED | OUTPATIENT
Start: 2024-11-24 | End: 2024-11-29 | Stop reason: HOSPADM

## 2024-11-24 RX ORDER — ATORVASTATIN CALCIUM 20 MG/1
40 TABLET, FILM COATED ORAL DAILY
Status: DISCONTINUED | OUTPATIENT
Start: 2024-11-25 | End: 2024-11-29 | Stop reason: HOSPADM

## 2024-11-24 RX ORDER — TAMSULOSIN HYDROCHLORIDE 0.4 MG/1
0.4 CAPSULE ORAL DAILY
Status: DISCONTINUED | OUTPATIENT
Start: 2024-11-25 | End: 2024-11-29 | Stop reason: HOSPADM

## 2024-11-24 RX ORDER — BISACODYL 5 MG/1
5 TABLET, DELAYED RELEASE ORAL DAILY PRN
Status: DISCONTINUED | OUTPATIENT
Start: 2024-11-24 | End: 2024-11-29 | Stop reason: HOSPADM

## 2024-11-24 RX ORDER — ACETAMINOPHEN 160 MG/5ML
650 SOLUTION ORAL EVERY 4 HOURS PRN
Status: DISCONTINUED | OUTPATIENT
Start: 2024-11-24 | End: 2024-11-29 | Stop reason: HOSPADM

## 2024-11-24 RX ORDER — CARVEDILOL 25 MG/1
25 TABLET ORAL 2 TIMES DAILY WITH MEALS
Status: DISCONTINUED | OUTPATIENT
Start: 2024-11-24 | End: 2024-11-29 | Stop reason: HOSPADM

## 2024-11-24 RX ORDER — CLOPIDOGREL BISULFATE 75 MG/1
75 TABLET ORAL DAILY
Status: DISCONTINUED | OUTPATIENT
Start: 2024-11-25 | End: 2024-11-29 | Stop reason: HOSPADM

## 2024-11-24 RX ORDER — FERROUS SULFATE 325(65) MG
325 TABLET ORAL
Status: DISCONTINUED | OUTPATIENT
Start: 2024-11-25 | End: 2024-11-29 | Stop reason: HOSPADM

## 2024-11-24 RX ORDER — ACETAMINOPHEN 325 MG/1
650 TABLET ORAL EVERY 4 HOURS PRN
Status: DISCONTINUED | OUTPATIENT
Start: 2024-11-24 | End: 2024-11-29 | Stop reason: HOSPADM

## 2024-11-24 RX ADMIN — QUETIAPINE FUMARATE 25 MG: 25 TABLET ORAL at 23:17

## 2024-11-24 RX ADMIN — CARVEDILOL 25 MG: 25 TABLET, FILM COATED ORAL at 23:17

## 2024-11-24 RX ADMIN — ACETAMINOPHEN 325MG 650 MG: 325 TABLET ORAL at 20:49

## 2024-11-24 RX ADMIN — GABAPENTIN 200 MG: 100 CAPSULE ORAL at 23:17

## 2024-11-25 DIAGNOSIS — S12.100A CLOSED ODONTOID FRACTURE, INITIAL ENCOUNTER: Primary | ICD-10-CM

## 2024-11-25 LAB
ANION GAP SERPL CALCULATED.3IONS-SCNC: 10.1 MMOL/L (ref 5–15)
BASOPHILS # BLD AUTO: 0.07 10*3/MM3 (ref 0–0.2)
BASOPHILS NFR BLD AUTO: 0.9 % (ref 0–1.5)
BUN SERPL-MCNC: 28 MG/DL (ref 8–23)
BUN/CREAT SERPL: 20.4 (ref 7–25)
CALCIUM SPEC-SCNC: 8.2 MG/DL (ref 8.6–10.5)
CHLORIDE SERPL-SCNC: 108 MMOL/L (ref 98–107)
CO2 SERPL-SCNC: 22.9 MMOL/L (ref 22–29)
CREAT SERPL-MCNC: 1.37 MG/DL (ref 0.76–1.27)
DEPRECATED RDW RBC AUTO: 41.5 FL (ref 37–54)
EGFRCR SERPLBLD CKD-EPI 2021: 51.5 ML/MIN/1.73
EOSINOPHIL # BLD AUTO: 0.31 10*3/MM3 (ref 0–0.4)
EOSINOPHIL NFR BLD AUTO: 3.9 % (ref 0.3–6.2)
ERYTHROCYTE [DISTWIDTH] IN BLOOD BY AUTOMATED COUNT: 13.1 % (ref 12.3–15.4)
GLUCOSE BLDC GLUCOMTR-MCNC: 137 MG/DL (ref 70–130)
GLUCOSE BLDC GLUCOMTR-MCNC: 142 MG/DL (ref 70–130)
GLUCOSE BLDC GLUCOMTR-MCNC: 166 MG/DL (ref 70–130)
GLUCOSE SERPL-MCNC: 130 MG/DL (ref 65–99)
HCT VFR BLD AUTO: 26.8 % (ref 37.5–51)
HGB BLD-MCNC: 8.6 G/DL (ref 13–17.7)
IMM GRANULOCYTES # BLD AUTO: 0.04 10*3/MM3 (ref 0–0.05)
IMM GRANULOCYTES NFR BLD AUTO: 0.5 % (ref 0–0.5)
LYMPHOCYTES # BLD AUTO: 1.19 10*3/MM3 (ref 0.7–3.1)
LYMPHOCYTES NFR BLD AUTO: 15 % (ref 19.6–45.3)
MAGNESIUM SERPL-MCNC: 2.2 MG/DL (ref 1.6–2.4)
MCH RBC QN AUTO: 27.9 PG (ref 26.6–33)
MCHC RBC AUTO-ENTMCNC: 32.1 G/DL (ref 31.5–35.7)
MCV RBC AUTO: 87 FL (ref 79–97)
MONOCYTES # BLD AUTO: 1.14 10*3/MM3 (ref 0.1–0.9)
MONOCYTES NFR BLD AUTO: 14.4 % (ref 5–12)
NEUTROPHILS NFR BLD AUTO: 5.19 10*3/MM3 (ref 1.7–7)
NEUTROPHILS NFR BLD AUTO: 65.3 % (ref 42.7–76)
NRBC BLD AUTO-RTO: 0 /100 WBC (ref 0–0.2)
PHOSPHATE SERPL-MCNC: 3.3 MG/DL (ref 2.5–4.5)
PLATELET # BLD AUTO: 284 10*3/MM3 (ref 140–450)
PMV BLD AUTO: 9.8 FL (ref 6–12)
POTASSIUM SERPL-SCNC: 3.8 MMOL/L (ref 3.5–5.2)
RBC # BLD AUTO: 3.08 10*6/MM3 (ref 4.14–5.8)
SODIUM SERPL-SCNC: 141 MMOL/L (ref 136–145)
WBC NRBC COR # BLD AUTO: 7.94 10*3/MM3 (ref 3.4–10.8)

## 2024-11-25 PROCEDURE — 85025 COMPLETE CBC W/AUTO DIFF WBC: CPT | Performed by: STUDENT IN AN ORGANIZED HEALTH CARE EDUCATION/TRAINING PROGRAM

## 2024-11-25 PROCEDURE — 82948 REAGENT STRIP/BLOOD GLUCOSE: CPT

## 2024-11-25 PROCEDURE — 84100 ASSAY OF PHOSPHORUS: CPT | Performed by: STUDENT IN AN ORGANIZED HEALTH CARE EDUCATION/TRAINING PROGRAM

## 2024-11-25 PROCEDURE — 80048 BASIC METABOLIC PNL TOTAL CA: CPT | Performed by: STUDENT IN AN ORGANIZED HEALTH CARE EDUCATION/TRAINING PROGRAM

## 2024-11-25 PROCEDURE — 99222 1ST HOSP IP/OBS MODERATE 55: CPT

## 2024-11-25 PROCEDURE — 63710000001 INSULIN LISPRO (HUMAN) PER 5 UNITS: Performed by: STUDENT IN AN ORGANIZED HEALTH CARE EDUCATION/TRAINING PROGRAM

## 2024-11-25 PROCEDURE — 83735 ASSAY OF MAGNESIUM: CPT | Performed by: STUDENT IN AN ORGANIZED HEALTH CARE EDUCATION/TRAINING PROGRAM

## 2024-11-25 RX ORDER — DOCUSATE SODIUM 100 MG/1
100 CAPSULE, LIQUID FILLED ORAL 2 TIMES DAILY
Status: DISCONTINUED | OUTPATIENT
Start: 2024-11-25 | End: 2024-11-29 | Stop reason: HOSPADM

## 2024-11-25 RX ADMIN — INSULIN LISPRO 2 UNITS: 100 INJECTION, SOLUTION INTRAVENOUS; SUBCUTANEOUS at 20:38

## 2024-11-25 RX ADMIN — GABAPENTIN 200 MG: 100 CAPSULE ORAL at 20:38

## 2024-11-25 RX ADMIN — SERTRALINE 75 MG: 50 TABLET, FILM COATED ORAL at 09:07

## 2024-11-25 RX ADMIN — Medication 10 ML: at 20:41

## 2024-11-25 RX ADMIN — FERROUS SULFATE TAB 325 MG (65 MG ELEMENTAL FE) 325 MG: 325 (65 FE) TAB at 09:07

## 2024-11-25 RX ADMIN — GABAPENTIN 200 MG: 100 CAPSULE ORAL at 09:07

## 2024-11-25 RX ADMIN — CLOPIDOGREL BISULFATE 75 MG: 75 TABLET, FILM COATED ORAL at 09:07

## 2024-11-25 RX ADMIN — AMLODIPINE BESYLATE 10 MG: 10 TABLET ORAL at 09:07

## 2024-11-25 RX ADMIN — CARVEDILOL 25 MG: 25 TABLET, FILM COATED ORAL at 17:32

## 2024-11-25 RX ADMIN — TAMSULOSIN HYDROCHLORIDE 0.4 MG: 0.4 CAPSULE ORAL at 09:07

## 2024-11-25 RX ADMIN — GABAPENTIN 200 MG: 100 CAPSULE ORAL at 17:32

## 2024-11-25 RX ADMIN — FUROSEMIDE 40 MG: 40 TABLET ORAL at 17:32

## 2024-11-25 RX ADMIN — CYANOCOBALAMIN TAB 500 MCG 1000 MCG: 500 TAB at 09:06

## 2024-11-25 RX ADMIN — QUETIAPINE FUMARATE 25 MG: 25 TABLET ORAL at 20:38

## 2024-11-25 RX ADMIN — Medication 5 MG: at 20:38

## 2024-11-25 RX ADMIN — ATORVASTATIN CALCIUM 40 MG: 20 TABLET, FILM COATED ORAL at 09:07

## 2024-11-25 RX ADMIN — CARVEDILOL 25 MG: 25 TABLET, FILM COATED ORAL at 09:06

## 2024-11-25 RX ADMIN — FUROSEMIDE 40 MG: 40 TABLET ORAL at 09:06

## 2024-11-25 NOTE — PLAN OF CARE
Problem: Adult Inpatient Plan of Care  Goal: Plan of Care Review  Outcome: Progressing  Flowsheets (Taken 11/25/2024 1836)  Progress: improving  Plan of Care Reviewed With:   patient   child  Goal: Patient-Specific Goal (Individualized)  Outcome: Progressing  Goal: Absence of Hospital-Acquired Illness or Injury  Outcome: Progressing  Intervention: Identify and Manage Fall Risk  Recent Flowsheet Documentation  Taken 11/25/2024 1800 by Armaan Lopez RN  Safety Promotion/Fall Prevention:   safety round/check completed   assistive device/personal items within reach  Taken 11/25/2024 1605 by Armaan Lopez RN  Safety Promotion/Fall Prevention:   safety round/check completed   assistive device/personal items within reach  Taken 11/25/2024 1000 by Armaan Lopez RN  Safety Promotion/Fall Prevention:   safety round/check completed   assistive device/personal items within reach  Taken 11/25/2024 0800 by Armaan Lopez RN  Safety Promotion/Fall Prevention:   safety round/check completed   assistive device/personal items within reach  Intervention: Prevent Skin Injury  Recent Flowsheet Documentation  Taken 11/25/2024 1605 by Armaan Lopez RN  Body Position: position changed independently  Taken 11/25/2024 0935 by Armaan Lopez RN  Body Position: position changed independently  Intervention: Prevent and Manage VTE (Venous Thromboembolism) Risk  Recent Flowsheet Documentation  Taken 11/25/2024 0935 by Armaan Lopez RN  VTE Prevention/Management:   bilateral   SCDs (sequential compression devices) on  Intervention: Prevent Infection  Recent Flowsheet Documentation  Taken 11/25/2024 1800 by Armaan Lopez RN  Infection Prevention:   personal protective equipment utilized   environmental surveillance performed  Taken 11/25/2024 1605 by Armaan Lopez RN  Infection Prevention:   personal protective equipment utilized   environmental surveillance performed  Taken  11/25/2024 1000 by Armaan Lopez, RN  Infection Prevention:   personal protective equipment utilized   environmental surveillance performed  Taken 11/25/2024 0800 by Armaan Lopez, RN  Infection Prevention:   personal protective equipment utilized   environmental surveillance performed  Goal: Optimal Comfort and Wellbeing  Outcome: Progressing  Goal: Readiness for Transition of Care  Outcome: Progressing   Goal Outcome Evaluation:  Plan of Care Reviewed With: patient, child        Progress: improving

## 2024-11-25 NOTE — PLAN OF CARE
Pt NPO since 0000, MRI results pending, message left with neurosurgery, consult in AM. [facility] following. C-collar on, pt denies n/t, is wheelchair bound at baseline, able to pivot with walker at home. Mild c/o pain, prn tylenol given at HS.      Goal Outcome Evaluation:

## 2024-11-25 NOTE — OUTREACH NOTE
Medical Week 2 Survey      Flowsheet Row Responses   [facility] patient discharged from? [place]   Does the patient have one of the following disease processes/diagnoses(primary or secondary)? Other   Week 2 attempt successful? No   Unsuccessful attempts Attempt 1   Revoke Readmitted            Sera BROOKS - Registered Nurse

## 2024-11-25 NOTE — CONSULTS
"[facility] [place] NEUROSURGERY CONSULT NOTE    Patient name: Dereck Ramírez  Referring Provider: Salina Cleveland MD   Reason for Consultation: odontoid fx    Patient Care Team:  Amber Rodney APRN as PCP - General (Family Medicine)    Chief complaint: fall, C2 fx    Subjective .     History of present illness:    Patient is a 84 y.o.  male with history of CVA anticoagulated w/ plavix, CAD, HLD, HTN, BPH and recently diagnosed with dementia who presents with ongoing confusion and multiple falls.  History taken from daughter at bedside.  Daughter states that he has been having some falls the past several months but is progressively gotten worse and fell 3 times recently. He was recently discharged from our facility where he was admitted for AMS and diagnosed with dementia.  Daughter states that since his discharge he has gotten progressively weaker.  He states he falls because he feels like someone is \"pushing him\".  He originally presented to [facility] for workup but ended up transferring here for MRI.  CT at [facility] showed acute to subacute type II odontoid fracture.  He is currently wearing a c-collar and having some expected neck pain but improved with medication.    Review of Systems  Review of Systems   Musculoskeletal:  Positive for neck pain.   Neurological:  Positive for weakness.   Psychiatric/Behavioral:  Positive for confusion.        History  PAST MEDICAL HISTORY  Past Medical History:   Diagnosis Date    Arthritis     Cerebellar artery occlusion     Coronary artery disease     Diabetes mellitus     Enlarged prostate     Hyperlipidemia     Hypertension     Stroke 2011, 2012       PAST SURGICAL HISTORY  Past Surgical History:   Procedure Laterality Date    CARDIAC CATHETERIZATION      lesion 1: intervention outcome    HERNIA REPAIR         FAMILY HISTORY  Family History   Problem Relation Age of Onset    Heart disease Mother     Stroke Mother     Crohn's disease Father     Stroke Father "     Arthritis Father     Cancer Other     Stroke Other     Diabetes Other     Heart defect Other     Hypertension Other     Thyroid disease Other     Cancer Maternal Uncle         bone    Dementia Maternal Grandmother        SOCIAL HISTORY  Social History     Tobacco Use    Smoking status: Never    Smokeless tobacco: Never   Vaping Use    Vaping status: Never Used   Substance Use Topics    Alcohol use: No    Drug use: No     Daughter at bedside      Allergies:  Patient has no known allergies.    MEDICATIONS:  Medications Prior to Admission   Medication Sig Dispense Refill Last Dose/Taking    amLODIPine (NORVASC) 10 MG tablet Take 1 tablet by mouth Daily. 90 tablet 3 11/23/2024    atorvastatin (LIPITOR) 40 MG tablet TAKE 1 TABLET BY MOUTH DAILY 90 tablet 1 11/23/2024    busPIRone (BUSPAR) 5 MG tablet Take 1 tablet by mouth 3 (Three) Times a Day As Needed (anxiety). 270 tablet 1 Past Week    carvedilol (COREG) 25 MG tablet TAKE 1 TABLET BY MOUTH TWICE DAILY WITH MEALS 180 tablet 3 11/23/2024    clopidogrel (PLAVIX) 75 MG tablet TAKE 1 TABLET BY MOUTH DAILY 90 tablet 3 11/23/2024    ferrous gluconate (FERGON) 324 MG tablet TAKE 1 TABLET BY MOUTH DAILY WITH BREAKFAST 90 tablet 1 11/23/2024    furosemide (LASIX) 40 MG tablet TAKE 1 TABLET BY MOUTH TWICE DAILY 180 tablet 3 11/23/2024    gabapentin (NEURONTIN) 100 MG capsule Take 2 capsules by mouth 3 (Three) Times a Day. 540 capsule 1 11/23/2024    nateglinide (STARLIX) 60 MG tablet Take 1 tablet by mouth 2 (Two) Times a Day With Meals. 180 tablet 3 11/23/2024    potassium chloride 10 MEQ CR tablet Take 1 tablet by mouth Daily. Take while taking double Lasix dose (Patient taking differently: Take 1 tablet by mouth Daily.) 90 tablet 1 11/23/2024    QUEtiapine (SEROquel) 25 MG tablet Take 1 tablet by mouth Every Night for 30 days. 30 tablet 0 11/23/2024    sertraline (ZOLOFT) 50 MG tablet TAKE 1 AND 1/2 TABLETS BY MOUTH DAILY 135 tablet 1 11/23/2024    tamsulosin (FLOMAX)  0.4 MG capsule 24 hr capsule TAKE 1 CAPSULE BY MOUTH DAILY 90 capsule 1 11/23/2024    vitamin B-12 (CYANOCOBALAMIN) 1000 MCG tablet TAKE 1 TABLET BY MOUTH DAILY 90 tablet 1 11/23/2024         Current Facility-Administered Medications:     acetaminophen (TYLENOL) tablet 650 mg, 650 mg, Oral, Q4H PRN, 650 mg at 11/24/24 2049 **OR** acetaminophen (TYLENOL) 160 MG/5ML oral solution 650 mg, 650 mg, Oral, Q4H PRN **OR** acetaminophen (TYLENOL) suppository 650 mg, 650 mg, Rectal, Q4H PRN, Salina Cleveland MD    amLODIPine (NORVASC) tablet 10 mg, 10 mg, Oral, Daily, Salina Cleveland MD, 10 mg at 11/25/24 0907    atorvastatin (LIPITOR) tablet 40 mg, 40 mg, Oral, Daily, Salina Cleveland MD, 40 mg at 11/25/24 0907    sennosides-docusate (PERICOLACE) 8.6-50 MG per tablet 2 tablet, 2 tablet, Oral, BID PRN **AND** polyethylene glycol (MIRALAX) packet 17 g, 17 g, Oral, Daily PRN **AND** bisacodyl (DULCOLAX) EC tablet 5 mg, 5 mg, Oral, Daily PRN **AND** bisacodyl (DULCOLAX) suppository 10 mg, 10 mg, Rectal, Daily PRN, Salina Cleveland MD    busPIRone (BUSPAR) tablet 5 mg, 5 mg, Oral, TID PRN, Salina Cleveland MD    carvedilol (COREG) tablet 25 mg, 25 mg, Oral, BID With Meals, Salina Cleveland MD, 25 mg at 11/25/24 0906    clopidogrel (PLAVIX) tablet 75 mg, 75 mg, Oral, Daily, Salina Cleveland MD, 75 mg at 11/25/24 0907    dextrose (D50W) (25 g/50 mL) IV injection 25 g, 25 g, Intravenous, Q15 Min PRN, Salina Cleveland MD    dextrose (GLUTOSE) oral gel 15 g, 15 g, Oral, Q15 Min PRN, Salina Cleveland MD    ferrous sulfate tablet 325 mg, 325 mg, Oral, Daily With Breakfast, Salina Cleveland MD, 325 mg at 11/25/24 0907    furosemide (LASIX) tablet 40 mg, 40 mg, Oral, BID, Salina Cleveland MD, 40 mg at 11/25/24 0906    gabapentin (NEURONTIN) capsule 200 mg, 200 mg, Oral, TID, Salina Cleveland MD, 200 mg at 11/25/24 0907    glucagon (GLUCAGEN) injection 1 mg, 1 mg, Intramuscular, Q15 Min PRN, Cristofer  Salian OLSEN MD    insulin lispro (HUMALOG/ADMELOG) injection 2-7 Units, 2-7 Units, Subcutaneous, 4x Daily AC & at Bedtime, Salina Cleveland MD    melatonin tablet 5 mg, 5 mg, Oral, Nightly PRN, Salina Cleveland MD    ondansetron ODT (ZOFRAN-ODT) disintegrating tablet 4 mg, 4 mg, Oral, Q6H PRN **OR** ondansetron (ZOFRAN) injection 4 mg, 4 mg, Intravenous, Q6H PRN, Salina Cleveland MD    QUEtiapine (SEROquel) tablet 25 mg, 25 mg, Oral, Nightly, Salina Cleveland MD, 25 mg at 11/24/24 2317    sertraline (ZOLOFT) tablet 75 mg, 75 mg, Oral, Daily, Salina Cleveland MD, 75 mg at 11/25/24 0907    sodium chloride 0.9 % flush 10 mL, 10 mL, Intravenous, Q12H, Salina Cleveland MD    sodium chloride 0.9 % flush 10 mL, 10 mL, Intravenous, PRN, Salina Cleveland MD    sodium chloride 0.9 % infusion 40 mL, 40 mL, Intravenous, PRN, Salina Cleveland MD    tamsulosin (FLOMAX) 24 hr capsule 0.4 mg, 0.4 mg, Oral, Daily, Salina Cleveland MD, 0.4 mg at 11/25/24 0907    vitamin B-12 (CYANOCOBALAMIN) tablet 1,000 mcg, 1,000 mcg, Oral, Daily, Salina Cleveland MD, 1,000 mcg at 11/25/24 0906    COMORBID CONDITIONS:  Cardiomyopathy , Coagulopathy due to antiplatelet agent/anticoagulant/tPA, Heart failure, History of CVA, Hypertension, and BPH, CAD    Objective     Results Review:  LABS:  Results from last 7 days   Lab Units 11/25/24  0609 11/19/24  0653   WBC 10*3/mm3 7.94 8.95   HEMOGLOBIN g/dL 8.6* 9.9*   HEMATOCRIT % 26.8* 29.4*   PLATELETS 10*3/mm3 284 222     Results from last 7 days   Lab Units 11/25/24  0609 11/19/24  0653   SODIUM mmol/L 141 139   POTASSIUM mmol/L 3.8 3.7   CHLORIDE mmol/L 108* 106   CO2 mmol/L 22.9 22.2   BUN mg/dL 28* 22   CREATININE mg/dL 1.37* 1.34*   CALCIUM mg/dL 8.2* 8.1*   GLUCOSE mg/dL 130* 142*         DIAGNOSTICS:    MRI CERVICAL SPINE WO CONTRAST 11/25/2024  FINDINGS: The study is hampered by patient motion. The sagittal T2  sequence demonstrates a transverse fracture involving the  dens. The  sagittal T2 sequence demonstrates edema involving the fracture plane and  to a lesser extent the dens. There is a plane of increased signal  intensity involving the prevertebral soft tissues on the sagittal T2  sequence. T2 hyperintensity is appreciated involving the ligamentum  flavum at the level of the odontoid fracture. A synovial cyst cannot be  excluded. This measures approximately 4 x 8 mm in the AP and  craniocaudal dimensions respectively. There is a mildly prominent  pannus. When combined with the edema or synovial cyst posteriorly there  is severe spinal stenosis at the level of C1/C2. Cerebrospinal fluid is  effaced ventral and dorsal to the cord. There is flattening of the  dorsal aspect of the cord, slight more prominent on the left.     C2-3: Moderate facet degenerative disease is present on the left. Mild  foraminal stenosis is present on the left secondary to the facet  hypertrophy.     C3-4: Moderate facet degenerative disease is present on the left. There  is moderate to severe foraminal stenosis on the left secondary to facet  hypertrophy.     C4-5: There is moderate canal stenosis secondary to a broad-based disc  osteophyte complex and moderate facet and ligamentum flavum hypertrophy.  Cerebrospinal fluid is effaced around the cord. There is no evidence of  cord signal abnormality. Moderate and severe foraminal stenosis is  present on the right and left respectively.     C5-6: Severe facet degenerative disease is present on the left. A  central disc osteophyte complex is present which results in mild  flattening of the ventral surface of the thecal sac and contributes to  mild to moderate canal stenosis when combined with the posterior element  degenerative disease. Moderate to severe foraminal stenosis is present  on the left secondary to facet hypertrophy and uncovertebral  degenerative disease.     C6-7: There is mild canal stenosis secondary to broad-based discussed by  complex  which contributes to severe foraminal stenosis bilaterally but  combined with uncovertebral degenerative disease.     C7-T1: Moderate facet degenerative disease is present bilaterally.     IMPRESSION:  1.  There is a transverse fracture plane appreciated involving the dens  with associated edema. The edema is also appreciated involving the dens  to a lesser degree. A thin plane of edema is appreciated involving the  prevertebral soft tissues from the skull base to level C4-5.  2.  Multilevel spinal stenosis is appreciated most severe at C1-C2 and  then C4-5. C1-C2 the spinal stenosis is severe secondary to the presence  of a pannus as well as edema or potentially synovial cyst involving the  ligamentum flavum posteriorly. There is flattening of the dorsal aspect  of the cord, more prominent to the left. There is no evidence of cord  edema. There is moderate canal stenosis at C4-5 and mild to moderate  canal stenosis at C5-6.  3.  Multilevel degenerative disease involving cervical spine is noted as  described above with no evidence for herniation. This includes  multilevel severe foraminal stenosis. See above.       Results Review:   I reviewed the patient's new clinical results.  I personally viewed and interpreted the patient's chart    Vital Signs   Temp:  [97.9 °F (36.6 °C)-98.8 °F (37.1 °C)] 97.9 °F (36.6 °C)  Heart Rate:  [62-73] 62  Resp:  [18-20] 20  BP: (123-163)/(54-72) 123/54    Physical Exam:  Physical Exam  Vitals reviewed.   Constitutional:       General: He is awake. He is not in acute distress.  Neck:      Comments: Wearing C-collar  Pulmonary:      Effort: Pulmonary effort is normal.   Skin:     General: Skin is warm and dry.   Neurological:      Mental Status: Mental status is at baseline.      GCS: GCS eye subscore is 4. GCS verbal subscore is 5. GCS motor subscore is 6.      Motor: Motor strength is normal.     Deep Tendon Reflexes:      Reflex Scores:       Tricep reflexes are 1+ on the right  side and 1+ on the left side.       Bicep reflexes are 1+ on the right side and 1+ on the left side.       Brachioradialis reflexes are 1+ on the right side and 1+ on the left side.  Psychiatric:         Speech: Speech normal.       Neurological Exam  Mental Status  Awake. Speech is normal. Language is fluent with no aphasia.    Motor  Normal muscle bulk throughout. Normal muscle tone. Strength is 5/5 throughout all four extremities.    Sensory  Sensation is intact to light touch, pinprick, vibration and proprioception in all four extremities.    Reflexes                                            Right                      Left  Brachioradialis                    1+                         1+  Biceps                                 1+                         1+  Triceps                                1+                         1+    Right pathological reflexes: Nghia's present. Ankle clonus absent.  Left pathological reflexes: Nghia's present. Ankle clonus absent.    Gait    Not tested due to falls.      Assessment & Plan       Odontoid fracture    Hypertension, essential    DM II (diabetes mellitus, type II), controlled    Hyperlipidemia    CVA (cerebrovascular accident) (with right-sided weakness)    B12 deficiency    CKD (chronic kidney disease) stage 3, GFR 30-59 ml/min    Iron deficiency anemia    Chronic diastolic heart failure    Dementia without behavioral disturbance      Problem List Items Addressed This Visit    None     Pleasant 84-year-old male anticoagulated with Plavix for history of CVA and recently diagnosed with dementia found to have odontoid fracture after fall.  He has been having some unsteadiness ambulating for several months with some falls but has gotten worse with 3 falls recently.  He is wearing a cervical collar but chin frequently slips underneath. I have adjusted it for now but will have our rep come by to ensure proper placement.  In addition to acute odontoid fracture on MRI  "there is also evidence of significant stenosis at C1-2 and C4-5. Stenosis at C1-2 is secondary to evidence of a pannus as well as edema or potentially synovial cyst involving the ligamentum flavum.  He is strong on exam however he does have slight Nghia's bilaterally.  Dr. Bartholomew reviewed imaging and there is no need for surgical intervention. Will continue treatment with hard collar. Will get CTs from [facility] for baseline imaging. Recommend PT for balance issues. Expect rehab at discharge. Case management following. Will follow-up as outpatient in 4-6 weeks with XR. No further recommendations at this time. Please feel free to contact us for any questions or concerns.     PLAN:     Continue C-collar at all times, rep to come by to assess proper placement  Ok for diet  Obtain CT's from hospitals for baseline  PT/OT  Case management for discharge planning  F/u 4-6 weeks w/ XR    I discussed the patient's findings and my recommendations with patient, family, and Dr. Bartholomew    During patient visit, I utilized appropriate personal protective equipment including gloves and mask.  Mask used was standard procedure mask. Appropriate PPE was worn during the entire visit.  Hand hygiene was completed before and after.     Willa Jones, APRN  11/25/24  14:29 EST    \"Dictated utilizing Dragon dictation\".     "

## 2024-11-25 NOTE — PROGRESS NOTES
Name: Dereck Ramírez ADMIT: 2024   : 1940  PCP: Amber Rodney APRN    MRN: [id number] LOS: 0 days   AGE/SEX: 84 y.o. male  ROOM: [facility]     Subjective   Subjective   Patient appears drowsy, comfortable, & in no apparent distress.  Answering orientation questions appropriately.  Denies chest pain, shortness of breath, trouble swallowing, or sensory changes.       Objective   Objective   Vital Signs  Temp:  [97.9 °F (36.6 °C)-98.8 °F (37.1 °C)] 97.9 °F (36.6 °C)  Heart Rate:  [62-73] 62  Resp:  [18-20] 20  BP: (123-163)/(54-72) 123/54  SpO2:  [94 %-97 %] 96 %  on   ;   Device (Oxygen Therapy): room air  There is no height or weight on file to calculate BMI.    Physical Exam  Constitutional:       General: He is not in acute distress.     Appearance: He is not toxic-appearing.      Comments: Drowsy, comfortable   Cardiovascular:      Rate and Rhythm: Normal rate.      Heart sounds: Normal heart sounds.   Pulmonary:      Effort: Pulmonary effort is normal.      Comments: Diminished on expiration  Abdominal:      General: Bowel sounds are normal.      Palpations: Abdomen is soft.   Musculoskeletal:      Right lower leg: No edema.      Left lower leg: No edema.   Skin:     General: Skin is warm and dry.   Neurological:      Mental Status: He is oriented to person, place, and time.      Cranial Nerves: No cranial nerve deficit.      Sensory: No sensory deficit.       Results Review     I reviewed the patient's new clinical results.  Results from last 7 days   Lab Units 24  0609 24  0653   WBC 10*3/mm3 7.94 8.95   HEMOGLOBIN g/dL 8.6* 9.9*   PLATELETS 10*3/mm3 284 222     Results from last 7 days   Lab Units 24  0609 24  0653   SODIUM mmol/L 141 139   POTASSIUM mmol/L 3.8 3.7   CHLORIDE mmol/L 108* 106   CO2 mmol/L 22.9 22.2   BUN mg/dL 28* 22   CREATININE mg/dL 1.37* 1.34*   GLUCOSE mg/dL 130* 142*   EGFR mL/min/1.73 51.5* 52.9*       Results from last 7 days   Lab  Units 11/25/24  0609 11/19/24  0653   CALCIUM mg/dL 8.2* 8.1*   MAGNESIUM mg/dL 2.2  --    PHOSPHORUS mg/dL 3.3  --        Glucose   Date/Time Value Ref Range Status   11/25/2024 1159 142 (H) 70 - 130 mg/dL Final   11/24/2024 2322 112 70 - 130 mg/dL Final       MRI Cervical Spine Without Contrast    Result Date: 11/25/2024  1.  There is a transverse fracture plane appreciated involving the dens with associated edema. The edema is also appreciated involving the dens to a lesser degree. A thin plane of edema is appreciated involving the prevertebral soft tissues from the skull base to level C4-5. 2.  Multilevel spinal stenosis is appreciated most severe at C1-C2 and then C4-5. C1-C2 the spinal stenosis is severe secondary to the presence of a pannus as well as edema or potentially synovial cyst involving the ligamentum flavum posteriorly. There is flattening of the dorsal aspect of the cord, more prominent to the left. There is no evidence of cord edema. There is moderate canal stenosis at C4-5 and mild to moderate canal stenosis at C5-6. 3.  Multilevel degenerative disease involving cervical spine is noted as described above with no evidence for herniation. This includes multilevel severe foraminal stenosis. See above.   This report was finalized on 11/25/2024 7:38 AM by Dr. Ruddy Flores M.D on Workstation: [id number]       I have personally reviewed all medications:  Scheduled Medications  amLODIPine, 10 mg, Oral, Daily  atorvastatin, 40 mg, Oral, Daily  carvedilol, 25 mg, Oral, BID With Meals  clopidogrel, 75 mg, Oral, Daily  docusate sodium, 100 mg, Oral, BID  ferrous sulfate, 325 mg, Oral, Daily With Breakfast  furosemide, 40 mg, Oral, BID  gabapentin, 200 mg, Oral, TID  insulin lispro, 2-7 Units, Subcutaneous, 4x Daily AC & at Bedtime  QUEtiapine, 25 mg, Oral, Nightly  sertraline, 75 mg, Oral, Daily  sodium chloride, 10 mL, Intravenous, Q12H  tamsulosin, 0.4 mg, Oral, Daily  vitamin B-12, 1,000 mcg, Oral,  "Daily    Infusions   Diet  Diet: Cardiac, Diabetic; Healthy Heart (2-3 Na+); Consistent Carbohydrate; Fluid Consistency: Thin (IDDSI 0)    I have personally reviewed:  [x]  Laboratory   []  Microbiology   [x]  Radiology   []  EKG/Telemetry  []  Cardiology/Vascular   []  Pathology    []  Records       Assessment/Plan     Active Hospital Problems    Diagnosis  POA    **Odontoid fracture [S12.110A]  Yes    Dementia without behavioral disturbance [F03.90]  Yes    Chronic diastolic heart failure [I50.32]  Yes    Iron deficiency anemia [D50.9]  Yes    CKD (chronic kidney disease) stage 3, GFR 30-59 ml/min [N18.30]  Yes    B12 deficiency [E53.8]  Yes    CVA (cerebrovascular accident) (with right-sided weakness) [I63.9]  Yes    Hypertension, essential [I10]  Yes    Hyperlipidemia [E78.5]  Yes    DM II (diabetes mellitus, type II), controlled [E11.9]  Yes      Resolved Hospital Problems   No resolved problems to display.       84 y.o. male admitted with Odontoid fracture after having multiple falls out of bed.  Patient states he \"lives with daughter\".    Odontoid fracture confirmed on MRI cervical spine with associated of edema.  CT head and cervical spine findings no acute intracranial process and severe multilevel degenerative change of cervical spine.  Discussed CYNDI recommendation with CYNDI JACOBSON who advised continue C-collar with no plans for surgery--ok to eat.  Aspiration precautions.  Tylenol PRN & avoid sedation.  Stool softeners BID with hold parameters.  PT/OT consulted.  CCP consulted    Dementia without behavioral disturbance per chart yet patient answering orientation questions appropriately at this time.    FEDERICO in the setting of CKD 3b:  Hgb trends wax / wane compared to priors & dropped 1.3 grams in 24 hours--most likely due to above listed injury.  Ferrous sulfate continued.    HTN/ CHF, Diastolic:  No signs of CHF exacerbation.  BP acceptable acutely with plans to continue CCB, BB, " furosemide.    History of CVA / HLD:  Continue statin & Plavix    BPH:  Flomax continued    Dementia - Oriented to person, place, time on exam.  Continue home seroquel; frequent reorientation     SCDs for DVT prophylaxis.  Full code.  Discussed with patient and nursing staff. / spoke with daughter via telephone--all questions answered to her satisfaction  Anticipate discharge home with family timing yet to be determined. Pending CYNDI recommendation  Expected discharge date/ time has not been documented.      KAVON Causey  [facility]  11/25/24  15:11 EST

## 2024-11-25 NOTE — H&P
Patient Name:  Dereck Ramírez  YOB: 1940  MRN:  [id number]  Admit Date:  11/24/2024  Patient Care Team:  Amber Rodney APRN as PCP - General (Family Medicine)      Subjective   History Present Illness     No chief complaint on file.      Mr. Ramírez is a 84 y.o.  with a history of hypertension, chronic diastolic CHF, stroke, hyperlipidemia, depression, BPH, dementia that presents to [facility] as an outside transfer for evaluation by neurosurgery for odontoid fracture.      History of Present Illness  84-year-old pleasant gentleman who reports to [facility] as an outside transfer for evaluation by neurosurgery for odontoid fracture.  No family is at bedside and the patient is a poor historian so the majority of the history is collected from chart review.  Per outside documentation patient presented to ER after having multiple falls out of bed.  The patient does tell me that he does remember trying to get a bed and feeling like when he was near the edge of the bed the mattress pushed him out and he fell.  Since falling he is complaining of right hip and elbow pain.  Also reported generalized weakness making it difficult to ambulate.  Imaging with CT at outside ER revealed acute versus subacute versus chronic type II odontoid fracture.  MRI at outside facility not available and attempted to transfer patient to [facility] however there were no beds for many hours and family requested transfer to other facility.    Review of Systems   Constitutional:  Negative for fatigue and fever.   Respiratory:  Negative for shortness of breath.    Cardiovascular:  Negative for chest pain.   Gastrointestinal:  Negative for abdominal pain, nausea and vomiting.   Musculoskeletal:         Right elbow pain, right side pain after falling   Neurological:  Positive for weakness.   Psychiatric/Behavioral:  Negative for confusion.         Personal History     Past Medical History:   Diagnosis Date     Arthritis     Cerebellar artery occlusion     Coronary artery disease     Diabetes mellitus     Enlarged prostate     Hyperlipidemia     Hypertension     Stroke 2011, 2012     Past Surgical History:   Procedure Laterality Date    CARDIAC CATHETERIZATION      lesion 1: intervention outcome    HERNIA REPAIR       Family History   Problem Relation Age of Onset    Heart disease Mother     Stroke Mother     Crohn's disease Father     Stroke Father     Arthritis Father     Cancer Other     Stroke Other     Diabetes Other     Heart defect Other     Hypertension Other     Thyroid disease Other     Cancer Maternal Uncle         bone    Dementia Maternal Grandmother      Social History     Tobacco Use    Smoking status: Never    Smokeless tobacco: Never   Vaping Use    Vaping status: Never Used   Substance Use Topics    Alcohol use: No    Drug use: No     No current facility-administered medications on file prior to encounter.     Current Outpatient Medications on File Prior to Encounter   Medication Sig Dispense Refill    amLODIPine (NORVASC) 10 MG tablet Take 1 tablet by mouth Daily. 90 tablet 3    atorvastatin (LIPITOR) 40 MG tablet TAKE 1 TABLET BY MOUTH DAILY 90 tablet 1    busPIRone (BUSPAR) 5 MG tablet Take 1 tablet by mouth 3 (Three) Times a Day As Needed (anxiety). 270 tablet 1    carvedilol (COREG) 25 MG tablet TAKE 1 TABLET BY MOUTH TWICE DAILY WITH MEALS 180 tablet 3    clopidogrel (PLAVIX) 75 MG tablet TAKE 1 TABLET BY MOUTH DAILY 90 tablet 3    ferrous gluconate (FERGON) 324 MG tablet TAKE 1 TABLET BY MOUTH DAILY WITH BREAKFAST 90 tablet 1    furosemide (LASIX) 40 MG tablet TAKE 1 TABLET BY MOUTH TWICE DAILY 180 tablet 3    gabapentin (NEURONTIN) 100 MG capsule Take 2 capsules by mouth 3 (Three) Times a Day. 540 capsule 1    nateglinide (STARLIX) 60 MG tablet Take 1 tablet by mouth 2 (Two) Times a Day With Meals. 180 tablet 3    potassium chloride 10 MEQ CR tablet Take 1 tablet by mouth Daily. Take while taking  double Lasix dose (Patient taking differently: Take 1 tablet by mouth Daily.) 90 tablet 1    QUEtiapine (SEROquel) 25 MG tablet Take 1 tablet by mouth Every Night for 30 days. 30 tablet 0    sertraline (ZOLOFT) 50 MG tablet TAKE 1 AND 1/2 TABLETS BY MOUTH DAILY 135 tablet 1    tamsulosin (FLOMAX) 0.4 MG capsule 24 hr capsule TAKE 1 CAPSULE BY MOUTH DAILY 90 capsule 1    vitamin B-12 (CYANOCOBALAMIN) 1000 MCG tablet TAKE 1 TABLET BY MOUTH DAILY 90 tablet 1     No Known Allergies    Objective    Objective     Vital Signs  Temp:  [98.2 °F (36.8 °C)] 98.2 °F (36.8 °C)  Heart Rate:  [71] 71  Resp:  [18] 18  BP: (159)/(61) 159/61  SpO2:  [97 %] 97 %  on   ;   Device (Oxygen Therapy): room air  There is no height or weight on file to calculate BMI.    Physical Exam  Constitutional:       General: He is not in acute distress.     Appearance: He is obese. He is ill-appearing. He is not toxic-appearing.   HENT:      Head: Normocephalic.      Mouth/Throat:      Mouth: Mucous membranes are moist.      Pharynx: Oropharynx is clear.   Neck:      Comments: ASPEN collar in place  Cardiovascular:      Rate and Rhythm: Normal rate and regular rhythm.   Pulmonary:      Effort: Pulmonary effort is normal. No respiratory distress.      Breath sounds: Normal breath sounds. No wheezing.   Abdominal:      General: Abdomen is flat.      Palpations: Abdomen is soft.      Tenderness: There is no abdominal tenderness.   Musculoskeletal:         General: No tenderness.      Right lower leg: No edema.      Left lower leg: No edema.   Skin:     General: Skin is warm and dry.   Neurological:      General: No focal deficit present.      Mental Status: He is alert and oriented to person, place, and time. Mental status is at baseline.      Motor: No weakness.         Results Review:  I reviewed the patient's new clinical results.  I reviewed the patient's new imaging results and agree with the interpretation.  I reviewed the patient's other test  results and agree with the interpretation  I personally viewed and interpreted the patient's EKG/Telemetry data  Discussed with ED provider.    Lab Results (last 24 hours)       Procedure Component Value Units Date/Time    Urinalysis With Culture If Indicated - [[id number]] Collected: 11/24/24 0631    Specimen: Urine Updated: 11/24/24 1914     Specimen Type: U CleanCatch     Color, UA YELLOW     Appearance, UA CLEAR     Specific Gravity, UA 1.010     pH, UA 6.0     Leuk Esterase, UA NEGATIVE     Nitrite, UA NEGATIVE     Protein, UA NEGATIVE     Glucose, UA NEGATIVE     Ketones, UA NEGATIVE     Urobilinogen, UA 0.2 EU/dL      Bilirubin, UA NEGATIVE     Blood, UA NEGATIVE    Urinalysis, Microscopic Only - [[id number]] Collected: 11/24/24 0631    Specimen: Urine Updated: 11/24/24 1914     Urinalysis Gross Exam Correlated     RBC, UA 0-2 /HPF      WBC, UA 0-2 /HPF      Bacteria, UA Trace /HPF      Epithelial Cells (non renal) Rare    Narrative:       Ordered by Discern Expert [email or web address]    TSH [[id number]] Collected: 11/24/24 0632     Updated: 11/24/24 1914    TROPONIN I, HIGH SENSITIVE [[id number]] Collected: 11/24/24 0632     Updated: 11/24/24 1914    COMPREHENSIVE METABOLIC PANEL [[id number]]  (Abnormal) Collected: 11/24/24 0632     Updated: 11/24/24 1914    AMMONIA [[id number]] Collected: 11/24/24 0632    Specimen: Blood Updated: 11/24/24 1914     Ammonia 13 uMol/L     PT AND PTT [[id number]]  (Abnormal) Collected: 11/24/24 0632    Specimen: Blood Updated: 11/24/24 1914     Protime 13.4 Second      Comment: Patients taking the antibiotic Cubicin (daptomycin) may have falsely prolonged PT/INR results.        INR 1.2     Comment: Recommended INR range for Oral Anticoagulant Therapy :     STANDARD: 2.0 - 3.0   HIGH: 3.0 - 4.5        PTT 32.7 Second     AUTODIFF [[id number]]  (Abnormal) Collected: 11/24/24 0632    Specimen: Blood Updated: 11/24/24 1914     Neutrophil % 73.1 %      Lymphocyte % 11.3 %      Monocyte %  11.5 %      Eosinophil % 3.6 %      Basophil % 0.5 %      Neutrophils Absolute 6.8 x10(3)/ul      Lymphocytes Absolute 1.1 x10(3)/ul      Monocytes Absolute 1.1 x10(3)/ul      Eosinophils Absolute 0.3 x10(3)/ul      Basophils Absolute 0 x10(3)/ul     Narrative:       Ordered by Discern Expert.    CBC AND DIFFERENTIAL [[id number]]  (Abnormal) Collected: 11/24/24 0632     Updated: 11/24/24 1914            Imaging Results (Last 24 Hours)       ** No results found for the last 24 hours. **            Results for orders placed during the hospital encounter of 08/31/23    Adult Transthoracic Echo Complete w/ Color, Spectral and Contrast if Necessary Per Protocol    Interpretation Summary    Left ventricular systolic function is normal. Calculated left ventricular EF = 63.2%    Left ventricular wall thickness is consistent with moderate concentric hypertrophy.    Left ventricular diastolic function is consistent with (grade I) impaired relaxation.    The left atrial cavity is moderately dilated.    Estimated right ventricular systolic pressure from tricuspid regurgitation is mildly elevated (35-45 mmHg).      Telemetry Scan   Final Result           Assessment/Plan     Active Hospital Problems    Diagnosis  POA    **Odontoid fracture [S12.110A]  Yes    Dementia without behavioral disturbance [F03.90]  Yes    Chronic diastolic heart failure [I50.32]  Yes    Iron deficiency anemia [D50.9]  Yes    CKD (chronic kidney disease) stage 3, GFR 30-59 ml/min [N18.30]  Yes    B12 deficiency [E53.8]  Yes    CVA (cerebrovascular accident) (with right-sided weakness) [I63.9]  Yes    Hypertension, essential [I10]  Yes    Hyperlipidemia [E78.5]  Yes    DM II (diabetes mellitus, type II), controlled [E11.9]  Yes      Resolved Hospital Problems   No resolved problems to display.       Mr. Ramírez is a 84 y.o.  with a history of hypertension, chronic diastolic CHF, stroke, hyperlipidemia, depression, BPH, dementia who presents with odontoid  fracture after falling.    Odontoid fracture  - CYNDI consulted, outside ED s/w Dr. Boucher  - MRI cervical spine WO ordered  - remain in [place] collar until seen by CYNDI  - pt reports pain is tolerable; cont PRN APAP    HTN   Chronic diastolic CHF  -continue home amlodipine, Coreg, Lasix  - no e/o CHF exac     History of CVA  Hyperlipidemia  - Continue atorvastatin, Plavix     Depression- continue home Zoloft     BPH-continue home Flomax    FEDERICO/B12 deficiency- cont supplementation    Dementia - cont home seroquel; frequent reorientation    I discussed the patient's findings and my recommendations with patient, nursing staff, and ED provider.    VTE Prophylaxis - SCDs.  Code Status - Full code.       Salina Cleveland MD  [facility]  11/24/24  23:00 EST

## 2024-11-26 ENCOUNTER — TELEPHONE (OUTPATIENT)
Dept: NEUROSURGERY | Facility: CLINIC | Age: 84
End: 2024-11-26
Payer: MEDICARE

## 2024-11-26 LAB
ANION GAP SERPL CALCULATED.3IONS-SCNC: 11 MMOL/L (ref 5–15)
BUN SERPL-MCNC: 28 MG/DL (ref 8–23)
BUN/CREAT SERPL: 18.2 (ref 7–25)
CALCIUM SPEC-SCNC: 8 MG/DL (ref 8.6–10.5)
CHLORIDE SERPL-SCNC: 108 MMOL/L (ref 98–107)
CO2 SERPL-SCNC: 21 MMOL/L (ref 22–29)
CREAT SERPL-MCNC: 1.54 MG/DL (ref 0.76–1.27)
DEPRECATED RDW RBC AUTO: 41.1 FL (ref 37–54)
EGFRCR SERPLBLD CKD-EPI 2021: 44.8 ML/MIN/1.73
ERYTHROCYTE [DISTWIDTH] IN BLOOD BY AUTOMATED COUNT: 13.1 % (ref 12.3–15.4)
GLUCOSE BLDC GLUCOMTR-MCNC: 125 MG/DL (ref 70–130)
GLUCOSE BLDC GLUCOMTR-MCNC: 151 MG/DL (ref 70–130)
GLUCOSE BLDC GLUCOMTR-MCNC: 214 MG/DL (ref 70–130)
GLUCOSE BLDC GLUCOMTR-MCNC: 251 MG/DL (ref 70–130)
GLUCOSE SERPL-MCNC: 140 MG/DL (ref 65–99)
HCT VFR BLD AUTO: 26.7 % (ref 37.5–51)
HGB BLD-MCNC: 9 G/DL (ref 13–17.7)
MCH RBC QN AUTO: 29.2 PG (ref 26.6–33)
MCHC RBC AUTO-ENTMCNC: 33.7 G/DL (ref 31.5–35.7)
MCV RBC AUTO: 86.7 FL (ref 79–97)
PLATELET # BLD AUTO: 289 10*3/MM3 (ref 140–450)
PMV BLD AUTO: 9.9 FL (ref 6–12)
POTASSIUM SERPL-SCNC: 3.6 MMOL/L (ref 3.5–5.2)
RBC # BLD AUTO: 3.08 10*6/MM3 (ref 4.14–5.8)
SODIUM SERPL-SCNC: 140 MMOL/L (ref 136–145)
WBC NRBC COR # BLD AUTO: 15.8 10*3/MM3 (ref 3.4–10.8)

## 2024-11-26 PROCEDURE — 97530 THERAPEUTIC ACTIVITIES: CPT

## 2024-11-26 PROCEDURE — 63710000001 INSULIN LISPRO (HUMAN) PER 5 UNITS: Performed by: STUDENT IN AN ORGANIZED HEALTH CARE EDUCATION/TRAINING PROGRAM

## 2024-11-26 PROCEDURE — 80048 BASIC METABOLIC PNL TOTAL CA: CPT | Performed by: NURSE PRACTITIONER

## 2024-11-26 PROCEDURE — 97162 PT EVAL MOD COMPLEX 30 MIN: CPT

## 2024-11-26 PROCEDURE — 25010000002 ENOXAPARIN PER 10 MG: Performed by: HOSPITALIST

## 2024-11-26 PROCEDURE — 97166 OT EVAL MOD COMPLEX 45 MIN: CPT

## 2024-11-26 PROCEDURE — 85027 COMPLETE CBC AUTOMATED: CPT | Performed by: NURSE PRACTITIONER

## 2024-11-26 PROCEDURE — 82948 REAGENT STRIP/BLOOD GLUCOSE: CPT

## 2024-11-26 RX ORDER — ENOXAPARIN SODIUM 100 MG/ML
30 INJECTION SUBCUTANEOUS NIGHTLY
Status: DISCONTINUED | OUTPATIENT
Start: 2024-11-26 | End: 2024-11-29 | Stop reason: HOSPADM

## 2024-11-26 RX ADMIN — ACETAMINOPHEN 325MG 650 MG: 325 TABLET ORAL at 20:38

## 2024-11-26 RX ADMIN — ENOXAPARIN SODIUM 30 MG: 100 INJECTION SUBCUTANEOUS at 20:38

## 2024-11-26 RX ADMIN — CLOPIDOGREL BISULFATE 75 MG: 75 TABLET, FILM COATED ORAL at 10:04

## 2024-11-26 RX ADMIN — CYANOCOBALAMIN TAB 500 MCG 1000 MCG: 500 TAB at 10:04

## 2024-11-26 RX ADMIN — GABAPENTIN 200 MG: 100 CAPSULE ORAL at 20:38

## 2024-11-26 RX ADMIN — INSULIN LISPRO 3 UNITS: 100 INJECTION, SOLUTION INTRAVENOUS; SUBCUTANEOUS at 20:38

## 2024-11-26 RX ADMIN — INSULIN LISPRO 4 UNITS: 100 INJECTION, SOLUTION INTRAVENOUS; SUBCUTANEOUS at 11:48

## 2024-11-26 RX ADMIN — Medication 10 ML: at 10:04

## 2024-11-26 RX ADMIN — FUROSEMIDE 40 MG: 40 TABLET ORAL at 17:00

## 2024-11-26 RX ADMIN — DOCUSATE SODIUM 100 MG: 100 CAPSULE, LIQUID FILLED ORAL at 10:04

## 2024-11-26 RX ADMIN — Medication 5 MG: at 20:39

## 2024-11-26 RX ADMIN — CARVEDILOL 25 MG: 25 TABLET, FILM COATED ORAL at 17:00

## 2024-11-26 RX ADMIN — GABAPENTIN 200 MG: 100 CAPSULE ORAL at 16:59

## 2024-11-26 RX ADMIN — FUROSEMIDE 40 MG: 40 TABLET ORAL at 10:04

## 2024-11-26 RX ADMIN — TAMSULOSIN HYDROCHLORIDE 0.4 MG: 0.4 CAPSULE ORAL at 10:04

## 2024-11-26 RX ADMIN — ATORVASTATIN CALCIUM 40 MG: 20 TABLET, FILM COATED ORAL at 10:04

## 2024-11-26 RX ADMIN — QUETIAPINE FUMARATE 25 MG: 25 TABLET ORAL at 20:38

## 2024-11-26 RX ADMIN — SERTRALINE 75 MG: 50 TABLET, FILM COATED ORAL at 10:04

## 2024-11-26 RX ADMIN — GABAPENTIN 200 MG: 100 CAPSULE ORAL at 10:04

## 2024-11-26 RX ADMIN — FERROUS SULFATE TAB 325 MG (65 MG ELEMENTAL FE) 325 MG: 325 (65 FE) TAB at 10:04

## 2024-11-26 RX ADMIN — Medication 10 ML: at 20:53
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
## 2024-11-26 NOTE — PLAN OF CARE
Goal Outcome Evaluation:      VSS throughout shift. Pt up to chair for part of shift. C-collar remained in place throughout shift. Pt no complaints at this time.

## 2024-11-26 NOTE — PROGRESS NOTES
Name: Dereck Ramírez ADMIT: 2024   : 1940  PCP: Amber Rodney APRN    MRN: [id number] LOS: 1 days   AGE/SEX: 84 y.o. male  ROOM: [facility]     Subjective   Subjective   Patient a bit agitated about his c-collar.  Otherwise no specific complaints       Objective   Objective   Vital Signs  Temp:  [97.4 °F (36.3 °C)-98.8 °F (37.1 °C)] 98.4 °F (36.9 °C)  Heart Rate:  [64-77] 75  Resp:  [16] 16  BP: (129-162)/(54-70) 156/64  SpO2:  [92 %-98 %] 95 %  on   ;   Device (Oxygen Therapy): room air  Body mass index is 30.2 kg/m².  Physical Exam  Vitals reviewed.   Constitutional:       General: He is not in acute distress.     Appearance: He is not ill-appearing.   Cardiovascular:      Rate and Rhythm: Normal rate and regular rhythm.   Pulmonary:      Effort: No respiratory distress.      Breath sounds: Normal breath sounds.   Abdominal:      General: There is no distension.      Palpations: Abdomen is soft.      Tenderness: There is no abdominal tenderness.   Skin:     General: Skin is warm and dry.   Neurological:      General: No focal deficit present.      Mental Status: He is alert. He is disoriented.   Psychiatric:      Comments: Irritable       Results Review     I reviewed the patient's new clinical results.  Results from last 7 days   Lab Units 24  0532 24  0609   WBC 10*3/mm3 15.80* 7.94   HEMOGLOBIN g/dL 9.0* 8.6*   PLATELETS 10*3/mm3 289 284     Results from last 7 days   Lab Units 24  0532 24  0609   SODIUM mmol/L 140 141   POTASSIUM mmol/L 3.6 3.8   CHLORIDE mmol/L 108* 108*   CO2 mmol/L 21.0* 22.9   BUN mg/dL 28* 28*   CREATININE mg/dL 1.54* 1.37*   GLUCOSE mg/dL 140* 130*   EGFR mL/min/1.73 44.8* 51.5*       Results from last 7 days   Lab Units 24  0532 24  0609   CALCIUM mg/dL 8.0* 8.2*   MAGNESIUM mg/dL  --  2.2   PHOSPHORUS mg/dL  --  3.3       Glucose   Date/Time Value Ref Range Status   2024 1617 125 70 - 130 mg/dL Final   2024 1125  251 (H) 70 - 130 mg/dL Final   11/26/2024 0714 151 (H) 70 - 130 mg/dL Final   11/25/2024 2030 166 (H) 70 - 130 mg/dL Final   11/25/2024 1735 137 (H) 70 - 130 mg/dL Final   11/25/2024 1159 142 (H) 70 - 130 mg/dL Final   11/24/2024 2322 112 70 - 130 mg/dL Final       MRI Cervical Spine Without Contrast    Result Date: 11/25/2024  1.  There is a transverse fracture plane appreciated involving the dens with associated edema. The edema is also appreciated involving the dens to a lesser degree. A thin plane of edema is appreciated involving the prevertebral soft tissues from the skull base to level C4-5. 2.  Multilevel spinal stenosis is appreciated most severe at C1-C2 and then C4-5. C1-C2 the spinal stenosis is severe secondary to the presence of a pannus as well as edema or potentially synovial cyst involving the ligamentum flavum posteriorly. There is flattening of the dorsal aspect of the cord, more prominent to the left. There is no evidence of cord edema. There is moderate canal stenosis at C4-5 and mild to moderate canal stenosis at C5-6. 3.  Multilevel degenerative disease involving cervical spine is noted as described above with no evidence for herniation. This includes multilevel severe foraminal stenosis. See above.   This report was finalized on 11/25/2024 7:38 AM by Dr. Ruddy Flores M.D on Workstation: [id number]       I have personally reviewed all medications:  Scheduled Medications  amLODIPine, 10 mg, Oral, Daily  atorvastatin, 40 mg, Oral, Daily  carvedilol, 25 mg, Oral, BID With Meals  clopidogrel, 75 mg, Oral, Daily  docusate sodium, 100 mg, Oral, BID  ferrous sulfate, 325 mg, Oral, Daily With Breakfast  furosemide, 40 mg, Oral, BID  gabapentin, 200 mg, Oral, TID  insulin lispro, 2-7 Units, Subcutaneous, 4x Daily AC & at Bedtime  QUEtiapine, 25 mg, Oral, Nightly  sertraline, 75 mg, Oral, Daily  sodium chloride, 10 mL, Intravenous, Q12H  tamsulosin, 0.4 mg, Oral, Daily  vitamin B-12, 1,000 mcg,  Oral, Daily    Infusions   Diet  Diet: Cardiac, Diabetic; Healthy Heart (2-3 Na+); Consistent Carbohydrate; Fluid Consistency: Thin (IDDSI 0)    I have personally reviewed:  [x]  Laboratory   [x]  Microbiology   [x]  Radiology   [x]  EKG/Telemetry  [x]  Cardiology/Vascular   []  Pathology    []  Records       Assessment/Plan     Active Hospital Problems    Diagnosis  POA    **Odontoid fracture [S12.110A]  Yes    Dementia without behavioral disturbance [F03.90]  Yes    Chronic diastolic heart failure [I50.32]  Yes    Iron deficiency anemia [D50.9]  Yes    CKD (chronic kidney disease) stage 3, GFR 30-59 ml/min [N18.30]  Yes    B12 deficiency [E53.8]  Yes    CVA (cerebrovascular accident) (with right-sided weakness) [I63.9]  Yes    Hypertension, essential [I10]  Yes    Hyperlipidemia [E78.5]  Yes    DM II (diabetes mellitus, type II), controlled [E11.9]  Yes      Resolved Hospital Problems   No resolved problems to display.       84 y.o. male with history of dementia, CKD 3, stroke, diabetes admitted after a fall with Odontoid fracture.    Nonoperative management of the odontoid fracture.  Hard collar recommended by neurosurgery x 4-6 weeks.  MRI with significant multilevel cervical stenosis as well  - PT and OT as tolerated    DM2, relatively controlled with correctional insulin alone for now.  Does not appear to have been on long-acting insulin at home    Leukocytosis without overt other signs of infection.  Will continue to monitor    CKD 3B: Relatively stable based on past labs.  He has a history of diastolic heart failure but looks euvolemic      DVT prophy: Will add Lovenox  Disposition: SNF recommended.  CCP making arrangements, hopefully can go soon      Ross Alfaro MD  [facility]  11/26/24  17:13 EST

## 2024-11-26 NOTE — TELEPHONE ENCOUNTER
Scheduled xray and appt with Odalys JACOBSON and mailed to patient. It will also come up on his [employer] account as it shows they are active. Thanks.
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
## 2024-11-26 NOTE — TELEPHONE ENCOUNTER
----- Message from Willa Jones sent at 11/25/2024  4:22 PM EST -----  Regarding: hospital f/u  Please schedule this patient a follow-up appointment in 3 weeks with cervical x-ray (already ordered). He has a C2 fx. Patient has dementia, please contact daughter for arrangements. Thanks!

## 2024-11-26 NOTE — CASE MANAGEMENT/SOCIAL WORK
Discharge Planning Assessment  [facility]     Patient Name: Dereck Ramírez  MRN: [id number]  Today's Date: 11/26/2024    Admit Date: 11/24/2024    Plan: Plan home with family and [facility]  or skilled care at accepting facility.   LAURA Baez RN   Discharge Needs Assessment       Row Name 11/26/24 1328       Living Environment    People in Home child(yvon), adult;grandchild(yvno)    Name(s) of People in Home Daughter ( Maribel Arreguin [phone]), her  ( Mingo) and her 2 grandchildren ( Raudel and Noam)    Current Living Arrangements home    Potentially Unsafe Housing Conditions none    In the past 12 months has the electric, gas, oil, or water company threatened to shut off services in your home? No    Primary Care Provided by self    Provides Primary Care For no one, unable/limited ability to care for self    Family Caregiver if Needed child(yvon), adult    Family Caregiver Names Daughter ( Maribel Arreguin [phone])    Quality of Family Relationships helpful;involved;supportive    Able to Return to Prior Arrangements yes    Living Arrangement Comments Pt lives with his daughter ( Maribel Arreguin [phone]), her  ( Mingo) and her 2 grandchildren ( Raudel and Noam) in a single story house.       Resource/Environmental Concerns    Resource/Environmental Concerns none    Transportation Concerns none       Transportation Needs    In the past 12 months, has lack of transportation kept you from medical appointments or from getting medications? no    In the past 12 months, has lack of transportation kept you from meetings, work, or from getting things needed for daily living? No       Food Insecurity    Within the past 12 months, you worried that your food would run out before you got the money to buy more. Never true    Within the past 12 months, the food you bought just didn't last and you didn't have money to get more. Never true       Transition Planning    Patient/Family  Anticipates Transition to inpatient rehabilitation facility    Patient/Family Anticipated Services at Transition none    Transportation Anticipated family or friend will provide       Discharge Needs Assessment    Readmission Within the Last 30 Days previous discharge plan unsuccessful    Equipment Currently Used at Home cane, straight;glucometer;grab bar;shower chair;walker, rolling;wheelchair    Concerns to be Addressed basic needs    Anticipated Changes Related to Illness none    Equipment Needed After Discharge cane, straight;grab bar, tub/shower;glucometer;shower chair;walker, rolling;wheelchair, manual    Discharge Facility/Level of Care Needs home with home health;nursing facility, skilled                   Discharge Plan       Row Name 11/26/24 1921       Plan    Plan Plan home with family and [facility]  or skilled care at accepting facility.   LAURA Baez RN    Patient/Family in Agreement with Plan yes    Plan Comments FACE SHEET VERIFIED/ IM LETTER SIGNED.  Pt with history of dementia.  Called pt's daughter ( Maribel) .  Pt's PCP is KAVON Valles. Pt lives with his daughter ( Maribel Bolivar [phone]), her  ( Mingo) and her 2 grandchildren ( Raudel and Noam) in a single story house. Pt is usually independent with ADLs but has had several falls recently.  Pt has a cane, glucometer, grab bars, shower chair, rolling walker and wheelchair for home use if needed. Pt gets his prescriptions at [facility] in [place].  Pt is current with [facility].  Padmini ( [phone]) called to follow.  Pt has not been in SNF.  Spoke with pt's daughter regarding SNF.  Daughter provided list of SNF and her choice is [facility] or [facility].  Dinora  ( [phone]) called to follow.  [facility] at accepting facility.  LAURA Baez RN                  Continued Care and Services - Admitted Since 11/24/2024       Destination       Service Provider Request  Status Services Address Phone Fax Patient Preferred    [facility] P[facility] - Request Sent -- [address] [facility] [address] [phone] --    [facility] - [facility] Sent [facility] [address] [facility] [phone] --              Home Medical Care       Service Provider Request Status Services Address Phone Fax Patient Preferred    [facility] Pending - Request Sent -- [address], [facility] [id number] [phone] --                  Selected Continued Care - Prior Encounters Includes continued care and service providers with selected services from prior encounters from 8/26/2024 to 11/26/2024      Discharged on 11/19/2024 Admission date: 11/17/2024 - Discharge disposition: Home-Health Care Svc      Home Medical Care       Service Provider Services Address Phone Fax Patient Preferred    [facility] [address], [facility] [id number] [phone] --                          Expected Discharge Date and Time       Expected Discharge Date Expected Discharge Time    Nov 29, 2024            Demographic Summary       Row Name 11/26/24 1322       General Information    Admission Type inpatient    Arrived From emergency department    Required Notices Provided Important Message from Medicare    Referral Source admission list    Reason for Consult discharge planning    Preferred Language English                   Functional Status       Row Name 11/26/24 1328       Functional Status    Usual Activity Tolerance moderate    Current Activity Tolerance fair       Functional Status, IADL    Medications assistive person    Meal Preparation assistive person    Housekeeping assistive person    Laundry assistive person    Shopping assistive person       Mental Status    General Appearance WDL WDL                    Psychosocial    No documentation.                  Abuse/Neglect    No documentation.                  Legal    No documentation.                  Substance Abuse    No documentation.                  Patient Forms    No documentation.                     Sunshine Baez RN

## 2024-11-26 NOTE — PLAN OF CARE
Goal Outcome Evaluation:  Plan of Care Reviewed With: patient, child      Pt is 83 y/o M admitted to [facility] on 11/24/24 with odontoid fx from OSH for MRI/CYNDI consult - CYNDI with no surgical intervention but aspen collar at all times. PMH: dementia, CVA, HTN, CHF. At baseline pt is from home with daughter, was previously indep with household mobility using AD, but was needing more help in the days leading up to admisstion. Pt currently demos min A of 2 to reach EOB, stand and ambulate with rwx in room. Hand over hand on rwx for safety as pt needs assist with direction and keeping walker on the ground. Pt demos mobility below baseline and therefore would benefit from acute skilled PT to address functional mobility deficits. Anticipate d/c to SNU.            Anticipated Discharge Disposition (PT): skilled nursing facility

## 2024-11-26 NOTE — CASE MANAGEMENT/SOCIAL WORK
Continued Stay Note  [facility]     Patient Name: Dereck Ramírez  MRN: [id number]  Today's Date: 11/26/2024    Admit Date: 11/24/2024    Plan: Plan home with family and [facility]  or skilled care at accepting facility.   LAURA Baez RN   Discharge Plan       Row Name 11/26/24 1332       Plan    Plan Plan home with family and [facility]  or skilled care at accepting facility.   LAURA Baez RN    Patient/Family in Agreement with Plan yes    Plan Comments FACE SHEET VERIFIED/ IM LETTER SIGNED.  Pt with history of dementia.  Called pt's daughter ( Maribel) .  Pt's PCP is KAVON Valles. Pt lives with his daughter ( Maribel [place] [phone]), her  ( Mingo) and her 2 grandchildren ( Raudel and Noam) in a single story house. Pt is usually independent with ADLs but has had several falls recently.  Pt has a cane, glucometer, grab bars, shower chair, rolling walker and wheelchair for home use if needed. Pt gets his prescriptions at [facility] in [place].  Pt is current with [facility].  Padmini ( [phone]) called to follow.  Pt has not been in SNF.  Spoke with pt's daughter regarding SNF.  Daughter provided list of SNF and her choice is [facility] or [facility].  Dinora  ( [phone]) called to follow.  Plan home with family and [facility]  or skilled care at accepting facility.  LAURA Baez RN                   Discharge Codes    No documentation.                 Expected Discharge Date and Time       Expected Discharge Date Expected Discharge Time    Nov 29, 2024               Sunshine Baez RN

## 2024-11-26 NOTE — PLAN OF CARE
Goal Outcome Evaluation:  Plan of Care Reviewed With: patient, daughter        Progress: improving  Outcome Evaluation: 84 y.o. male admittd to hospital with Odontoid Fx. No surgical intervention at this time, cervical collar on at all times.  PMH sig for Dementia, CVA, HTN.  At baseline, patient lives at home with daughter and was (I) ADLs and functional mobility (Rw).  AxOx1, BUE WFL (B sh flex limited) 3/5. Patient able to follow and agreeable to simple directions.  Patient resting in bed upon arrival.  Patient transitioned to EOB with Min A x2.  STS from EOB with Min A x2 and Rw.  Patien able to ambulate household distance in room with Min A x2 and Rw.  Cuing for hand placement, walker spacing and management with ambulation.  OT would be beneficial to address underlying deficits and increase (I) ADLs.  Anticipate patient d/c to SNU for continued progress.    Anticipated Discharge Disposition (OT): skilled nursing facility

## 2024-11-26 NOTE — DISCHARGE PLACEMENT REQUEST
"Dereck Reeder (84 y.o. Male)       Date of Birth   1940    Social Security Number       Address   [address] [id number]    Home Phone   [phone]    MRN   [id number]       Voodoo   Other    Marital Status                               Admission Date   11/24/24    Admission Type   Elective    Admitting Provider   Salina Cleveland MD    Attending Provider   Ross Alfaro MD    Department, Room/Bed   [address], P589/1       Discharge Date       Discharge Disposition       Discharge Destination                                 Attending Provider: Ross Alfaro MD    Allergies: No Known Allergies    Isolation: None   Infection: None   Code Status: CPR    Ht: 182.9 cm (72\")   Wt: 101 kg (222 lb 10.6 oz)    Admission Cmt: None   Principal Problem: Odontoid fracture [S12.110A]                   Active Insurance as of 11/24/2024       Primary Coverage       Payor Plan Insurance Group Employer/Plan Group    MEDICARE MEDICARE A & B        Payor Plan Address Payor Plan Phone Number Payor Plan Fax Number Effective Dates    [address] [phone]  8/1/2007 - None Entered    [facility] [id number]         Subscriber Name Subscriber Birth Date Member ID       DERECK REEDER 1940 [id number]               Secondary Coverage       Payor Plan Insurance Group Employer/Plan Group    [facility] SUPP [id number]       Payor Plan Address Payor Plan Phone Number Payor Plan Fax Number Effective Dates    [address]   12/1/2016 - None Entered    [facility] [id number]         Subscriber Name Subscriber Birth Date Member ID       DERECK REEDER 1940 [id number]                     Emergency Contacts        (Rel.) Home Phone Work Phone Mobile Phone    Maribel Arreguin (Daughter) [phone] -- --                "

## 2024-11-27 LAB
ANION GAP SERPL CALCULATED.3IONS-SCNC: 13 MMOL/L (ref 5–15)
BUN SERPL-MCNC: 29 MG/DL (ref 8–23)
BUN/CREAT SERPL: 18.7 (ref 7–25)
CALCIUM SPEC-SCNC: 8.1 MG/DL (ref 8.6–10.5)
CHLORIDE SERPL-SCNC: 103 MMOL/L (ref 98–107)
CO2 SERPL-SCNC: 23 MMOL/L (ref 22–29)
CREAT SERPL-MCNC: 1.55 MG/DL (ref 0.76–1.27)
DEPRECATED RDW RBC AUTO: 39.5 FL (ref 37–54)
EGFRCR SERPLBLD CKD-EPI 2021: 44.4 ML/MIN/1.73
ERYTHROCYTE [DISTWIDTH] IN BLOOD BY AUTOMATED COUNT: 12.9 % (ref 12.3–15.4)
GLUCOSE BLDC GLUCOMTR-MCNC: 144 MG/DL (ref 70–130)
GLUCOSE BLDC GLUCOMTR-MCNC: 158 MG/DL (ref 70–130)
GLUCOSE BLDC GLUCOMTR-MCNC: 160 MG/DL (ref 70–130)
GLUCOSE BLDC GLUCOMTR-MCNC: 215 MG/DL (ref 70–130)
GLUCOSE BLDC GLUCOMTR-MCNC: 247 MG/DL (ref 70–130)
GLUCOSE SERPL-MCNC: 138 MG/DL (ref 65–99)
HCT VFR BLD AUTO: 26.4 % (ref 37.5–51)
HGB BLD-MCNC: 8.7 G/DL (ref 13–17.7)
MCH RBC QN AUTO: 28.1 PG (ref 26.6–33)
MCHC RBC AUTO-ENTMCNC: 33 G/DL (ref 31.5–35.7)
MCV RBC AUTO: 85.2 FL (ref 79–97)
PLATELET # BLD AUTO: 293 10*3/MM3 (ref 140–450)
PMV BLD AUTO: 9.8 FL (ref 6–12)
POTASSIUM SERPL-SCNC: 3.5 MMOL/L (ref 3.5–5.2)
RBC # BLD AUTO: 3.1 10*6/MM3 (ref 4.14–5.8)
SODIUM SERPL-SCNC: 139 MMOL/L (ref 136–145)
WBC NRBC COR # BLD AUTO: 11.25 10*3/MM3 (ref 3.4–10.8)

## 2024-11-27 PROCEDURE — 97530 THERAPEUTIC ACTIVITIES: CPT

## 2024-11-27 PROCEDURE — 80048 BASIC METABOLIC PNL TOTAL CA: CPT | Performed by: HOSPITALIST

## 2024-11-27 PROCEDURE — 85027 COMPLETE CBC AUTOMATED: CPT | Performed by: HOSPITALIST

## 2024-11-27 PROCEDURE — 63710000001 INSULIN LISPRO (HUMAN) PER 5 UNITS: Performed by: STUDENT IN AN ORGANIZED HEALTH CARE EDUCATION/TRAINING PROGRAM

## 2024-11-27 PROCEDURE — 25010000002 ENOXAPARIN PER 10 MG: Performed by: HOSPITALIST

## 2024-11-27 PROCEDURE — 82948 REAGENT STRIP/BLOOD GLUCOSE: CPT

## 2024-11-27 RX ORDER — HYDROCODONE BITARTRATE AND ACETAMINOPHEN 5; 325 MG/1; MG/1
1 TABLET ORAL EVERY 6 HOURS PRN
Status: DISCONTINUED | OUTPATIENT
Start: 2024-11-27 | End: 2024-11-29 | Stop reason: HOSPADM

## 2024-11-27 RX ADMIN — SERTRALINE 75 MG: 50 TABLET, FILM COATED ORAL at 08:49

## 2024-11-27 RX ADMIN — Medication 10 ML: at 21:21

## 2024-11-27 RX ADMIN — GABAPENTIN 200 MG: 100 CAPSULE ORAL at 21:16

## 2024-11-27 RX ADMIN — ENOXAPARIN SODIUM 30 MG: 100 INJECTION SUBCUTANEOUS at 21:15

## 2024-11-27 RX ADMIN — CYANOCOBALAMIN TAB 500 MCG 1000 MCG: 500 TAB at 08:50

## 2024-11-27 RX ADMIN — ATORVASTATIN CALCIUM 40 MG: 20 TABLET, FILM COATED ORAL at 08:46

## 2024-11-27 RX ADMIN — CARVEDILOL 25 MG: 25 TABLET, FILM COATED ORAL at 17:07

## 2024-11-27 RX ADMIN — Medication 10 ML: at 08:52

## 2024-11-27 RX ADMIN — INSULIN LISPRO 3 UNITS: 100 INJECTION, SOLUTION INTRAVENOUS; SUBCUTANEOUS at 12:35

## 2024-11-27 RX ADMIN — FUROSEMIDE 40 MG: 40 TABLET ORAL at 08:47

## 2024-11-27 RX ADMIN — FUROSEMIDE 40 MG: 40 TABLET ORAL at 17:07

## 2024-11-27 RX ADMIN — ACETAMINOPHEN 325MG 650 MG: 325 TABLET ORAL at 21:15

## 2024-11-27 RX ADMIN — TAMSULOSIN HYDROCHLORIDE 0.4 MG: 0.4 CAPSULE ORAL at 08:50

## 2024-11-27 RX ADMIN — QUETIAPINE FUMARATE 25 MG: 25 TABLET ORAL at 21:15

## 2024-11-27 RX ADMIN — CLOPIDOGREL BISULFATE 75 MG: 75 TABLET, FILM COATED ORAL at 08:47

## 2024-11-27 RX ADMIN — ACETAMINOPHEN 325MG 650 MG: 325 TABLET ORAL at 08:45

## 2024-11-27 RX ADMIN — INSULIN LISPRO 2 UNITS: 100 INJECTION, SOLUTION INTRAVENOUS; SUBCUTANEOUS at 16:56

## 2024-11-27 RX ADMIN — GABAPENTIN 200 MG: 100 CAPSULE ORAL at 16:57

## 2024-11-27 RX ADMIN — FERROUS SULFATE TAB 325 MG (65 MG ELEMENTAL FE) 325 MG: 325 (65 FE) TAB at 08:45

## 2024-11-27 RX ADMIN — INSULIN LISPRO 3 UNITS: 100 INJECTION, SOLUTION INTRAVENOUS; SUBCUTANEOUS at 21:15

## 2024-11-27 RX ADMIN — Medication 5 MG: at 21:15

## 2024-11-27 RX ADMIN — GABAPENTIN 200 MG: 100 CAPSULE ORAL at 08:49

## 2024-11-27 NOTE — PLAN OF CARE
Goal Outcome Evaluation:  Plan of Care Reviewed With: patient        Progress: improving  Outcome Evaluation: Pt participated well in PT.  Progressed with mobility requiring less assist to transfer to the edge of bed, pt stood with mod to Min A of 1 and was able to ambulate 25 ft with FWW and Min A.  Overall less assistance then needed on 11/26. Pt was able to statically stand with FWW and CGA to SBA.   Pt performed seated strengthening exercises.  Aspen collar remained on at all times.

## 2024-11-27 NOTE — PLAN OF CARE
Goal Outcome Evaluation:      VSS throughout shift. Pt up to chair for part of shift. Pain well controlled per MAR. Pt no complaints at this time.

## 2024-11-27 NOTE — PLAN OF CARE
Problem: Adult Inpatient Plan of Care  Goal: Plan of Care Review  Outcome: Progressing   Goal Outcome Evaluation:      Pt remained A/O throughout the night,VSS, neuros remained unchanged, pt did run a low grade temp the beginning of shift admin tylenol, pt is congested and has developed a productive cough, will continue to monitor.
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
## 2024-11-27 NOTE — PROGRESS NOTES
Name: Dereck Ramírez ADMIT: 2024   : 1940  PCP: Amber Rodney APRN    MRN: [id number] LOS: 2 days   AGE/SEX: 84 y.o. male  ROOM: [facility]     Subjective   Subjective   Patient seen and examined this morning.  Hospital day 3.  He is awake and resting in bed, having some pain and discomfort in his neck, otherwise, no acute complaints.       Objective   Objective   Vital Signs  Temp:  [97.5 °F (36.4 °C)-100.8 °F (38.2 °C)] 98.8 °F (37.1 °C)  Heart Rate:  [65-77] 65  Resp:  [16-18] 18  BP: (137-156)/(52-65) 137/52  SpO2:  [94 %-98 %] 94 %  on   ;   Device (Oxygen Therapy): room air  Body mass index is 30.2 kg/m².  Physical Exam  Vitals and nursing note reviewed.   Constitutional:       General: He is awake. He is not in acute distress.     Comments: Elderly, chronically ill-appearing   Neck:      Comments: Brace present  Cardiovascular:      Rate and Rhythm: Normal rate and regular rhythm.      Pulses: Normal pulses.      Heart sounds: Normal heart sounds.   Pulmonary:      Effort: Pulmonary effort is normal. No respiratory distress.      Breath sounds: No wheezing.   Abdominal:      Palpations: Abdomen is soft.      Tenderness: There is no abdominal tenderness.   Musculoskeletal:      Cervical back: Tenderness present.   Skin:     General: Skin is warm and dry.   Neurological:      Mental Status: He is alert.   Psychiatric:         Behavior: Behavior is cooperative.       Results Review     I reviewed the patient's new clinical results.  Results from last 7 days   Lab Units 24  0706 24  0532 24  0609   WBC 10*3/mm3 11.25* 15.80* 7.94   HEMOGLOBIN g/dL 8.7* 9.0* 8.6*   PLATELETS 10*3/mm3 293 289 284     Results from last 7 days   Lab Units 24  0706 24  0532 24  0609   SODIUM mmol/L 139 140 141   POTASSIUM mmol/L 3.5 3.6 3.8   CHLORIDE mmol/L 103 108* 108*   CO2 mmol/L 23.0 21.0* 22.9   BUN mg/dL 29* 28* 28*   CREATININE mg/dL 1.55* 1.54* 1.37*   GLUCOSE  mg/dL 138* 140* 130*   EGFR mL/min/1.73 44.4* 44.8* 51.5*       Results from last 7 days   Lab Units 11/26/24  0532 11/25/24  0609   CALCIUM mg/dL 8.0* 8.2*   MAGNESIUM mg/dL  --  2.2   PHOSPHORUS mg/dL  --  3.3       Glucose   Date/Time Value Ref Range Status   11/27/2024 0727 144 (H) 70 - 130 mg/dL Final   11/26/2024 2013 214 (H) 70 - 130 mg/dL Final   11/26/2024 1617 125 70 - 130 mg/dL Final   11/26/2024 1125 251 (H) 70 - 130 mg/dL Final   11/26/2024 0714 151 (H) 70 - 130 mg/dL Final   11/25/2024 2030 166 (H) 70 - 130 mg/dL Final   11/25/2024 1735 137 (H) 70 - 130 mg/dL Final       No radiology results for the last day    I have personally reviewed all medications:  Scheduled Medications  amLODIPine, 10 mg, Oral, Daily  atorvastatin, 40 mg, Oral, Daily  carvedilol, 25 mg, Oral, BID With Meals  clopidogrel, 75 mg, Oral, Daily  docusate sodium, 100 mg, Oral, BID  enoxaparin, 30 mg, Subcutaneous, Nightly  ferrous sulfate, 325 mg, Oral, Daily With Breakfast  furosemide, 40 mg, Oral, BID  gabapentin, 200 mg, Oral, TID  insulin lispro, 2-7 Units, Subcutaneous, 4x Daily AC & at Bedtime  QUEtiapine, 25 mg, Oral, Nightly  sertraline, 75 mg, Oral, Daily  sodium chloride, 10 mL, Intravenous, Q12H  tamsulosin, 0.4 mg, Oral, Daily  vitamin B-12, 1,000 mcg, Oral, Daily    Infusions   Diet  Diet: Cardiac, Diabetic; Healthy Heart (2-3 Na+); Consistent Carbohydrate; Fluid Consistency: Thin (IDDSI 0)    I have personally reviewed:  [x]  Laboratory   [x]  Microbiology   [x]  Radiology   [x]  EKG/Telemetry  [x]  Cardiology/Vascular   []  Pathology    []  Records       Assessment/Plan     Active Hospital Problems    Diagnosis  POA    **Odontoid fracture [S12.110A]  Yes    Dementia without behavioral disturbance [F03.90]  Yes    Chronic diastolic heart failure [I50.32]  Yes    Iron deficiency anemia [D50.9]  Yes    CKD (chronic kidney disease) stage 3, GFR 30-59 ml/min [N18.30]  Yes    B12 deficiency [E53.8]  Yes    CVA  (cerebrovascular accident) (with right-sided weakness) [I63.9]  Yes    Hypertension, essential [I10]  Yes    Hyperlipidemia [E78.5]  Yes    DM II (diabetes mellitus, type II), controlled [E11.9]  Yes      Resolved Hospital Problems   No resolved problems to display.       84 y.o. male admitted with Odontoid fracture.    Odontoid fracture  Cervical spine stenosis   Acute pain due to trauma   - CT showing type II odontoid fracture, MRI showing transverse fracture plane appreciated involving the dens with associated edema. Multilevel spinal stenosis is appreciated most severe at C1-C2 and then C4-5. moderate canal stenosis at C4-5 and mild to moderate canal stenosis at C5-6.     - Neurosurgery evaluated, recommending C-collar at all times, follow up in 4-6 weeks for repeat x-rays.  - PT/OT, fall precautions, elevate HOB. PRN medications for pain, pain uncontrolled with Tylenol alone, will add PRN Norco.    Leukocytosis  - WBC count elevated on most recent labs, etiology likely reactive. Patient afebrile, no other signs or symptoms to suggest acute infection at this time.  However, do need to closely monitor.  - Order repeat CBC in AM for reassessment.      Chronic kidney disease stage 3B  - On most recent labs, patient's creatinine found to be stable and near apparent baseline.   - Order repeat BMP in AM for reassessment of renal function.   - Will continue to monitor and trend creatinine to guide ongoing management decisions. Avoid nephrotoxic medications, IVF, strict I/O, daily weights.    Type 2 diabetes mellitus with hyperglycemia  - Most recent A1c: 7.10% (09/26/24)  - Current treatment regimen: SSI; Glucose appears acceptable, continue regimen as prescribed.    Hypertension  - Blood pressure appears stable and acceptable acutely at this time. No indications to warrant acute changes/intervention at this time.  - Continue current medications as prescribed. Trend BP to guide ongoing management decisions.    Chronic  HFpEF  - 2D Echo (08/31/23) showing EF 65.1%, G1DD; No evidence of decompensation at present. Continue diuretic as prescribed.     Anemia  - Hemoglobin low on most recent labs. No evidence of overt blood loss. No indications for acute intervention at this time.    - Order repeat CBC in AM for reassessment. Continue to monitor, transfuse for hemoglobin <7.    History of CVA  - Continue Plavix, statin.    Dementia  - No evidence of behavioral disturbance, closely monitor.      All workup, problems and plans new to me today. First day taking care of patient.      Lovenox 30 mg SC daily for DVT prophylaxis.  Full code.  Discussed with patient and nursing staff.  Anticipate discharge to SNU facility later this week.  Expected Discharge Date: 11/27/2024; Expected Discharge Time:       Duane Sargent DO  [facility]  11/27/24  07:41 EST

## 2024-11-28 LAB
ANION GAP SERPL CALCULATED.3IONS-SCNC: 12 MMOL/L (ref 5–15)
BASOPHILS # BLD AUTO: 0.02 10*3/MM3 (ref 0–0.2)
BASOPHILS NFR BLD AUTO: 0.2 % (ref 0–1.5)
BUN SERPL-MCNC: 29 MG/DL (ref 8–23)
BUN/CREAT SERPL: 18.6 (ref 7–25)
CALCIUM SPEC-SCNC: 8.2 MG/DL (ref 8.6–10.5)
CHLORIDE SERPL-SCNC: 104 MMOL/L (ref 98–107)
CO2 SERPL-SCNC: 24 MMOL/L (ref 22–29)
CREAT SERPL-MCNC: 1.56 MG/DL (ref 0.76–1.27)
DEPRECATED RDW RBC AUTO: 40 FL (ref 37–54)
EGFRCR SERPLBLD CKD-EPI 2021: 44.1 ML/MIN/1.73
EOSINOPHIL # BLD AUTO: 0.35 10*3/MM3 (ref 0–0.4)
EOSINOPHIL NFR BLD AUTO: 4 % (ref 0.3–6.2)
ERYTHROCYTE [DISTWIDTH] IN BLOOD BY AUTOMATED COUNT: 13 % (ref 12.3–15.4)
GLUCOSE BLDC GLUCOMTR-MCNC: 153 MG/DL (ref 70–130)
GLUCOSE BLDC GLUCOMTR-MCNC: 159 MG/DL (ref 70–130)
GLUCOSE BLDC GLUCOMTR-MCNC: 214 MG/DL (ref 70–130)
GLUCOSE BLDC GLUCOMTR-MCNC: 221 MG/DL (ref 70–130)
GLUCOSE SERPL-MCNC: 128 MG/DL (ref 65–99)
HCT VFR BLD AUTO: 26.3 % (ref 37.5–51)
HGB BLD-MCNC: 8.8 G/DL (ref 13–17.7)
IMM GRANULOCYTES # BLD AUTO: 0.06 10*3/MM3 (ref 0–0.05)
IMM GRANULOCYTES NFR BLD AUTO: 0.7 % (ref 0–0.5)
LYMPHOCYTES # BLD AUTO: 1.8 10*3/MM3 (ref 0.7–3.1)
LYMPHOCYTES NFR BLD AUTO: 20.5 % (ref 19.6–45.3)
MCH RBC QN AUTO: 28.6 PG (ref 26.6–33)
MCHC RBC AUTO-ENTMCNC: 33.5 G/DL (ref 31.5–35.7)
MCV RBC AUTO: 85.4 FL (ref 79–97)
MONOCYTES # BLD AUTO: 0.94 10*3/MM3 (ref 0.1–0.9)
MONOCYTES NFR BLD AUTO: 10.7 % (ref 5–12)
NEUTROPHILS NFR BLD AUTO: 5.62 10*3/MM3 (ref 1.7–7)
NEUTROPHILS NFR BLD AUTO: 63.9 % (ref 42.7–76)
NRBC BLD AUTO-RTO: 0 /100 WBC (ref 0–0.2)
PLATELET # BLD AUTO: 277 10*3/MM3 (ref 140–450)
PMV BLD AUTO: 9.5 FL (ref 6–12)
POTASSIUM SERPL-SCNC: 3.4 MMOL/L (ref 3.5–5.2)
POTASSIUM SERPL-SCNC: 3.8 MMOL/L (ref 3.5–5.2)
RBC # BLD AUTO: 3.08 10*6/MM3 (ref 4.14–5.8)
SODIUM SERPL-SCNC: 140 MMOL/L (ref 136–145)
WBC NRBC COR # BLD AUTO: 8.79 10*3/MM3 (ref 3.4–10.8)

## 2024-11-28 PROCEDURE — 82948 REAGENT STRIP/BLOOD GLUCOSE: CPT

## 2024-11-28 PROCEDURE — 85025 COMPLETE CBC W/AUTO DIFF WBC: CPT | Performed by: STUDENT IN AN ORGANIZED HEALTH CARE EDUCATION/TRAINING PROGRAM

## 2024-11-28 PROCEDURE — 25010000002 ENOXAPARIN PER 10 MG: Performed by: HOSPITALIST

## 2024-11-28 PROCEDURE — 84132 ASSAY OF SERUM POTASSIUM: CPT | Performed by: STUDENT IN AN ORGANIZED HEALTH CARE EDUCATION/TRAINING PROGRAM

## 2024-11-28 PROCEDURE — 63710000001 INSULIN LISPRO (HUMAN) PER 5 UNITS: Performed by: STUDENT IN AN ORGANIZED HEALTH CARE EDUCATION/TRAINING PROGRAM

## 2024-11-28 PROCEDURE — 80048 BASIC METABOLIC PNL TOTAL CA: CPT | Performed by: STUDENT IN AN ORGANIZED HEALTH CARE EDUCATION/TRAINING PROGRAM

## 2024-11-28 RX ORDER — POTASSIUM CHLORIDE 750 MG/1
40 TABLET, FILM COATED, EXTENDED RELEASE ORAL EVERY 4 HOURS
Status: COMPLETED | OUTPATIENT
Start: 2024-11-28 | End: 2024-11-28

## 2024-11-28 RX ADMIN — DOCUSATE SODIUM 100 MG: 100 CAPSULE, LIQUID FILLED ORAL at 21:42

## 2024-11-28 RX ADMIN — CARVEDILOL 25 MG: 25 TABLET, FILM COATED ORAL at 18:06

## 2024-11-28 RX ADMIN — GABAPENTIN 200 MG: 100 CAPSULE ORAL at 08:48

## 2024-11-28 RX ADMIN — INSULIN LISPRO 2 UNITS: 100 INJECTION, SOLUTION INTRAVENOUS; SUBCUTANEOUS at 21:42

## 2024-11-28 RX ADMIN — ATORVASTATIN CALCIUM 40 MG: 20 TABLET, FILM COATED ORAL at 08:47

## 2024-11-28 RX ADMIN — POTASSIUM CHLORIDE 40 MEQ: 750 TABLET, EXTENDED RELEASE ORAL at 12:17

## 2024-11-28 RX ADMIN — CLOPIDOGREL BISULFATE 75 MG: 75 TABLET, FILM COATED ORAL at 08:47

## 2024-11-28 RX ADMIN — Medication 10 ML: at 21:45

## 2024-11-28 RX ADMIN — QUETIAPINE FUMARATE 25 MG: 25 TABLET ORAL at 21:42

## 2024-11-28 RX ADMIN — INSULIN LISPRO 3 UNITS: 100 INJECTION, SOLUTION INTRAVENOUS; SUBCUTANEOUS at 12:17

## 2024-11-28 RX ADMIN — POTASSIUM CHLORIDE 40 MEQ: 750 TABLET, EXTENDED RELEASE ORAL at 18:06

## 2024-11-28 RX ADMIN — TAMSULOSIN HYDROCHLORIDE 0.4 MG: 0.4 CAPSULE ORAL at 08:47

## 2024-11-28 RX ADMIN — ENOXAPARIN SODIUM 30 MG: 100 INJECTION SUBCUTANEOUS at 21:43

## 2024-11-28 RX ADMIN — INSULIN LISPRO 2 UNITS: 100 INJECTION, SOLUTION INTRAVENOUS; SUBCUTANEOUS at 08:44

## 2024-11-28 RX ADMIN — GABAPENTIN 200 MG: 100 CAPSULE ORAL at 17:02

## 2024-11-28 RX ADMIN — GABAPENTIN 200 MG: 100 CAPSULE ORAL at 21:42

## 2024-11-28 RX ADMIN — CARVEDILOL 25 MG: 25 TABLET, FILM COATED ORAL at 08:47

## 2024-11-28 RX ADMIN — FERROUS SULFATE TAB 325 MG (65 MG ELEMENTAL FE) 325 MG: 325 (65 FE) TAB at 08:48

## 2024-11-28 RX ADMIN — SERTRALINE 75 MG: 50 TABLET, FILM COATED ORAL at 08:46

## 2024-11-28 RX ADMIN — FUROSEMIDE 40 MG: 40 TABLET ORAL at 18:06

## 2024-11-28 RX ADMIN — AMLODIPINE BESYLATE 10 MG: 10 TABLET ORAL at 08:47

## 2024-11-28 RX ADMIN — CYANOCOBALAMIN TAB 500 MCG 1000 MCG: 500 TAB at 08:46

## 2024-11-28 RX ADMIN — FUROSEMIDE 40 MG: 40 TABLET ORAL at 08:48

## 2024-11-28 RX ADMIN — INSULIN LISPRO 3 UNITS: 100 INJECTION, SOLUTION INTRAVENOUS; SUBCUTANEOUS at 17:02

## 2024-11-28 NOTE — PROGRESS NOTES
Name: Dereck Ramírez ADMIT: 2024   : 1940  PCP: Amber Rodney APRN    MRN: [id number] LOS: 3 days   AGE/SEX: 84 y.o. male  ROOM: [facility]     Subjective   Subjective   Patient seen and examined this morning.  Hospital day 4, having some neck discomfort ongoing, but otherwise, no acute complaints.       Objective   Objective   Vital Signs  Temp:  [97.1 °F (36.2 °C)-99 °F (37.2 °C)] 98.1 °F (36.7 °C)  Heart Rate:  [60-73] 64  Resp:  [8-18] 18  BP: (122-162)/(55-74) 144/59  SpO2:  [95 %-97 %] 96 %  on   ;   Device (Oxygen Therapy): room air  Body mass index is 30.2 kg/m².  Physical Exam  Vitals and nursing note reviewed.   Constitutional:       General: He is awake. He is not in acute distress.     Comments: Elderly, chronically ill-appearing   Neck:      Comments: Brace present  Cardiovascular:      Rate and Rhythm: Normal rate and regular rhythm.      Pulses: Normal pulses.      Heart sounds: Normal heart sounds.   Pulmonary:      Effort: Pulmonary effort is normal. No respiratory distress.      Breath sounds: No wheezing.   Abdominal:      General: There is no distension.      Palpations: Abdomen is soft.      Tenderness: There is no abdominal tenderness.   Musculoskeletal:      Cervical back: Tenderness present.   Skin:     General: Skin is warm and dry.   Neurological:      Mental Status: He is alert.   Psychiatric:         Behavior: Behavior is cooperative.       Results Review     I reviewed the patient's new clinical results.  Results from last 7 days   Lab Units 24  0452 24  0706 24  0532 24  0609   WBC 10*3/mm3 8.79 11.25* 15.80* 7.94   HEMOGLOBIN g/dL 8.8* 8.7* 9.0* 8.6*   PLATELETS 10*3/mm3 277 293 289 284     Results from last 7 days   Lab Units 24  0452 24  0706 24  0532 24  0609   SODIUM mmol/L 140 139 140 141   POTASSIUM mmol/L 3.4* 3.5 3.6 3.8   CHLORIDE mmol/L 104 103 108* 108*   CO2 mmol/L 24.0 23.0 21.0* 22.9   BUN mg/dL  29* 29* 28* 28*   CREATININE mg/dL 1.56* 1.55* 1.54* 1.37*   GLUCOSE mg/dL 128* 138* 140* 130*   EGFR mL/min/1.73 44.1* 44.4* 44.8* 51.5*       Results from last 7 days   Lab Units 11/28/24  0452 11/27/24  0706 11/26/24  0532 11/25/24  0609   CALCIUM mg/dL 8.2* 8.1* 8.0* 8.2*   MAGNESIUM mg/dL  --   --   --  2.2   PHOSPHORUS mg/dL  --   --   --  3.3       Glucose   Date/Time Value Ref Range Status   11/28/2024 0719 153 (H) 70 - 130 mg/dL Final   11/27/2024 1953 215 (H) 70 - 130 mg/dL Final   11/27/2024 1656 158 (H) 70 - 130 mg/dL Final   11/27/2024 1518 160 (H) 70 - 130 mg/dL Final   11/27/2024 1213 247 (H) 70 - 130 mg/dL Final   11/27/2024 0727 144 (H) 70 - 130 mg/dL Final   11/26/2024 2013 214 (H) 70 - 130 mg/dL Final       No radiology results for the last day    I have personally reviewed all medications:  Scheduled Medications  amLODIPine, 10 mg, Oral, Daily  atorvastatin, 40 mg, Oral, Daily  carvedilol, 25 mg, Oral, BID With Meals  clopidogrel, 75 mg, Oral, Daily  docusate sodium, 100 mg, Oral, BID  enoxaparin, 30 mg, Subcutaneous, Nightly  ferrous sulfate, 325 mg, Oral, Daily With Breakfast  furosemide, 40 mg, Oral, BID  gabapentin, 200 mg, Oral, TID  insulin lispro, 2-7 Units, Subcutaneous, 4x Daily AC & at Bedtime  QUEtiapine, 25 mg, Oral, Nightly  sertraline, 75 mg, Oral, Daily  sodium chloride, 10 mL, Intravenous, Q12H  tamsulosin, 0.4 mg, Oral, Daily  vitamin B-12, 1,000 mcg, Oral, Daily    Infusions   Diet  Diet: Cardiac, Diabetic; Healthy Heart (2-3 Na+); Consistent Carbohydrate; Fluid Consistency: Thin (IDDSI 0)    I have personally reviewed:  [x]  Laboratory   []  Microbiology   []  Radiology   [x]  EKG/Telemetry  []  Cardiology/Vascular   []  Pathology    []  Records       Assessment/Plan     Active Hospital Problems    Diagnosis  POA    **Odontoid fracture [S12.110A]  Yes    Dementia without behavioral disturbance [F03.90]  Yes    Chronic diastolic heart failure [I50.32]  Yes    Iron deficiency  anemia [D50.9]  Yes    CKD (chronic kidney disease) stage 3, GFR 30-59 ml/min [N18.30]  Yes    B12 deficiency [E53.8]  Yes    CVA (cerebrovascular accident) (with right-sided weakness) [I63.9]  Yes    Hypertension, essential [I10]  Yes    Hyperlipidemia [E78.5]  Yes    DM II (diabetes mellitus, type II), controlled [E11.9]  Yes      Resolved Hospital Problems   No resolved problems to display.       84 y.o. male admitted with Odontoid fracture.    Odontoid fracture  Cervical spine stenosis   Acute pain due to trauma   - CT showing type II odontoid fracture, MRI showing transverse fracture plane appreciated involving the dens with associated edema. Multilevel spinal stenosis is appreciated most severe at C1-C2 and then C4-5. moderate canal stenosis at C4-5 and mild to moderate canal stenosis at C5-6.     - Neurosurgery evaluated, recommending C-collar at all times, follow up in 4-6 weeks for repeat x-rays.  - PT/OT, fall precautions, elevate HOB. PRN medications for pain, monitor for any side effects, including respiratory depression, mental status changes or constipation.    Leukocytosis  - WBC count elevated on prior labs, has since improved on most recent testing. Patient afebrile, no other signs or symptoms to suggest acute infection at this time.  However, do need to closely monitor.  - Order repeat CBC in AM for reassessment.      Chronic kidney disease stage 3B  - On most recent labs, patient's creatinine found to be stable and near apparent baseline.   - Order repeat BMP in AM for reassessment of renal function.   - Will continue to monitor and trend creatinine to guide ongoing management decisions. Avoid nephrotoxic medications, IVF, strict I/O, daily weights.    Type 2 diabetes mellitus with hyperglycemia  - Most recent A1c: 7.10% (09/26/24)  - Current treatment regimen: SSI; Glucose appears acceptable mostly, does have some elevations around mealtime. Continue SSI as ordered for now, adjust regimen as  needed.     Hypokalemia  - K low on most recent labs; Will replete via electrolyte protocol. Follow up repeat labs to guide ongoing management decisions. Replete PRN per protocol. Continue to monitor    Hypertension  - Blood pressure appears stable and acceptable acutely at this time. No indications to warrant acute changes/intervention at this time.  - Continue current medications as prescribed. Trend BP to guide ongoing management decisions.    Chronic HFpEF  - 2D Echo (08/31/23) showing EF 65.1%, G1DD; No evidence of decompensation at present. Continue diuretic as prescribed.     Anemia  - Hemoglobin low on most recent labs. No evidence of overt blood loss. No indications for acute intervention at this time.    - Order repeat CBC in AM for reassessment. Continue to monitor, transfuse for hemoglobin <7.    History of CVA  - Continue Plavix, statin.    Dementia  - No evidence of behavioral disturbance, closely monitor.    Portions of this text have been copied and I have reviewed these. Documentation accurate as of 11/28/24.       Lovenox 30 mg SC daily for DVT prophylaxis.  Full code.  Discussed with patient and nursing staff.  Anticipate discharge to SNU facility once arrangements have been made.  Expected Discharge Date: 11/29/2024; Expected Discharge Time:       Duane Sargent DO  [facility]  11/28/24

## 2024-11-28 NOTE — PLAN OF CARE
Problem: Adult Inpatient Plan of Care  Goal: Plan of Care Review  Outcome: Progressing     Problem: Adult Inpatient Plan of Care  Goal: Absence of Hospital-Acquired Illness or Injury  Intervention: Identify and Manage Fall Risk  Recent Flowsheet Documentation  Taken 11/28/2024 0403 by Elis Simms RN  Safety Promotion/Fall Prevention:   safety round/check completed   room organization consistent  Taken 11/28/2024 0200 by Elis Simms RN  Safety Promotion/Fall Prevention:   safety round/check completed   room organization consistent  Taken 11/28/2024 0000 by Elis Simms RN  Safety Promotion/Fall Prevention:   safety round/check completed   room organization consistent  Taken 11/27/2024 2200 by Elis Simms RN  Safety Promotion/Fall Prevention: safety round/check completed  Taken 11/27/2024 2000 by Elis Simms RN  Safety Promotion/Fall Prevention:   safety round/check completed   room organization consistent  Intervention: Prevent Skin Injury  Recent Flowsheet Documentation  Taken 11/28/2024 0500 by Elis Simms RN  Body Position: position changed independently  Taken 11/28/2024 0403 by Elis Simms RN  Body Position: position changed independently  Taken 11/28/2024 0200 by Elis Simms RN  Body Position: position changed independently  Taken 11/28/2024 0000 by Elis Simms RN  Body Position: position changed independently  Taken 11/27/2024 2200 by Elis Simms RN  Body Position: weight shifting  Taken 11/27/2024 2000 by Elis Simms RN  Body Position: position changed independently  Intervention: Prevent and Manage VTE (Venous Thromboembolism) Risk  Recent Flowsheet Documentation  Taken 11/28/2024 0000 by Elis Simms RN  VTE Prevention/Management:   bilateral   SCDs (sequential compression devices) on  Taken 11/27/2024 2000 by Elis Simms RN  VTE Prevention/Management:   bilateral   SCDs (sequential compression devices) on  Intervention: Prevent  Infection  Recent Flowsheet Documentation  Taken 11/28/2024 0403 by Elis Simms RN  Infection Prevention:   single patient room provided   rest/sleep promoted   personal protective equipment utilized   hand hygiene promoted  Taken 11/28/2024 0200 by Elis Simms RN  Infection Prevention:   single patient room provided   rest/sleep promoted   personal protective equipment utilized  Taken 11/28/2024 0000 by Elis Simms RN  Infection Prevention:   personal protective equipment utilized   rest/sleep promoted   single patient room provided   hand hygiene promoted  Taken 11/27/2024 2200 by Elis Simms RN  Infection Prevention: personal protective equipment utilized  Taken 11/27/2024 2000 by Elis Simms RN  Infection Prevention:   personal protective equipment utilized   rest/sleep promoted   single patient room provided   hand hygiene promoted  Goal: Optimal Comfort and Wellbeing  Intervention: Monitor Pain and Promote Comfort  Recent Flowsheet Documentation  Taken 11/27/2024 2115 by Elis Simms RN  Pain Management Interventions: pain medication given  Intervention: Provide Person-Centered Care  Recent Flowsheet Documentation  Taken 11/28/2024 0000 by Elis Simms RN  Trust Relationship/Rapport:   care explained   choices provided   emotional support provided   empathic listening provided  Taken 11/27/2024 2000 by Elis Simms RN  Trust Relationship/Rapport:   care explained   choices provided   emotional support provided   empathic listening provided     Problem: Skin Injury Risk Increased  Goal: Skin Health and Integrity  Intervention: Optimize Skin Protection  Recent Flowsheet Documentation  Taken 11/28/2024 0500 by Elis Simms RN  Head of Bed (HOB) Positioning: HOB at 30-45 degrees  Taken 11/28/2024 0403 by Elis Simms RN  Activity Management: activity minimized  Head of Bed (HOB) Positioning: HOB at 30-45 degrees  Taken 11/28/2024 0200 by Elis Simms RN  Activity  Management: activity minimized  Head of Bed (HOB) Positioning: HOB at 30-45 degrees  Taken 11/28/2024 0000 by Elis Simms RN  Activity Management: activity encouraged  Pressure Reduction Techniques:   frequent weight shift encouraged   heels elevated off bed  Head of Bed (HOB) Positioning: HOB at 30-45 degrees  Pressure Reduction Devices: positioning supports utilized  Taken 11/27/2024 2200 by Elis Simms RN  Activity Management: activity encouraged  Head of Bed (HOB) Positioning: HOB at 20-30 degrees  Taken 11/27/2024 2000 by Elis Simms RN  Activity Management: activity encouraged  Pressure Reduction Techniques:   frequent weight shift encouraged   heels elevated off bed  Head of Bed (HOB) Positioning: HOB at 30-45 degrees  Pressure Reduction Devices: positioning supports utilized     Problem: Fall Injury Risk  Goal: Absence of Fall and Fall-Related Injury  Intervention: Identify and Manage Contributors  Recent Flowsheet Documentation  Taken 11/28/2024 0000 by Elis Simms RN  Medication Review/Management: medications reviewed  Taken 11/27/2024 2000 by Elis Simms RN  Medication Review/Management: medications reviewed  Intervention: Promote Injury-Free Environment  Recent Flowsheet Documentation  Taken 11/28/2024 0403 by Elis Simms RN  Safety Promotion/Fall Prevention:   safety round/check completed   room organization consistent  Taken 11/28/2024 0200 by Elis Simms RN  Safety Promotion/Fall Prevention:   safety round/check completed   room organization consistent  Taken 11/28/2024 0000 by Elis Simms RN  Safety Promotion/Fall Prevention:   safety round/check completed   room organization consistent  Taken 11/27/2024 2200 by Elis Simms RN  Safety Promotion/Fall Prevention: safety round/check completed  Taken 11/27/2024 2000 by Elis Simms RN  Safety Promotion/Fall Prevention:   safety round/check completed   room organization consistent     Problem: Violence Risk  or Actual  Goal: Anger and Impulse Control  Intervention: Minimize Safety Risk  Recent Flowsheet Documentation  Taken 11/28/2024 0403 by Elis Simms RN  Enhanced Safety Measures: bed alarm set  Taken 11/28/2024 0200 by Elis Simms RN  Enhanced Safety Measures: bed alarm set  Taken 11/28/2024 0000 by Elis Simms RN  Enhanced Safety Measures: bed alarm set  Taken 11/27/2024 2200 by Elis Simms RN  Enhanced Safety Measures: bed alarm set  Taken 11/27/2024 2000 by Elis Simms RN  Enhanced Safety Measures: bed alarm set     Problem: Sepsis/Septic Shock  Goal: Absence of Infection Signs and Symptoms  Intervention: Initiate Sepsis Management  Recent Flowsheet Documentation  Taken 11/28/2024 0403 by Elis Simms RN  Infection Prevention:   single patient room provided   rest/sleep promoted   personal protective equipment utilized   hand hygiene promoted  Taken 11/28/2024 0200 by Elis Simms RN  Infection Prevention:   single patient room provided   rest/sleep promoted   personal protective equipment utilized  Taken 11/28/2024 0000 by Elis Simms RN  Infection Prevention:   personal protective equipment utilized   rest/sleep promoted   single patient room provided   hand hygiene promoted  Taken 11/27/2024 2200 by Elis Simms RN  Infection Prevention: personal protective equipment utilized  Taken 11/27/2024 2000 by Elis Simms RN  Infection Prevention:   personal protective equipment utilized   rest/sleep promoted   single patient room provided   hand hygiene promoted  Intervention: Promote Recovery  Recent Flowsheet Documentation  Taken 11/28/2024 0403 by Elis Simms RN  Activity Management: activity minimized  Taken 11/28/2024 0200 by Elis Simms RN  Activity Management: activity minimized  Taken 11/28/2024 0000 by Elis Simms RN  Activity Management: activity encouraged  Airway/Ventilation Management: pulmonary hygiene promoted  Sleep/Rest Enhancement:   room  darkened   regular sleep/rest pattern promoted  Taken 11/27/2024 2200 by Elis Simms, RN  Activity Management: activity encouraged  Taken 11/27/2024 2000 by Elis Simms, RN  Activity Management: activity encouraged  Airway/Ventilation Management: pulmonary hygiene promoted   Goal Outcome Evaluation:         No acute changes overnight. VSS and neuros remained the same.

## 2024-11-28 NOTE — PLAN OF CARE
Goal Outcome Evaluation:  Plan of Care Reviewed With: patient        Progress: improving  Outcome Evaluation: vitals stable, clean c collar applied today, chin seated properly in collar at this time, redness noted to chin, no open areas at this time.  ambulating to bathroom with walker and one assist.  k replaced today.

## 2024-11-29 VITALS
DIASTOLIC BLOOD PRESSURE: 53 MMHG | HEART RATE: 57 BPM | WEIGHT: 222.66 LBS | TEMPERATURE: 97.9 F | SYSTOLIC BLOOD PRESSURE: 128 MMHG | HEIGHT: 72 IN | RESPIRATION RATE: 16 BRPM | OXYGEN SATURATION: 98 % | BODY MASS INDEX: 30.16 KG/M2

## 2024-11-29 LAB
ANION GAP SERPL CALCULATED.3IONS-SCNC: 9.8 MMOL/L (ref 5–15)
BASOPHILS # BLD AUTO: 0.06 10*3/MM3 (ref 0–0.2)
BASOPHILS NFR BLD AUTO: 0.7 % (ref 0–1.5)
BUN SERPL-MCNC: 26 MG/DL (ref 8–23)
BUN/CREAT SERPL: 17.6 (ref 7–25)
CALCIUM SPEC-SCNC: 8.3 MG/DL (ref 8.6–10.5)
CHLORIDE SERPL-SCNC: 101 MMOL/L (ref 98–107)
CO2 SERPL-SCNC: 25.2 MMOL/L (ref 22–29)
CREAT SERPL-MCNC: 1.48 MG/DL (ref 0.76–1.27)
DEPRECATED RDW RBC AUTO: 40.7 FL (ref 37–54)
EGFRCR SERPLBLD CKD-EPI 2021: 46.9 ML/MIN/1.73
EOSINOPHIL # BLD AUTO: 0.43 10*3/MM3 (ref 0–0.4)
EOSINOPHIL NFR BLD AUTO: 4.8 % (ref 0.3–6.2)
ERYTHROCYTE [DISTWIDTH] IN BLOOD BY AUTOMATED COUNT: 12.9 % (ref 12.3–15.4)
GLUCOSE BLDC GLUCOMTR-MCNC: 165 MG/DL (ref 70–130)
GLUCOSE BLDC GLUCOMTR-MCNC: 233 MG/DL (ref 70–130)
GLUCOSE SERPL-MCNC: 181 MG/DL (ref 65–99)
HCT VFR BLD AUTO: 27.8 % (ref 37.5–51)
HGB BLD-MCNC: 8.8 G/DL (ref 13–17.7)
IMM GRANULOCYTES # BLD AUTO: 0.07 10*3/MM3 (ref 0–0.05)
IMM GRANULOCYTES NFR BLD AUTO: 0.8 % (ref 0–0.5)
LYMPHOCYTES # BLD AUTO: 1.38 10*3/MM3 (ref 0.7–3.1)
LYMPHOCYTES NFR BLD AUTO: 15.5 % (ref 19.6–45.3)
MCH RBC QN AUTO: 27.6 PG (ref 26.6–33)
MCHC RBC AUTO-ENTMCNC: 31.7 G/DL (ref 31.5–35.7)
MCV RBC AUTO: 87.1 FL (ref 79–97)
MONOCYTES # BLD AUTO: 0.94 10*3/MM3 (ref 0.1–0.9)
MONOCYTES NFR BLD AUTO: 10.5 % (ref 5–12)
NEUTROPHILS NFR BLD AUTO: 6.03 10*3/MM3 (ref 1.7–7)
NEUTROPHILS NFR BLD AUTO: 67.7 % (ref 42.7–76)
NRBC BLD AUTO-RTO: 0 /100 WBC (ref 0–0.2)
PLATELET # BLD AUTO: 310 10*3/MM3 (ref 140–450)
PMV BLD AUTO: 9.8 FL (ref 6–12)
POTASSIUM SERPL-SCNC: 3.7 MMOL/L (ref 3.5–5.2)
RBC # BLD AUTO: 3.19 10*6/MM3 (ref 4.14–5.8)
SODIUM SERPL-SCNC: 136 MMOL/L (ref 136–145)
WBC NRBC COR # BLD AUTO: 8.91 10*3/MM3 (ref 3.4–10.8)

## 2024-11-29 PROCEDURE — 80048 BASIC METABOLIC PNL TOTAL CA: CPT | Performed by: STUDENT IN AN ORGANIZED HEALTH CARE EDUCATION/TRAINING PROGRAM

## 2024-11-29 PROCEDURE — 63710000001 INSULIN LISPRO (HUMAN) PER 5 UNITS: Performed by: STUDENT IN AN ORGANIZED HEALTH CARE EDUCATION/TRAINING PROGRAM

## 2024-11-29 PROCEDURE — 85025 COMPLETE CBC W/AUTO DIFF WBC: CPT | Performed by: STUDENT IN AN ORGANIZED HEALTH CARE EDUCATION/TRAINING PROGRAM

## 2024-11-29 PROCEDURE — 82948 REAGENT STRIP/BLOOD GLUCOSE: CPT

## 2024-11-29 RX ORDER — POLYETHYLENE GLYCOL 3350 17 G/17G
17 POWDER, FOR SOLUTION ORAL DAILY PRN
Qty: 30 EACH | Refills: 0 | Status: SHIPPED | OUTPATIENT
Start: 2024-11-29

## 2024-11-29 RX ORDER — PSEUDOEPHEDRINE HCL 30 MG
100 TABLET ORAL 2 TIMES DAILY
Qty: 30 EACH | Refills: 0 | Status: SHIPPED | OUTPATIENT
Start: 2024-11-29

## 2024-11-29 RX ORDER — HYDROCODONE BITARTRATE AND ACETAMINOPHEN 5; 325 MG/1; MG/1
1 TABLET ORAL EVERY 6 HOURS PRN
Qty: 12 TABLET | Refills: 0 | Status: SHIPPED | OUTPATIENT
Start: 2024-11-29

## 2024-11-29 RX ORDER — ACETAMINOPHEN 325 MG/1
650 TABLET ORAL EVERY 4 HOURS PRN
Qty: 30 TABLET | Refills: 0 | Status: SHIPPED | OUTPATIENT
Start: 2024-11-29

## 2024-11-29 RX ORDER — GABAPENTIN 100 MG/1
200 CAPSULE ORAL 3 TIMES DAILY
Qty: 12 CAPSULE | Refills: 0 | Status: SHIPPED | OUTPATIENT
Start: 2024-11-29

## 2024-11-29 RX ADMIN — GABAPENTIN 200 MG: 100 CAPSULE ORAL at 08:59

## 2024-11-29 RX ADMIN — AMLODIPINE BESYLATE 10 MG: 10 TABLET ORAL at 08:59

## 2024-11-29 RX ADMIN — CYANOCOBALAMIN TAB 500 MCG 1000 MCG: 500 TAB at 08:59

## 2024-11-29 RX ADMIN — CARVEDILOL 25 MG: 25 TABLET, FILM COATED ORAL at 08:59

## 2024-11-29 RX ADMIN — INSULIN LISPRO 3 UNITS: 100 INJECTION, SOLUTION INTRAVENOUS; SUBCUTANEOUS at 12:13

## 2024-11-29 RX ADMIN — Medication 10 ML: at 10:49

## 2024-11-29 RX ADMIN — TAMSULOSIN HYDROCHLORIDE 0.4 MG: 0.4 CAPSULE ORAL at 08:59

## 2024-11-29 RX ADMIN — FUROSEMIDE 40 MG: 40 TABLET ORAL at 08:59

## 2024-11-29 RX ADMIN — INSULIN LISPRO 2 UNITS: 100 INJECTION, SOLUTION INTRAVENOUS; SUBCUTANEOUS at 08:59

## 2024-11-29 RX ADMIN — CLOPIDOGREL BISULFATE 75 MG: 75 TABLET, FILM COATED ORAL at 08:59

## 2024-11-29 RX ADMIN — FERROUS SULFATE TAB 325 MG (65 MG ELEMENTAL FE) 325 MG: 325 (65 FE) TAB at 08:59

## 2024-11-29 RX ADMIN — ATORVASTATIN CALCIUM 40 MG: 20 TABLET, FILM COATED ORAL at 08:59

## 2024-11-29 RX ADMIN — DOCUSATE SODIUM 100 MG: 100 CAPSULE, LIQUID FILLED ORAL at 08:59

## 2024-11-29 RX ADMIN — SERTRALINE 75 MG: 50 TABLET, FILM COATED ORAL at 09:00

## 2024-11-29 NOTE — PLAN OF CARE
Goal Outcome Evaluation:  Plan of Care Reviewed With: patient        Progress: no change     Patient alert and oriented, on room air, assistance with a walker was provided to him to ambulate to the bathroom, he had a C collar brace in use that was met on arrival of his care and it stayed with him all through the shift. Patient was carried along with his plan of care, no new complain made this moment.

## 2024-11-29 NOTE — CASE MANAGEMENT/SOCIAL WORK
Continued Stay Note  [facility]     Patient Name: Dereck Ramírez  MRN: [id number]  Today's Date: 11/29/2024    Admit Date: 11/24/2024    Plan: [facility]   Discharge Plan       Row Name 11/29/24 1229       Plan    Plan [facility]    Patient/Family in Agreement with Plan yes    Plan Comments [facility] states bed available today at [facility]. Call placed to patient's daughter Maribel to inform of discharge today. Packet: office  Pharmacy: updated  Transport: [facility]                   Discharge Codes    No documentation.                 Expected Discharge Date and Time       Expected Discharge Date Expected Discharge Time    Nov 29, 2024 11:25 AM              Zandra Anderson RN

## 2024-11-29 NOTE — CASE MANAGEMENT/SOCIAL WORK
Case Management Discharge Note      Final Note: [facility] per [facility]         Selected Continued Care - Admitted Since 11/24/2024       Destination Coordination complete.      Service Provider Services Address Phone Fax Patient Preferred    [facility] [address], [facility] [address] [phone] --              Durable Medical Equipment    No services have been selected for the patient.                Dialysis/Infusion    No services have been selected for the patient.                Home Medical Care    No services have been selected for the patient.                Therapy    No services have been selected for the patient.                Community Resources    No services have been selected for the patient.                Community & DME    No services have been selected for the patient.                    Selected Continued Care - Prior Encounters Includes continued care and service providers with selected services from prior encounters from 8/26/2024 to 11/29/2024      Discharged on 11/19/2024 Admission date: 11/17/2024 - Discharge disposition: Home-Health Care Svc      Home Medical Care       Service Provider Services Address Phone Fax Patient Preferred    [facility] - [facility] [address] [address] [place], [place] [id number] [phone] --                          Transportation Services  Ambulance: Other ([facility])    Final Discharge Disposition Code: 03 - skilled nursing facility (SNF)

## 2024-11-29 NOTE — DISCHARGE SUMMARY
Patient Name: Dereck Ramírez  : 1940  MRN: [id number]    Date of Admission: 2024  Date of Discharge:  2024  Primary Care Physician: Amber Rodney APRN      Chief Complaint:   No chief complaint on file.      Discharge Diagnoses     Active Hospital Problems    Diagnosis  POA    **Odontoid fracture [S12.110A]  Yes    Dementia without behavioral disturbance [F03.90]  Yes    Chronic diastolic heart failure [I50.32]  Yes    Iron deficiency anemia [D50.9]  Yes    CKD (chronic kidney disease) stage 3, GFR 30-59 ml/min [N18.30]  Yes    B12 deficiency [E53.8]  Yes    CVA (cerebrovascular accident) (with right-sided weakness) [I63.9]  Yes    Hypertension, essential [I10]  Yes    Hyperlipidemia [E78.5]  Yes    DM II (diabetes mellitus, type II), controlled [E11.9]  Yes      Resolved Hospital Problems   No resolved problems to display.        Hospital Course     Mr. Ramírez is a 84 y.o. male with a history of hypertension, chronic diastolic CHF, stroke, hyperlipidemia, depression, BPH, dementia who presented to [facility] initially as an outside transfer for evaluation by neurosurgery for odontoid fracture. Please see the admitting history and physical for further details.  He was admitted to the hospital for further evaluation and treatment.     Odontoid fracture  Cervical spine stenosis   Acute pain due to trauma, falls  - CT showing type II odontoid fracture, MRI showing transverse fracture plane appreciated involving the dens with associated edema. Multilevel spinal stenosis is appreciated most severe at C1-C2 and then C4-5. moderate canal stenosis at C4-5 and mild to moderate canal stenosis at C5-6. Neurosurgery consulted and evaluated, they did not feel that acute surgical intervention was warranted at this time.  They recommended continuation with hard collar at all times, physical therapy and outpatient follow up in 4-6 weeks for reassessment and repeat x-rays.   Physical therapy to follow during hospital stay, and it was recommended that patient be discharged to skilled nursing facility, which has been arranged.  Continue with as needed pain medication as prescribed after discharge, continue with bowel regimen.  Closely monitor at facility, ensure assistance with activity and implementation of fall precautions.  Follow-up with neurosurgery after discharge as recommended above.    Leukocytosis  - WBC count elevated on prior labs, felt to be reactive in nature, values subsequently normalized over the course of his hospital stay and values were within normal limits on most recent labs prior to discharge. Patient afebrile, no other signs or symptoms to suggest acute infection at this time.       Chronic kidney disease stage 3B  - On most recent labs, patient's creatinine found to be stable and near apparent baseline. No acute intervention warranted, but recommend close monitoring after discharge, given that patient is on diuretic therapy. Recommend repeat BMP within 1 to 2 days at facility and again with PCP within 1 week for reassessment.    Type 2 diabetes mellitus with hyperglycemia  - Most recent A1c: 7.10% (09/26/24); resume home medication regimen as previously prescribed after discharge. Recommend that patient have his blood glucose checked 2-3 times daily and record those values in log book and take it to next PCP visit to allow for greater insight into treatment efficacy to guide further management decisions. Recommend consistent carbohydrate/diabetic diet.    Hypokalemia  - K low on prior labs, repleted and improved on most recent testing. Recommend repeat BMP within 1 to 2 days at facility and again with PCP within 1 week for reassessment.     Hypertension  - Blood pressure appears stable and acceptable acutely at this time. No indications to warrant acute changes/intervention at this time. Continue current medications as prescribed. Recommend that patient have his  blood pressure checked 2-3 times daily and record those values in log book and take it to next PCP visit to allow for greater insight into treatment efficacy to guide further management decisions. Recommend heart healthy diet.     Chronic HFpEF  - 2D Echo (08/31/23) showing EF 65.1%, G1DD; No evidence of decompensation at present. Continue medications as prescribed after discharge. Patient provided discharge instructions on weight monitoring and when to return to hospital. Recommend low sodium diet.       Anemia  - Hemoglobin low on most recent labs. No evidence of overt blood loss. No indications for acute intervention at this time, however, do recommend close monitoring after discharge. Recommend repeat CBC within 1 to 2 days at facility and again with PCP within 1 week for reassessment.     History of CVA  - Continue Plavix, statin.     Dementia  - No evidence of behavioral disturbance, recommend implementation of delirium precautions at facility.    RLS/Neuropathy  - On Gabapentin chronically, continue as prescribed.    Recommend close follow up with PCP within 1 week after discharge for reassessment to guide ongoing management decisions.    Day of Discharge     Subjective:  Patient seen and examined this morning.  Hospital day 5.  He is awake and resting comfortably bed, doing okay at present, pain controlled.  He has been accepted to SNF, bed available, plans for discharge to rehab today.    Physical Exam:  Temp:  [97.2 °F (36.2 °C)-98.2 °F (36.8 °C)] 97.9 °F (36.6 °C)  Heart Rate:  [57-71] 57  Resp:  [16-18] 16  BP: (116-147)/(57-96) 116/62  Body mass index is 30.2 kg/m².  Physical Exam  Vitals and nursing note reviewed.   Constitutional:       General: He is awake. He is not in acute distress.     Comments: Elderly, chronically ill-appearing   Neck:      Comments: Brace present  Cardiovascular:      Rate and Rhythm: Normal rate and regular rhythm.      Pulses: Normal pulses.      Heart sounds: Normal heart  sounds.   Pulmonary:      Effort: Pulmonary effort is normal. No respiratory distress.      Breath sounds: No wheezing.   Abdominal:      General: There is no distension.      Palpations: Abdomen is soft.      Tenderness: There is no abdominal tenderness. There is no guarding.   Musculoskeletal:      Cervical back: Tenderness present.   Skin:     General: Skin is warm and dry.   Neurological:      Mental Status: He is alert.   Psychiatric:         Behavior: Behavior is cooperative.         Consultants     Consult Orders (all) (From admission, onward)       Start     Ordered    11/25/24 0258  Inpatient Case Management  Consult  Once        Provider:  (Not yet assigned)    11/25/24 0259 11/24/24 1921  Inpatient Neurosurgery Consult  Once        Specialty:  Neurosurgery  Provider:  Kurt Boucher IV, MD    11/24/24 1920                  Procedures     * Surgery not found *    Imaging Results (All)       Procedure Component Value Units Date/Time    CT Outside Films [[id number]] Resulted: 11/25/24 1444     Updated: 11/25/24 1444    Narrative:      This procedure was auto-finalized with no dictation required.    XR Outside Films [[id number]] Resulted: 11/25/24 1444     Updated: 11/25/24 1444    Narrative:      This procedure was auto-finalized with no dictation required.    CT Outside Films [[id number]] Resulted: 11/25/24 1444     Updated: 11/25/24 1444    Narrative:      This procedure was auto-finalized with no dictation required.    CT Outside Films [[id number]] Resulted: 11/25/24 1444     Updated: 11/25/24 1444    Narrative:      This procedure was auto-finalized with no dictation required.    CT Outside Films [[id number]] Resulted: 11/25/24 1444     Updated: 11/25/24 1444    Narrative:      This procedure was auto-finalized with no dictation required.    CT Outside Films [[id number]] Resulted: 11/25/24 1444     Updated: 11/25/24 1444    Narrative:      This procedure was auto-finalized with no dictation  required.    CT Outside Films [[id number]] Resulted: 11/25/24 1444     Updated: 11/25/24 1444    Narrative:      This procedure was auto-finalized with no dictation required.    MRI Cervical Spine Without Contrast [[id number]] Collected: 11/25/24 0729     Updated: 11/25/24 0741    Narrative:      MRI CERVICAL SPINE WO CONTRAST-     HISTORY:  outside CT imaging with odontoid fracture; CYNDI req MRI     COMPARISON: MRI brain 11/17/2024 and CT cervical spine 9/25/2015     FINDINGS: The study is hampered by patient motion. The sagittal T2  sequence demonstrates a transverse fracture involving the dens. The  sagittal T2 sequence demonstrates edema involving the fracture plane and  to a lesser extent the dens. There is a plane of increased signal  intensity involving the prevertebral soft tissues on the sagittal T2  sequence. T2 hyperintensity is appreciated involving the ligamentum  flavum at the level of the odontoid fracture. A synovial cyst cannot be  excluded. This measures approximately 4 x 8 mm in the AP and  craniocaudal dimensions respectively. There is a mildly prominent  pannus. When combined with the edema or synovial cyst posteriorly there  is severe spinal stenosis at the level of C1/C2. Cerebrospinal fluid is  effaced ventral and dorsal to the cord. There is flattening of the  dorsal aspect of the cord, slight more prominent on the left.     C2-3: Moderate facet degenerative disease is present on the left. Mild  foraminal stenosis is present on the left secondary to the facet  hypertrophy.     C3-4: Moderate facet degenerative disease is present on the left. There  is moderate to severe foraminal stenosis on the left secondary to facet  hypertrophy.     C4-5: There is moderate canal stenosis secondary to a broad-based disc  osteophyte complex and moderate facet and ligamentum flavum hypertrophy.  Cerebrospinal fluid is effaced around the cord. There is no evidence of  cord signal abnormality. Moderate and  severe foraminal stenosis is  present on the right and left respectively.     C5-6: Severe facet degenerative disease is present on the left. A  central disc osteophyte complex is present which results in mild  flattening of the ventral surface of the thecal sac and contributes to  mild to moderate canal stenosis when combined with the posterior element  degenerative disease. Moderate to severe foraminal stenosis is present  on the left secondary to facet hypertrophy and uncovertebral  degenerative disease.     C6-7: There is mild canal stenosis secondary to broad-based discussed by  complex which contributes to severe foraminal stenosis bilaterally but  combined with uncovertebral degenerative disease.     C7-T1: Moderate facet degenerative disease is present bilaterally.       Impression:      1.  There is a transverse fracture plane appreciated involving the dens  with associated edema. The edema is also appreciated involving the dens  to a lesser degree. A thin plane of edema is appreciated involving the  prevertebral soft tissues from the skull base to level C4-5.  2.  Multilevel spinal stenosis is appreciated most severe at C1-C2 and  then C4-5. C1-C2 the spinal stenosis is severe secondary to the presence  of a pannus as well as edema or potentially synovial cyst involving the  ligamentum flavum posteriorly. There is flattening of the dorsal aspect  of the cord, more prominent to the left. There is no evidence of cord  edema. There is moderate canal stenosis at C4-5 and mild to moderate  canal stenosis at C5-6.  3.  Multilevel degenerative disease involving cervical spine is noted as  described above with no evidence for herniation. This includes  multilevel severe foraminal stenosis. See above.        This report was finalized on 11/25/2024 7:38 AM by Dr. Ruddy Flores M.D on Workstation: [id number]             Results for orders placed during the hospital encounter of 07/31/23    Duplex Carotid  "Ultrasound CAR    Interpretation Summary    Right internal carotid artery demonstrates a less than 50% stenosis.    Left internal carotid artery demonstrates a less than 50% stenosis.    Results for orders placed during the hospital encounter of 08/31/23    Adult Transthoracic Echo Complete w/ Color, Spectral and Contrast if Necessary Per Protocol    Interpretation Summary    Left ventricular systolic function is normal. Calculated left ventricular EF = 63.2%    Left ventricular wall thickness is consistent with moderate concentric hypertrophy.    Left ventricular diastolic function is consistent with (grade I) impaired relaxation.    The left atrial cavity is moderately dilated.    Estimated right ventricular systolic pressure from tricuspid regurgitation is mildly elevated (35-45 mmHg).    Pertinent Labs     Results from last 7 days   Lab Units 11/29/24 0426 11/28/24 0452 11/27/24  0706 11/26/24  0532   WBC 10*3/mm3 8.91 8.79 11.25* 15.80*   HEMOGLOBIN g/dL 8.8* 8.8* 8.7* 9.0*   PLATELETS 10*3/mm3 310 277 293 289     Results from last 7 days   Lab Units 11/29/24 0426 11/28/24 2052 11/28/24 0452 11/27/24  0706 11/26/24  0532   SODIUM mmol/L 136  --  140 139 140   POTASSIUM mmol/L 3.7 3.8 3.4* 3.5 3.6   CHLORIDE mmol/L 101  --  104 103 108*   CO2 mmol/L 25.2  --  24.0 23.0 21.0*   BUN mg/dL 26*  --  29* 29* 28*   CREATININE mg/dL 1.48*  --  1.56* 1.55* 1.54*   GLUCOSE mg/dL 181*  --  128* 138* 140*   EGFR mL/min/1.73 46.9*  --  44.1* 44.4* 44.8*       Results from last 7 days   Lab Units 11/29/24 0426 11/28/24 0452 11/27/24 0706 11/26/24  0532 11/25/24  0609   CALCIUM mg/dL 8.3* 8.2* 8.1* 8.0* 8.2*   MAGNESIUM mg/dL  --   --   --   --  2.2   PHOSPHORUS mg/dL  --   --   --   --  3.3     Results from last 7 days   Lab Units 11/24/24  0632   AMMONIA uMol/L 13             Invalid input(s): \"LDLCALC\"          Test Results Pending at Discharge     Pending Results       None              Discharge Details      "   Discharge Medications        New Medications        Instructions Start Date   acetaminophen 325 MG tablet  Commonly known as: TYLENOL   650 mg, Oral, Every 4 Hours PRN      docusate sodium 100 MG capsule   100 mg, Oral, 2 Times Daily      HYDROcodone-acetaminophen 5-325 MG per tablet  Commonly known as: NORCO   1 tablet, Oral, Every 6 Hours PRN      polyethylene glycol 17 g packet  Commonly known as: MIRALAX   17 g, Oral, Daily PRN             Changes to Medications        Instructions Start Date   gabapentin 100 MG capsule  Commonly known as: NEURONTIN  What changed: Another medication with the same name was added. Make sure you understand how and when to take each.   200 mg, Oral, 3 Times Daily      gabapentin 100 MG capsule  Commonly known as: NEURONTIN  What changed: You were already taking a medication with the same name, and this prescription was added. Make sure you understand how and when to take each.   200 mg, Oral, 3 Times Daily             Continue These Medications        Instructions Start Date   amLODIPine 10 MG tablet  Commonly known as: NORVASC   10 mg, Oral, Daily      atorvastatin 40 MG tablet  Commonly known as: LIPITOR   40 mg, Oral, Daily      busPIRone 5 MG tablet  Commonly known as: BUSPAR   5 mg, Oral, 3 Times Daily PRN      carvedilol 25 MG tablet  Commonly known as: COREG   TAKE 1 TABLET BY MOUTH TWICE DAILY WITH MEALS      clopidogrel 75 MG tablet  Commonly known as: PLAVIX   75 mg, Oral, Daily      ferrous gluconate 324 MG tablet  Commonly known as: FERGON   324 mg, Oral, Daily With Breakfast      furosemide 40 MG tablet  Commonly known as: LASIX   40 mg, Oral, 2 Times Daily      nateglinide 60 MG tablet  Commonly known as: STARLIX   60 mg, Oral, 2 Times Daily With Meals      potassium chloride 10 MEQ CR tablet   10 mEq, Oral, Daily, Take while taking double Lasix dose      QUEtiapine 25 MG tablet  Commonly known as: SEROquel   25 mg, Oral, Nightly      sertraline 50 MG  tablet  Commonly known as: ZOLOFT   75 mg, Oral, Daily      tamsulosin 0.4 MG capsule 24 hr capsule  Commonly known as: FLOMAX   0.4 mg, Oral, Daily      vitamin B-12 1000 MCG tablet  Commonly known as: CYANOCOBALAMIN   1,000 mcg, Oral, Daily               No Known Allergies    Discharge Disposition:  Skilled Nursing Facility (DC - External)      Discharge Diet:  Diet Order   Procedures    Diet: Cardiac, Diabetic; Healthy Heart (2-3 Na+); Consistent Carbohydrate; Fluid Consistency: Thin (IDDSI 0)       Discharge Activity:   Activity Instructions       Other Activity Instructions      Activity Instructions: Per facility    Up WIth Assist              CODE STATUS:    Code Status and Medical Interventions: CPR (Attempt to Resuscitate); Full Support   Ordered at: 11/24/24 8462     Code Status (Patient has no pulse and is not breathing):    CPR (Attempt to Resuscitate)     Medical Interventions (Patient has pulse or is breathing):    Full Support       Future Appointments   Date Time Provider Department Center   12/10/2024 11:45 AM [facility]R [facility]   12/17/2024  2:30 PM Ruchi Loco APRN [facility]   3/27/2025 10:30 AM Amber Rodney APRN [facility]     Additional Instructions for the Follow-ups that You Need to Schedule       Discharge Follow-up with PCP   As directed       Currently Documented PCP:    Amber Rodney APRN    PCP Phone Number:    [phone]     Follow Up Details: Within 1 week after discharge for reassessment, medication and symptom review, blood pressure check, glucose check, weight check, BMP and CBC        Discharge Follow-up with Specialty: Neurosurgery, primary cardiologist   As directed      Specialty: Neurosurgery, primary cardiologist   Follow Up Details: please follow-up with neurosurgery in 4 to 6 weeks with Dr. Bartholomew; Please follow up with primary cardiologist within 1-2 weeks for routine follow up               Contact information for  follow-up providers       Eric Bartholomew MD. Schedule an appointment as soon as possible for a visit in 4 week(s).    Specialty: Neurosurgery  Contact information:  [address] [facility]  [address] [place] [address] [id number] [phone]               Amber Rodney APRN. Schedule an appointment as soon as possible for a visit in 1 week(s).    Specialties: Family Medicine, Urgent Care  Contact information:  [address] [id number] [phone]               Amber Rodney APRN .    Specialties: Family Medicine, Urgent Care  Why: Within 1 week after discharge for reassessment, medication and symptom review, blood pressure check, glucose check, weight check, BMP and CBC  Contact information:  [address] [id number] [phone]                       Contact information for after-discharge care       Destination       [facility] .    Service: Skilled Nursing  Contact information:  [address] [address]  [phone]                                   Additional Instructions for the Follow-ups that You Need to Schedule       Discharge Follow-up with PCP   As directed       Currently Documented PCP:    Amber Rodney APRN    PCP Phone Number:    [phone]     Follow Up Details: Within 1 week after discharge for reassessment, medication and symptom review, blood pressure check, glucose check, weight check, BMP and CBC        Discharge Follow-up with Specialty: Neurosurgery, primary cardiologist   As directed      Specialty: Neurosurgery, primary cardiologist   Follow Up Details: please follow-up with neurosurgery in 4 to 6 weeks with Dr. Bartholomew; Please follow up with primary cardiologist within 1-2 weeks for routine follow up            Time Spent on Discharge:  Greater than 30 minutes      Duane Sargent DO  [facility]  11/29/24  11:29 EST

## 2024-12-03 ENCOUNTER — TELEPHONE (OUTPATIENT)
Dept: NEUROSURGERY | Facility: CLINIC | Age: 84
End: 2024-12-03
Payer: MEDICARE

## 2024-12-03 NOTE — TELEPHONE ENCOUNTER
Caller: BENITA    Relationship to patient: DAUGHTER (POA)    Best call back number: [phone]    Chief complaint: PATIENT IS IN REHAB & NEEDS TO RESCHEDULE HIS APPT        If rescheduling, when is the original appointment: 12/17/24     Additional notes:NEEDS TO BE AROUND THE MIDDLE OF JAN.

## 2024-12-04 NOTE — TELEPHONE ENCOUNTER
Rescheduled appt to January 21 at 1:00 and LVM for Maribel with appt information. Also reminded her that he will need xray a week prior.

## 2024-12-04 NOTE — TELEPHONE ENCOUNTER
PATIENTS DAUGHTER CALLED AND STATES SHE WOULD LIKE THE APPT DATE OF 12/17/2024.  STATES SHE CALLED 12/3/2024 TO RESCHEDULE BECAUSE SHE DIDN'T THINK SHE WOULD BE ABLE TO MAKE IT BUT SHE CAN.    PLEASE CALL BENITA GAMEZ @ PHONE NUMBER [phone]

## 2024-12-11 ENCOUNTER — TELEPHONE (OUTPATIENT)
Dept: NEUROSURGERY | Facility: CLINIC | Age: 84
End: 2024-12-11
Payer: MEDICARE

## 2024-12-11 RX ORDER — BUSPIRONE HYDROCHLORIDE 5 MG/1
5 TABLET ORAL 3 TIMES DAILY PRN
Qty: 270 TABLET | Refills: 1 | Status: SHIPPED | OUTPATIENT
Start: 2024-12-11

## 2024-12-11 NOTE — TELEPHONE ENCOUNTER
Rx Refill Note  Requested Prescriptions     Pending Prescriptions Disp Refills    busPIRone (BUSPAR) 5 MG tablet [Pharmacy Med Name: BUSPIRONE 5MG TABLETS] 270 tablet 1     Sig: TAKE 1 TABLET BY MOUTH THREE TIMES DAILY AS NEEDED FOR ANXIETY      Last office visit with prescribing clinician: 9/26/2024   Last telemedicine visit with prescribing clinician: Visit date not found   Next office visit with prescribing clinician: 3/27/2025                         Would you like a call back once the refill request has been completed: [] Yes [] No    If the office needs to give you a call back, can they leave a voicemail: [] Yes [] No    Darius Cardoso MA  12/11/24, 08:52 EST

## 2024-12-11 NOTE — PROGRESS NOTES
Subjective   History of Present Illness: Dereck Ramírez is a 84 y.o. male is here today for hospitalization follow-up from C2 fracture.  He has been doing well since discharge from the hospital.  He is in his Aspen collar today.  He has no complaints of neck pain.  He also denies any upper extremity weakness, numbness, tingling or loss of dexterity.        The following portions of the patient's history were reviewed and updated as appropriate: allergies, current medications, past family history, past medical history, past social history, past surgical history, and problem list.      Past Medical History:   Diagnosis Date    Arthritis     Cerebellar artery occlusion     Coronary artery disease     Diabetes mellitus     Enlarged prostate     Hyperlipidemia     Hypertension     Stroke 2011, 2012        Past Surgical History:   Procedure Laterality Date    CARDIAC CATHETERIZATION      lesion 1: intervention outcome    HERNIA REPAIR            Current Outpatient Medications:     acetaminophen (TYLENOL) 325 MG tablet, Take 2 tablets by mouth Every 4 (Four) Hours As Needed for Mild Pain., Disp: 30 tablet, Rfl: 0    amLODIPine (NORVASC) 10 MG tablet, Take 1 tablet by mouth Daily., Disp: 90 tablet, Rfl: 3    atorvastatin (LIPITOR) 40 MG tablet, TAKE 1 TABLET BY MOUTH DAILY, Disp: 90 tablet, Rfl: 1    busPIRone (BUSPAR) 5 MG tablet, TAKE 1 TABLET BY MOUTH THREE TIMES DAILY AS NEEDED FOR ANXIETY, Disp: 270 tablet, Rfl: 1    carvedilol (COREG) 25 MG tablet, TAKE 1 TABLET BY MOUTH TWICE DAILY WITH MEALS, Disp: 180 tablet, Rfl: 3    clopidogrel (PLAVIX) 75 MG tablet, TAKE 1 TABLET BY MOUTH DAILY, Disp: 90 tablet, Rfl: 3    docusate sodium 100 MG capsule, Take 1 capsule by mouth 2 (Two) Times a Day., Disp: 30 each, Rfl: 0    ferrous gluconate (FERGON) 324 MG tablet, TAKE 1 TABLET BY MOUTH DAILY WITH BREAKFAST, Disp: 90 tablet, Rfl: 1    furosemide (LASIX) 40 MG tablet, TAKE 1 TABLET BY MOUTH TWICE DAILY, Disp: 180 tablet, Rfl:  3    gabapentin (NEURONTIN) 100 MG capsule, Take 2 capsules by mouth 3 (Three) Times a Day., Disp: 540 capsule, Rfl: 1    gabapentin (NEURONTIN) 100 MG capsule, Take 2 capsules by mouth 3 (Three) Times a Day., Disp: 12 capsule, Rfl: 0    HYDROcodone-acetaminophen (NORCO) 5-325 MG per tablet, Take 1 tablet by mouth Every 6 (Six) Hours As Needed for Moderate Pain., Disp: 12 tablet, Rfl: 0    nateglinide (STARLIX) 60 MG tablet, Take 1 tablet by mouth 2 (Two) Times a Day With Meals., Disp: 180 tablet, Rfl: 3    polyethylene glycol (MIRALAX) 17 g packet, Take 17 g by mouth Daily As Needed (Use if senna-docusate is ineffective)., Disp: 30 each, Rfl: 0    potassium chloride 10 MEQ CR tablet, Take 1 tablet by mouth Daily. Take while taking double Lasix dose (Patient taking differently: Take 1 tablet by mouth Daily.), Disp: 90 tablet, Rfl: 1    QUEtiapine (SEROquel) 25 MG tablet, Take 1 tablet by mouth Every Night for 30 days., Disp: 30 tablet, Rfl: 0    sertraline (ZOLOFT) 50 MG tablet, TAKE 1 AND 1/2 TABLETS BY MOUTH DAILY, Disp: 135 tablet, Rfl: 1    tamsulosin (FLOMAX) 0.4 MG capsule 24 hr capsule, TAKE 1 CAPSULE BY MOUTH DAILY, Disp: 90 capsule, Rfl: 1    vitamin B-12 (CYANOCOBALAMIN) 1000 MCG tablet, TAKE 1 TABLET BY MOUTH DAILY, Disp: 90 tablet, Rfl: 1     No Known Allergies     Social History     Socioeconomic History    Marital status:    Tobacco Use    Smoking status: Never    Smokeless tobacco: Never   Vaping Use    Vaping status: Never Used   Substance and Sexual Activity    Alcohol use: No    Drug use: No    Sexual activity: Never        Family History   Problem Relation Age of Onset    Heart disease Mother     Stroke Mother     Crohn's disease Father     Stroke Father     Arthritis Father     Cancer Other     Stroke Other     Diabetes Other     Heart defect Other     Hypertension Other     Thyroid disease Other     Cancer Maternal Uncle         bone    Dementia Maternal Grandmother         Review of  "Systems   All other systems reviewed and are negative.      Objective     Vitals:    24 1515   BP: 130/60   Pulse: 71   Temp: 98.4 °F (36.9 °C)   SpO2: 96%   Weight: 101 kg (222 lb)   Height: 182 cm (71.65\")     Body mass index is 30.4 kg/m².    Physical exam  Awake, alert, oriented x3  Pupils equal round reactive to light  Extraocular muscles intact  Face symmetric  Speech is fluent and clear  No pronator drift  Motor exam  Bilateral deltoids 5/5, bilateral biceps 5/5, bilateral triceps 5/5, bilateral wrist extension 5/5 bilateral hand  5/5  Bilateral hip flexion 5/5, bilateral knee extension 5/5, bilateral DF/PF 5/5  No clonus  No Nghia's reflex  gait deferred  Able to detect  light touch in all 4 extremities      Assessment & Plan   Independent Review of Radiographic Studies:      I personally reviewed the images from the following studies.    X-rays complete, however have not been read    Medical Decision Makin-year-old male who was recently hospitalized after ground-level fall found to have an odontoid to fracture.  He has been doing well since discharge, however continues to have multiple falls.  He denies any myelopathy.  We will switch him to a soft collar for the next 4 weeks, if he has any pain he will need to follow-up.  If not, he can follow-up as needed.    Diagnoses and all orders for this visit:    1. Closed odontoid fracture with routine healing, subsequent encounter (Primary)      Return if symptoms worsen or fail to improve.    I spent 30 minutes caring for Dreeck Ramírez on this date of service. This time includes time spent by me in the following activities: preparing for the visit, reviewing tests, obtaining and/or reviewing a separately obtained history, performing a medically appropriate examination and/or evaluation, counseling and educating the patient/family/caregiver, ordering medications, tests, or procedures, referring and communicating with other health care " professionals, documenting information in the medical record, independently interpreting results and communicating that information with the patient/family/caregiver, and care coordination

## 2024-12-11 NOTE — TELEPHONE ENCOUNTER
Called and received voicemail to advise patient to be here at 3:00 for his XR. He has  an appointment with Odalys Loco at 3:30

## 2024-12-13 RX ORDER — ATORVASTATIN CALCIUM 40 MG/1
40 TABLET, FILM COATED ORAL DAILY
Qty: 90 TABLET | Refills: 1 | Status: SHIPPED | OUTPATIENT
Start: 2024-12-13

## 2024-12-13 NOTE — TELEPHONE ENCOUNTER
Rx Refill Note  Requested Prescriptions     Pending Prescriptions Disp Refills    atorvastatin (LIPITOR) 40 MG tablet [Pharmacy Med Name: ATORVASTATIN 40MG TABLETS] 90 tablet 1     Sig: TAKE 1 TABLET BY MOUTH DAILY      Last office visit with prescribing clinician: Visit date not found   Last telemedicine visit with prescribing clinician: Visit date not found   Next office visit with prescribing clinician: Visit date not found                         Would you like a call back once the refill request has been completed: [] Yes [] No    If the office needs to give you a call back, can they leave a voicemail: [] Yes [] No    Raquel Rasmussen MA  12/13/24, 16:00 EST

## 2024-12-17 ENCOUNTER — OFFICE VISIT (OUTPATIENT)
Dept: NEUROSURGERY | Facility: CLINIC | Age: 84
End: 2024-12-17
Payer: MEDICARE

## 2024-12-17 VITALS
HEIGHT: 72 IN | HEART RATE: 71 BPM | BODY MASS INDEX: 30.07 KG/M2 | TEMPERATURE: 98.4 F | OXYGEN SATURATION: 96 % | DIASTOLIC BLOOD PRESSURE: 60 MMHG | WEIGHT: 222 LBS | SYSTOLIC BLOOD PRESSURE: 130 MMHG

## 2024-12-17 DIAGNOSIS — S12.100D CLOSED ODONTOID FRACTURE WITH ROUTINE HEALING, SUBSEQUENT ENCOUNTER: Primary | ICD-10-CM

## 2024-12-20 NOTE — TELEPHONE ENCOUNTER
Rx Refill Note  Requested Prescriptions     Pending Prescriptions Disp Refills    sertraline (ZOLOFT) 50 MG tablet 135 tablet 1     Sig: Take 1.5 tablets by mouth Daily.      Last office visit with prescribing clinician: 9/26/2024   Last telemedicine visit with prescribing clinician: Visit date not found   Next office visit with prescribing clinician: 3/27/2025                         Would you like a call back once the refill request has been completed: [] Yes [] No    If the office needs to give you a call back, can they leave a voicemail: [] Yes [] No    Raquel Rasmussen MA  12/20/24, 15:53 EST

## 2024-12-30 ENCOUNTER — TELEPHONE (OUTPATIENT)
Dept: FAMILY MEDICINE CLINIC | Facility: CLINIC | Age: 84
End: 2024-12-30

## 2024-12-30 NOTE — TELEPHONE ENCOUNTER
Caller: Maribel Arreguin    Relationship to patient: Emergency Contact    Best call back number:     Chief complaint:     Type of visit: HOSPITAL FOLLOW UP    Requested date:      If rescheduling, when is the original appointment:      Additional notes: PATIENTS DAUGHTER SAYS THAT THE PATIENT WAS RELEASED FROM [facility] ON 12/24/24 AND NEEDS A HOSPITAL FOLLOW UP

## 2024-12-30 NOTE — TELEPHONE ENCOUNTER
Caller: [facility]    Relationship:     Best call back number: [phone]     What is the best time to reach you: ANY    Who are you requesting to speak with (clinical staff, provider,  specific staff member): NIKI HERNANDEZ        What was the call regarding: REQUESTING VERBAL ORDERS FOR OT FOR 1 TIME A WEEK FOR 4 WEEKS AND HOME HEALTH AID 1 TIME A WEEK FOR 4 WEEKS.    PLEASE CALL TO GIVE VERBAL OKAY.

## 2024-12-31 ENCOUNTER — TELEPHONE (OUTPATIENT)
Dept: FAMILY MEDICINE CLINIC | Facility: CLINIC | Age: 84
End: 2024-12-31

## 2024-12-31 NOTE — TELEPHONE ENCOUNTER
Caller: SAMAN/[facility]    Relationship: [facility]    Best call back number: [phone]    What orders are you requesting (i.e. lab or imaging): PHYSICAL THERAPY 1 TIME  A WEEK FOR 6 WEEKS     In what timeframe would the patient need to come in: ASAP    Where will you receive your lab/imaging services: IN HIS HOME     Additional notes: PLEASE CALL WITH VERBAL ORDER

## 2024-12-31 NOTE — TELEPHONE ENCOUNTER
Caller: RYDER    Relationship: Home Health    Best call back number: [phone]     What orders are you requesting (i.e. lab or imaging): VERBAL ORDERS      Additional notes:     NEEDING ORDERS FOR SKILLED NURSING FOR 1X A WEEK FOR 6 WEEKS.      PLEASE RETURN CALL TO GIVE VERBAL ORDERS

## 2025-01-01 ENCOUNTER — HOSPITAL ENCOUNTER (INPATIENT)
Facility: HOSPITAL | Age: 85
LOS: 3 days | End: 2025-03-31
Attending: EMERGENCY MEDICINE | Admitting: INTERNAL MEDICINE
Payer: MEDICARE

## 2025-01-01 ENCOUNTER — APPOINTMENT (OUTPATIENT)
Dept: CT IMAGING | Facility: HOSPITAL | Age: 85
End: 2025-01-01
Payer: MEDICARE

## 2025-01-01 ENCOUNTER — APPOINTMENT (OUTPATIENT)
Dept: GENERAL RADIOLOGY | Facility: HOSPITAL | Age: 85
End: 2025-01-01
Payer: MEDICARE

## 2025-01-01 ENCOUNTER — APPOINTMENT (OUTPATIENT)
Dept: CARDIOLOGY | Facility: HOSPITAL | Age: 85
End: 2025-01-01
Payer: MEDICARE

## 2025-01-01 ENCOUNTER — HOSPITAL ENCOUNTER (INPATIENT)
Facility: HOSPITAL | Age: 85
LOS: 5 days | End: 2025-04-05
Attending: STUDENT IN AN ORGANIZED HEALTH CARE EDUCATION/TRAINING PROGRAM
Payer: COMMERCIAL

## 2025-01-01 ENCOUNTER — TELEPHONE (OUTPATIENT)
Dept: FAMILY MEDICINE CLINIC | Facility: CLINIC | Age: 85
End: 2025-01-01

## 2025-01-01 VITALS
BODY MASS INDEX: 30.4 KG/M2 | TEMPERATURE: 97.3 F | HEIGHT: 72 IN | RESPIRATION RATE: 18 BRPM | OXYGEN SATURATION: 66 % | DIASTOLIC BLOOD PRESSURE: 58 MMHG | HEART RATE: 91 BPM | SYSTOLIC BLOOD PRESSURE: 120 MMHG | WEIGHT: 224.43 LBS

## 2025-01-01 VITALS
TEMPERATURE: 105.7 F | WEIGHT: 224 LBS | BODY MASS INDEX: 30.34 KG/M2 | DIASTOLIC BLOOD PRESSURE: 65 MMHG | SYSTOLIC BLOOD PRESSURE: 129 MMHG | HEIGHT: 72 IN | OXYGEN SATURATION: 72 %

## 2025-01-01 DIAGNOSIS — S80.819A ABRASION OF LOWER EXTREMITY, UNSPECIFIED LATERALITY, INITIAL ENCOUNTER: ICD-10-CM

## 2025-01-01 DIAGNOSIS — F03.911 DEMENTIA WITH AGITATION, UNSPECIFIED DEMENTIA SEVERITY, UNSPECIFIED DEMENTIA TYPE: Primary | ICD-10-CM

## 2025-01-01 DIAGNOSIS — D64.9 ACUTE ON CHRONIC ANEMIA: ICD-10-CM

## 2025-01-01 LAB
ABO GROUP BLD: NORMAL
ALBUMIN SERPL-MCNC: 3.3 G/DL (ref 3.5–5.2)
ALBUMIN SERPL-MCNC: 3.5 G/DL (ref 3.5–5.2)
ALBUMIN/GLOB SERPL: 1.1 G/DL
ALBUMIN/GLOB SERPL: 1.1 G/DL
ALP SERPL-CCNC: 152 U/L (ref 39–117)
ALP SERPL-CCNC: 157 U/L (ref 39–117)
ALT SERPL W P-5'-P-CCNC: 15 U/L (ref 1–41)
ALT SERPL W P-5'-P-CCNC: 19 U/L (ref 1–41)
ANION GAP SERPL CALCULATED.3IONS-SCNC: 11.9 MMOL/L (ref 5–15)
ANION GAP SERPL CALCULATED.3IONS-SCNC: 12.1 MMOL/L (ref 5–15)
ANION GAP SERPL CALCULATED.3IONS-SCNC: 12.2 MMOL/L (ref 5–15)
ANION GAP SERPL CALCULATED.3IONS-SCNC: 7.7 MMOL/L (ref 5–15)
ARTERIAL PATENCY WRIST A: POSITIVE
ARTERIAL PATENCY WRIST A: POSITIVE
AST SERPL-CCNC: 15 U/L (ref 1–40)
AST SERPL-CCNC: 17 U/L (ref 1–40)
ATMOSPHERIC PRESS: 746.9 MMHG
ATMOSPHERIC PRESS: 748.5 MMHG
B PARAPERT DNA SPEC QL NAA+PROBE: NOT DETECTED
B PERT DNA SPEC QL NAA+PROBE: NOT DETECTED
BACTERIA SPEC AEROBE CULT: NORMAL
BACTERIA SPEC AEROBE CULT: NORMAL
BACTERIA SPEC RESP CULT: NORMAL
BASE EXCESS BLDA CALC-SCNC: 1.3 MMOL/L (ref 0–2)
BASE EXCESS BLDA CALC-SCNC: 3.1 MMOL/L (ref 0–2)
BASOPHILS # BLD AUTO: 0.02 10*3/MM3 (ref 0–0.2)
BASOPHILS # BLD AUTO: 0.04 10*3/MM3 (ref 0–0.2)
BASOPHILS NFR BLD AUTO: 0.3 % (ref 0–1.5)
BASOPHILS NFR BLD AUTO: 0.5 % (ref 0–1.5)
BDY SITE: ABNORMAL
BDY SITE: ABNORMAL
BH CV LOWER VASCULAR LEFT COMMON FEMORAL AUGMENT: NORMAL
BH CV LOWER VASCULAR LEFT COMMON FEMORAL COMPETENT: NORMAL
BH CV LOWER VASCULAR LEFT COMMON FEMORAL COMPRESS: NORMAL
BH CV LOWER VASCULAR LEFT COMMON FEMORAL PHASIC: NORMAL
BH CV LOWER VASCULAR LEFT COMMON FEMORAL SPONT: NORMAL
BH CV LOWER VASCULAR LEFT DISTAL FEMORAL COMPRESS: NORMAL
BH CV LOWER VASCULAR LEFT GASTRONEMIUS COMPRESS: NORMAL
BH CV LOWER VASCULAR LEFT GREATER SAPH AK COMPRESS: NORMAL
BH CV LOWER VASCULAR LEFT GREATER SAPH BK COMPRESS: NORMAL
BH CV LOWER VASCULAR LEFT LESSER SAPH COMPRESS: NORMAL
BH CV LOWER VASCULAR LEFT MID FEMORAL AUGMENT: NORMAL
BH CV LOWER VASCULAR LEFT MID FEMORAL COMPETENT: NORMAL
BH CV LOWER VASCULAR LEFT MID FEMORAL COMPRESS: NORMAL
BH CV LOWER VASCULAR LEFT MID FEMORAL PHASIC: NORMAL
BH CV LOWER VASCULAR LEFT MID FEMORAL SPONT: NORMAL
BH CV LOWER VASCULAR LEFT PERONEAL COMPRESS: NORMAL
BH CV LOWER VASCULAR LEFT POPLITEAL AUGMENT: NORMAL
BH CV LOWER VASCULAR LEFT POPLITEAL COMPETENT: NORMAL
BH CV LOWER VASCULAR LEFT POPLITEAL COMPRESS: NORMAL
BH CV LOWER VASCULAR LEFT POPLITEAL PHASIC: NORMAL
BH CV LOWER VASCULAR LEFT POPLITEAL SPONT: NORMAL
BH CV LOWER VASCULAR LEFT POSTERIOR TIBIAL COMPRESS: NORMAL
BH CV LOWER VASCULAR LEFT PROFUNDA FEMORAL COMPRESS: NORMAL
BH CV LOWER VASCULAR LEFT PROXIMAL FEMORAL COMPRESS: NORMAL
BH CV LOWER VASCULAR LEFT SAPHENOFEMORAL JUNCTION COMPRESS: NORMAL
BH CV LOWER VASCULAR RIGHT COMMON FEMORAL AUGMENT: NORMAL
BH CV LOWER VASCULAR RIGHT COMMON FEMORAL COMPETENT: NORMAL
BH CV LOWER VASCULAR RIGHT COMMON FEMORAL COMPRESS: NORMAL
BH CV LOWER VASCULAR RIGHT COMMON FEMORAL PHASIC: NORMAL
BH CV LOWER VASCULAR RIGHT COMMON FEMORAL SPONT: NORMAL
BH CV LOWER VASCULAR RIGHT DISTAL FEMORAL COMPRESS: NORMAL
BH CV LOWER VASCULAR RIGHT GASTRONEMIUS COMPRESS: NORMAL
BH CV LOWER VASCULAR RIGHT GREATER SAPH AK COMPRESS: NORMAL
BH CV LOWER VASCULAR RIGHT GREATER SAPH BK COMPRESS: NORMAL
BH CV LOWER VASCULAR RIGHT LESSER SAPH COMPRESS: NORMAL
BH CV LOWER VASCULAR RIGHT MID FEMORAL AUGMENT: NORMAL
BH CV LOWER VASCULAR RIGHT MID FEMORAL COMPETENT: NORMAL
BH CV LOWER VASCULAR RIGHT MID FEMORAL COMPRESS: NORMAL
BH CV LOWER VASCULAR RIGHT MID FEMORAL PHASIC: NORMAL
BH CV LOWER VASCULAR RIGHT MID FEMORAL SPONT: NORMAL
BH CV LOWER VASCULAR RIGHT PERONEAL COMPRESS: NORMAL
BH CV LOWER VASCULAR RIGHT POPLITEAL AUGMENT: NORMAL
BH CV LOWER VASCULAR RIGHT POPLITEAL COMPETENT: NORMAL
BH CV LOWER VASCULAR RIGHT POPLITEAL COMPRESS: NORMAL
BH CV LOWER VASCULAR RIGHT POPLITEAL PHASIC: NORMAL
BH CV LOWER VASCULAR RIGHT POPLITEAL SPONT: NORMAL
BH CV LOWER VASCULAR RIGHT POSTERIOR TIBIAL COMPRESS: NORMAL
BH CV LOWER VASCULAR RIGHT PROFUNDA FEMORAL COMPRESS: NORMAL
BH CV LOWER VASCULAR RIGHT PROXIMAL FEMORAL COMPRESS: NORMAL
BH CV LOWER VASCULAR RIGHT SAPHENOFEMORAL JUNCTION COMPRESS: NORMAL
BILIRUB SERPL-MCNC: 0.3 MG/DL (ref 0–1.2)
BILIRUB SERPL-MCNC: 0.3 MG/DL (ref 0–1.2)
BILIRUB UR QL STRIP: NEGATIVE
BLD GP AB SCN SERPL QL: NEGATIVE
BUN SERPL-MCNC: 24 MG/DL (ref 8–23)
BUN SERPL-MCNC: 27 MG/DL (ref 8–23)
BUN SERPL-MCNC: 28 MG/DL (ref 8–23)
BUN SERPL-MCNC: 34 MG/DL (ref 8–23)
BUN/CREAT SERPL: 14.7 (ref 7–25)
BUN/CREAT SERPL: 16 (ref 7–25)
BUN/CREAT SERPL: 16.6 (ref 7–25)
BUN/CREAT SERPL: 21.1 (ref 7–25)
C AURIS DNA SPEC QL NAA+NON-PROBE: NOT DETECTED
C PNEUM DNA NPH QL NAA+NON-PROBE: NOT DETECTED
CALCIUM SPEC-SCNC: 7.8 MG/DL (ref 8.6–10.5)
CALCIUM SPEC-SCNC: 8 MG/DL (ref 8.6–10.5)
CALCIUM SPEC-SCNC: 8.2 MG/DL (ref 8.6–10.5)
CALCIUM SPEC-SCNC: 8.5 MG/DL (ref 8.6–10.5)
CHLORIDE SERPL-SCNC: 105 MMOL/L (ref 98–107)
CHLORIDE SERPL-SCNC: 108 MMOL/L (ref 98–107)
CHLORIDE SERPL-SCNC: 108 MMOL/L (ref 98–107)
CHLORIDE SERPL-SCNC: 109 MMOL/L (ref 98–107)
CLARITY UR: CLEAR
CO2 SERPL-SCNC: 21.8 MMOL/L (ref 22–29)
CO2 SERPL-SCNC: 22.1 MMOL/L (ref 22–29)
CO2 SERPL-SCNC: 23.3 MMOL/L (ref 22–29)
CO2 SERPL-SCNC: 24.9 MMOL/L (ref 22–29)
COLOR UR: YELLOW
CREAT SERPL-MCNC: 1.61 MG/DL (ref 0.76–1.27)
CREAT SERPL-MCNC: 1.63 MG/DL (ref 0.76–1.27)
CREAT SERPL-MCNC: 1.63 MG/DL (ref 0.76–1.27)
CREAT SERPL-MCNC: 1.75 MG/DL (ref 0.76–1.27)
D DIMER PPP FEU-MCNC: 0.86 MCGFEU/ML (ref 0–0.82)
D-LACTATE SERPL-SCNC: 0.8 MMOL/L (ref 0.5–2)
DEPRECATED RDW RBC AUTO: 39.7 FL (ref 37–54)
DEPRECATED RDW RBC AUTO: 40.6 FL (ref 37–54)
DEPRECATED RDW RBC AUTO: 41.2 FL (ref 37–54)
DEPRECATED RDW RBC AUTO: 41.5 FL (ref 37–54)
DEVICE COMMENT: ABNORMAL
EGFRCR SERPLBLD CKD-EPI 2021: 38.4 ML/MIN/1.73
EGFRCR SERPLBLD CKD-EPI 2021: 41.8 ML/MIN/1.73
EGFRCR SERPLBLD CKD-EPI 2021: 41.8 ML/MIN/1.73
EGFRCR SERPLBLD CKD-EPI 2021: 42.4 ML/MIN/1.73
EOSINOPHIL # BLD AUTO: 0.23 10*3/MM3 (ref 0–0.4)
EOSINOPHIL # BLD AUTO: 0.26 10*3/MM3 (ref 0–0.4)
EOSINOPHIL NFR BLD AUTO: 2.8 % (ref 0.3–6.2)
EOSINOPHIL NFR BLD AUTO: 3.3 % (ref 0.3–6.2)
ERYTHROCYTE [DISTWIDTH] IN BLOOD BY AUTOMATED COUNT: 13.1 % (ref 12.3–15.4)
ERYTHROCYTE [DISTWIDTH] IN BLOOD BY AUTOMATED COUNT: 13.4 % (ref 12.3–15.4)
FERRITIN SERPL-MCNC: 111 NG/ML (ref 30–400)
FLUAV SUBTYP SPEC NAA+PROBE: NOT DETECTED
FLUBV RNA ISLT QL NAA+PROBE: NOT DETECTED
GAS FLOW AIRWAY: 3 LPM
GAS FLOW AIRWAY: 9 LPM
GLOBULIN UR ELPH-MCNC: 2.9 GM/DL
GLOBULIN UR ELPH-MCNC: 3.2 GM/DL
GLUCOSE BLDC GLUCOMTR-MCNC: 126 MG/DL (ref 70–130)
GLUCOSE BLDC GLUCOMTR-MCNC: 175 MG/DL (ref 70–130)
GLUCOSE SERPL-MCNC: 114 MG/DL (ref 65–99)
GLUCOSE SERPL-MCNC: 118 MG/DL (ref 65–99)
GLUCOSE SERPL-MCNC: 124 MG/DL (ref 65–99)
GLUCOSE SERPL-MCNC: 94 MG/DL (ref 65–99)
GLUCOSE UR STRIP-MCNC: NEGATIVE MG/DL
GRAM STN SPEC: NORMAL
GRAM STN SPEC: NORMAL
HADV DNA SPEC NAA+PROBE: NOT DETECTED
HCO3 BLDA-SCNC: 25 MMOL/L (ref 22–28)
HCO3 BLDA-SCNC: 26.1 MMOL/L (ref 22–28)
HCOV 229E RNA SPEC QL NAA+PROBE: NOT DETECTED
HCOV HKU1 RNA SPEC QL NAA+PROBE: NOT DETECTED
HCOV NL63 RNA SPEC QL NAA+PROBE: NOT DETECTED
HCOV OC43 RNA SPEC QL NAA+PROBE: NOT DETECTED
HCT VFR BLD AUTO: 24 % (ref 37.5–51)
HCT VFR BLD AUTO: 24.7 % (ref 37.5–51)
HCT VFR BLD AUTO: 25.2 % (ref 37.5–51)
HCT VFR BLD AUTO: 25.7 % (ref 37.5–51)
HEMODILUTION: NO
HEMODILUTION: NO
HGB BLD-MCNC: 7.6 G/DL (ref 13–17.7)
HGB BLD-MCNC: 7.8 G/DL (ref 13–17.7)
HGB BLD-MCNC: 7.8 G/DL (ref 13–17.7)
HGB BLD-MCNC: 7.9 G/DL (ref 13–17.7)
HGB UR QL STRIP.AUTO: NEGATIVE
HMPV RNA NPH QL NAA+NON-PROBE: NOT DETECTED
HPIV1 RNA ISLT QL NAA+PROBE: NOT DETECTED
HPIV2 RNA SPEC QL NAA+PROBE: NOT DETECTED
HPIV3 RNA NPH QL NAA+PROBE: NOT DETECTED
HPIV4 P GENE NPH QL NAA+PROBE: NOT DETECTED
IMM GRANULOCYTES # BLD AUTO: 0.03 10*3/MM3 (ref 0–0.05)
IMM GRANULOCYTES # BLD AUTO: 0.04 10*3/MM3 (ref 0–0.05)
IMM GRANULOCYTES NFR BLD AUTO: 0.4 % (ref 0–0.5)
IMM GRANULOCYTES NFR BLD AUTO: 0.5 % (ref 0–0.5)
IRON 24H UR-MRATE: 17 MCG/DL (ref 59–158)
IRON SATN MFR SERPL: 6 % (ref 20–50)
KETONES UR QL STRIP: NEGATIVE
L PNEUMO1 AG UR QL IA: NEGATIVE
LEUKOCYTE ESTERASE UR QL STRIP.AUTO: NEGATIVE
LYMPHOCYTES # BLD AUTO: 0.76 10*3/MM3 (ref 0.7–3.1)
LYMPHOCYTES # BLD AUTO: 0.99 10*3/MM3 (ref 0.7–3.1)
LYMPHOCYTES NFR BLD AUTO: 12.1 % (ref 19.6–45.3)
LYMPHOCYTES NFR BLD AUTO: 9.7 % (ref 19.6–45.3)
M PNEUMO IGG SER IA-ACNC: NOT DETECTED
MAGNESIUM SERPL-MCNC: 2.2 MG/DL (ref 1.6–2.4)
MCH RBC QN AUTO: 25.9 PG (ref 26.6–33)
MCH RBC QN AUTO: 26.3 PG (ref 26.6–33)
MCH RBC QN AUTO: 26.3 PG (ref 26.6–33)
MCH RBC QN AUTO: 27.1 PG (ref 26.6–33)
MCHC RBC AUTO-ENTMCNC: 30.4 G/DL (ref 31.5–35.7)
MCHC RBC AUTO-ENTMCNC: 30.8 G/DL (ref 31.5–35.7)
MCHC RBC AUTO-ENTMCNC: 31.3 G/DL (ref 31.5–35.7)
MCHC RBC AUTO-ENTMCNC: 32.5 G/DL (ref 31.5–35.7)
MCV RBC AUTO: 83.3 FL (ref 79–97)
MCV RBC AUTO: 84 FL (ref 79–97)
MCV RBC AUTO: 85.4 FL (ref 79–97)
MCV RBC AUTO: 85.5 FL (ref 79–97)
MODALITY: ABNORMAL
MODALITY: ABNORMAL
MONOCYTES # BLD AUTO: 1 10*3/MM3 (ref 0.1–0.9)
MONOCYTES # BLD AUTO: 1.22 10*3/MM3 (ref 0.1–0.9)
MONOCYTES NFR BLD AUTO: 12.7 % (ref 5–12)
MONOCYTES NFR BLD AUTO: 14.9 % (ref 5–12)
NEUTROPHILS NFR BLD AUTO: 5.68 10*3/MM3 (ref 1.7–7)
NEUTROPHILS NFR BLD AUTO: 5.8 10*3/MM3 (ref 1.7–7)
NEUTROPHILS NFR BLD AUTO: 69.2 % (ref 42.7–76)
NEUTROPHILS NFR BLD AUTO: 73.6 % (ref 42.7–76)
NITRITE UR QL STRIP: NEGATIVE
NRBC BLD AUTO-RTO: 0 /100 WBC (ref 0–0.2)
NRBC BLD AUTO-RTO: 0 /100 WBC (ref 0–0.2)
NT-PROBNP SERPL-MCNC: 1800 PG/ML (ref 0–1800)
NT-PROBNP SERPL-MCNC: 2656 PG/ML (ref 0–1800)
PCO2 BLDA: 33 MM HG (ref 35–45)
PCO2 BLDA: 35.6 MM HG (ref 35–45)
PH BLDA: 7.46 PH UNITS (ref 7.35–7.45)
PH BLDA: 7.51 PH UNITS (ref 7.35–7.45)
PH UR STRIP.AUTO: <=5 [PH] (ref 5–8)
PLATELET # BLD AUTO: 218 10*3/MM3 (ref 140–450)
PLATELET # BLD AUTO: 223 10*3/MM3 (ref 140–450)
PLATELET # BLD AUTO: 251 10*3/MM3 (ref 140–450)
PLATELET # BLD AUTO: 252 10*3/MM3 (ref 140–450)
PMV BLD AUTO: 10 FL (ref 6–12)
PMV BLD AUTO: 10.1 FL (ref 6–12)
PMV BLD AUTO: 9.5 FL (ref 6–12)
PMV BLD AUTO: 9.7 FL (ref 6–12)
PO2 BLDA: 56.1 MM HG (ref 80–100)
PO2 BLDA: 66.2 MM HG (ref 80–100)
POTASSIUM SERPL-SCNC: 3.3 MMOL/L (ref 3.5–5.2)
POTASSIUM SERPL-SCNC: 3.8 MMOL/L (ref 3.5–5.2)
POTASSIUM SERPL-SCNC: 3.8 MMOL/L (ref 3.5–5.2)
POTASSIUM SERPL-SCNC: 4.3 MMOL/L (ref 3.5–5.2)
PROCALCITONIN SERPL-MCNC: 0.04 NG/ML (ref 0–0.25)
PROT SERPL-MCNC: 6.2 G/DL (ref 6–8.5)
PROT SERPL-MCNC: 6.7 G/DL (ref 6–8.5)
PROT UR QL STRIP: NEGATIVE
RBC # BLD AUTO: 2.88 10*6/MM3 (ref 4.14–5.8)
RBC # BLD AUTO: 2.89 10*6/MM3 (ref 4.14–5.8)
RBC # BLD AUTO: 3 10*6/MM3 (ref 4.14–5.8)
RBC # BLD AUTO: 3.01 10*6/MM3 (ref 4.14–5.8)
RH BLD: NEGATIVE
RHINOVIRUS RNA SPEC NAA+PROBE: NOT DETECTED
RSV RNA NPH QL NAA+NON-PROBE: NOT DETECTED
S PNEUM AG SPEC QL LA: NEGATIVE
SAO2 % BLDCOA: 91.7 % (ref 92–98.5)
SAO2 % BLDCOA: 93.9 % (ref 92–98.5)
SARS-COV-2 RNA NPH QL NAA+NON-PROBE: NOT DETECTED
SODIUM SERPL-SCNC: 139 MMOL/L (ref 136–145)
SODIUM SERPL-SCNC: 139 MMOL/L (ref 136–145)
SODIUM SERPL-SCNC: 143 MMOL/L (ref 136–145)
SODIUM SERPL-SCNC: 145 MMOL/L (ref 136–145)
SP GR UR STRIP: 1.01 (ref 1–1.03)
T&S EXPIRATION DATE: NORMAL
TIBC SERPL-MCNC: 273 MCG/DL (ref 298–536)
TOTAL RATE: 16 BREATHS/MINUTE
TOTAL RATE: 16 BREATHS/MINUTE
TRANSFERRIN SERPL-MCNC: 183 MG/DL (ref 200–360)
UROBILINOGEN UR QL STRIP: NORMAL
WBC NRBC COR # BLD AUTO: 10.54 10*3/MM3 (ref 3.4–10.8)
WBC NRBC COR # BLD AUTO: 10.88 10*3/MM3 (ref 3.4–10.8)
WBC NRBC COR # BLD AUTO: 7.87 10*3/MM3 (ref 3.4–10.8)
WBC NRBC COR # BLD AUTO: 8.2 10*3/MM3 (ref 3.4–10.8)

## 2025-01-01 PROCEDURE — 81003 URINALYSIS AUTO W/O SCOPE: CPT | Performed by: EMERGENCY MEDICINE

## 2025-01-01 PROCEDURE — 25010000002 PIPERACILLIN SOD-TAZOBACTAM PER 1 G: Performed by: STUDENT IN AN ORGANIZED HEALTH CARE EDUCATION/TRAINING PROGRAM

## 2025-01-01 PROCEDURE — 36600 WITHDRAWAL OF ARTERIAL BLOOD: CPT

## 2025-01-01 PROCEDURE — 83605 ASSAY OF LACTIC ACID: CPT | Performed by: INTERNAL MEDICINE

## 2025-01-01 PROCEDURE — 25010000002 MORPHINE PER 10 MG: Performed by: STUDENT IN AN ORGANIZED HEALTH CARE EDUCATION/TRAINING PROGRAM

## 2025-01-01 PROCEDURE — 99232 SBSQ HOSP IP/OBS MODERATE 35: CPT

## 2025-01-01 PROCEDURE — 87449 NOS EACH ORGANISM AG IA: CPT | Performed by: STUDENT IN AN ORGANIZED HEALTH CARE EDUCATION/TRAINING PROGRAM

## 2025-01-01 PROCEDURE — 87040 BLOOD CULTURE FOR BACTERIA: CPT | Performed by: STUDENT IN AN ORGANIZED HEALTH CARE EDUCATION/TRAINING PROGRAM

## 2025-01-01 PROCEDURE — 87205 SMEAR GRAM STAIN: CPT | Performed by: STUDENT IN AN ORGANIZED HEALTH CARE EDUCATION/TRAINING PROGRAM

## 2025-01-01 PROCEDURE — 36600 WITHDRAWAL OF ARTERIAL BLOOD: CPT | Performed by: NURSE PRACTITIONER

## 2025-01-01 PROCEDURE — 25010000002 GLYCOPYRROLATE 0.2 MG/ML SOLUTION: Performed by: STUDENT IN AN ORGANIZED HEALTH CARE EDUCATION/TRAINING PROGRAM

## 2025-01-01 PROCEDURE — 84145 PROCALCITONIN (PCT): CPT | Performed by: INTERNAL MEDICINE

## 2025-01-01 PROCEDURE — 97166 OT EVAL MOD COMPLEX 45 MIN: CPT

## 2025-01-01 PROCEDURE — 82948 REAGENT STRIP/BLOOD GLUCOSE: CPT

## 2025-01-01 PROCEDURE — 25010000002 LORAZEPAM PER 2 MG: Performed by: STUDENT IN AN ORGANIZED HEALTH CARE EDUCATION/TRAINING PROGRAM

## 2025-01-01 PROCEDURE — 25010000002 OLANZAPINE 10 MG RECONSTITUTED SOLUTION: Performed by: SPECIALIST

## 2025-01-01 PROCEDURE — 25010000002 FUROSEMIDE PER 20 MG: Performed by: NURSE PRACTITIONER

## 2025-01-01 PROCEDURE — 25010000002 HYDROMORPHONE 1 MG/ML SOLUTION: Performed by: STUDENT IN AN ORGANIZED HEALTH CARE EDUCATION/TRAINING PROGRAM

## 2025-01-01 PROCEDURE — 80048 BASIC METABOLIC PNL TOTAL CA: CPT | Performed by: STUDENT IN AN ORGANIZED HEALTH CARE EDUCATION/TRAINING PROGRAM

## 2025-01-01 PROCEDURE — 82803 BLOOD GASES ANY COMBINATION: CPT | Performed by: NURSE PRACTITIONER

## 2025-01-01 PROCEDURE — 94640 AIRWAY INHALATION TREATMENT: CPT

## 2025-01-01 PROCEDURE — 83880 ASSAY OF NATRIURETIC PEPTIDE: CPT | Performed by: INTERNAL MEDICINE

## 2025-01-01 PROCEDURE — 85025 COMPLETE CBC W/AUTO DIFF WBC: CPT | Performed by: INTERNAL MEDICINE

## 2025-01-01 PROCEDURE — 80053 COMPREHEN METABOLIC PANEL: CPT | Performed by: NURSE PRACTITIONER

## 2025-01-01 PROCEDURE — 94761 N-INVAS EAR/PLS OXIMETRY MLT: CPT

## 2025-01-01 PROCEDURE — 25010000002 HYDROMORPHONE PER 4 MG: Performed by: STUDENT IN AN ORGANIZED HEALTH CARE EDUCATION/TRAINING PROGRAM

## 2025-01-01 PROCEDURE — 25010000002 GLYCOPYRROLATE 0.2 MG/ML SOLUTION: Performed by: INTERNAL MEDICINE

## 2025-01-01 PROCEDURE — 82803 BLOOD GASES ANY COMBINATION: CPT

## 2025-01-01 PROCEDURE — 80053 COMPREHEN METABOLIC PANEL: CPT | Performed by: EMERGENCY MEDICINE

## 2025-01-01 PROCEDURE — 83540 ASSAY OF IRON: CPT | Performed by: INTERNAL MEDICINE

## 2025-01-01 PROCEDURE — 83880 ASSAY OF NATRIURETIC PEPTIDE: CPT | Performed by: EMERGENCY MEDICINE

## 2025-01-01 PROCEDURE — 25010000002 PIPERACILLIN SOD-TAZOBACTAM PER 1 G: Performed by: INTERNAL MEDICINE

## 2025-01-01 PROCEDURE — 71250 CT THORAX DX C-: CPT

## 2025-01-01 PROCEDURE — 36415 COLL VENOUS BLD VENIPUNCTURE: CPT | Performed by: INTERNAL MEDICINE

## 2025-01-01 PROCEDURE — G0378 HOSPITAL OBSERVATION PER HR: HCPCS

## 2025-01-01 PROCEDURE — 82728 ASSAY OF FERRITIN: CPT | Performed by: INTERNAL MEDICINE

## 2025-01-01 PROCEDURE — 97162 PT EVAL MOD COMPLEX 30 MIN: CPT

## 2025-01-01 PROCEDURE — 86901 BLOOD TYPING SEROLOGIC RH(D): CPT | Performed by: STUDENT IN AN ORGANIZED HEALTH CARE EDUCATION/TRAINING PROGRAM

## 2025-01-01 PROCEDURE — 99231 SBSQ HOSP IP/OBS SF/LOW 25: CPT | Performed by: INTERNAL MEDICINE

## 2025-01-01 PROCEDURE — 99222 1ST HOSP IP/OBS MODERATE 55: CPT | Performed by: INTERNAL MEDICINE

## 2025-01-01 PROCEDURE — 71045 X-RAY EXAM CHEST 1 VIEW: CPT

## 2025-01-01 PROCEDURE — 85379 FIBRIN DEGRADATION QUANT: CPT | Performed by: NURSE PRACTITIONER

## 2025-01-01 PROCEDURE — 93970 EXTREMITY STUDY: CPT

## 2025-01-01 PROCEDURE — 25010000002 LORAZEPAM PER 2 MG: Performed by: INTERNAL MEDICINE

## 2025-01-01 PROCEDURE — 99221 1ST HOSP IP/OBS SF/LOW 40: CPT | Performed by: NURSE PRACTITIONER

## 2025-01-01 PROCEDURE — 86850 RBC ANTIBODY SCREEN: CPT | Performed by: STUDENT IN AN ORGANIZED HEALTH CARE EDUCATION/TRAINING PROGRAM

## 2025-01-01 PROCEDURE — 0202U NFCT DS 22 TRGT SARS-COV-2: CPT | Performed by: INTERNAL MEDICINE

## 2025-01-01 PROCEDURE — 85027 COMPLETE CBC AUTOMATED: CPT | Performed by: STUDENT IN AN ORGANIZED HEALTH CARE EDUCATION/TRAINING PROGRAM

## 2025-01-01 PROCEDURE — 92610 EVALUATE SWALLOWING FUNCTION: CPT

## 2025-01-01 PROCEDURE — 94799 UNLISTED PULMONARY SVC/PX: CPT

## 2025-01-01 PROCEDURE — 84466 ASSAY OF TRANSFERRIN: CPT | Performed by: INTERNAL MEDICINE

## 2025-01-01 PROCEDURE — 93970 EXTREMITY STUDY: CPT | Performed by: SURGERY

## 2025-01-01 PROCEDURE — 87070 CULTURE OTHR SPECIMN AEROBIC: CPT | Performed by: STUDENT IN AN ORGANIZED HEALTH CARE EDUCATION/TRAINING PROGRAM

## 2025-01-01 PROCEDURE — 86900 BLOOD TYPING SEROLOGIC ABO: CPT | Performed by: STUDENT IN AN ORGANIZED HEALTH CARE EDUCATION/TRAINING PROGRAM

## 2025-01-01 PROCEDURE — 99222 1ST HOSP IP/OBS MODERATE 55: CPT

## 2025-01-01 PROCEDURE — 25010000002 POTASSIUM CHLORIDE 10 MEQ/100ML SOLUTION: Performed by: STUDENT IN AN ORGANIZED HEALTH CARE EDUCATION/TRAINING PROGRAM

## 2025-01-01 PROCEDURE — 87481 CANDIDA DNA AMP PROBE: CPT | Performed by: INTERNAL MEDICINE

## 2025-01-01 PROCEDURE — 85025 COMPLETE CBC W/AUTO DIFF WBC: CPT | Performed by: EMERGENCY MEDICINE

## 2025-01-01 PROCEDURE — 72052 X-RAY EXAM NECK SPINE 6/>VWS: CPT

## 2025-01-01 PROCEDURE — 25010000002 FUROSEMIDE PER 20 MG: Performed by: STUDENT IN AN ORGANIZED HEALTH CARE EDUCATION/TRAINING PROGRAM

## 2025-01-01 PROCEDURE — 83735 ASSAY OF MAGNESIUM: CPT | Performed by: STUDENT IN AN ORGANIZED HEALTH CARE EDUCATION/TRAINING PROGRAM

## 2025-01-01 PROCEDURE — 99285 EMERGENCY DEPT VISIT HI MDM: CPT

## 2025-01-01 RX ORDER — MORPHINE SULFATE 2 MG/ML
2 INJECTION, SOLUTION INTRAMUSCULAR; INTRAVENOUS
Status: DISCONTINUED | OUTPATIENT
Start: 2025-01-01 | End: 2025-01-01

## 2025-01-01 RX ORDER — LORAZEPAM 2 MG/ML
2 INJECTION INTRAMUSCULAR
Status: CANCELLED | OUTPATIENT
Start: 2025-01-01 | End: 2025-01-01

## 2025-01-01 RX ORDER — LORAZEPAM 2 MG/ML
0.5 INJECTION INTRAMUSCULAR
Status: DISCONTINUED | OUTPATIENT
Start: 2025-01-01 | End: 2025-01-01

## 2025-01-01 RX ORDER — LORAZEPAM 2 MG/ML
0.5 CONCENTRATE ORAL
Status: DISCONTINUED | OUTPATIENT
Start: 2025-01-01 | End: 2025-01-01 | Stop reason: HOSPADM

## 2025-01-01 RX ORDER — ACETAMINOPHEN 160 MG/5ML
650 SOLUTION ORAL EVERY 4 HOURS PRN
Status: DISCONTINUED | OUTPATIENT
Start: 2025-01-01 | End: 2025-01-01

## 2025-01-01 RX ORDER — ACETAMINOPHEN 650 MG/1
650 SUPPOSITORY RECTAL EVERY 4 HOURS PRN
Status: CANCELLED | OUTPATIENT
Start: 2025-01-01

## 2025-01-01 RX ORDER — HYDROMORPHONE HYDROCHLORIDE 2 MG/ML
1.5 INJECTION, SOLUTION INTRAMUSCULAR; INTRAVENOUS; SUBCUTANEOUS
Refills: 0 | Status: DISCONTINUED | OUTPATIENT
Start: 2025-01-01 | End: 2025-01-01 | Stop reason: HOSPADM

## 2025-01-01 RX ORDER — IPRATROPIUM BROMIDE AND ALBUTEROL SULFATE 2.5; .5 MG/3ML; MG/3ML
3 SOLUTION RESPIRATORY (INHALATION) EVERY 6 HOURS PRN
Status: DISCONTINUED | OUTPATIENT
Start: 2025-01-01 | End: 2025-01-01

## 2025-01-01 RX ORDER — LORAZEPAM 2 MG/ML
0.5 INJECTION INTRAMUSCULAR
Status: CANCELLED | OUTPATIENT
Start: 2025-01-01 | End: 2025-01-01

## 2025-01-01 RX ORDER — SODIUM CHLORIDE 0.9 % (FLUSH) 0.9 %
10 SYRINGE (ML) INJECTION AS NEEDED
Status: DISCONTINUED | OUTPATIENT
Start: 2025-01-01 | End: 2025-01-01 | Stop reason: HOSPADM

## 2025-01-01 RX ORDER — LORAZEPAM 2 MG/ML
0.5 INJECTION INTRAMUSCULAR
Status: DISCONTINUED | OUTPATIENT
Start: 2025-01-01 | End: 2025-01-01 | Stop reason: HOSPADM

## 2025-01-01 RX ORDER — ACETAMINOPHEN 325 MG/1
650 TABLET ORAL EVERY 4 HOURS PRN
Status: CANCELLED | OUTPATIENT
Start: 2025-01-01

## 2025-01-01 RX ORDER — QUETIAPINE FUMARATE 25 MG/1
25 TABLET, FILM COATED ORAL NIGHTLY
Status: DISCONTINUED | OUTPATIENT
Start: 2025-01-01 | End: 2025-01-01

## 2025-01-01 RX ORDER — DIPHENOXYLATE HYDROCHLORIDE AND ATROPINE SULFATE 2.5; .025 MG/1; MG/1
1 TABLET ORAL
Status: DISCONTINUED | OUTPATIENT
Start: 2025-01-01 | End: 2025-01-01

## 2025-01-01 RX ORDER — LORAZEPAM 2 MG/ML
2 INJECTION INTRAMUSCULAR
Status: DISCONTINUED | OUTPATIENT
Start: 2025-01-01 | End: 2025-01-01 | Stop reason: HOSPADM

## 2025-01-01 RX ORDER — GLYCOPYRROLATE 0.2 MG/ML
0.4 INJECTION INTRAMUSCULAR; INTRAVENOUS
Status: DISCONTINUED | OUTPATIENT
Start: 2025-01-01 | End: 2025-01-01 | Stop reason: HOSPADM

## 2025-01-01 RX ORDER — OLANZAPINE 5 MG/1
5 TABLET ORAL 2 TIMES DAILY
Status: DISCONTINUED | OUTPATIENT
Start: 2025-01-01 | End: 2025-01-01

## 2025-01-01 RX ORDER — ACETAMINOPHEN 650 MG/1
650 SUPPOSITORY RECTAL EVERY 4 HOURS PRN
Status: DISCONTINUED | OUTPATIENT
Start: 2025-01-01 | End: 2025-01-01 | Stop reason: HOSPADM

## 2025-01-01 RX ORDER — LORAZEPAM 2 MG/ML
0.5 CONCENTRATE ORAL
Status: CANCELLED | OUTPATIENT
Start: 2025-01-01 | End: 2025-01-01

## 2025-01-01 RX ORDER — PANTOPRAZOLE SODIUM 40 MG/10ML
40 INJECTION, POWDER, LYOPHILIZED, FOR SOLUTION INTRAVENOUS 2 TIMES DAILY
Status: DISCONTINUED | OUTPATIENT
Start: 2025-01-01 | End: 2025-01-01

## 2025-01-01 RX ORDER — MORPHINE SULFATE 20 MG/ML
3.75 SOLUTION ORAL
Refills: 0 | Status: DISCONTINUED | OUTPATIENT
Start: 2025-01-01 | End: 2025-01-01 | Stop reason: HOSPADM

## 2025-01-01 RX ORDER — MORPHINE SULFATE 2 MG/ML
2 INJECTION, SOLUTION INTRAMUSCULAR; INTRAVENOUS
Status: DISCONTINUED | OUTPATIENT
Start: 2025-01-01 | End: 2025-01-01 | Stop reason: HOSPADM

## 2025-01-01 RX ORDER — ACETAMINOPHEN 160 MG/5ML
650 SOLUTION ORAL EVERY 4 HOURS PRN
Status: DISCONTINUED | OUTPATIENT
Start: 2025-01-01 | End: 2025-01-01 | Stop reason: HOSPADM

## 2025-01-01 RX ORDER — AMOXICILLIN 250 MG
2 CAPSULE ORAL 2 TIMES DAILY PRN
Status: DISCONTINUED | OUTPATIENT
Start: 2025-01-01 | End: 2025-01-01 | Stop reason: HOSPADM

## 2025-01-01 RX ORDER — LORAZEPAM 2 MG/ML
1 INJECTION INTRAMUSCULAR
Status: DISCONTINUED | OUTPATIENT
Start: 2025-01-01 | End: 2025-01-01 | Stop reason: HOSPADM

## 2025-01-01 RX ORDER — MORPHINE SULFATE 2 MG/ML
2 INJECTION, SOLUTION INTRAMUSCULAR; INTRAVENOUS
Status: CANCELLED | OUTPATIENT
Start: 2025-01-01 | End: 2025-04-06

## 2025-01-01 RX ORDER — GLYCOPYRROLATE 0.2 MG/ML
0.8 INJECTION INTRAMUSCULAR; INTRAVENOUS
Status: DISCONTINUED | OUTPATIENT
Start: 2025-01-01 | End: 2025-01-01 | Stop reason: HOSPADM

## 2025-01-01 RX ORDER — MORPHINE SULFATE 20 MG/ML
10 SOLUTION ORAL
Refills: 0 | Status: DISCONTINUED | OUTPATIENT
Start: 2025-01-01 | End: 2025-01-01 | Stop reason: HOSPADM

## 2025-01-01 RX ORDER — MORPHINE SULFATE 4 MG/ML
4 INJECTION, SOLUTION INTRAMUSCULAR; INTRAVENOUS
Status: CANCELLED | OUTPATIENT
Start: 2025-01-01 | End: 2025-04-06

## 2025-01-01 RX ORDER — POTASSIUM CHLORIDE 7.45 MG/ML
10 INJECTION INTRAVENOUS
Status: DISCONTINUED | OUTPATIENT
Start: 2025-01-01 | End: 2025-01-01

## 2025-01-01 RX ORDER — LORAZEPAM 2 MG/ML
2 INJECTION INTRAMUSCULAR
Status: DISCONTINUED | OUTPATIENT
Start: 2025-01-01 | End: 2025-01-01

## 2025-01-01 RX ORDER — LORAZEPAM 2 MG/ML
2 CONCENTRATE ORAL
Status: DISCONTINUED | OUTPATIENT
Start: 2025-01-01 | End: 2025-01-01 | Stop reason: HOSPADM

## 2025-01-01 RX ORDER — BUSPIRONE HYDROCHLORIDE 5 MG/1
5 TABLET ORAL NIGHTLY
Status: DISCONTINUED | OUTPATIENT
Start: 2025-01-01 | End: 2025-01-01

## 2025-01-01 RX ORDER — LORAZEPAM 2 MG/ML
0.5 CONCENTRATE ORAL
Status: DISCONTINUED | OUTPATIENT
Start: 2025-01-01 | End: 2025-01-01

## 2025-01-01 RX ORDER — MORPHINE SULFATE 2 MG/ML
4 INJECTION, SOLUTION INTRAMUSCULAR; INTRAVENOUS
Status: DISCONTINUED | OUTPATIENT
Start: 2025-01-01 | End: 2025-01-01 | Stop reason: HOSPADM

## 2025-01-01 RX ORDER — POLYETHYLENE GLYCOL 3350 17 G/17G
17 POWDER, FOR SOLUTION ORAL DAILY PRN
Status: CANCELLED | OUTPATIENT
Start: 2025-01-01

## 2025-01-01 RX ORDER — BISACODYL 10 MG
10 SUPPOSITORY, RECTAL RECTAL DAILY PRN
Status: DISCONTINUED | OUTPATIENT
Start: 2025-01-01 | End: 2025-01-01 | Stop reason: HOSPADM

## 2025-01-01 RX ORDER — MORPHINE SULFATE 20 MG/ML
20 SOLUTION ORAL
Refills: 0 | Status: DISCONTINUED | OUTPATIENT
Start: 2025-01-01 | End: 2025-01-01 | Stop reason: HOSPADM

## 2025-01-01 RX ORDER — HYDROMORPHONE HYDROCHLORIDE 2 MG/ML
1.5 INJECTION, SOLUTION INTRAMUSCULAR; INTRAVENOUS; SUBCUTANEOUS
Refills: 0 | Status: CANCELLED | OUTPATIENT
Start: 2025-01-01 | End: 2025-04-06

## 2025-01-01 RX ORDER — LORAZEPAM 2 MG/ML
1 INJECTION INTRAMUSCULAR
Status: CANCELLED | OUTPATIENT
Start: 2025-01-01 | End: 2025-01-01

## 2025-01-01 RX ORDER — CARVEDILOL 25 MG/1
25 TABLET ORAL 2 TIMES DAILY WITH MEALS
Status: DISCONTINUED | OUTPATIENT
Start: 2025-01-01 | End: 2025-01-01

## 2025-01-01 RX ORDER — DIPHENOXYLATE HYDROCHLORIDE AND ATROPINE SULFATE 2.5; .025 MG/1; MG/1
1 TABLET ORAL
Status: DISCONTINUED | OUTPATIENT
Start: 2025-01-01 | End: 2025-01-01 | Stop reason: HOSPADM

## 2025-01-01 RX ORDER — ACETAMINOPHEN 325 MG/1
650 TABLET ORAL EVERY 4 HOURS PRN
Status: DISCONTINUED | OUTPATIENT
Start: 2025-01-01 | End: 2025-01-01 | Stop reason: HOSPADM

## 2025-01-01 RX ORDER — KETOROLAC TROMETHAMINE 15 MG/ML
15 INJECTION, SOLUTION INTRAMUSCULAR; INTRAVENOUS EVERY 6 HOURS PRN
Status: DISCONTINUED | OUTPATIENT
Start: 2025-01-01 | End: 2025-01-01

## 2025-01-01 RX ORDER — OLANZAPINE 5 MG/1
2.5 TABLET ORAL 2 TIMES DAILY
Status: DISCONTINUED | OUTPATIENT
Start: 2025-01-01 | End: 2025-01-01

## 2025-01-01 RX ORDER — MULTIVITAMIN WITH IRON
1000 TABLET ORAL DAILY
Status: DISCONTINUED | OUTPATIENT
Start: 2025-01-01 | End: 2025-01-01

## 2025-01-01 RX ORDER — HYDROMORPHONE HYDROCHLORIDE 1 MG/ML
0.5 INJECTION, SOLUTION INTRAMUSCULAR; INTRAVENOUS; SUBCUTANEOUS
Refills: 0 | Status: DISCONTINUED | OUTPATIENT
Start: 2025-01-01 | End: 2025-01-01 | Stop reason: HOSPADM

## 2025-01-01 RX ORDER — TAMSULOSIN HYDROCHLORIDE 0.4 MG/1
0.4 CAPSULE ORAL DAILY
Status: DISCONTINUED | OUTPATIENT
Start: 2025-01-01 | End: 2025-01-01

## 2025-01-01 RX ORDER — GABAPENTIN 100 MG/1
200 CAPSULE ORAL 3 TIMES DAILY
Status: DISCONTINUED | OUTPATIENT
Start: 2025-01-01 | End: 2025-01-01

## 2025-01-01 RX ORDER — LORAZEPAM 2 MG/ML
1 INJECTION INTRAMUSCULAR
Status: DISCONTINUED | OUTPATIENT
Start: 2025-01-01 | End: 2025-01-01

## 2025-01-01 RX ORDER — SODIUM CHLORIDE 0.9 % (FLUSH) 0.9 %
10 SYRINGE (ML) INJECTION AS NEEDED
Status: CANCELLED | OUTPATIENT
Start: 2025-01-01

## 2025-01-01 RX ORDER — OLANZAPINE 10 MG/2ML
5 INJECTION, POWDER, FOR SOLUTION INTRAMUSCULAR EVERY 8 HOURS PRN
Status: DISCONTINUED | OUTPATIENT
Start: 2025-01-01 | End: 2025-01-01

## 2025-01-01 RX ORDER — LORAZEPAM 2 MG/ML
2 CONCENTRATE ORAL
Status: DISCONTINUED | OUTPATIENT
Start: 2025-01-01 | End: 2025-01-01

## 2025-01-01 RX ORDER — GLYCOPYRROLATE 0.2 MG/ML
0.4 INJECTION INTRAMUSCULAR; INTRAVENOUS EVERY 4 HOURS PRN
Status: DISCONTINUED | OUTPATIENT
Start: 2025-01-01 | End: 2025-01-01 | Stop reason: HOSPADM

## 2025-01-01 RX ORDER — LORAZEPAM 2 MG/ML
1 CONCENTRATE ORAL
Status: DISCONTINUED | OUTPATIENT
Start: 2025-01-01 | End: 2025-01-01 | Stop reason: HOSPADM

## 2025-01-01 RX ORDER — MORPHINE SULFATE 4 MG/ML
4 INJECTION, SOLUTION INTRAMUSCULAR; INTRAVENOUS
Status: DISCONTINUED | OUTPATIENT
Start: 2025-01-01 | End: 2025-01-01

## 2025-01-01 RX ORDER — MORPHINE SULFATE 20 MG/ML
10 SOLUTION ORAL
Refills: 0 | Status: CANCELLED | OUTPATIENT
Start: 2025-01-01 | End: 2025-04-06

## 2025-01-01 RX ORDER — LORAZEPAM 2 MG/ML
2 CONCENTRATE ORAL
Status: CANCELLED | OUTPATIENT
Start: 2025-01-01 | End: 2025-01-01

## 2025-01-01 RX ORDER — MORPHINE SULFATE 10 MG/ML
6 INJECTION, SOLUTION INTRAMUSCULAR; INTRAVENOUS
Status: CANCELLED | OUTPATIENT
Start: 2025-01-01 | End: 2025-04-06

## 2025-01-01 RX ORDER — BISACODYL 5 MG/1
5 TABLET, DELAYED RELEASE ORAL DAILY PRN
Status: DISCONTINUED | OUTPATIENT
Start: 2025-01-01 | End: 2025-01-01 | Stop reason: HOSPADM

## 2025-01-01 RX ORDER — GLYCOPYRROLATE 0.2 MG/ML
0.4 INJECTION INTRAMUSCULAR; INTRAVENOUS EVERY 4 HOURS PRN
Status: CANCELLED | OUTPATIENT
Start: 2025-01-01

## 2025-01-01 RX ORDER — BISACODYL 10 MG
10 SUPPOSITORY, RECTAL RECTAL DAILY PRN
Status: CANCELLED | OUTPATIENT
Start: 2025-01-01

## 2025-01-01 RX ORDER — KETOROLAC TROMETHAMINE 15 MG/ML
15 INJECTION, SOLUTION INTRAMUSCULAR; INTRAVENOUS EVERY 6 HOURS PRN
Status: DISCONTINUED | OUTPATIENT
Start: 2025-01-01 | End: 2025-01-01 | Stop reason: HOSPADM

## 2025-01-01 RX ORDER — GLYCOPYRROLATE 0.2 MG/ML
0.4 INJECTION INTRAMUSCULAR; INTRAVENOUS EVERY 4 HOURS PRN
Status: DISCONTINUED | OUTPATIENT
Start: 2025-01-01 | End: 2025-01-01

## 2025-01-01 RX ORDER — HALOPERIDOL 5 MG/ML
1 INJECTION INTRAMUSCULAR EVERY 6 HOURS PRN
Status: DISCONTINUED | OUTPATIENT
Start: 2025-01-01 | End: 2025-01-01

## 2025-01-01 RX ORDER — LORAZEPAM 2 MG/ML
1 CONCENTRATE ORAL
Status: CANCELLED | OUTPATIENT
Start: 2025-01-01 | End: 2025-01-01

## 2025-01-01 RX ORDER — FUROSEMIDE 10 MG/ML
40 INJECTION INTRAMUSCULAR; INTRAVENOUS ONCE
Status: COMPLETED | OUTPATIENT
Start: 2025-01-01 | End: 2025-01-01

## 2025-01-01 RX ORDER — ONDANSETRON 2 MG/ML
4 INJECTION INTRAMUSCULAR; INTRAVENOUS EVERY 6 HOURS PRN
Status: DISCONTINUED | OUTPATIENT
Start: 2025-01-01 | End: 2025-01-01

## 2025-01-01 RX ORDER — DIPHENOXYLATE HYDROCHLORIDE AND ATROPINE SULFATE 2.5; .025 MG/1; MG/1
1 TABLET ORAL
Status: CANCELLED | OUTPATIENT
Start: 2025-01-01

## 2025-01-01 RX ORDER — LORAZEPAM 2 MG/ML
1 CONCENTRATE ORAL
Status: DISCONTINUED | OUTPATIENT
Start: 2025-01-01 | End: 2025-01-01

## 2025-01-01 RX ORDER — AMOXICILLIN 250 MG
2 CAPSULE ORAL 2 TIMES DAILY PRN
Status: DISCONTINUED | OUTPATIENT
Start: 2025-01-01 | End: 2025-01-01

## 2025-01-01 RX ORDER — ATORVASTATIN CALCIUM 20 MG/1
40 TABLET, FILM COATED ORAL DAILY
Status: DISCONTINUED | OUTPATIENT
Start: 2025-01-01 | End: 2025-01-01

## 2025-01-01 RX ORDER — IPRATROPIUM BROMIDE AND ALBUTEROL SULFATE 2.5; .5 MG/3ML; MG/3ML
3 SOLUTION RESPIRATORY (INHALATION) ONCE
Status: DISCONTINUED | OUTPATIENT
Start: 2025-01-01 | End: 2025-01-01

## 2025-01-01 RX ORDER — FERROUS SULFATE 325(65) MG
325 TABLET ORAL
Status: DISCONTINUED | OUTPATIENT
Start: 2025-01-01 | End: 2025-01-01

## 2025-01-01 RX ORDER — MORPHINE SULFATE 20 MG/ML
3.75 SOLUTION ORAL
Refills: 0 | Status: CANCELLED | OUTPATIENT
Start: 2025-01-01 | End: 2025-04-06

## 2025-01-01 RX ORDER — MORPHINE SULFATE 20 MG/ML
20 SOLUTION ORAL
Refills: 0 | Status: CANCELLED | OUTPATIENT
Start: 2025-01-01 | End: 2025-04-06

## 2025-01-01 RX ORDER — POLYETHYLENE GLYCOL 3350 17 G/17G
17 POWDER, FOR SOLUTION ORAL DAILY PRN
Status: DISCONTINUED | OUTPATIENT
Start: 2025-01-01 | End: 2025-01-01 | Stop reason: HOSPADM

## 2025-01-01 RX ORDER — ACETAMINOPHEN 325 MG/1
650 TABLET ORAL EVERY 4 HOURS PRN
Status: DISCONTINUED | OUTPATIENT
Start: 2025-01-01 | End: 2025-01-01

## 2025-01-01 RX ORDER — ACETAMINOPHEN 650 MG/1
650 SUPPOSITORY RECTAL EVERY 4 HOURS PRN
Status: DISCONTINUED | OUTPATIENT
Start: 2025-01-01 | End: 2025-01-01

## 2025-01-01 RX ORDER — MORPHINE SULFATE 2 MG/ML
6 INJECTION, SOLUTION INTRAMUSCULAR; INTRAVENOUS
Status: DISCONTINUED | OUTPATIENT
Start: 2025-01-01 | End: 2025-01-01 | Stop reason: HOSPADM

## 2025-01-01 RX ORDER — MORPHINE SULFATE 10 MG/ML
6 INJECTION, SOLUTION INTRAMUSCULAR; INTRAVENOUS
Status: DISCONTINUED | OUTPATIENT
Start: 2025-01-01 | End: 2025-01-01

## 2025-01-01 RX ORDER — BISACODYL 5 MG/1
5 TABLET, DELAYED RELEASE ORAL DAILY PRN
Status: DISCONTINUED | OUTPATIENT
Start: 2025-01-01 | End: 2025-01-01

## 2025-01-01 RX ORDER — ACETAMINOPHEN 160 MG/5ML
650 SOLUTION ORAL EVERY 4 HOURS PRN
Status: CANCELLED | OUTPATIENT
Start: 2025-01-01

## 2025-01-01 RX ORDER — AMOXICILLIN 250 MG
2 CAPSULE ORAL 2 TIMES DAILY PRN
Status: CANCELLED | OUTPATIENT
Start: 2025-01-01

## 2025-01-01 RX ORDER — SODIUM CHLORIDE 9 MG/ML
75 INJECTION, SOLUTION INTRAVENOUS CONTINUOUS
Status: DISCONTINUED | OUTPATIENT
Start: 2025-01-01 | End: 2025-01-01

## 2025-01-01 RX ORDER — BISACODYL 5 MG/1
5 TABLET, DELAYED RELEASE ORAL DAILY PRN
Status: CANCELLED | OUTPATIENT
Start: 2025-01-01

## 2025-01-01 RX ORDER — HYDROMORPHONE HYDROCHLORIDE 1 MG/ML
0.5 INJECTION, SOLUTION INTRAMUSCULAR; INTRAVENOUS; SUBCUTANEOUS
Refills: 0 | Status: CANCELLED | OUTPATIENT
Start: 2025-01-01 | End: 2025-04-06

## 2025-01-01 RX ORDER — KETOROLAC TROMETHAMINE 15 MG/ML
15 INJECTION, SOLUTION INTRAMUSCULAR; INTRAVENOUS EVERY 6 HOURS PRN
Status: CANCELLED | OUTPATIENT
Start: 2025-01-01 | End: 2025-01-01

## 2025-01-01 RX ORDER — AMLODIPINE BESYLATE 10 MG/1
10 TABLET ORAL DAILY
Status: DISCONTINUED | OUTPATIENT
Start: 2025-01-01 | End: 2025-01-01

## 2025-01-01 RX ORDER — POLYETHYLENE GLYCOL 3350 17 G/17G
17 POWDER, FOR SOLUTION ORAL DAILY PRN
Status: DISCONTINUED | OUTPATIENT
Start: 2025-01-01 | End: 2025-01-01

## 2025-01-01 RX ADMIN — GLYCOPYRROLATE 0.4 MG: 0.2 INJECTION INTRAMUSCULAR; INTRAVENOUS at 12:54

## 2025-01-01 RX ADMIN — FUROSEMIDE 40 MG: 10 INJECTION, SOLUTION INTRAMUSCULAR; INTRAVENOUS at 12:27

## 2025-01-01 RX ADMIN — LORAZEPAM 2 MG: 2 INJECTION INTRAMUSCULAR; INTRAVENOUS at 14:33

## 2025-01-01 RX ADMIN — MORPHINE SULFATE 2 MG: 2 INJECTION, SOLUTION INTRAMUSCULAR; INTRAVENOUS at 20:17

## 2025-01-01 RX ADMIN — LORAZEPAM 2 MG: 2 INJECTION INTRAMUSCULAR; INTRAVENOUS at 12:50

## 2025-01-01 RX ADMIN — MORPHINE SULFATE 2 MG: 2 INJECTION, SOLUTION INTRAMUSCULAR; INTRAVENOUS at 02:16

## 2025-01-01 RX ADMIN — GLYCOPYRROLATE 0.4 MG: 0.2 INJECTION INTRAMUSCULAR; INTRAVENOUS at 16:47

## 2025-01-01 RX ADMIN — MORPHINE SULFATE 2 MG: 2 INJECTION, SOLUTION INTRAMUSCULAR; INTRAVENOUS at 02:40

## 2025-01-01 RX ADMIN — MORPHINE SULFATE 2 MG: 2 INJECTION, SOLUTION INTRAMUSCULAR; INTRAVENOUS at 16:05

## 2025-01-01 RX ADMIN — LORAZEPAM 0.5 MG: 2 INJECTION INTRAMUSCULAR; INTRAVENOUS at 09:26

## 2025-01-01 RX ADMIN — MORPHINE SULFATE 2 MG: 2 INJECTION, SOLUTION INTRAMUSCULAR; INTRAVENOUS at 13:26

## 2025-01-01 RX ADMIN — HYDROMORPHONE HYDROCHLORIDE 1 MG: 1 INJECTION, SOLUTION INTRAMUSCULAR; INTRAVENOUS; SUBCUTANEOUS at 04:14

## 2025-01-01 RX ADMIN — GLYCOPYRROLATE 0.8 MG: 0.2 INJECTION INTRAMUSCULAR; INTRAVENOUS at 12:42

## 2025-01-01 RX ADMIN — LORAZEPAM 1 MG: 2 INJECTION INTRAMUSCULAR; INTRAVENOUS at 20:17

## 2025-01-01 RX ADMIN — HYDROMORPHONE HYDROCHLORIDE 1 MG: 1 INJECTION, SOLUTION INTRAMUSCULAR; INTRAVENOUS; SUBCUTANEOUS at 16:28

## 2025-01-01 RX ADMIN — LORAZEPAM 1 MG: 2 INJECTION INTRAMUSCULAR; INTRAVENOUS at 02:16

## 2025-01-01 RX ADMIN — HYDROMORPHONE HYDROCHLORIDE 1 MG: 1 INJECTION, SOLUTION INTRAMUSCULAR; INTRAVENOUS; SUBCUTANEOUS at 12:42

## 2025-01-01 RX ADMIN — GLYCOPYRROLATE 0.8 MG: 0.2 INJECTION INTRAMUSCULAR; INTRAVENOUS at 20:37

## 2025-01-01 RX ADMIN — GLYCOPYRROLATE 0.8 MG: 0.2 INJECTION INTRAMUSCULAR; INTRAVENOUS at 14:33

## 2025-01-01 RX ADMIN — PIPERACILLIN AND TAZOBACTAM 3.38 G: 3; .375 INJECTION, POWDER, FOR SOLUTION INTRAVENOUS at 03:13

## 2025-01-01 RX ADMIN — LORAZEPAM 2 MG: 2 INJECTION INTRAMUSCULAR; INTRAVENOUS at 04:41

## 2025-01-01 RX ADMIN — HYDROMORPHONE HYDROCHLORIDE 1 MG: 1 INJECTION, SOLUTION INTRAMUSCULAR; INTRAVENOUS; SUBCUTANEOUS at 04:40

## 2025-01-01 RX ADMIN — BUSPIRONE HYDROCHLORIDE 5 MG: 5 TABLET ORAL at 20:24

## 2025-01-01 RX ADMIN — LORAZEPAM 2 MG: 2 INJECTION INTRAMUSCULAR; INTRAVENOUS at 08:19

## 2025-01-01 RX ADMIN — LORAZEPAM 2 MG: 2 INJECTION INTRAMUSCULAR; INTRAVENOUS at 04:14

## 2025-01-01 RX ADMIN — PANTOPRAZOLE SODIUM 40 MG: 40 INJECTION, POWDER, FOR SOLUTION INTRAVENOUS at 20:24

## 2025-01-01 RX ADMIN — LORAZEPAM 1 MG: 2 INJECTION INTRAMUSCULAR; INTRAVENOUS at 08:07

## 2025-01-01 RX ADMIN — LORAZEPAM 2 MG: 2 INJECTION INTRAMUSCULAR; INTRAVENOUS at 07:58

## 2025-01-01 RX ADMIN — GLYCOPYRROLATE 0.4 MG: 0.2 INJECTION INTRAMUSCULAR; INTRAVENOUS at 12:50

## 2025-01-01 RX ADMIN — CARVEDILOL 25 MG: 25 TABLET, FILM COATED ORAL at 11:21

## 2025-01-01 RX ADMIN — GLYCOPYRROLATE 0.4 MG: 0.2 INJECTION INTRAMUSCULAR; INTRAVENOUS at 00:33

## 2025-01-01 RX ADMIN — LORAZEPAM 2 MG: 2 INJECTION INTRAMUSCULAR; INTRAVENOUS at 16:27

## 2025-01-01 RX ADMIN — HYDROMORPHONE HYDROCHLORIDE 1.5 MG: 2 INJECTION, SOLUTION INTRAMUSCULAR; INTRAVENOUS; SUBCUTANEOUS at 00:44

## 2025-01-01 RX ADMIN — HYDROMORPHONE HYDROCHLORIDE 1.5 MG: 2 INJECTION, SOLUTION INTRAMUSCULAR; INTRAVENOUS; SUBCUTANEOUS at 14:32

## 2025-01-01 RX ADMIN — SERTRALINE HYDROCHLORIDE 75 MG: 50 TABLET, FILM COATED ORAL at 11:20

## 2025-01-01 RX ADMIN — GLYCOPYRROLATE 0.4 MG: 0.2 INJECTION INTRAMUSCULAR; INTRAVENOUS at 20:22

## 2025-01-01 RX ADMIN — CARVEDILOL 25 MG: 25 TABLET, FILM COATED ORAL at 09:26

## 2025-01-01 RX ADMIN — GLYCOPYRROLATE 0.8 MG: 0.2 INJECTION INTRAMUSCULAR; INTRAVENOUS at 07:59

## 2025-01-01 RX ADMIN — GLYCOPYRROLATE 0.4 MG: 0.2 INJECTION INTRAMUSCULAR; INTRAVENOUS at 00:43

## 2025-01-01 RX ADMIN — OLANZAPINE 5 MG: 10 INJECTION, POWDER, LYOPHILIZED, FOR SOLUTION INTRAMUSCULAR at 14:25

## 2025-01-01 RX ADMIN — PANTOPRAZOLE SODIUM 40 MG: 40 INJECTION, POWDER, FOR SOLUTION INTRAVENOUS at 11:57

## 2025-01-01 RX ADMIN — Medication 10 ML: at 20:25

## 2025-01-01 RX ADMIN — ATORVASTATIN CALCIUM 40 MG: 20 TABLET, FILM COATED ORAL at 11:20

## 2025-01-01 RX ADMIN — PANTOPRAZOLE SODIUM 40 MG: 40 INJECTION, POWDER, FOR SOLUTION INTRAVENOUS at 08:26

## 2025-01-01 RX ADMIN — PIPERACILLIN AND TAZOBACTAM 3.38 G: 3; .375 INJECTION, POWDER, FOR SOLUTION INTRAVENOUS at 11:20

## 2025-01-01 RX ADMIN — ACETAMINOPHEN 650 MG: 325 TABLET, FILM COATED ORAL at 11:20

## 2025-01-01 RX ADMIN — LORAZEPAM 2 MG: 2 INJECTION INTRAMUSCULAR; INTRAVENOUS at 16:47

## 2025-01-01 RX ADMIN — Medication 1000 MCG: at 11:21

## 2025-01-01 RX ADMIN — HYDROMORPHONE HYDROCHLORIDE 1 MG: 1 INJECTION, SOLUTION INTRAMUSCULAR; INTRAVENOUS; SUBCUTANEOUS at 09:51

## 2025-01-01 RX ADMIN — LORAZEPAM 2 MG: 2 INJECTION INTRAMUSCULAR; INTRAVENOUS at 12:42

## 2025-01-01 RX ADMIN — GLYCOPYRROLATE 0.4 MG: 0.2 INJECTION INTRAMUSCULAR; INTRAVENOUS at 08:33

## 2025-01-01 RX ADMIN — LORAZEPAM 1 MG: 2 INJECTION INTRAMUSCULAR; INTRAVENOUS at 12:54

## 2025-01-01 RX ADMIN — POTASSIUM CHLORIDE 10 MEQ: 7.46 INJECTION, SOLUTION INTRAVENOUS at 08:26

## 2025-01-01 RX ADMIN — LORAZEPAM 0.5 MG: 2 INJECTION INTRAMUSCULAR; INTRAVENOUS at 02:39

## 2025-01-01 RX ADMIN — GLYCOPYRROLATE 0.4 MG: 0.2 INJECTION INTRAMUSCULAR; INTRAVENOUS at 04:15

## 2025-01-01 RX ADMIN — PANTOPRAZOLE SODIUM 40 MG: 40 INJECTION, POWDER, FOR SOLUTION INTRAVENOUS at 09:15

## 2025-01-01 RX ADMIN — GLYCOPYRROLATE 0.8 MG: 0.2 INJECTION INTRAMUSCULAR; INTRAVENOUS at 00:43

## 2025-01-01 RX ADMIN — PIPERACILLIN AND TAZOBACTAM 3.38 G: 3; .375 INJECTION, POWDER, FOR SOLUTION INTRAVENOUS at 04:46

## 2025-01-01 RX ADMIN — LORAZEPAM 2 MG: 2 INJECTION INTRAMUSCULAR; INTRAVENOUS at 20:22

## 2025-01-01 RX ADMIN — SODIUM CHLORIDE 75 ML/HR: 9 INJECTION, SOLUTION INTRAVENOUS at 09:15

## 2025-01-01 RX ADMIN — HYDROMORPHONE HYDROCHLORIDE 1 MG: 1 INJECTION, SOLUTION INTRAMUSCULAR; INTRAVENOUS; SUBCUTANEOUS at 08:32

## 2025-01-01 RX ADMIN — PIPERACILLIN AND TAZOBACTAM 3.38 G: 3; .375 INJECTION, POWDER, FOR SOLUTION INTRAVENOUS at 12:27

## 2025-01-01 RX ADMIN — MORPHINE SULFATE 4 MG: 4 INJECTION, SOLUTION INTRAMUSCULAR; INTRAVENOUS at 00:43

## 2025-01-01 RX ADMIN — GLYCOPYRROLATE 0.4 MG: 0.2 INJECTION INTRAMUSCULAR; INTRAVENOUS at 08:08

## 2025-01-01 RX ADMIN — GLYCOPYRROLATE 0.4 MG: 0.2 INJECTION INTRAMUSCULAR; INTRAVENOUS at 08:19

## 2025-01-01 RX ADMIN — FERROUS SULFATE TAB 325 MG (65 MG ELEMENTAL FE) 325 MG: 325 (65 FE) TAB at 11:21

## 2025-01-01 RX ADMIN — TAMSULOSIN HYDROCHLORIDE 0.4 MG: 0.4 CAPSULE ORAL at 11:20

## 2025-01-01 RX ADMIN — GABAPENTIN 200 MG: 100 CAPSULE ORAL at 11:21

## 2025-01-01 RX ADMIN — MORPHINE SULFATE 4 MG: 4 INJECTION, SOLUTION INTRAMUSCULAR; INTRAVENOUS at 20:22

## 2025-01-01 RX ADMIN — PIPERACILLIN AND TAZOBACTAM 3.38 G: 3; .375 INJECTION, POWDER, FOR SOLUTION INTRAVENOUS at 21:14

## 2025-01-01 RX ADMIN — LORAZEPAM 0.5 MG: 2 INJECTION INTRAMUSCULAR; INTRAVENOUS at 16:05

## 2025-01-01 RX ADMIN — PIPERACILLIN AND TAZOBACTAM 3.38 G: 3; .375 INJECTION, POWDER, FOR SOLUTION INTRAVENOUS at 19:53

## 2025-01-01 RX ADMIN — GLYCOPYRROLATE 0.8 MG: 0.2 INJECTION INTRAMUSCULAR; INTRAVENOUS at 16:33

## 2025-01-01 RX ADMIN — HYDROMORPHONE HYDROCHLORIDE 1 MG: 1 INJECTION, SOLUTION INTRAMUSCULAR; INTRAVENOUS; SUBCUTANEOUS at 20:35

## 2025-01-01 RX ADMIN — GLYCOPYRROLATE 0.8 MG: 0.2 INJECTION INTRAMUSCULAR; INTRAVENOUS at 04:54

## 2025-01-01 RX ADMIN — OLANZAPINE 2.5 MG: 5 TABLET, FILM COATED ORAL at 20:24

## 2025-01-01 RX ADMIN — GABAPENTIN 200 MG: 100 CAPSULE ORAL at 17:03

## 2025-01-01 RX ADMIN — HYDROMORPHONE HYDROCHLORIDE 1 MG: 1 INJECTION, SOLUTION INTRAMUSCULAR; INTRAVENOUS; SUBCUTANEOUS at 20:37

## 2025-01-01 RX ADMIN — CARVEDILOL 25 MG: 25 TABLET, FILM COATED ORAL at 17:03

## 2025-01-01 RX ADMIN — LORAZEPAM 0.5 MG: 2 INJECTION INTRAMUSCULAR; INTRAVENOUS at 13:54

## 2025-01-01 RX ADMIN — MORPHINE SULFATE 2 MG: 2 INJECTION, SOLUTION INTRAMUSCULAR; INTRAVENOUS at 13:54

## 2025-01-01 RX ADMIN — HYDROMORPHONE HYDROCHLORIDE 1.5 MG: 2 INJECTION, SOLUTION INTRAMUSCULAR; INTRAVENOUS; SUBCUTANEOUS at 04:55

## 2025-01-01 RX ADMIN — LORAZEPAM 0.5 MG: 2 INJECTION INTRAMUSCULAR; INTRAVENOUS at 20:16

## 2025-01-01 RX ADMIN — HYDROMORPHONE HYDROCHLORIDE 1 MG: 1 INJECTION, SOLUTION INTRAMUSCULAR; INTRAVENOUS; SUBCUTANEOUS at 00:33

## 2025-01-01 RX ADMIN — FUROSEMIDE 40 MG: 10 INJECTION, SOLUTION INTRAMUSCULAR; INTRAVENOUS at 03:35

## 2025-01-01 RX ADMIN — IPRATROPIUM BROMIDE AND ALBUTEROL SULFATE 3 ML: .5; 3 SOLUTION RESPIRATORY (INHALATION) at 05:32

## 2025-01-01 RX ADMIN — HYDROMORPHONE HYDROCHLORIDE 1 MG: 1 INJECTION, SOLUTION INTRAMUSCULAR; INTRAVENOUS; SUBCUTANEOUS at 08:20

## 2025-01-01 RX ADMIN — PIPERACILLIN AND TAZOBACTAM 3.38 G: 3; .375 INJECTION, POWDER, FOR SOLUTION INTRAVENOUS at 19:46

## 2025-01-01 RX ADMIN — MORPHINE SULFATE 4 MG: 4 INJECTION, SOLUTION INTRAMUSCULAR; INTRAVENOUS at 08:07

## 2025-01-01 RX ADMIN — PANTOPRAZOLE SODIUM 40 MG: 40 INJECTION, POWDER, FOR SOLUTION INTRAVENOUS at 21:14

## 2025-01-01 RX ADMIN — LORAZEPAM 2 MG: 2 INJECTION INTRAMUSCULAR; INTRAVENOUS at 08:32

## 2025-01-01 RX ADMIN — LORAZEPAM 2 MG: 2 INJECTION INTRAMUSCULAR; INTRAVENOUS at 00:33

## 2025-01-01 RX ADMIN — IPRATROPIUM BROMIDE AND ALBUTEROL SULFATE 3 ML: .5; 3 SOLUTION RESPIRATORY (INHALATION) at 01:33

## 2025-01-01 RX ADMIN — HYDROMORPHONE HYDROCHLORIDE 1.5 MG: 2 INJECTION, SOLUTION INTRAMUSCULAR; INTRAVENOUS; SUBCUTANEOUS at 16:33

## 2025-01-01 RX ADMIN — LORAZEPAM 2 MG: 2 INJECTION INTRAMUSCULAR; INTRAVENOUS at 00:43

## 2025-01-01 RX ADMIN — LORAZEPAM 2 MG: 2 INJECTION INTRAMUSCULAR; INTRAVENOUS at 20:35

## 2025-01-01 RX ADMIN — MORPHINE SULFATE 4 MG: 4 INJECTION, SOLUTION INTRAMUSCULAR; INTRAVENOUS at 16:47

## 2025-01-01 RX ADMIN — MORPHINE SULFATE 2 MG: 2 INJECTION, SOLUTION INTRAMUSCULAR; INTRAVENOUS at 04:58

## 2025-01-01 RX ADMIN — MORPHINE SULFATE 4 MG: 4 INJECTION, SOLUTION INTRAMUSCULAR; INTRAVENOUS at 12:55

## 2025-01-01 RX ADMIN — GLYCOPYRROLATE 0.4 MG: 0.2 INJECTION INTRAMUSCULAR; INTRAVENOUS at 04:40

## 2025-01-01 RX ADMIN — MORPHINE SULFATE 4 MG: 4 INJECTION, SOLUTION INTRAMUSCULAR; INTRAVENOUS at 06:36

## 2025-01-01 RX ADMIN — PIPERACILLIN AND TAZOBACTAM 3.38 G: 3; .375 INJECTION, POWDER, FOR SOLUTION INTRAVENOUS at 04:03

## 2025-01-01 RX ADMIN — LORAZEPAM 2 MG: 2 INJECTION INTRAMUSCULAR; INTRAVENOUS at 09:51

## 2025-01-01 RX ADMIN — FUROSEMIDE 40 MG: 10 INJECTION, SOLUTION INTRAMUSCULAR; INTRAVENOUS at 09:28

## 2025-01-01 RX ADMIN — POTASSIUM CHLORIDE 10 MEQ: 7.46 INJECTION, SOLUTION INTRAVENOUS at 10:10

## 2025-01-01 RX ADMIN — GLYCOPYRROLATE 0.4 MG: 0.2 INJECTION INTRAMUSCULAR; INTRAVENOUS at 20:35

## 2025-01-01 RX ADMIN — LORAZEPAM 2 MG: 2 INJECTION INTRAMUSCULAR; INTRAVENOUS at 20:37

## 2025-01-01 RX ADMIN — HYDROMORPHONE HYDROCHLORIDE 1.5 MG: 2 INJECTION, SOLUTION INTRAMUSCULAR; INTRAVENOUS; SUBCUTANEOUS at 07:58

## 2025-01-01 RX ADMIN — LORAZEPAM 2 MG: 2 INJECTION INTRAMUSCULAR; INTRAVENOUS at 04:55

## 2025-01-01 RX ADMIN — LORAZEPAM 2 MG: 2 INJECTION INTRAMUSCULAR; INTRAVENOUS at 16:33

## 2025-01-01 RX ADMIN — GABAPENTIN 200 MG: 100 CAPSULE ORAL at 20:24

## 2025-01-01 RX ADMIN — LORAZEPAM 1 MG: 2 INJECTION INTRAMUSCULAR; INTRAVENOUS at 06:36

## 2025-01-01 RX ADMIN — HYDROMORPHONE HYDROCHLORIDE 1 MG: 1 INJECTION, SOLUTION INTRAMUSCULAR; INTRAVENOUS; SUBCUTANEOUS at 12:50

## 2025-01-01 RX ADMIN — MORPHINE SULFATE 2 MG: 2 INJECTION, SOLUTION INTRAMUSCULAR; INTRAVENOUS at 09:26

## 2025-01-01 RX ADMIN — GLYCOPYRROLATE 0.4 MG: 0.2 INJECTION INTRAMUSCULAR; INTRAVENOUS at 16:27

## 2025-01-02 ENCOUNTER — TELEPHONE (OUTPATIENT)
Dept: FAMILY MEDICINE CLINIC | Facility: CLINIC | Age: 85
End: 2025-01-02
Payer: MEDICARE

## 2025-01-14 NOTE — TELEPHONE ENCOUNTER
Caller: ArreguinMaribel    Relationship: Emergency Contact    Best call back number: [phone]     Requested Prescriptions:   Requested Prescriptions     Pending Prescriptions Disp Refills    QUEtiapine (SEROquel) 25 MG tablet 30 tablet 0     Sig: Take 1 tablet by mouth Every Night for 30 days.        Pharmacy where request should be sent: [id number] - [place], [place] - [address] AT [address] - [phone]  - [phone] FX     Last office visit with prescribing clinician: 9/26/2024   Last telemedicine visit with prescribing clinician: Visit date not found   Next office visit with prescribing clinician: 3/27/2025       Would you like a call back once the refill request has been completed: [x] Yes [] No    If the office needs to give you a call back, can they leave a voicemail: [x] Yes [] No    Melinda Weller Rep   01/14/25 16:31 EST

## 2025-01-15 RX ORDER — QUETIAPINE FUMARATE 25 MG/1
25 TABLET, FILM COATED ORAL NIGHTLY
Qty: 90 TABLET | Refills: 0 | Status: SHIPPED | OUTPATIENT
Start: 2025-01-15 | End: 2025-02-14

## 2025-01-15 NOTE — TELEPHONE ENCOUNTER
Rx Refill Note  Requested Prescriptions     Pending Prescriptions Disp Refills    QUEtiapine (SEROquel) 25 MG tablet 30 tablet 0     Sig: Take 1 tablet by mouth Every Night for 30 days.      Last office visit with prescribing clinician: 9/26/2024   Last telemedicine visit with prescribing clinician: Visit date not found   Next office visit with prescribing clinician: 3/27/2025                         Would you like a call back once the refill request has been completed: [] Yes [] No    If the office needs to give you a call back, can they leave a voicemail: [] Yes [] No    Darius Cardoso MA  01/15/25, 08:03 EST

## 2025-01-20 RX ORDER — CARVEDILOL 25 MG/1
TABLET ORAL
Qty: 180 TABLET | Refills: 0 | Status: SHIPPED | OUTPATIENT
Start: 2025-01-20

## 2025-01-28 ENCOUNTER — DOCUMENTATION (OUTPATIENT)
Dept: PHYSICAL THERAPY | Facility: CLINIC | Age: 85
End: 2025-01-28
Payer: MEDICARE

## 2025-01-28 DIAGNOSIS — R26.89 BALANCE PROBLEM: Primary | ICD-10-CM

## 2025-01-28 NOTE — PROGRESS NOTES
Discharge Summary  Discharge Summary from Physical Therapy Report  [address], [address], [place] [address]    Patient Information  Dereck Ramírez  1940    Dates  PT visit: 11/28/2023 - 01/31/2024  Number of Visits: 18     Discharge Status of Patient: See Final Note dated 01/31/2024    Goals: Partially Met    Visit Diagnoses:    ICD-10-CM ICD-9-CM   1. Balance problem  R26.89 781.99       Discharge Plan: Continue with current home exercise program as instructed    Date of Discharge 1/28/2025        Inge Howard PT, DPT, OCS  Physical Therapist  [place] #[id number]  Electronically Signed by: Inge Howard PT, 01/28/25, 12:31 PM EST

## 2025-01-29 ENCOUNTER — TELEPHONE (OUTPATIENT)
Dept: FAMILY MEDICINE CLINIC | Facility: CLINIC | Age: 85
End: 2025-01-29

## 2025-01-29 NOTE — TELEPHONE ENCOUNTER
[facility] staff attempted to follow warm transfer process and was unsuccessful     Caller: Maribel Arreguin    Relationship to patient: Emergency Contact    Best call back number  [phone]     Patient is needing:   PATIENT WAS IN THE HOSPITAL ON:  DECEMBER 24, 2024 REHAB  NOVEMBER 24-29 [facility] UNABLE TO SCHEDULE APPOINTMENT DUE TO OUR PROTOCOL ON HOSPITAL FOLLOW UP.    ALSO NEED TO RESCHEDULE 3/27/25 APPOINTMENT

## 2025-01-29 NOTE — TELEPHONE ENCOUNTER
Caller: [facility]    Relationship:     Best call back number:     [facility] [phone]       What was the call regarding: PATIENT HAS MET ALL GOALS WITH PHYSICAL THERAPY AND THEY WILL BE DISCHARGING HIM FROM HOME HEALTH       Is it okay if the provider responds through MyChart: CALL IF NEEDED

## 2025-02-03 RX ORDER — POTASSIUM CHLORIDE 750 MG/1
10 TABLET, EXTENDED RELEASE ORAL DAILY
Qty: 90 TABLET | Refills: 3 | Status: SHIPPED | OUTPATIENT
Start: 2025-02-03

## 2025-02-05 ENCOUNTER — OFFICE VISIT (OUTPATIENT)
Dept: FAMILY MEDICINE CLINIC | Facility: CLINIC | Age: 85
End: 2025-02-05
Payer: MEDICARE

## 2025-02-05 VITALS
WEIGHT: 222 LBS | OXYGEN SATURATION: 96 % | HEIGHT: 72 IN | BODY MASS INDEX: 30.07 KG/M2 | DIASTOLIC BLOOD PRESSURE: 80 MMHG | HEART RATE: 96 BPM | SYSTOLIC BLOOD PRESSURE: 136 MMHG

## 2025-02-05 DIAGNOSIS — I10 HYPERTENSION, ESSENTIAL: Chronic | ICD-10-CM

## 2025-02-05 DIAGNOSIS — E11.59 TYPE 2 DIABETES MELLITUS WITH OTHER CIRCULATORY COMPLICATION, WITHOUT LONG-TERM CURRENT USE OF INSULIN: ICD-10-CM

## 2025-02-05 DIAGNOSIS — F03.B11 MODERATE DEMENTIA WITH AGITATION, UNSPECIFIED DEMENTIA TYPE: ICD-10-CM

## 2025-02-05 DIAGNOSIS — R29.6 RECURRENT FALLS: Primary | ICD-10-CM

## 2025-02-05 DIAGNOSIS — E78.2 MIXED HYPERLIPIDEMIA: ICD-10-CM

## 2025-02-05 PROCEDURE — 99214 OFFICE O/P EST MOD 30 MIN: CPT | Performed by: NURSE PRACTITIONER

## 2025-02-05 PROCEDURE — G2211 COMPLEX E/M VISIT ADD ON: HCPCS | Performed by: NURSE PRACTITIONER

## 2025-02-05 PROCEDURE — 3079F DIAST BP 80-89 MM HG: CPT | Performed by: NURSE PRACTITIONER

## 2025-02-05 PROCEDURE — 1125F AMNT PAIN NOTED PAIN PRSNT: CPT | Performed by: NURSE PRACTITIONER

## 2025-02-05 PROCEDURE — 3075F SYST BP GE 130 - 139MM HG: CPT | Performed by: NURSE PRACTITIONER

## 2025-02-05 NOTE — PROGRESS NOTES
"Chief Complaint  Hospital Follow Up Visit    Subjective        Dereck Ramírez presents to [facility]  History of Present Illness  Dereck presents for routine follow up. He was hospitalized twice in November, the first hospital date of admission 11/17/2024, date of discharge 11/19/2024. His daughter noticed he was not acting like hisself and was more confused. They suspected dementia that progressed and he was discharged home. After being home, he had several falls and then presented back to the ED  11/24/24 and was admitted for dementia, odontoid fracture, CKD and anemia. He discharged to skilled nursing until 12/24/2024. He is doing well, at baseline with increased nighttime irritability. He was started on seroquel to help with the agitation. No recent labs but agreeable to complete today to monitor kidney function, glucose and wbc.     History of Present Illness      Objective   Vital Signs:  /80 (BP Location: Right arm, Patient Position: Sitting, Cuff Size: Large Adult)   Pulse 96   Ht 182 cm (71.65\")   Wt 101 kg (222 lb)   SpO2 96%   BMI 30.40 kg/m²   Estimated body mass index is 30.4 kg/m² as calculated from the following:    Height as of this encounter: 182 cm (71.65\").    Weight as of this encounter: 101 kg (222 lb).             Physical Exam  Constitutional:       General: He is not in acute distress.     Appearance: He is well-developed. He is not diaphoretic.   Cardiovascular:      Rate and Rhythm: Normal rate and regular rhythm.      Heart sounds: Normal heart sounds. No murmur heard.     No friction rub. No gallop.   Pulmonary:      Effort: Pulmonary effort is normal. No respiratory distress.      Breath sounds: Normal breath sounds. No wheezing or rales.   Musculoskeletal:      Cervical back: Neck supple.   Skin:     General: Skin is warm and dry.   Neurological:      Mental Status: He is alert and oriented to person, place, and time.       Physical " Exam      Result Review :          Results      Assessment & Plan             Assessment and Plan     Diagnoses and all orders for this visit:    1. Recurrent falls (Primary)  -     Ambulatory Referral to Case Management Caregiving/Support    2. Moderate dementia with agitation, unspecified dementia type  -     Ambulatory Referral to Case Management Caregiving/Support    3. Type 2 diabetes mellitus with other circulatory complication, without long-term current use of insulin  -     CBC & Differential  -     Comprehensive metabolic panel  -     Hemoglobin A1c    4. Hypertension, essential  -     CBC & Differential  -     Comprehensive metabolic panel    5. Mixed hyperlipidemia  -     CBC & Differential  -     Comprehensive metabolic panel    Recurrent falls: currently under PT to help with strengthening, discussed safe mechanics including walker and wheelchair. Will refer to case management to see if options for assistance with bathing once weekly is available. He does have increased symptoms of dementia, on seroquel with some improvement, daughter still reports some night time irritability, will continue current dose of seroquel 25 mg and consider increase in the future if needed.     DM: monitor A1C, continue current medication at this time  HTN: chronic, stable, monitor cmp and continue current medication  Hyperlipidemia: chronic stable, hold on lipids due to nonfasting, will monitor at upcoming visit         Follow Up     No follow-ups on file.  Patient was given instructions and counseling regarding his condition or for health maintenance advice. Please see specific information pulled into the AVS if appropriate.       Patient or patient representative verbalized consent for the use of Ambient Listening during the visit with  KAVON Rosario for chart documentation. 2/5/2025  13:42 EST

## 2025-02-06 ENCOUNTER — REFERRAL TRIAGE (OUTPATIENT)
Dept: CASE MANAGEMENT | Facility: OTHER | Age: 85
End: 2025-02-06
Payer: MEDICARE

## 2025-02-06 ENCOUNTER — PATIENT OUTREACH (OUTPATIENT)
Dept: CASE MANAGEMENT | Facility: OTHER | Age: 85
End: 2025-02-06
Payer: MEDICARE

## 2025-02-06 ENCOUNTER — TELEPHONE (OUTPATIENT)
Age: 85
End: 2025-02-06
Payer: MEDICARE

## 2025-02-06 LAB
ALBUMIN SERPL-MCNC: 4 G/DL (ref 3.5–5.2)
ALBUMIN/GLOB SERPL: 1.3 G/DL
ALP SERPL-CCNC: 172 U/L (ref 39–117)
ALT SERPL-CCNC: 16 U/L (ref 1–41)
AST SERPL-CCNC: 17 U/L (ref 1–40)
BASOPHILS # BLD AUTO: 0.06 10*3/MM3 (ref 0–0.2)
BASOPHILS NFR BLD AUTO: 0.9 % (ref 0–1.5)
BILIRUB SERPL-MCNC: <0.2 MG/DL (ref 0–1.2)
BUN SERPL-MCNC: 35 MG/DL (ref 8–23)
BUN/CREAT SERPL: 23.2 (ref 7–25)
CALCIUM SERPL-MCNC: 9 MG/DL (ref 8.6–10.5)
CHLORIDE SERPL-SCNC: 104 MMOL/L (ref 98–107)
CO2 SERPL-SCNC: 26 MMOL/L (ref 22–29)
CREAT SERPL-MCNC: 1.51 MG/DL (ref 0.76–1.27)
EGFRCR SERPLBLD CKD-EPI 2021: 45.8 ML/MIN/1.73
EOSINOPHIL # BLD AUTO: 0.36 10*3/MM3 (ref 0–0.4)
EOSINOPHIL NFR BLD AUTO: 5.5 % (ref 0.3–6.2)
ERYTHROCYTE [DISTWIDTH] IN BLOOD BY AUTOMATED COUNT: 13.5 % (ref 12.3–15.4)
GLOBULIN SER CALC-MCNC: 3.2 GM/DL
GLUCOSE SERPL-MCNC: 112 MG/DL (ref 65–99)
HBA1C MFR BLD: 6.8 % (ref 4.8–5.6)
HCT VFR BLD AUTO: 28.4 % (ref 37.5–51)
HGB BLD-MCNC: 9.3 G/DL (ref 13–17.7)
IMM GRANULOCYTES # BLD AUTO: 0.01 10*3/MM3 (ref 0–0.05)
IMM GRANULOCYTES NFR BLD AUTO: 0.2 % (ref 0–0.5)
LYMPHOCYTES # BLD AUTO: 1.3 10*3/MM3 (ref 0.7–3.1)
LYMPHOCYTES NFR BLD AUTO: 19.8 % (ref 19.6–45.3)
MCH RBC QN AUTO: 28.4 PG (ref 26.6–33)
MCHC RBC AUTO-ENTMCNC: 32.7 G/DL (ref 31.5–35.7)
MCV RBC AUTO: 86.9 FL (ref 79–97)
MONOCYTES # BLD AUTO: 0.85 10*3/MM3 (ref 0.1–0.9)
MONOCYTES NFR BLD AUTO: 12.9 % (ref 5–12)
NEUTROPHILS # BLD AUTO: 4 10*3/MM3 (ref 1.7–7)
NEUTROPHILS NFR BLD AUTO: 60.7 % (ref 42.7–76)
NRBC BLD AUTO-RTO: 0 /100 WBC (ref 0–0.2)
PLATELET # BLD AUTO: 257 10*3/MM3 (ref 140–450)
POTASSIUM SERPL-SCNC: 4.3 MMOL/L (ref 3.5–5.2)
PROT SERPL-MCNC: 7.2 G/DL (ref 6–8.5)
RBC # BLD AUTO: 3.27 10*6/MM3 (ref 4.14–5.8)
SODIUM SERPL-SCNC: 141 MMOL/L (ref 136–145)
WBC # BLD AUTO: 6.58 10*3/MM3 (ref 3.4–10.8)

## 2025-02-06 NOTE — TELEPHONE ENCOUNTER
----- Message from Dung NOBLES sent at 2/6/2025 11:05 AM EST -----  Patient will return call to schedule this appointment. Patient has currently been admitted to rehab after falling and breaking his neck.  ----- Message -----  From: Khai Turk CMA  Sent: 1/20/2025   7:30 AM EST  To: Melinda Mckeon    Patient is overdue for a 6 month f/u. Can we please get him an apt with BK please. Thanks

## 2025-02-06 NOTE — OUTREACH NOTE
AMBULATORY CASE MANAGEMENT NOTE    Names and Relationships of Patient/Support Persons: Contact: Maribel Arreguin; Relationship: Emergency Contact -     Patient Outreach    Spoke with patient's daughter regarding needs for home bath aide. Patient is currently active with [facility]. Daughter states the bath aide came two weeks ago and stated that would be her last visit. Daughter is open to private pay options sent to [place]. SW referral placed to assist with information. Follow up scheduled next week.     Education Documentation  No documentation found.        Lynette SANTANA  Ambulatory Case Management    2/6/2025, 16:21 EST

## 2025-02-11 ENCOUNTER — PATIENT OUTREACH (OUTPATIENT)
Age: 85
End: 2025-02-11
Payer: MEDICARE

## 2025-02-11 NOTE — OUTREACH NOTE
Social Work Assessment  Questions/Answers      Flowsheet Row Most Recent Value   Referral Source nursing, outpatient staff, outpatient clinic   Reason for Consult community resources, other (see comments)  [in home personal care agencies]   Preferred Language English   Advance Care Planning Reviewed no concerns identified   People in Home child(yvon), adult, grandchild(yvon)   Current Living Arrangements home   Potentially Unsafe Housing Conditions none   In the past 12 months has the electric, gas, oil, or water company threatened to shut off services in your home? No   Primary Care Provided by self, child(yvon)   Quality of Family Relationships helpful, involved, supportive   Employment Status retired   Source of Income unable to assess   Application for Public Assistance not applied   Medications assistive person   Meal Preparation assistive person   Housekeeping assistive person   Laundry assistive person   Shopping assistive person   If for any reason you need help with day-to-day activities such as bathing, preparing meals, shopping, managing finances, etc., do you get the help you need? I could use a little more help   Sources of Support community support, adult child(yvon)   Do you want help finding or keeping work or a job? I do not need or want help   Do you want help with school or training? For example, starting or completing job training or getting a high school diploma, GED or equivalent No   Transportation Concerns none   Outpatient/Agency/Support Group Needs homecare agency          SDOH updated and reviewed with the patient during this program:  --     Disabilities: At Risk (2/11/2025)    Disabilities     Concentrating, Remembering, or Making Decisions Difficulty: yes     Doing Errands Independently Difficulty: yes      --     Employment: Not At Risk (2/11/2025)    Employment     Do you want help finding or keeping work or a job?: I do not need or want help      Financial Resource Strain: Low Risk   (2/11/2025)    Overall Financial Resource Strain (CARDIA)     Difficulty of Paying Living Expenses: Not very hard      --     Food Insecurity: No Food Insecurity (2/11/2025)    Hunger Vital Sign     Worried About Running Out of Food in the Last Year: Never true     Ran Out of Food in the Last Year: Never true      --     Housing Stability: Low Risk  (2/11/2025)    Housing Stability Vital Sign     Unable to Pay for Housing in the Last Year: No     Number of Times Moved in the Last Year: 1     Homeless in the Last Year: No      --     Transportation Needs: No Transportation Needs (2/11/2025)    PRAPARE - Transportation     Lack of Transportation (Medical): No     Lack of Transportation (Non-Medical): No      --     Utilities: Not At Risk (2/11/2025)    [facility] Utilities     Threatened with loss of utilities: No      Continuing Care   [facility]    [address] [address]    Phone: [phone]    Request Status: Considering    Services: Transportation    Resource for: Transportation Needs   [facility]    [address], [place] [address]    Phone: [phone]    Request Status: Considering    Services: Respite and Relief for Caregivers   [facility] - [address], [address], [place] [address]    Phone: [phone]    Request Status: Considering    Services: Personal Care Services     Patient Outreach    MSW outreach to patient's daughter as requested per RN referral for assistance with community resource needs. Patient's daughter requesting information via [employer] regarding  in home personal care providers that could assist patient with bathing. MSW provided a list of agencies with contact numbers for patient's daughter to call regarding assistance for patient. MSW included contact information in [employer] message to patient's daughter and she can follow-up with MSW if needed for additional assistance.    Rayne  Y -   Ambulatory Case Management    2/11/2025, 15:29 EST

## 2025-02-13 RX ORDER — FERROUS GLUCONATE 324(38)MG
1 TABLET ORAL
Qty: 90 TABLET | Refills: 1 | Status: SHIPPED | OUTPATIENT
Start: 2025-02-13

## 2025-02-13 NOTE — TELEPHONE ENCOUNTER
Rx Refill Note  Requested Prescriptions     Pending Prescriptions Disp Refills    ferrous gluconate (FERGON) 324 MG tablet [Pharmacy Med Name: FERROUS GLUC 324MG TABLETS] 90 tablet 1     Sig: TAKE 1 TABLET BY MOUTH DAILY WITH BREAKFAST      Last office visit with prescribing clinician: 2/5/2025   Last telemedicine visit with prescribing clinician: Visit date not found   Next office visit with prescribing clinician: 8/6/2025                         Would you like a call back once the refill request has been completed: [] Yes [] No    If the office needs to give you a call back, can they leave a voicemail: [] Yes [] No    Darius Cardoso MA  02/13/25, 10:06 EST

## 2025-02-26 ENCOUNTER — TELEPHONE (OUTPATIENT)
Dept: FAMILY MEDICINE CLINIC | Facility: CLINIC | Age: 85
End: 2025-02-26

## 2025-02-26 NOTE — TELEPHONE ENCOUNTER
I know with a hospital stay he they can transfer him to the nursing home, I am reaching out to  to see if there are other ways to get him admitted.

## 2025-02-26 NOTE — TELEPHONE ENCOUNTER
Caller: Benita Arreguin    Relationship: Emergency Contact    Best call back number: [phone]     What was the call regarding: BENITA IS CALLING TO SPEAK TO SOMEONE ABOUT GETTING THE PATIENT INTO A NURSING HOME    PLEASE CALL AND ADVISE

## 2025-02-27 ENCOUNTER — PATIENT OUTREACH (OUTPATIENT)
Age: 85
End: 2025-02-27
Payer: MEDICARE

## 2025-02-27 NOTE — OUTREACH NOTE
Patient Outreach    MSW outreach to patient's daughter Maribel to discuss placement needed for patient. Patient's daughter states that patient is need of long term care (LTC) placement as she does not feel her father's health is improving. MSW explained long term care placement process to patient's daughter and discussed that the first step would be reviewing her father's income and assets. If patient is not within the income limits for Medicaid, she would need to meet with an elder law  to discuss the spend down process for Medicaid. Patient's daughter and MSW discussed then completing the long term care Medicaid application so that patient could go into Medicaid pending status. MSW offered to send referrals to long term care facilities once LTC Medicaid application is completed and patient in pending status. MSW has sent an overview of this process and links to income levels and potential long term facilities for patient's daughter to consider. Patient's daughter would like to review this information with her sister. MSW offered contact information to answer any questions.    Rayne MIRZA -   Ambulatory Case Management    2/27/2025, 12:11 EST

## 2025-03-21 ENCOUNTER — PATIENT OUTREACH (OUTPATIENT)
Age: 85
End: 2025-03-21
Payer: MEDICARE

## 2025-03-21 NOTE — OUTREACH NOTE
Patient Outreach    MSW spoke with patient's daughter Maribel regarding needs for long term care placement. Maribel stated that patient is ready for long term care placement but that her sister Georgina is Power of  (POA). Maribel stated that her sister Georgina outreached for assistance to MSW. MSW unaware of any missed calls or messages from patient's daughter Georgina. MSW offered to outreach to Georgina directly regarding long term care process and call went straight to voicemail. MSW left voicemail and call back number.    Rayne MIRZA -   Ambulatory Case Management    3/21/2025, 15:36 EDT

## 2025-03-27 PROBLEM — F03.918 DEMENTIA WITH AGGRESSIVE BEHAVIOR: Status: ACTIVE | Noted: 2025-01-01

## 2025-03-27 NOTE — TELEPHONE ENCOUNTER
Caller: Maribel Arreguin    Relationship: Emergency Contact    Best call back number: [phone]    What is the best time to reach you: ANYTIME     Who are you requesting to speak with (clinical staff, provider,  specific staff member): DAUGHTER     Do you know the name of the person who called: NIKI HERNANDEZ     What was the call regarding: HE DUG INTO HIS LEGS IN HIS SLEEP AND THEY ARE BLEEDING, AND TO DISCUSS A NURSING HOME     Is it okay if the provider responds through MyChart: NO

## 2025-03-27 NOTE — ED NOTES
"Nursing report ED to floor  Dereck Ramírez  84 y.o.  male    HPI :  HPI  Stated Reason for Visit: pt family pt has dementia. pt daughter states she wants him to be placed in a nursing home. last night pt was \"digging\" at his legs to where his legs are bleeding  History Obtained From: family    Chief Complaint  Chief Complaint   Patient presents with    Wound Check       Admitting doctor:   Belle Duran MD    Admitting diagnosis:   The primary encounter diagnosis was Dementia with agitation, unspecified dementia severity, unspecified dementia type. Diagnoses of Abrasion of lower extremity, unspecified laterality, initial encounter and Acute on chronic anemia were also pertinent to this visit.    Code status:   Current Code Status       Date Active Code Status Order ID Comments User Context       3/27/2025 1529 CPR (Attempt to Resuscitate) [id number]  Belle Duran MD ED        Question Answer    Code Status (Patient has no pulse and is not breathing) CPR (Attempt to Resuscitate)    Medical Interventions (Patient has pulse or is breathing) Full Support                    Allergies:   Patient has no known allergies.    Isolation:   No active isolations    Intake and Output  No intake or output data in the 24 hours ending 03/27/25 1628    Weight:   There were no vitals filed for this visit.    Most recent vitals:   Vitals:    03/27/25 1231 03/27/25 1501 03/27/25 1531 03/27/25 1601   BP: 129/63 123/75 137/56 137/58   BP Location: Right arm Right arm     Patient Position: Lying Lying     Pulse: 73 80 78 81   Resp: 16 16 16    Temp:       TempSrc:       SpO2: 95% 94% 93% 90%       Active LDAs/IV Access:   Lines, Drains & Airways       Active LDAs       None                    Labs (abnormal labs have a star):   Labs Reviewed   COMPREHENSIVE METABOLIC PANEL - Abnormal; Notable for the following components:       Result Value    BUN 34 (*)     Creatinine 1.61 (*)     Calcium 8.5 (*)     Alkaline Phosphatase " 157 (*)     eGFR 42.4 (*)     All other components within normal limits    Narrative:     GFR Categories in Chronic Kidney Disease (CKD)      GFR Category          GFR (mL/min/1.73)    Interpretation  G1                     90 or greater         Normal or high (1)  G2                      60-89                Mild decrease (1)  G3a                   45-59                Mild to moderate decrease  G3b                   30-44                Moderate to severe decrease  G4                    15-29                Severe decrease  G5                    14 or less           Kidney failure          (1)In the absence of evidence of kidney disease, neither GFR category G1 or G2 fulfill the criteria for CKD.    eGFR calculation 2021 CKD-EPI creatinine equation, which does not include race as a factor   CBC WITH AUTO DIFFERENTIAL - Abnormal; Notable for the following components:    RBC 3.01 (*)     Hemoglobin 7.8 (*)     Hematocrit 25.7 (*)     MCH 25.9 (*)     MCHC 30.4 (*)     Lymphocyte % 9.7 (*)     Monocyte % 12.7 (*)     Monocytes, Absolute 1.00 (*)     All other components within normal limits   BNP (IN-HOUSE) - Normal    Narrative:     This assay is used as an aid in the diagnosis of individuals suspected of having heart failure. It can be used as an aid in the diagnosis of acute decompensated heart failure (ADHF) in patients presenting with signs and symptoms of ADHF to the emergency department (ED). In addition, NT-proBNP of <300 pg/mL indicates ADHF is not likely.    Age Range Result Interpretation  NT-proBNP Concentration (pg/mL:      <50             Positive            >450                   Gray                 300-450                    Negative             <300    50-75           Positive            >900                  Gray                300-900                  Negative            <300      >75             Positive            >1800                  Gray                300-1800                  Negative             <300   URINALYSIS W/ MICROSCOPIC IF INDICATED (NO CULTURE) - Normal    Narrative:     Urine microscopic not indicated.   CBC AND DIFFERENTIAL    Narrative:     The following orders were created for panel order CBC & Differential.  Procedure                               Abnormality         Status                     ---------                               -----------         ------                     CBC Auto Differential[[id number]]        Abnormal            Final result                 Please view results for these tests on the individual orders.       EKG:   No orders to display       Meds given in ED:   Medications   sodium chloride 0.9 % flush 10 mL (has no administration in time range)   acetaminophen (TYLENOL) tablet 650 mg (has no administration in time range)     Or   acetaminophen (TYLENOL) 160 MG/5ML oral solution 650 mg (has no administration in time range)     Or   acetaminophen (TYLENOL) suppository 650 mg (has no administration in time range)   ondansetron (ZOFRAN) injection 4 mg (has no administration in time range)   sennosides-docusate (PERICOLACE) 8.6-50 MG per tablet 2 tablet (has no administration in time range)     And   polyethylene glycol (MIRALAX) packet 17 g (has no administration in time range)     And   bisacodyl (DULCOLAX) EC tablet 5 mg (has no administration in time range)     And   bisacodyl (DULCOLAX) suppository 10 mg (has no administration in time range)       Imaging results:  No radiology results for the last day    Ambulatory status:   Pt does not ambulate well. High fall risk, transfer to wheelchair.     Social issues:   Social History     Socioeconomic History    Marital status:    Tobacco Use    Smoking status: Never    Smokeless tobacco: Never   Vaping Use    Vaping status: Never Used   Substance and Sexual Activity    Alcohol use: No    Drug use: No    Sexual activity: Never       Peripheral Neurovascular  Peripheral Neurovascular (Adult)  Peripheral  Neurovascular WDL: .WDL except, neurovascular assessment lower  LLE Neurovascular Assessment  Temperature LLE: warm  Color LLE: other (see comments) (ecchymotic)  Sensation LLE: numbness present, tingling present  RLE Neurovascular Assessment  Temperature RLE: warm  Color RLE: other (see comments) (ecchymotic)  Sensation RLE: numbness present, tingling present    Neuro Cognitive  Neuro Cognitive (Adult)  Cognitive/Neuro/Behavioral WDL: all  Level of Consciousness: Confusion  Arousal Level: opens eyes spontaneously  Orientation: disoriented to, place, situation, time  Speech: incoherent  Mood/Behavior: anxious    Learning  Learning Assessment  Learning Readiness and Ability: physical limitation noted, cognitive limitation noted, psychosocial barrier noted    Respiratory  Respiratory WDL  Respiratory WDL: WDL    Abdominal Pain       Pain Assessments  Pain (Adult)  (0-10) Pain Rating: Rest: 0  (0-10) Pain Rating: Activity: 0    NIH Stroke Scale       Jalyn Deleon RN  03/27/25 16:28 EDT

## 2025-03-27 NOTE — PLAN OF CARE
Goal Outcome Evaluation:           Progress: no change  Outcome Evaluation: pt admitted from ED with dementia and aggressive behavors, Assist x1, brief, incontinent, family states aggressive when sun goes down, CCP to place in memory/ longterm care pt has cough wear mask in room
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
## 2025-03-27 NOTE — H&P
HISTORY AND PHYSICAL   [facility]        Date of Admission: 3/27/2025  Patient Identification:  Name: Dereck Ramírez  Age: 84 y.o.  Sex: male  :  1940  MRN: [id number]                     Primary Care Physician: Amber Rodney APRN    Chief Complaint:  84 year old gentleman presented to the emergency room with family due to agitation and aggressive behavior at home; he has a history of dementia and lives with his daughter; he also has wounds on his legs and feet; he is more agitated at night; he has been scratching his legs and cause them to bleed; he was admitted a few months ago after a fall resulting in a cervical spine fracture; his family is having increasing difficulty with providing care for him at home    History of Present Illness:   As above    Past Medical History:  Past Medical History:   Diagnosis Date    Arthritis     Cerebellar artery occlusion     Coronary artery disease     Diabetes mellitus     Enlarged prostate     Hyperlipidemia     Hypertension     Stroke ,      Past Surgical History:  Past Surgical History:   Procedure Laterality Date    CARDIAC CATHETERIZATION      lesion 1: intervention outcome    HERNIA REPAIR        Home Meds:  Medications Prior to Admission   Medication Sig Dispense Refill Last Dose/Taking    acetaminophen (TYLENOL) 325 MG tablet Take 2 tablets by mouth Every 4 (Four) Hours As Needed for Mild Pain. 30 tablet 0 Taking As Needed    amLODIPine (NORVASC) 10 MG tablet Take 1 tablet by mouth Daily. 90 tablet 3 Taking    atorvastatin (LIPITOR) 40 MG tablet TAKE 1 TABLET BY MOUTH DAILY 90 tablet 1 Taking    busPIRone (BUSPAR) 5 MG tablet TAKE 1 TABLET BY MOUTH THREE TIMES DAILY AS NEEDED FOR ANXIETY (Patient taking differently: Take 1 tablet by mouth Every Night.) 270 tablet 1 Taking Differently    carvedilol (COREG) 25 MG tablet TAKE 1 TABLET BY MOUTH TWICE DAILY WITH MEALS (Patient taking differently: Take 1 tablet by mouth 2 (Two)  Times a Day With Meals.) 180 tablet 0 Taking Differently    clopidogrel (PLAVIX) 75 MG tablet TAKE 1 TABLET BY MOUTH DAILY 90 tablet 3 Taking    docusate sodium 100 MG capsule Take 1 capsule by mouth 2 (Two) Times a Day. 30 each 0 Taking    ferrous gluconate (FERGON) 324 MG tablet TAKE 1 TABLET BY MOUTH DAILY WITH BREAKFAST 90 tablet 1 Taking    furosemide (LASIX) 40 MG tablet TAKE 1 TABLET BY MOUTH TWICE DAILY 180 tablet 3 Taking    gabapentin (NEURONTIN) 100 MG capsule Take 2 capsules by mouth 3 (Three) Times a Day. 540 capsule 1 Taking    nateglinide (STARLIX) 60 MG tablet Take 1 tablet by mouth 2 (Two) Times a Day With Meals. 180 tablet 3 Taking    polyethylene glycol (MIRALAX) 17 g packet Take 17 g by mouth Daily As Needed (Use if senna-docusate is ineffective). 30 each 0 Taking As Needed    potassium chloride 10 MEQ CR tablet Take 1 tablet by mouth Daily. 90 tablet 3 Taking    QUEtiapine (SEROquel) 25 MG tablet Take 1 tablet by mouth Every Night for 30 days. 90 tablet 0 Taking    sertraline (ZOLOFT) 50 MG tablet Take 1.5 tablets by mouth Daily. 135 tablet 1 Taking    tamsulosin (FLOMAX) 0.4 MG capsule 24 hr capsule TAKE 1 CAPSULE BY MOUTH DAILY 90 capsule 1 Taking    vitamin B-12 (CYANOCOBALAMIN) 1000 MCG tablet TAKE 1 TABLET BY MOUTH DAILY 90 tablet 1 Taking       Allergies:  No Known Allergies  Immunizations:  Immunization History   Administered Date(s) Administered    COVID-19 (PFIZER) Purple Cap Monovalent 02/01/2021, 02/01/2021, 02/22/2021, 02/22/2021, 10/28/2021    Fluad Quad 65+ 10/17/2019, 10/06/2020    Fluzone High-Dose 65+YRS 10/12/2017, 12/20/2018, 10/17/2019, 09/26/2024    Fluzone High-Dose 65+yrs 10/25/2021, 11/02/2022, 09/28/2023    Hepatitis A 08/23/2018, 04/18/2019    Influenza TIV (IM) 12/09/2016    Pneumococcal Conjugate 13-Valent (PCV13) 12/09/2016    Pneumococcal Polysaccharide (PPSV23) 08/23/2018    Td, Not Adsorbed 04/18/2019     Social History:   Social History     Social History  "Narrative    Not on file     Social History     Socioeconomic History    Marital status:    Tobacco Use    Smoking status: Never    Smokeless tobacco: Never   Vaping Use    Vaping status: Never Used   Substance and Sexual Activity    Alcohol use: No    Drug use: No    Sexual activity: Never       Family History:  Family History   Problem Relation Age of Onset    Heart disease Mother     Stroke Mother     Crohn's disease Father     Stroke Father     Arthritis Father     Cancer Other     Stroke Other     Diabetes Other     Heart defect Other     Hypertension Other     Thyroid disease Other     Cancer Maternal Uncle         bone    Dementia Maternal Grandmother         Review of Systems  Not obtainable from the patient    Objective:  T Max 24 hrs: Temp (24hrs), Av.9 °F (36.6 °C), Min:97.8 °F (36.6 °C), Max:98 °F (36.7 °C)    Vitals Ranges:   Temp:  [97.8 °F (36.6 °C)-98 °F (36.7 °C)] 98 °F (36.7 °C)  Heart Rate:  [65-85] 85  Resp:  [16] 16  BP: (123-139)/(53-75) 135/54      Exam:  /54 (BP Location: Right arm, Patient Position: Sitting)   Pulse 85   Temp 98 °F (36.7 °C) (Oral)   Resp 16   Ht 182.9 cm (72\")   Wt 101 kg (221 lb 9 oz)   SpO2 92%   BMI 30.05 kg/m²     General Appearance:    Alert, cooperative, no distress, appears stated age; chronically ill appearing   Head:    Normocephalic, without obvious abnormality, atraumatic   Eyes:    PERRL, conjunctivae/corneas clear, EOM's intact, both eyes   Ears:    Normal external ear canals, both ears   Nose:   Nares normal, septum midline, mucosa normal, no drainage    or sinus tenderness   Throat:   Lips, mucosa, and tongue normal   Neck:   Supple, symmetrical, trachea midline, no adenopathy;     thyroid:  no enlargement/tenderness/nodules; no carotid    bruit or JVD   Back:     Symmetric, no curvature, ROM normal, no CVA tenderness   Lungs:     Clear to auscultation bilaterally, respirations unlabored   Chest Wall:    No tenderness or deformity    " Heart:    Regular rate and rhythm, S1 and S2 normal, no murmur, rub   or gallop   Abdomen:     Soft, nontender, bowel sounds active all four quadrants,     no masses, no hepatomegaly, no splenomegaly   Extremities:   Extremities normal, atraumatic, no cyanosis or edema                       .    Data Review:  Labs in chart were reviewed.  WBC   Date Value Ref Range Status   03/27/2025 7.87 3.40 - 10.80 10*3/mm3 Final     Hemoglobin   Date Value Ref Range Status   03/27/2025 7.8 (L) 13.0 - 17.7 g/dL Final     Hematocrit   Date Value Ref Range Status   03/27/2025 25.7 (L) 37.5 - 51.0 % Final     Platelets   Date Value Ref Range Status   03/27/2025 218 140 - 450 10*3/mm3 Final     Sodium   Date Value Ref Range Status   03/27/2025 139 136 - 145 mmol/L Final     Potassium   Date Value Ref Range Status   03/27/2025 4.3 3.5 - 5.2 mmol/L Final     Chloride   Date Value Ref Range Status   03/27/2025 105 98 - 107 mmol/L Final     CO2   Date Value Ref Range Status   03/27/2025 22.1 22.0 - 29.0 mmol/L Final     BUN   Date Value Ref Range Status   03/27/2025 34 (H) 8 - 23 mg/dL Final     Creatinine   Date Value Ref Range Status   03/27/2025 1.61 (H) 0.76 - 1.27 mg/dL Final     Glucose   Date Value Ref Range Status   03/27/2025 94 65 - 99 mg/dL Final     Calcium   Date Value Ref Range Status   03/27/2025 8.5 (L) 8.6 - 10.5 mg/dL Final     AST (SGOT)   Date Value Ref Range Status   03/27/2025 15 1 - 40 U/L Final     ALT (SGPT)   Date Value Ref Range Status   03/27/2025 19 1 - 41 U/L Final     Alkaline Phosphatase   Date Value Ref Range Status   03/27/2025 157 (H) 39 - 117 U/L Final                Imaging Results (All)       None              Assessment:  Active Hospital Problems    Diagnosis  POA    **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   Anemia  Cad  Diabetes  Hypertension  Hyperlipidemia  dysphagia    Plan:  Speech to see  Gi to see  Psychiatry to  see  Trend labs  Patient is dnr per daughter  Has been coughing after he eats so likely aspirating  Dw patient, daughter    Belle Domingo Duran MD  3/27/2025  19:51 EDT   Patient is now on oxygen; will make him npo; he was coughing after he ate; cxr and start zosyn

## 2025-03-27 NOTE — PROGRESS NOTES
Clinical Pharmacy Services: Medication History    Dereck Ramírez is a 84 y.o. male presenting to [facility] for   Chief Complaint   Patient presents with    Wound Check       He  has a past medical history of Arthritis, Cerebellar artery occlusion, Coronary artery disease, Diabetes mellitus, Enlarged prostate, Hyperlipidemia, Hypertension, and Stroke (2011, 2012).    Allergies as of 03/27/2025    (No Known Allergies)       Medication information was obtained from: Family Member   Pharmacy and Phone Number:     Prior to Admission Medications       Prescriptions Last Dose Informant Patient Reported? Taking?    acetaminophen (TYLENOL) 325 MG tablet  Family Member No Yes    Take 2 tablets by mouth Every 4 (Four) Hours As Needed for Mild Pain.    amLODIPine (NORVASC) 10 MG tablet  Family Member No Yes    Take 1 tablet by mouth Daily.    atorvastatin (LIPITOR) 40 MG tablet  Family Member No Yes    TAKE 1 TABLET BY MOUTH DAILY    busPIRone (BUSPAR) 5 MG tablet  Family Member No Yes    TAKE 1 TABLET BY MOUTH THREE TIMES DAILY AS NEEDED FOR ANXIETY    Patient taking differently:  Take 1 tablet by mouth Every Night.    carvedilol (COREG) 25 MG tablet  Family Member No Yes    TAKE 1 TABLET BY MOUTH TWICE DAILY WITH MEALS    Patient taking differently:  Take 1 tablet by mouth 2 (Two) Times a Day With Meals.    clopidogrel (PLAVIX) 75 MG tablet  Family Member No Yes    TAKE 1 TABLET BY MOUTH DAILY    docusate sodium 100 MG capsule  Family Member No Yes    Take 1 capsule by mouth 2 (Two) Times a Day.    ferrous gluconate (FERGON) 324 MG tablet  Family Member No Yes    TAKE 1 TABLET BY MOUTH DAILY WITH BREAKFAST    furosemide (LASIX) 40 MG tablet  Family Member No Yes    TAKE 1 TABLET BY MOUTH TWICE DAILY    gabapentin (NEURONTIN) 100 MG capsule  Family Member No Yes    Take 2 capsules by mouth 3 (Three) Times a Day.    nateglinide (STARLIX) 60 MG tablet  Family Member No Yes    Take 1 tablet by mouth 2 (Two) Times  a Day With Meals.    polyethylene glycol (MIRALAX) 17 g packet  Family Member No Yes    Take 17 g by mouth Daily As Needed (Use if senna-docusate is ineffective).    potassium chloride 10 MEQ CR tablet  Family Member No Yes    Take 1 tablet by mouth Daily.    QUEtiapine (SEROquel) 25 MG tablet  Family Member No Yes    Take 1 tablet by mouth Every Night for 30 days.    sertraline (ZOLOFT) 50 MG tablet  Family Member No Yes    Take 1.5 tablets by mouth Daily.    tamsulosin (FLOMAX) 0.4 MG capsule 24 hr capsule  Family Member No Yes    TAKE 1 CAPSULE BY MOUTH DAILY    vitamin B-12 (CYANOCOBALAMIN) 1000 MCG tablet  Family Member No Yes    TAKE 1 TABLET BY MOUTH DAILY              Medication notes:     This medication list is complete to the best of my knowledge as of 3/27/2025    Please call if questions.    Titus Luke  Medication History Technician   [phone]    3/27/2025 13:32 EDT

## 2025-03-27 NOTE — CASE MANAGEMENT/SOCIAL WORK
Discharge Planning Assessment  [facility]     Patient Name: Dereck Ramírez  MRN: [id number]  Today's Date: 3/27/2025    Admit Date: 3/27/2025        Discharge Needs Assessment    No documentation.                  Discharge Plan       Row Name 03/27/25 1211       Plan    Plan Comments Spoke with patient's daughter, Maribel, at bedside regarding concerns over her ability to safely care for him in their home any longer. Daughter reports that patient's dementia is worsening and that he has become more aggressive, particularly at night. She informs that he has suffered multiple falls in the home, often forgetting to use his walker. She states that last night he picked at his legs so badly that he opened the skin on multiple areas of his legs. Chart review reveals that the process for LTC placement has been explained to patient's daughter by ambulatory . Daughter informs she has not yet begun the process of applying for Medicaid or looking at facilities. I reiterated to daughter that a Medicaid application will need to be completed and a Medicaid pending bed will need to be found for patient. I inquired if daughter at bedside is patient's POA and she informs me that she is not, but that her sister, Georgina Marie, is POA. She provided me with Georgina's number ([phone]). I attempted to reach Georgina, but phone went straight to voicemail. Discussed daughter's concerns with ER provider, Dr. Rojas, and will likely plan for admission for long term care placement.                  Selected Continued Care - Episodes Includes continued care and service providers with selected services from the active episodes listed below             Demographic Summary    No documentation.                  Functional Status    No documentation.                  Psychosocial    No documentation.                  Abuse/Neglect    No documentation.                  Legal    No documentation.                  Substance Abuse     No documentation.                  Patient Forms    No documentation.                     Dg Morrell RN

## 2025-03-28 ENCOUNTER — PATIENT OUTREACH (OUTPATIENT)
Age: 85
End: 2025-03-28
Payer: MEDICARE

## 2025-03-28 PROBLEM — J96.01 ACUTE HYPOXIC RESPIRATORY FAILURE: Status: ACTIVE | Noted: 2025-03-28

## 2025-03-28 NOTE — CASE MANAGEMENT/SOCIAL WORK
Continued Stay Note  [facility]     Patient Name: Dereck Ramírez  MRN: [id number]  Today's Date: 3/28/2025    Admit Date: 3/27/2025        Discharge Plan       Row Name 03/28/25 1342       Plan    Plan Comments Pt discussed in multidisciplinary rounds. Clinicals reviewed. CCP attempted to round on pt, however, transferring from 8P to 6N; CCP on 6N will now follow. Pt not yet medically ready for DC; CCP 6N will follow. DC plan pending pt's progress during hospitalization. DC barriers: IV zosyn, IVF, PT consulted 3/27, GI consulted 3/27, psychiatrist consulted 3/27, CYNDI consulted 3/28, pending blood cultures, xr spine ordered 3/28, & duplex BLE ordered 3/28. IDRIS Caruso/LOBO             Expected Discharge Date and Time       Expected Discharge Date Expected Discharge Time    Apr 1, 2025    Payton Downey RN

## 2025-03-28 NOTE — CONSULTS
IDENTIFYING INFORMATION: The patient is an 84-year-old white male with a history of dementia and schizophrenia admitted with increasing aggression.    CHIEF COMPLAINT: None given    INFORMANT: Daughter, patient and chart    RELIABILITY: Good    HISTORY OF PRESENT ILLNESS: The patient is an 84-year-old white male with a history of dementia and schizophrenia.  He currently lives with his daughter.  She reports that he has been increasingly agitated and aggressive particularly at night.  Symptoms apparently worsened after he had completed a course of rehab related to a recent cervical spine fracture.  The patient has been engaging in dermatomatilomania, digging at his legs until they have bled.  When seen today, the patient is oriented x 4 and is cooperative with hands-on care.  Staff reports that he had a good night.  Daughter is uncertain as to whether she will be able to continue to care for patient following discharge.    PAST PSYCHIATRIC HISTORY: As previously noted, the patient has been diagnosed with dementia and also has a history of pre-existing schizophrenia per daughter is currently prescribed psychotropic medications include low-dose BuSpar and Seroquel and Zoloft.    PMed HX:Significant for anemia, coronary artery disease, diabetes mellitus, hypertension, hyperlipidemia and dysphagia.  He is currently on Zosyn for aspiration pneumonia.      MEDICATIONS:   Current Facility-Administered Medications   Medication Dose Route Frequency Provider Last Rate Last Admin    acetaminophen (TYLENOL) tablet 650 mg  650 mg Oral Q4H PRN Belle Duran MD   650 mg at 03/28/25 1120    Or    acetaminophen (TYLENOL) 160 MG/5ML oral solution 650 mg  650 mg Oral Q4H PRN Belle Duran MD        Or    acetaminophen (TYLENOL) suppository 650 mg  650 mg Rectal Q4H PRN Belle Duran MD        atorvastatin (LIPITOR) tablet 40 mg  40 mg Oral Daily Belle Duran MD   40 mg at 03/28/25 1120     sennosides-docusate (PERICOLACE) 8.6-50 MG per tablet 2 tablet  2 tablet Oral BID PRN Belle Duran MD        And    polyethylene glycol (MIRALAX) packet 17 g  17 g Oral Daily PRN Belle Duran MD        And    bisacodyl (DULCOLAX) EC tablet 5 mg  5 mg Oral Daily PRN Belle Duran MD        And    bisacodyl (DULCOLAX) suppository 10 mg  10 mg Rectal Daily PRN Belle Duran MD        busPIRone (BUSPAR) tablet 5 mg  5 mg Oral Nightly Belle Duran MD        carvedilol (COREG) tablet 25 mg  25 mg Oral BID With Meals Belle Duran MD   25 mg at 03/28/25 1121    ferrous sulfate tablet 325 mg  325 mg Oral Daily With Breakfast Belle Duran MD   325 mg at 03/28/25 1121    gabapentin (NEURONTIN) capsule 200 mg  200 mg Oral TID Belle Duran MD   200 mg at 03/28/25 1121    haloperidol lactate (HALDOL) injection 1 mg  1 mg Intravenous Q6H PRN Lavinia Pollack, DO        ipratropium-albuterol (DUO-NEB) nebulizer solution 3 mL  3 mL Nebulization Once Laurita Hoyos APRN        ipratropium-albuterol (DUO-NEB) nebulizer solution 3 mL  3 mL Nebulization Q6H PRN Lavinia Pollack, DO   3 mL at 03/28/25 0532    ondansetron (ZOFRAN) injection 4 mg  4 mg Intravenous Q6H PRN Belle Duran MD        pantoprazole (PROTONIX) injection 40 mg  40 mg Intravenous BID Lavinia Pollack, DO   40 mg at 03/28/25 0915    piperacillin-tazobactam (ZOSYN) 3.375 g IVPB in 100 mL NS MBP (CD)  3.375 g Intravenous Q8H Belel Duran MD   3.375 g at 03/28/25 1120    QUEtiapine (SEROquel) tablet 25 mg  25 mg Oral Nightly Belle Duran MD        sertraline (ZOLOFT) tablet 75 mg  75 mg Oral Daily Belle Duran MD   75 mg at 03/28/25 1120    sodium chloride 0.9 % flush 10 mL  10 mL Intravenous PRN Rickey Rojas MD        sodium chloride 0.9 % infusion  75 mL/hr Intravenous Continuous Lavinia Pollack DO 75 mL/hr at 03/28/25 0915 75 mL/hr at 03/28/25  0915    tamsulosin (FLOMAX) 24 hr capsule 0.4 mg  0.4 mg Oral Daily Belle Duran MD   0.4 mg at 03/28/25 1120    vitamin B-12 (CYANOCOBALAMIN) tablet 1,000 mcg  1,000 mcg Oral Daily Belle Duran MD   1,000 mcg at 03/28/25 1121         ALLERGIES: None    FAMILY HISTORY: Noncontributory    SOCIAL HISTORY: Patient lives with his daughter.  No reported use of psychoactive substances.    MENTAL STATUS EXAM: Patient is an elderly white male dressed in hospital garb.  He has multiple areas of excoriated skin on his lower extremities.  The patient is awake and alert and is oriented to person and place and can identify the [place] president.  He does not appear to be responding to any internal stimuli.  He does not comply with formal testing of memory and cognition.  Judgment and insight appear to be somewhat impaired.     ASSETS/LIABILITIES: To be assessed/health issues    DIAGNOSTIC IMPRESSION: Primary dementia Alzheimer's type with behavioral disturbance with possible superimposed delirium secondary to pneumonia, schizophrenia per daughter's history, medical problems as noted previously    PLAN: The patient does surprisingly well with regards to orientation though his cooperation with mental status exam is less than optimal.  I would like to get a bit more aggressive with his psychotropic medications and feels as though his increased agitation could be related to his infectious process and may resolve with resolution of his pneumonia.  I will continue to follow with you.  I will start him on scheduled and as needed olanzapine for now.

## 2025-03-28 NOTE — PLAN OF CARE
Goal Outcome Evaluation:  Plan of Care Reviewed With: patient        Progress: no change  Outcome Evaluation: 84 y.o. male admitted to [facility] agressive behavior/agitation at nightime and wounds to BLEs.  Patient does of Hx of dementia.  At baseline, patient lives with daughter and grandchildren at home and requires assistance with ADLs and Independent with functional mobility (Rw).  A&Ox2, BUE WFL 3/5.  Patient pleasant throughout Eval.  Patient presents to OT with decreased strength, activity tolerance, coordination and balance.  Patient supine on transport stretcher upon arrival.  Patient just return from testing.  Supine to sit on EOB with Min A.  STS from edge of stretcher with CGA, performed ambulation from hallway to recliner chair with Min A and Rw.  No LOB, unsteadiness throughout with cuing for posture and walker management.  Patient continues on 8L O2 Hi-flow nc.  Patient reclined in chair with all needs within reach.  OT would be beneficial to address underlying physical deficits and increase ADLs.  Anticipate patient to d/c to SNF for continued progress and care.    Anticipated Discharge Disposition (OT): skilled nursing facility

## 2025-03-28 NOTE — CONSULTS
[facility] NEUROSURGERY CONSULT NOTE    Patient name: Dereck Ramírez  Referring Provider: Dr Pollack  Reason for Consultation: C2 fracture    Patient Care Team:  Amber Rodney APRN as PCP - General (Family Medicine)  Lynette Mcwilliams, IDRIS as Ambulatory  ([facility] [place] 2[place]  Rayne Sheikh MSW as  ([facility]) ([facility] [place][note 2[place]    Chief complaint: Aggressive behavior    Subjective .     History of present illness:    Patient is a 84 y.o.  male with a past medical history of dementia, HTN, CKD, DM 2.  Patient was brought in to the emergency department by family with aggressive behavior/agitation.  They report that he has been picking at his legs and have caused multiple wounds.  They feel that he is dementia is getting worse and it is harder for them to care for him at home.  They feel it is time for him to go to a nursing home.  Patient had a fall in November of last year resulting in a C2 fracture.  Family reports no falls recently that they are aware of.  It is very difficult to obtain review of symptoms or HPI from patient due to his history of dementia.      History  PAST MEDICAL HISTORY  Past Medical History:   Diagnosis Date    Arthritis     Cerebellar artery occlusion     Coronary artery disease     Diabetes mellitus     Enlarged prostate     Hyperlipidemia     Hypertension     Stroke 2011, 2012       PAST SURGICAL HISTORY  Past Surgical History:   Procedure Laterality Date    CARDIAC CATHETERIZATION      lesion 1: intervention outcome    HERNIA REPAIR         FAMILY HISTORY  Family History   Problem Relation Age of Onset    Heart disease Mother     Stroke Mother     Crohn's disease Father     Stroke Father     Arthritis Father     Cancer Other     Stroke Other     Diabetes Other     Heart defect Other     Hypertension Other     Thyroid disease Other     Cancer Maternal Uncle         bone    Dementia Maternal Grandmother        SOCIAL HISTORY  Social History     Tobacco  Use    Smoking status: Never    Smokeless tobacco: Never   Vaping Use    Vaping status: Never Used   Substance Use Topics    Alcohol use: No    Drug use: No     Allergies:  Patient has no known allergies.    MEDICATIONS:  Medications Prior to Admission   Medication Sig Dispense Refill Last Dose/Taking    acetaminophen (TYLENOL) 325 MG tablet Take 2 tablets by mouth Every 4 (Four) Hours As Needed for Mild Pain. 30 tablet 0 Taking As Needed    amLODIPine (NORVASC) 10 MG tablet Take 1 tablet by mouth Daily. 90 tablet 3 Taking    atorvastatin (LIPITOR) 40 MG tablet TAKE 1 TABLET BY MOUTH DAILY 90 tablet 1 Taking    busPIRone (BUSPAR) 5 MG tablet TAKE 1 TABLET BY MOUTH THREE TIMES DAILY AS NEEDED FOR ANXIETY (Patient taking differently: Take 1 tablet by mouth Every Night.) 270 tablet 1 Taking Differently    carvedilol (COREG) 25 MG tablet TAKE 1 TABLET BY MOUTH TWICE DAILY WITH MEALS (Patient taking differently: Take 1 tablet by mouth 2 (Two) Times a Day With Meals.) 180 tablet 0 Taking Differently    clopidogrel (PLAVIX) 75 MG tablet TAKE 1 TABLET BY MOUTH DAILY 90 tablet 3 Taking    docusate sodium 100 MG capsule Take 1 capsule by mouth 2 (Two) Times a Day. 30 each 0 Taking    ferrous gluconate (FERGON) 324 MG tablet TAKE 1 TABLET BY MOUTH DAILY WITH BREAKFAST 90 tablet 1 Taking    furosemide (LASIX) 40 MG tablet TAKE 1 TABLET BY MOUTH TWICE DAILY 180 tablet 3 Taking    gabapentin (NEURONTIN) 100 MG capsule Take 2 capsules by mouth 3 (Three) Times a Day. 540 capsule 1 Taking    nateglinide (STARLIX) 60 MG tablet Take 1 tablet by mouth 2 (Two) Times a Day With Meals. 180 tablet 3 Taking    polyethylene glycol (MIRALAX) 17 g packet Take 17 g by mouth Daily As Needed (Use if senna-docusate is ineffective). 30 each 0 Taking As Needed    potassium chloride 10 MEQ CR tablet Take 1 tablet by mouth Daily. 90 tablet 3 Taking    QUEtiapine (SEROquel) 25 MG tablet Take 1 tablet by mouth Every Night for 30 days. 90 tablet 0 Taking     sertraline (ZOLOFT) 50 MG tablet Take 1.5 tablets by mouth Daily. 135 tablet 1 Taking    tamsulosin (FLOMAX) 0.4 MG capsule 24 hr capsule TAKE 1 CAPSULE BY MOUTH DAILY 90 capsule 1 Taking    vitamin B-12 (CYANOCOBALAMIN) 1000 MCG tablet TAKE 1 TABLET BY MOUTH DAILY 90 tablet 1 Taking       Current Facility-Administered Medications:     acetaminophen (TYLENOL) tablet 650 mg, 650 mg, Oral, Q4H PRN, 650 mg at 03/28/25 1120 **OR** acetaminophen (TYLENOL) 160 MG/5ML oral solution 650 mg, 650 mg, Oral, Q4H PRN **OR** acetaminophen (TYLENOL) suppository 650 mg, 650 mg, Rectal, Q4H PRN, Belle Duran MD    atorvastatin (LIPITOR) tablet 40 mg, 40 mg, Oral, Daily, Belle Duran MD, 40 mg at 03/28/25 1120    sennosides-docusate (PERICOLACE) 8.6-50 MG per tablet 2 tablet, 2 tablet, Oral, BID PRN **AND** polyethylene glycol (MIRALAX) packet 17 g, 17 g, Oral, Daily PRN **AND** bisacodyl (DULCOLAX) EC tablet 5 mg, 5 mg, Oral, Daily PRN **AND** bisacodyl (DULCOLAX) suppository 10 mg, 10 mg, Rectal, Daily PRN, Belle Duran MD    busPIRone (BUSPAR) tablet 5 mg, 5 mg, Oral, Nightly, Belle Duran MD    carvedilol (COREG) tablet 25 mg, 25 mg, Oral, BID With Meals, Belle Duran MD, 25 mg at 03/28/25 1121    ferrous sulfate tablet 325 mg, 325 mg, Oral, Daily With Breakfast, Belle Duran MD, 325 mg at 03/28/25 1121    gabapentin (NEURONTIN) capsule 200 mg, 200 mg, Oral, TID, Belle Duran MD, 200 mg at 03/28/25 1121    ipratropium-albuterol (DUO-NEB) nebulizer solution 3 mL, 3 mL, Nebulization, Once, Laurita Hoyos APRN    ipratropium-albuterol (DUO-NEB) nebulizer solution 3 mL, 3 mL, Nebulization, Q6H PRN, Lavinia Pollack, , 3 mL at 03/28/25 0532    OLANZapine (zyPREXA) injection 5 mg, 5 mg, Intramuscular, Q8H PRN, Nas Maguire III, MD    OLANZapine (zyPREXA) tablet 2.5 mg, 2.5 mg, Oral, BID, Nas Maguire III, MD    ondansetron  (ZOFRAN) injection 4 mg, 4 mg, Intravenous, Q6H PRN, Belle Duran MD    pantoprazole (PROTONIX) injection 40 mg, 40 mg, Intravenous, BID, Lavinia Pollack DO, 40 mg at 03/28/25 0915    piperacillin-tazobactam (ZOSYN) 3.375 g IVPB in 100 mL NS MBP (CD), 3.375 g, Intravenous, Q8H, Belle Duran MD, 3.375 g at 03/28/25 1120    sertraline (ZOLOFT) tablet 75 mg, 75 mg, Oral, Daily, Belle Duran MD, 75 mg at 03/28/25 1120    [COMPLETED] Insert Peripheral IV, , , Once **AND** sodium chloride 0.9 % flush 10 mL, 10 mL, Intravenous, PRN, Rickey Rojas MD    sodium chloride 0.9 % infusion, 75 mL/hr, Intravenous, Continuous, Lavinia Pollack DO, Last Rate: 75 mL/hr at 03/28/25 0915, 75 mL/hr at 03/28/25 0915    tamsulosin (FLOMAX) 24 hr capsule 0.4 mg, 0.4 mg, Oral, Daily, Belle Duran MD, 0.4 mg at 03/28/25 1120    vitamin B-12 (CYANOCOBALAMIN) tablet 1,000 mcg, 1,000 mcg, Oral, Daily, Belle Duran MD, 1,000 mcg at 03/28/25 1121    COMORBID CONDITIONS:  Anemia, chronic kidney disease including stage, Diabetes Type 2, Hypertension, and Dementia      Review of Systems  Review of Systems   Unable to perform ROS: Dementia     Objective     Physical Exam  Physical Exam  Vitals reviewed.   Constitutional:       General: He is awake.      Comments: Disheveled, elderly appearing   Pulmonary:      Effort: Pulmonary effort is normal.   Musculoskeletal:      Cervical back: No tenderness or bony tenderness.      Thoracic back: No bony tenderness.      Lumbar back: No bony tenderness.      Comments: Moderate edema noted to bilateral lower extremities   Skin:     General: Skin is warm and dry.      Comments: Multiple scabbed and irritated areas to bilateral lower extremities   Neurological:      Mental Status: He is alert.       Neurological Exam  Mental Status  Awake and alert.    Motor  Normal muscle bulk throughout. Normal muscle tone.  5/5 strength to bilateral upper  extremities.    Gait    Not tested due to fall risk.        Results Review:    LABS:    Admission on 03/27/2025   Component Date Value Ref Range Status    Glucose 03/27/2025 94  65 - 99 mg/dL Final    BUN 03/27/2025 34 (H)  8 - 23 mg/dL Final    Creatinine 03/27/2025 1.61 (H)  0.76 - 1.27 mg/dL Final    Sodium 03/27/2025 139  136 - 145 mmol/L Final    Potassium 03/27/2025 4.3  3.5 - 5.2 mmol/L Final    Chloride 03/27/2025 105  98 - 107 mmol/L Final    CO2 03/27/2025 22.1  22.0 - 29.0 mmol/L Final    Calcium 03/27/2025 8.5 (L)  8.6 - 10.5 mg/dL Final    Total Protein 03/27/2025 6.7  6.0 - 8.5 g/dL Final    Albumin 03/27/2025 3.5  3.5 - 5.2 g/dL Final    ALT (SGPT) 03/27/2025 19  1 - 41 U/L Final    AST (SGOT) 03/27/2025 15  1 - 40 U/L Final    Alkaline Phosphatase 03/27/2025 157 (H)  39 - 117 U/L Final    Total Bilirubin 03/27/2025 0.3  0.0 - 1.2 mg/dL Final    Globulin 03/27/2025 3.2  gm/dL Final    A/G Ratio 03/27/2025 1.1  g/dL Final    BUN/Creatinine Ratio 03/27/2025 21.1  7.0 - 25.0 Final    Anion Gap 03/27/2025 11.9  5.0 - 15.0 mmol/L Final    eGFR 03/27/2025 42.4 (L)  >60.0 mL/min/1.73 Final    proBNP 03/27/2025 1,800.0  0.0 - 1,800.0 pg/mL Final    Color, UA 03/27/2025 Yellow  Yellow, Straw Final    Appearance, UA 03/27/2025 Clear  Clear Final    pH, UA 03/27/2025 <=5.0  5.0 - 8.0 Final    Specific Gravity, UA 03/27/2025 1.008  1.005 - 1.030 Final    Glucose, UA 03/27/2025 Negative  Negative Final    Ketones, UA 03/27/2025 Negative  Negative Final    Bilirubin, UA 03/27/2025 Negative  Negative Final    Blood, UA 03/27/2025 Negative  Negative Final    Protein, UA 03/27/2025 Negative  Negative Final    Leuk Esterase, UA 03/27/2025 Negative  Negative Final    Nitrite, UA 03/27/2025 Negative  Negative Final    Urobilinogen, UA 03/27/2025 0.2 E.U./dL  0.2 - 1.0 E.U./dL Final    WBC 03/27/2025 7.87  3.40 - 10.80 10*3/mm3 Final    RBC 03/27/2025 3.01 (L)  4.14 - 5.80 10*6/mm3 Final    Hemoglobin 03/27/2025 7.8 (L)   13.0 - 17.7 g/dL Final    Hematocrit 03/27/2025 25.7 (L)  37.5 - 51.0 % Final    MCV 03/27/2025 85.4  79.0 - 97.0 fL Final    MCH 03/27/2025 25.9 (L)  26.6 - 33.0 pg Final    MCHC 03/27/2025 30.4 (L)  31.5 - 35.7 g/dL Final    RDW 03/27/2025 13.4  12.3 - 15.4 % Final    RDW-SD 03/27/2025 41.2  37.0 - 54.0 fl Final    MPV 03/27/2025 9.5  6.0 - 12.0 fL Final    Platelets 03/27/2025 218  140 - 450 10*3/mm3 Final    Neutrophil % 03/27/2025 73.6  42.7 - 76.0 % Final    Lymphocyte % 03/27/2025 9.7 (L)  19.6 - 45.3 % Final    Monocyte % 03/27/2025 12.7 (H)  5.0 - 12.0 % Final    Eosinophil % 03/27/2025 3.3  0.3 - 6.2 % Final    Basophil % 03/27/2025 0.3  0.0 - 1.5 % Final    Immature Grans % 03/27/2025 0.4  0.0 - 0.5 % Final    Neutrophils, Absolute 03/27/2025 5.80  1.70 - 7.00 10*3/mm3 Final    Lymphocytes, Absolute 03/27/2025 0.76  0.70 - 3.10 10*3/mm3 Final    Monocytes, Absolute 03/27/2025 1.00 (H)  0.10 - 0.90 10*3/mm3 Final    Eosinophils, Absolute 03/27/2025 0.26  0.00 - 0.40 10*3/mm3 Final    Basophils, Absolute 03/27/2025 0.02  0.00 - 0.20 10*3/mm3 Final    Immature Grans, Absolute 03/27/2025 0.03  0.00 - 0.05 10*3/mm3 Final    nRBC 03/27/2025 0.0  0.0 - 0.2 /100 WBC Final    ADENOVIRUS, PCR 03/27/2025 Not Detected  Not Detected Final    Coronavirus 229E 03/27/2025 Not Detected  Not Detected Final    Coronavirus HKU1 03/27/2025 Not Detected  Not Detected Final    Coronavirus NL63 03/27/2025 Not Detected  Not Detected Final    Coronavirus OC43 03/27/2025 Not Detected  Not Detected Final    COVID19 03/27/2025 Not Detected  Not Detected - Ref. Range Final    Human Metapneumovirus 03/27/2025 Not Detected  Not Detected Final    Human Rhinovirus/Enterovirus 03/27/2025 Not Detected  Not Detected Final    Influenza A PCR 03/27/2025 Not Detected  Not Detected Final    Influenza B PCR 03/27/2025 Not Detected  Not Detected Final    Parainfluenza Virus 1 03/27/2025 Not Detected  Not Detected Final    Parainfluenza Virus 2  03/27/2025 Not Detected  Not Detected Final    Parainfluenza Virus 3 03/27/2025 Not Detected  Not Detected Final    Parainfluenza Virus 4 03/27/2025 Not Detected  Not Detected Final    RSV, PCR 03/27/2025 Not Detected  Not Detected Final    Bordetella pertussis pcr 03/27/2025 Not Detected  Not Detected Final    Bordetella parapertussis PCR 03/27/2025 Not Detected  Not Detected Final    Chlamydophila pneumoniae PCR 03/27/2025 Not Detected  Not Detected Final    Mycoplasma pneumo by PCR 03/27/2025 Not Detected  Not Detected Final    Lactate 03/27/2025 0.8  0.5 - 2.0 mmol/L Final    WBC 03/28/2025 8.20  3.40 - 10.80 10*3/mm3 Final    RBC 03/28/2025 2.89 (L)  4.14 - 5.80 10*6/mm3 Final    Hemoglobin 03/28/2025 7.6 (L)  13.0 - 17.7 g/dL Final    Hematocrit 03/28/2025 24.7 (L)  37.5 - 51.0 % Final    MCV 03/28/2025 85.5  79.0 - 97.0 fL Final    MCH 03/28/2025 26.3 (L)  26.6 - 33.0 pg Final    MCHC 03/28/2025 30.8 (L)  31.5 - 35.7 g/dL Final    RDW 03/28/2025 13.4  12.3 - 15.4 % Final    RDW-SD 03/28/2025 41.5  37.0 - 54.0 fl Final    MPV 03/28/2025 9.7  6.0 - 12.0 fL Final    Platelets 03/28/2025 223  140 - 450 10*3/mm3 Final    Neutrophil % 03/28/2025 69.2  42.7 - 76.0 % Final    Lymphocyte % 03/28/2025 12.1 (L)  19.6 - 45.3 % Final    Monocyte % 03/28/2025 14.9 (H)  5.0 - 12.0 % Final    Eosinophil % 03/28/2025 2.8  0.3 - 6.2 % Final    Basophil % 03/28/2025 0.5  0.0 - 1.5 % Final    Immature Grans % 03/28/2025 0.5  0.0 - 0.5 % Final    Neutrophils, Absolute 03/28/2025 5.68  1.70 - 7.00 10*3/mm3 Final    Lymphocytes, Absolute 03/28/2025 0.99  0.70 - 3.10 10*3/mm3 Final    Monocytes, Absolute 03/28/2025 1.22 (H)  0.10 - 0.90 10*3/mm3 Final    Eosinophils, Absolute 03/28/2025 0.23  0.00 - 0.40 10*3/mm3 Final    Basophils, Absolute 03/28/2025 0.04  0.00 - 0.20 10*3/mm3 Final    Immature Grans, Absolute 03/28/2025 0.04  0.00 - 0.05 10*3/mm3 Final    nRBC 03/28/2025 0.0  0.0 - 0.2 /100 WBC Final    Iron 03/28/2025 17 (L)  59  - 158 mcg/dL Final    Iron Saturation (TSAT) 03/28/2025 6 (L)  20 - 50 % Final    Transferrin 03/28/2025 183 (L)  200 - 360 mg/dL Final    TIBC 03/28/2025 273 (L)  298 - 536 mcg/dL Final    Ferritin 03/28/2025 111.00  30.00 - 400.00 ng/mL Final    Glucose 03/27/2025 175 (H)  70 - 130 mg/dL Final    Site 03/28/2025 Right Radial   Final    Gabino's Test 03/28/2025 Positive   Final    pH, Arterial 03/28/2025 7.455 (H)  7.350 - 7.450 pH units Final    pCO2, Arterial 03/28/2025 35.6  35.0 - 45.0 mm Hg Final    pO2, Arterial 03/28/2025 66.2 (L)  80.0 - 100.0 mm Hg Final    HCO3, Arterial 03/28/2025 25.0  22.0 - 28.0 mmol/L Final    Base Excess, Arterial 03/28/2025 1.3  0.0 - 2.0 mmol/L Final    Serial Number: [id number]:  [id number]    O2 Saturation, Arterial 03/28/2025 93.9  92.0 - 98.5 % Final    Barometric Pressure for Blood Gas 03/28/2025 748.5000  mmHg Final    Modality 03/28/2025 HFNC   Final    Flow Rate 03/28/2025 9.0000  lpm Final    Rate 03/28/2025 16  Breaths/minute Final    Hemodilution 03/28/2025 No   Final    Glucose 03/28/2025 118 (H)  65 - 99 mg/dL Final    BUN 03/28/2025 28 (H)  8 - 23 mg/dL Final    Creatinine 03/28/2025 1.75 (H)  0.76 - 1.27 mg/dL Final    Sodium 03/28/2025 139  136 - 145 mmol/L Final    Potassium 03/28/2025 3.8  3.5 - 5.2 mmol/L Final    Chloride 03/28/2025 108 (H)  98 - 107 mmol/L Final    CO2 03/28/2025 23.3  22.0 - 29.0 mmol/L Final    Calcium 03/28/2025 8.2 (L)  8.6 - 10.5 mg/dL Final    Total Protein 03/28/2025 6.2  6.0 - 8.5 g/dL Final    Albumin 03/28/2025 3.3 (L)  3.5 - 5.2 g/dL Final    ALT (SGPT) 03/28/2025 15  1 - 41 U/L Final    AST (SGOT) 03/28/2025 17  1 - 40 U/L Final    Alkaline Phosphatase 03/28/2025 152 (H)  39 - 117 U/L Final    Total Bilirubin 03/28/2025 0.3  0.0 - 1.2 mg/dL Final    Globulin 03/28/2025 2.9  gm/dL Final    A/G Ratio 03/28/2025 1.1  g/dL Final    BUN/Creatinine Ratio 03/28/2025 16.0  7.0 - 25.0 Final    Anion Gap 03/28/2025 7.7  5.0 - 15.0 mmol/L Final     eGFR 03/28/2025 38.4 (L)  >60.0 mL/min/1.73 Final    D-Dimer, Quantitative 03/28/2025 0.86 (H)  0.00 - 0.82 MCGFEU/mL Final    Glucose 03/28/2025 126  70 - 130 mg/dL Final    Strep Pneumo Ag 03/27/2025 Negative  Negative Final    LEGIONELLA ANTIGEN, URINE 03/27/2025 Negative  Negative Final    ABO Type 03/28/2025 A   Final    RH type 03/28/2025 Negative   Final    Antibody Screen 03/28/2025 Negative   Final    T&S Expiration Date 03/28/2025 3/31/2025 11:59:59 PM   Final       DIAGNOSTICS:  No new imaging to review    Results Review:   I reviewed the patient's new clinical results.  I personally viewed  the patient's chart, it was also discussed with and reviewed by Dr. Bartholomew    Vital Signs   Temp:  [97.8 °F (36.6 °C)-98.3 °F (36.8 °C)] 97.8 °F (36.6 °C)  Heart Rate:  [65-85] 74  Resp:  [16] 16  BP: (123-151)/(53-75) 151/53      Assessment & Plan       Dementia with aggressive behavior      Problem List Items Addressed This Visit    None  Visit Diagnoses         Dementia with agitation, unspecified dementia severity, unspecified dementia type    -  Primary    Relevant Medications    busPIRone (BUSPAR) tablet 5 mg    QUEtiapine (SEROquel) tablet 25 mg    sertraline (ZOLOFT) tablet 75 mg    haloperidol lactate (HALDOL) injection 1 mg      Abrasion of lower extremity, unspecified laterality, initial encounter          Acute on chronic anemia        Relevant Medications    ferrous sulfate tablet 325 mg    vitamin B-12 (CYANOCOBALAMIN) tablet 1,000 mcg             84-year-old male with C2 fracture from a fall in November of last year.  Family brought patient in the ER due to aggressive behavior and agitation.  They feel they can no longer care for him at home and are wanting to get him placed in a long-term care facility.  Family does not report any recent falls, patient says no when asked if his neck hurt and he had no reproducible tenderness on exam.  Patient was seen in follow-up in our office in December of last  "year and at that time was switched to a soft collar that he needed to wear for 4 weeks.  Will check plain cervical spine x-rays.  If those look good no further recommendations from neurosurgery.    PLAN:   -Cervical xrays    I discussed the patient's findings and my recommendations with patient, family, nursing staff, and Dr Bartholomew    During patient visit, I utilized appropriate personal protective equipment including gloves and mask.  Mask used was standard procedure mask. Appropriate PPE was worn during the entire visit.  Hand hygiene was completed before and after.     I spent 35 minutes caring for Dereck Ramírez on this date of service. This time includes time spent by me in the following activities: preparing for the visit, reviewing tests, obtaining and/or reviewing a separately obtained history, performing a medically appropriate examination and/or evaluation, counseling and educating the patient/family/caregiver, ordering medications, tests, or procedures, referring and communicating with other health care professionals, documenting information in the medical record, independently interpreting results and communicating that information with the patient/family/caregiver, and care coordination         Taya Ignacio, APRN  03/28/25  10:45 EDT    \"Dictated utilizing Dragon dictation\".      "

## 2025-03-28 NOTE — PROGRESS NOTES
Name: Dereck Ramírez ADMIT: 3/27/2025   : 1940  PCP: Amber Rodney APRN    MRN: [id number] LOS: 0 days   AGE/SEX: 84 y.o. male  ROOM: [facility]     Subjective   Subjective   3/28/2025  Patient seen and examined at bedside he is awake and oriented x 3 but on 8 L high flow nasal cannula.  Appears to have aspirated overnight.  Case discussed with daughter who states that he has been having agitation episodes overnight and become near uncontrollable.  He is DNR/DNI.       Objective   Objective   Vital Signs  Temp:  [97.8 °F (36.6 °C)-98.3 °F (36.8 °C)] 98.2 °F (36.8 °C)  Heart Rate:  [65-85] 71  Resp:  [16] 16  BP: (123-140)/(53-75) 135/61  SpO2:  [87 %-99 %] 95 %  on  Flow (L/min) (Oxygen Therapy):  [2-9] 8;   Device (Oxygen Therapy): high-flow nasal cannula  Body mass index is 30.05 kg/m².  Physical Exam  Constitutional:       General: He is not in acute distress.     Appearance: He is ill-appearing.   HENT:      Head: Normocephalic and atraumatic.      Nose: Nose normal. No congestion.      Mouth/Throat:      Pharynx: Oropharynx is clear. No oropharyngeal exudate.   Eyes:      General: No scleral icterus.  Cardiovascular:      Rate and Rhythm: Normal rate and regular rhythm.      Heart sounds: No murmur heard.     No friction rub. No gallop.   Pulmonary:      Breath sounds: Rales present. No wheezing or rhonchi.      Comments: Patient on 8 L high flow nasal cannula  Abdominal:      General: There is no distension.      Tenderness: There is no abdominal tenderness. There is no guarding.   Musculoskeletal:      Cervical back: Normal range of motion. No rigidity.      Right lower leg: Edema present.      Left lower leg: Edema present.   Skin:     Coloration: Skin is not jaundiced.      Findings: No bruising.      Comments: Patient was scabbing and excoriations on the legs   Neurological:      General: No focal deficit present.      Mental Status: He is alert.      Motor: Weakness present.        Results Review     I reviewed the patient's new clinical results.  Results from last 7 days   Lab Units 03/28/25  0414 03/27/25  1306   WBC 10*3/mm3 8.20 7.87   HEMOGLOBIN g/dL 7.6* 7.8*   PLATELETS 10*3/mm3 223 218     Results from last 7 days   Lab Units 03/28/25  0414 03/27/25  1306   SODIUM mmol/L 139 139   POTASSIUM mmol/L 3.8 4.3   CHLORIDE mmol/L 108* 105   CO2 mmol/L 23.3 22.1   BUN mg/dL 28* 34*   CREATININE mg/dL 1.75* 1.61*   GLUCOSE mg/dL 118* 94   EGFR mL/min/1.73 38.4* 42.4*     Results from last 7 days   Lab Units 03/28/25  0414 03/27/25  1306   ALBUMIN g/dL 3.3* 3.5   BILIRUBIN mg/dL 0.3 0.3   ALK PHOS U/L 152* 157*   AST (SGOT) U/L 17 15   ALT (SGPT) U/L 15 19     Results from last 7 days   Lab Units 03/28/25  0414 03/27/25  1306   CALCIUM mg/dL 8.2* 8.5*   ALBUMIN g/dL 3.3* 3.5     Results from last 7 days   Lab Units 03/27/25  2047   LACTATE mmol/L 0.8     Glucose   Date/Time Value Ref Range Status   03/28/2025 0632 126 70 - 130 mg/dL Final   03/27/2025 2348 175 (H) 70 - 130 mg/dL Final       XR Chest 1 View  Result Date: 3/27/2025  1. Mild cardiomegaly. 2. Mild vascular congestion with patchy area of asymmetric edema or pneumonia in the right midlung.   This report was finalized on 3/27/2025 11:41 PM by Dr. Glenn Wolff M.D on Workstation: [id number]        I have personally reviewed all medications:  Scheduled Medications  amLODIPine, 10 mg, Oral, Daily  atorvastatin, 40 mg, Oral, Daily  busPIRone, 5 mg, Oral, Nightly  carvedilol, 25 mg, Oral, BID With Meals  ferrous sulfate, 325 mg, Oral, Daily With Breakfast  gabapentin, 200 mg, Oral, TID  ipratropium-albuterol, 3 mL, Nebulization, Once  piperacillin-tazobactam, 3.375 g, Intravenous, Q8H  QUEtiapine, 25 mg, Oral, Nightly  sertraline, 75 mg, Oral, Daily  tamsulosin, 0.4 mg, Oral, Daily  vitamin B-12, 1,000 mcg, Oral, Daily    Infusions   Diet  NPO Diet NPO Type: Strict NPO    I have personally reviewed:  [x]  Laboratory   [x]   Microbiology   [x]  Radiology   [x]  EKG/Telemetry  [x]  Cardiology/Vascular   []  Pathology    []  Records       Assessment/Plan     Active Hospital Problems    Diagnosis  POA    **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       84 y.o. male admitted with Dementia with aggressive behavior.    #Acute hypoxic respiratory failure  #Aspiration pneumonia    -Patient on 8 L high flow nasal cannula    -Chest x-ray shows right middle lobe pneumonia    -N.p.o.    -SLP    -Zosyn    -Blood cultures    -Strep, Legionella, pneumonia    -ABG shows hypoxia    -Pulm to assess    -DuoNeb as needed    #Dementia with aggression    -Daughter states patient gets aggressive and agitated at night but normal during the day    -Psychiatry consulted    -CT head without acute mass, shift, hemorrhage    -Seroquel 25 mg p.o. nightly    -Zoloft 25 mg p.o. daily    -Haldol 1 mg IV every 6 hours as needed agitation/anxiety, monitor closely for QT prolongation    #Odontoid fracture    -CT cervical spine shows age-indeterminate, although possible acute or subacute type II odontoid fracture    -Neurosurgery consulted    -Cervical collar    -PT/OT    #CAD    -Continue home statin and Coreg    -In the setting of anemia, hold Plavix    #CKD    -Creatinine 1.61 >  1.75 on admission    - creatinine ranges from base of around 1.4-1.6    -UA is bland    -N.p.o. start IVF NS at 75 an hour    -Monitor      #Anemia    -Hemoglobin 7.8 > 7.6, no overt bleeding    -Hemoglobin was 9.31-month ago    -Possibly secondary to Plavix use, hold    -Type and screen    -Iron panel reviewed    -Fecal occult    -PPI twice daily    -GI consulted    -Transfuse to maintain hemoglobin greater than 7.0    -Continue iron supplement    #Lower extremity edema    -proBNP 1800    -Stop Lasix    -No diuretics at this time    -Check    #BPH    - Flomax    #Hypertension    -Hold Norvasc due to lower extremity  edema    -Continue Coreg    #Hyperlipidemia    -Continue statin    Patient is at high risk of further and/or rapid decompensation, will transfer from [facility] for closer observation.  Discussed with family and he is DNR/DNI    SCDs for DVT prophylaxis.  Limited code (no CPR, no intubation).  Discussed with patient, family, and nursing staff.  Anticipate discharge  to be determined  timing yet to be determined.  Expected Discharge Date: 4/1/2025; Expected Discharge Time:       Lavinia Pollack DO  [facility]  03/28/25  08:34 EDT

## 2025-03-28 NOTE — NURSING NOTE
03/28/25 1456   Wound 03/28/25 Right lower leg   Placement Date: 03/28/25   Present on Original Admission: Yes  Side: Right  Orientation: lower  Location: leg   Wound Image     Base red;dry  (scabs, dry skin plaques, very dry skin)   Drainage Amount none   Care, Wound cleansed with;soap and water  (cleanse legs with foam soap and water, applied petrolatum moisturizor)   Dressing Care dressing applied  (applied 2 layer coflex compression wrap)   Wound 03/28/25 Left lower leg   Placement Date: 03/28/25   Present on Original Admission: Yes  Side: Left  Orientation: lower  Location: leg   Wound Image     Base dry;red  (scabs, dry skin plaques, very dry skin)   Drainage Amount none   Care, Wound cleansed with  (cleanse legs with foam soap and water, applied petrolatum moisturizor)   Dressing Care dressing applied  (applied 2 layer coflex compression wrap)   Skin Interventions   Pressure Reduction Techniques heels elevated off bed  (heels floated with 1 pillow under each leg)     Wound/ostomy - consult received regarding BLE, reports patient patient scratching at legs excessively and causes trauma/bleeding, patient with dementia and becomes increasingly agitated in the evening/night. Patient has been admitted after a fall at home, increasing agitation and pneumonia. Patient is pleasant and cooperative at this time, he reports that he scratches at his legs because they itch.     Patient's skin, in general, extremely dry appearing, especially the BLE, unkempt and overgrown toe nails, scattered plaques of thickened skin, scabs, crust, circumferential redness to gaiter area of BLE consistent with venous stasis skin changes.     I washed the legs well with foam soap and bathing cloths and applied petrolatum based moisturizer, he reported that this process felt so soothing and his legs have not been cleansed like this in a long time. Legs were wrapped in 2 layer coflex compression, he reported that this was very comforting  "and \"feels so good\", he repeatedly said \"thank you\" to show appreciation, he would drift off to sleep while legs were being cared for.     I would like to use the 2 layer coflex as they tend to be comfortable than ace wraps and they may deter the picking on the legs. Hopefully, the legs being clean and not so dry and compression will decrease any itching and patient will not be tempted to scratch. At this time there are no open wounds that require care, overtime the crusted/scabbed areas will slough off with basic hygiene and cleansing of the skin. Will add some magic barrier cream to the legs next time wraps are changed to soothe and manage any fungal component.   "

## 2025-03-28 NOTE — CONSULTS
[facility]  Initial Inpatient Consult Note    Referring Provider:     Belle Duran MD        Reason for Consultation: Anemia     Subjective     History of present illness:    84 y.o. male past medical history of dementia, diabetes, hypertension, hyperlipidemia and CKD who presented to the emergency room with his family due to agitation.      Labs on arrival showed hemoglobin of 7.8 down from 9.3 back in February.  His baseline tends to be between 8 and 10.  Iron profile shows low iron at 17, TSAT 6.  Ferritin within normal limits, 11 but could be falsely elevated.    Patient seen at bedside this morning.  He is a poor historian.  States he has had some trouble swallowing.  Denies any abdominal pain.  Spoke to nurse who denies any signs of overt GI bleeding -states patient has not had a bowel movement since arriving.  He is unable to say whether or not he has ever had an EGD or colonoscopy in the past.    CT abdomen pelvis completed 11/20/2024 was unremarkable.    Of note he does take Plavix at home.    Past Medical History:  Past Medical History:   Diagnosis Date    Arthritis     Cerebellar artery occlusion     Coronary artery disease     Diabetes mellitus     Enlarged prostate     Hyperlipidemia     Hypertension     Stroke 2011, 2012     Past Surgical History:  Past Surgical History:   Procedure Laterality Date    CARDIAC CATHETERIZATION      lesion 1: intervention outcome    HERNIA REPAIR        Social History:   Social History     Tobacco Use    Smoking status: Never    Smokeless tobacco: Never   Substance Use Topics    Alcohol use: No      Family History:  Family History   Problem Relation Age of Onset    Heart disease Mother     Stroke Mother     Crohn's disease Father     Stroke Father     Arthritis Father     Cancer Other     Stroke Other     Diabetes Other     Heart defect Other     Hypertension Other     Thyroid disease Other     Cancer Maternal Uncle         bone     Dementia Maternal Grandmother        Home Meds:  Medications Prior to Admission   Medication Sig Dispense Refill Last Dose/Taking    acetaminophen (TYLENOL) 325 MG tablet Take 2 tablets by mouth Every 4 (Four) Hours As Needed for Mild Pain. 30 tablet 0 Taking As Needed    amLODIPine (NORVASC) 10 MG tablet Take 1 tablet by mouth Daily. 90 tablet 3 Taking    atorvastatin (LIPITOR) 40 MG tablet TAKE 1 TABLET BY MOUTH DAILY 90 tablet 1 Taking    busPIRone (BUSPAR) 5 MG tablet TAKE 1 TABLET BY MOUTH THREE TIMES DAILY AS NEEDED FOR ANXIETY (Patient taking differently: Take 1 tablet by mouth Every Night.) 270 tablet 1 Taking Differently    carvedilol (COREG) 25 MG tablet TAKE 1 TABLET BY MOUTH TWICE DAILY WITH MEALS (Patient taking differently: Take 1 tablet by mouth 2 (Two) Times a Day With Meals.) 180 tablet 0 Taking Differently    clopidogrel (PLAVIX) 75 MG tablet TAKE 1 TABLET BY MOUTH DAILY 90 tablet 3 Taking    docusate sodium 100 MG capsule Take 1 capsule by mouth 2 (Two) Times a Day. 30 each 0 Taking    ferrous gluconate (FERGON) 324 MG tablet TAKE 1 TABLET BY MOUTH DAILY WITH BREAKFAST 90 tablet 1 Taking    furosemide (LASIX) 40 MG tablet TAKE 1 TABLET BY MOUTH TWICE DAILY 180 tablet 3 Taking    gabapentin (NEURONTIN) 100 MG capsule Take 2 capsules by mouth 3 (Three) Times a Day. 540 capsule 1 Taking    nateglinide (STARLIX) 60 MG tablet Take 1 tablet by mouth 2 (Two) Times a Day With Meals. 180 tablet 3 Taking    polyethylene glycol (MIRALAX) 17 g packet Take 17 g by mouth Daily As Needed (Use if senna-docusate is ineffective). 30 each 0 Taking As Needed    potassium chloride 10 MEQ CR tablet Take 1 tablet by mouth Daily. 90 tablet 3 Taking    QUEtiapine (SEROquel) 25 MG tablet Take 1 tablet by mouth Every Night for 30 days. 90 tablet 0 Taking    sertraline (ZOLOFT) 50 MG tablet Take 1.5 tablets by mouth Daily. 135 tablet 1 Taking    tamsulosin (FLOMAX) 0.4 MG capsule 24 hr capsule TAKE 1 CAPSULE BY MOUTH DAILY  90 capsule 1 Taking    vitamin B-12 (CYANOCOBALAMIN) 1000 MCG tablet TAKE 1 TABLET BY MOUTH DAILY 90 tablet 1 Taking     Current Meds:   amLODIPine, 10 mg, Oral, Daily  atorvastatin, 40 mg, Oral, Daily  busPIRone, 5 mg, Oral, Nightly  carvedilol, 25 mg, Oral, BID With Meals  ferrous sulfate, 325 mg, Oral, Daily With Breakfast  gabapentin, 200 mg, Oral, TID  ipratropium-albuterol, 3 mL, Nebulization, Once  piperacillin-tazobactam, 3.375 g, Intravenous, Q8H  QUEtiapine, 25 mg, Oral, Nightly  sertraline, 75 mg, Oral, Daily  tamsulosin, 0.4 mg, Oral, Daily  vitamin B-12, 1,000 mcg, Oral, Daily      Allergies:  No Known Allergies    Objective     Vital Signs  Temp:  [97.8 °F (36.6 °C)-98.3 °F (36.8 °C)] 98.2 °F (36.8 °C)  Heart Rate:  [65-85] 71  Resp:  [16] 16  BP: (123-140)/(53-75) 135/61    Physical Exam:   General: patient awake, alert and cooperative   Eyes: Normal lids and lashes, no scleral icterus   Neck: supple, normal ROM   Pulm: regular and unlabored   Abdomen: soft, nontender, nondistended;     Results Review:   I reviewed the patient's new clinical results.    Results from last 7 days   Lab Units 03/28/25  0414 03/27/25  1306   WBC 10*3/mm3 8.20 7.87   HEMOGLOBIN g/dL 7.6* 7.8*   HEMATOCRIT % 24.7* 25.7*   PLATELETS 10*3/mm3 223 218     Results from last 7 days   Lab Units 03/28/25  0414 03/27/25  1306   SODIUM mmol/L 139 139   POTASSIUM mmol/L 3.8 4.3   CHLORIDE mmol/L 108* 105   CO2 mmol/L 23.3 22.1   BUN mg/dL 28* 34*   CREATININE mg/dL 1.75* 1.61*   CALCIUM mg/dL 8.2* 8.5*   BILIRUBIN mg/dL 0.3 0.3   ALK PHOS U/L 152* 157*   ALT (SGPT) U/L 15 19   AST (SGOT) U/L 17 15   GLUCOSE mg/dL 118* 94         Lab Results   Lab Value Date/Time    LIPASE 34 07/28/2014 0719    LIPASE 25 07/25/2014 1845       Radiology:  XR Chest 1 View   Final Result   1. Mild cardiomegaly.   2. Mild vascular congestion with patchy area of asymmetric edema or   pneumonia in the right midlung.           This report was finalized on  3/27/2025 11:41 PM by Dr. Glenn Wolff M.D on Workstation: [id number]          XR Chest 1 View    (Results Pending)       Assessment & Plan   Assessment:   Dysphagia  Concern for aspiration pneumonia  Acute hypoxic respiratory failure on 8 L high flow nasal cannula  Acute on chronic anemia -no signs of overt GI bleeding  On Plavix  Dementia      Plan:   -Regarding dysphagia he is currently being followed by speech therapy and they are planning for VFSS to rule out oropharyngeal dysphagia.  Will await results.  Given current hypoxic respiratory failure and oxygen requirements he would be poor candidate for scope so if speech evaluation is unremarkable would recommend esophagram at that point.  -No signs of overt GI bleeding.  Recommend monitoring hemoglobin and transfusing as needed per primary team for now.  Not ideal candidate for endoscopic evaluation at this time but if he does have overt signs of bleeding or hemoglobin continues to downtrend can reconsider at that time  -Recommend PPI twice daily  -Diet per speech therapy    Patient and plan of care discussed with attending, Dr. Thompson    Dictated using Dragon dictation.         Evelyn Barrera PA-C.  [facility]  [address] [place] [address]  Office: [phone]

## 2025-03-28 NOTE — OUTREACH NOTE
Patient Outreach    MSW scheduled for third outreach attempt to patient's daughter and POA Crystal Stone regarding long term placement. MSW completed chart review and patient has been admitted to [facility] on 3/27 and working on long term care placement with inpatient case management staff. MSW to pause outreach and continue to follow regarding additional needs.     Rayne MIRZA -   Ambulatory Case Management    3/28/2025, 11:48 EDT
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
## 2025-03-28 NOTE — PLAN OF CARE
Goal Outcome Evaluation:  Plan of Care Reviewed With: patient        Progress: no change  Outcome Evaluation: vss. nvi. PW and breif for incontince. x1 assist. NPO. stool sample needed. no complaints of pain. 8 L 02 tonight. SCDs refused, pt reports them causing burning to lower legs. plan to d/c when medically stable to long term care.    Minimal struggles with confusion this shift, no aggression witnessed. 02 at 8 L with high flow nasal canula. Breathing treatments ordered per MD. Frequent productive cough throughout night.

## 2025-03-28 NOTE — PLAN OF CARE
Goal Outcome Evaluation:  Plan of Care Reviewed With: patient           Outcome Evaluation: Patient seen for clinical swallow assessment d/t coughing after meals and CXR suspicious of aspiration. On 8 Ls. Oral mech exam remarkable for slight soft palate deviation to R, moderately dysarthric (Hx cva per RN),  excess tissue/edema? bilaterally in mouth.  Torus palatinus on hard palate. Hoarse/harsh voice. Patient oriented to name and . Able to talk about his children and career. Swallow incoordination suspected with thins via cup/straw, nectar, x1 with puree, mixed, and regular solids with intermittent soft throat clearing. No overt s/s of aspiration with honey via straw. No definite voice change post swallow, but difficult to rate d/t dysphonia. SLP recs honey and puree, straws okay. Meds whole or crushed with honey or puree. Will follow for VFSS d/t soft dysphagia signs and to rule out silent aspiration. Pt in agreement. Please make NPO if supplemental 02 demands increase.
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
## 2025-03-28 NOTE — CONSULTS
Patient Care Team:  Amber Rodney APRN as PCP - General (Family Medicine)  Lynette Mcwilliams, IDRIS as Ambulatory  ([facility])  Rayne Sheikh MSW as  (Amb Case Mgmt) ([facility])      Subjective     Patient is a 84 y.o. male.  Asked to see for aspiration pneumonia patient presented emergency department yesterday for aggressive behavior and agitation and bilateral leg wounds he has a history of dementia that has been worsening he has diabetes, hypertension, hyperlipidemia ,chronic kidney disease coronary artery disease prior CVA with right-sided weakness history of cervical spine fracture few November from a fall.  Spoke with patient his daughter he has had a cough chest congestion yellow mucus for about 2 to 3 days now no fevers or chills maybe a little sore throat no runny nose no chest pain.  He was oxygenating well when he came in yesterday on room air and overnight he rather acutely desaturated.  He is improving now we just looking back up and his saturations were 99% on 7 L O2.  Says that having problems swallowing the daughter says he does and she thinks he is aspirating frequently.  She says he never sleeps in bed he takes a little catnaps in the chair he is a significant snore she has never noticed any apneas.  No history of lung disease never smoker    Review of Systems:  Lower extremity edema for several months      History  Past Medical History:   Diagnosis Date    Arthritis     Cerebellar artery occlusion     Coronary artery disease     Diabetes mellitus     Enlarged prostate     Hyperlipidemia     Hypertension     Stroke 2011, 2012     Past Surgical History:   Procedure Laterality Date    CARDIAC CATHETERIZATION      lesion 1: intervention outcome    HERNIA REPAIR       Social History     Socioeconomic History    Marital status:    Tobacco Use    Smoking status: Never    Smokeless tobacco: Never   Vaping Use    Vaping status: Never  Used   Substance and Sexual Activity    Alcohol use: No    Drug use: No    Sexual activity: Never     Family History   Problem Relation Age of Onset    Heart disease Mother     Stroke Mother     Crohn's disease Father     Stroke Father     Arthritis Father     Cancer Other     Stroke Other     Diabetes Other     Heart defect Other     Hypertension Other     Thyroid disease Other     Cancer Maternal Uncle         bone    Dementia Maternal Grandmother          Allergies:  Patient has no known allergies.    Medications:  Prior to Admission medications    Medication Sig Start Date End Date Taking? Authorizing Provider   acetaminophen (TYLENOL) 325 MG tablet Take 2 tablets by mouth Every 4 (Four) Hours As Needed for Mild Pain. 11/29/24  Yes Duane Sargent DO   amLODIPine (NORVASC) 10 MG tablet Take 1 tablet by mouth Daily. 9/26/24  Yes Amber Rodney APRN   atorvastatin (LIPITOR) 40 MG tablet TAKE 1 TABLET BY MOUTH DAILY 12/13/24  Yes Amber Rodney APRN   busPIRone (BUSPAR) 5 MG tablet TAKE 1 TABLET BY MOUTH THREE TIMES DAILY AS NEEDED FOR ANXIETY  Patient taking differently: Take 1 tablet by mouth Every Night. 12/11/24  Yes Amber Rodney APRN   carvedilol (COREG) 25 MG tablet TAKE 1 TABLET BY MOUTH TWICE DAILY WITH MEALS  Patient taking differently: Take 1 tablet by mouth 2 (Two) Times a Day With Meals. 1/20/25  Yes Josafat Mendez MD   clopidogrel (PLAVIX) 75 MG tablet TAKE 1 TABLET BY MOUTH DAILY 3/19/24  Yes Amber Rodney APRN   docusate sodium 100 MG capsule Take 1 capsule by mouth 2 (Two) Times a Day. 11/29/24  Yes Duane Sargent DO   ferrous gluconate (FERGON) 324 MG tablet TAKE 1 TABLET BY MOUTH DAILY WITH BREAKFAST 2/13/25  Yes Amber Rodney APRN   furosemide (LASIX) 40 MG tablet TAKE 1 TABLET BY MOUTH TWICE DAILY 8/7/24  Yes Josafat Mendez MD   gabapentin (NEURONTIN) 100 MG capsule Take 2 capsules by mouth 3 (Three) Times a Day. 9/26/24  Yes  Amber Rodney APRN   nateglinide (STARLIX) 60 MG tablet Take 1 tablet by mouth 2 (Two) Times a Day With Meals. 3/26/24  Yes Amber Rodney APRN   polyethylene glycol (MIRALAX) 17 g packet Take 17 g by mouth Daily As Needed (Use if senna-docusate is ineffective). 11/29/24  Yes Duane Sargent DO   potassium chloride 10 MEQ CR tablet Take 1 tablet by mouth Daily. 2/3/25  Yes Rosi Sunshine APRN   QUEtiapine (SEROquel) 25 MG tablet Take 1 tablet by mouth Every Night for 30 days. 1/15/25 3/27/25 Yes Amber Rodney APRN   sertraline (ZOLOFT) 50 MG tablet Take 1.5 tablets by mouth Daily. 12/20/24  Yes Amber Rodney APRN   tamsulosin (FLOMAX) 0.4 MG capsule 24 hr capsule TAKE 1 CAPSULE BY MOUTH DAILY 6/10/24  Yes Joaquin David Sr., MD   vitamin B-12 (CYANOCOBALAMIN) 1000 MCG tablet TAKE 1 TABLET BY MOUTH DAILY 11/7/24  Yes Amber Rodney APRN     atorvastatin, 40 mg, Oral, Daily  busPIRone, 5 mg, Oral, Nightly  carvedilol, 25 mg, Oral, BID With Meals  ferrous sulfate, 325 mg, Oral, Daily With Breakfast  gabapentin, 200 mg, Oral, TID  ipratropium-albuterol, 3 mL, Nebulization, Once  pantoprazole, 40 mg, Intravenous, BID  piperacillin-tazobactam, 3.375 g, Intravenous, Q8H  QUEtiapine, 25 mg, Oral, Nightly  sertraline, 75 mg, Oral, Daily  tamsulosin, 0.4 mg, Oral, Daily  vitamin B-12, 1,000 mcg, Oral, Daily      sodium chloride, 75 mL/hr, Last Rate: 75 mL/hr (03/28/25 0915)        Objective     Vital Signs  Vital Sign Min/Max for last 24 hours  Temp  Min: 97.8 °F (36.6 °C)  Max: 98.3 °F (36.8 °C)   BP  Min: 123/75  Max: 151/53   Pulse  Min: 65  Max: 85   Resp  Min: 16  Max: 16   SpO2  Min: 87 %  Max: 99 %   Flow (L/min) (Oxygen Therapy)  Min: 2  Max: 9   Weight  Min: 101 kg (221 lb 9 oz)  Max: 101 kg (221 lb 9 oz)       Intake/Output Summary (Last 24 hours) at 3/28/2025 1039  Last data filed at 3/28/2025 0915  Gross per 24 hour   Intake 0 ml   Output 650 ml   Net -650 ml  "    I/O this shift:  In: 0   Out: 350 [Urine:350]  Last Weight and Admission Weight        03/27/25 1841   Weight: 101 kg (221 lb 9 oz)     Flowsheet Rows      Flowsheet Row First Filed Value   Admission Height 182.9 cm (72\") Documented at 03/27/2025 1841   Admission Weight 101 kg (221 lb 9 oz) Documented at 03/27/2025 1841            Body mass index is 30.05 kg/m².           Physical Exam:  General Appearance: White male he is sitting up in a chair again he is on 7 L nasal cannula O2 with sats of 99% look in acute distress although his voice is extremely wet sounding  Eyes: Conjunctiva are clear and anicteric  ENT: Mucous membranes are moist no exudates Mallampati type II airway  Neck: No adenopathy or thyromegaly no jugular venous distention trachea midline neck is fairly large  Lungs: His lungs sound much better than I thought that he has a lot of upper airway rhonchi heard loudest over the laryngeal area and certainly some of that is transmitted down but I do not hear a whole lot in his lungs right now and I do not percuss any dullness  Cardiac: Regular rate rhythm no murmur  Abdomen: Soft nontender obese no palpable hepatosplenomegaly he does have an umbilical hernia that is reducible  : Not examined  Musculoskeletal: He has moderate thoracic kyphosis  Skin: Warm and dry no jaundice no petechiae  Neuro: He is alert he walked with assistance of 2 in the room.  He most of my questions that seem to be appropriately this daughter seem to agree with his answers  Extremities/P Vascular: He has at least 2+ lower extremity edema bilaterally has a number of excoriations and sores on his anterior legs bilaterally there are some erythema as well  MSE: Seems to be in pretty good spirits today, he was very cooperative      Labs:  Results from last 7 days   Lab Units 03/28/25  0414 03/27/25  1306   GLUCOSE mg/dL 118* 94   SODIUM mmol/L 139 139   POTASSIUM mmol/L 3.8 4.3   CO2 mmol/L 23.3 22.1   CHLORIDE mmol/L 108* 105 "   ANION GAP mmol/L 7.7 11.9   CREATININE mg/dL 1.75* 1.61*   BUN mg/dL 28* 34*   BUN / CREAT RATIO  16.0 21.1   CALCIUM mg/dL 8.2* 8.5*   ALK PHOS U/L 152* 157*   TOTAL PROTEIN g/dL 6.2 6.7   ALT (SGPT) U/L 15 19   AST (SGOT) U/L 17 15   BILIRUBIN mg/dL 0.3 0.3   ALBUMIN g/dL 3.3* 3.5   GLOBULIN gm/dL 2.9 3.2     Estimated Creatinine Clearance: 40 mL/min (A) (by C-G formula based on SCr of 1.75 mg/dL (H)).      Results from last 7 days   Lab Units 03/28/25  0414 03/27/25  1306   WBC 10*3/mm3 8.20 7.87   RBC 10*6/mm3 2.89* 3.01*   HEMOGLOBIN g/dL 7.6* 7.8*   HEMATOCRIT % 24.7* 25.7*   MCV fL 85.5 85.4   MCH pg 26.3* 25.9*   MCHC g/dL 30.8* 30.4*   RDW % 13.4 13.4   RDW-SD fl 41.5 41.2   MPV fL 9.7 9.5   PLATELETS 10*3/mm3 223 218   NEUTROPHIL % % 69.2 73.6   LYMPHOCYTE % % 12.1* 9.7*   MONOCYTES % % 14.9* 12.7*   EOSINOPHIL % % 2.8 3.3   BASOPHIL % % 0.5 0.3   IMM GRAN % % 0.5 0.4   NEUTROS ABS 10*3/mm3 5.68 5.80   LYMPHS ABS 10*3/mm3 0.99 0.76   MONOS ABS 10*3/mm3 1.22* 1.00*   EOS ABS 10*3/mm3 0.23 0.26   BASOS ABS 10*3/mm3 0.04 0.02   IMMATURE GRANS (ABS) 10*3/mm3 0.04 0.03   NRBC /100 WBC 0.0 0.0     Results from last 7 days   Lab Units 03/28/25  0519   PH, ARTERIAL pH units 7.455*   PO2 ART mm Hg 66.2*   PCO2, ARTERIAL mm Hg 35.6   HCO3 ART mmol/L 25.0         Results from last 7 days   Lab Units 03/27/25  1306   PROBNP pg/mL 1,800.0         Results from last 7 days   Lab Units 03/27/25  2047   LACTATE mmol/L 0.8         Microbiology Results (last 10 days)       Procedure Component Value - Date/Time    Respiratory Panel PCR w/COVID-19(SARS-CoV-2) KIMBERLY/SKINNY/ROBBIE/PAD/COR/CALIXTO In-House, NP Swab in UTM/VTM, 2 HR TAT - Swab, Nasopharynx [[id number]]  (Normal) Collected: 03/27/25 2003    Lab Status: Final result Specimen: Swab from Nasopharynx Updated: 03/27/25 2104     ADENOVIRUS, PCR Not Detected     Coronavirus 229E Not Detected     Coronavirus HKU1 Not Detected     Coronavirus NL63 Not Detected     Coronavirus OC43 Not  Detected     COVID19 Not Detected     Human Metapneumovirus Not Detected     Human Rhinovirus/Enterovirus Not Detected     Influenza A PCR Not Detected     Influenza B PCR Not Detected     Parainfluenza Virus 1 Not Detected     Parainfluenza Virus 2 Not Detected     Parainfluenza Virus 3 Not Detected     Parainfluenza Virus 4 Not Detected     RSV, PCR Not Detected     Bordetella pertussis pcr Not Detected     Bordetella parapertussis PCR Not Detected     Chlamydophila pneumoniae PCR Not Detected     Mycoplasma pneumo by PCR Not Detected    Narrative:      In the setting of a positive respiratory panel with a viral infection PLUS a negative procalcitonin without other underlying concern for bacterial infection, consider observing off antibiotics or discontinuation of antibiotics and continue supportive care. If the respiratory panel is positive for atypical bacterial infection (Bordetella pertussis, Chlamydophila pneumoniae, or Mycoplasma pneumoniae), consider antibiotic de-escalation to target atypical bacterial infection.    S. Pneumo Ag Urine or CSF - Urine, Urine, Clean Catch [[id number]]  (Normal) Collected: 03/27/25 1340    Lab Status: Final result Specimen: Urine, Clean Catch Updated: 03/28/25 1010     Strep Pneumo Ag Negative    Legionella Antigen, Urine - Urine, Urine, Clean Catch [[id number]]  (Normal) Collected: 03/27/25 1340    Lab Status: Final result Specimen: Urine, Clean Catch Updated: 03/28/25 1010     LEGIONELLA ANTIGEN, URINE Negative              Diagnostics:  XR Chest 1 View  Result Date: 3/28/2025  XR CHEST 1 VW-  Clinical: Hypoxia  COMPARISON examination 3/27/2025  FINDINGS: Infiltrative process within the right midlung zone diminished within the interim. Remaining infiltrates similar to the previous examination. The cardiomediastinal silhouette is stable, cardiac enlargement as before. No gross pleural effusion seen.  CONCLUSION: Diminished airspace disease right midlung zone otherwise similar  to 3/27/2025.  This report was finalized on 3/28/2025 7:26 AM by Dr. Jairo Sanchez M.D on Workstation: [id number]      XR Chest 1 View  Result Date: 3/27/2025  XR CHEST 1 VW-3/27/2025  HISTORY: Cough, hypoxia.  Heart size is mildly enlarged. There is bilateral vascular congestion. There is a patchy area of increased density in the right midlung which may represent asymmetric pulmonary edema or pneumonia. Please correlate with the clinical findings. There may be very minimal pleural fluid blunting both costophrenic angles.      1. Mild cardiomegaly. 2. Mild vascular congestion with patchy area of asymmetric edema or pneumonia in the right midlung.   This report was finalized on 3/27/2025 11:41 PM by Dr. Glenn Wolff M.D on Workstation: [id number]      Results for orders placed during the hospital encounter of 08/31/23    Adult Transthoracic Echo Complete w/ Color, Spectral and Contrast if Necessary Per Protocol    Interpretation Summary    Left ventricular systolic function is normal. Calculated left ventricular EF = 63.2%    Left ventricular wall thickness is consistent with moderate concentric hypertrophy.    Left ventricular diastolic function is consistent with (grade I) impaired relaxation.    The left atrial cavity is moderately dilated.    Estimated right ventricular systolic pressure from tricuspid regurgitation is mildly elevated (35-45 mmHg).      X-ray reviewed looks like pulmonary congestion and may be some early interstitial edema somewhat perihilar.  Looking at yesterday's film there was a little bit increased prominence in the right base area right midlung it looks more symmetric today    Assessment & Plan     Acute hypoxic respiratory failure titrate O2 hopefully be able to wean further like plenty of room to wean at this time.  Pneumonia unclear possible ,had normal white count, afebrile.  Acute decline last night he may have aspirated and may be even an aspiration pneumonitis or just  bronchitis that is caused this problem.  He may have some CHF exacerbated by his chronic kidney disease.  Patient on Zosyn should be adequate coverage while we try and figure out what is going on.  Question CHF echocardiogram a couple years ago normal LVEF but grade 1 diastolic dysfunction is this all heart failure preserved ejection fraction, not sure I am going to get a B type natruretic peptide  Pulmonary hypertension by echocardiogram in 2023 he had a moderately dilated left atrium at that time so probably who group 2 disease  Dementia with agitation and aggressive behavior  Dysphagia speech is seen him and recommended a modified diet his voice is extremely wet we will have to watch closely I am very worried about aspiration risk   concern for sleep apnea observe I do not think he is going to be a very good candidate for treatment if he has it.  Odontoid fracture in November been out of cervical collar for couple of months now  Chronic kidney disease stage IIIb his baseline creatinine is around 1.5 past month  Coronary artery disease  Hypertension  Hyperlipidemia  Diabetes mellitus type 2  Anemia   lower extremity wounds from patient apparently scratching      Pj Silva Jr, MD  03/28/25  10:39 EDT    Time:

## 2025-03-28 NOTE — PLAN OF CARE
Goal Outcome Evaluation:  Plan of Care Reviewed With: patient           Outcome Evaluation: Pt is an 85 yo M admitted from home with aggressve behavior/agitation with hx of dementia. Pt also with BLE wounds and on 8L O2 at time of eval. Pt lives with his daughter and her 2 sons - per chart, family with increased difficulty caring for the pt and seeking LTC placement. Pt reports he typically uses a rwx at [facility], multiple recent falls at home. Pt presents to PT with impaired strength, endurance, and balance limiting overall mobility. Pt returning to room with transport from testing, transferred to EOB with min A x1, STS with CGA, and ambulated 15ft in his room with rwx. Pt forward flexed with shuffled steps, intermittent assist with rwx management. Pt left sitting UIC with RN, nsg aid, and 2 providers present. PT will continue to follow, anticipate DC to SNF and transition to LTC per family request.    Anticipated Discharge Disposition (PT): skilled nursing facility

## 2025-03-29 NOTE — PROGRESS NOTES
CYNDI:  Cervical x-rays reveal no change in alignment in neutral, flexion, or extension.  No need for neurosurgical follow-up.

## 2025-03-29 NOTE — PROGRESS NOTES
[facility]  [phone]  Dr. Nilay Kimble    Subjective:  LOS: 1    Appears to be doing pretty well.  Previously requiring up to 10 L of high flow nasal cannula oxygen, currently helpful.  Turned down to 3 L while in the room.  He appears to be comfortable, but is confused and is asking about going home.    Objective:  Vital Signs past 24hrs    Temp range: Temp (24hrs), Av.1 °F (36.7 °C), Min:97.5 °F (36.4 °C), Max:98.8 °F (37.1 °C)    BP range: BP: (128-157)/(57-64) 143/59  Pulse range: Heart Rate:  [73-90] 86  Resp rate range: Resp:  [18-22] 20  102 kg (224 lb 6.9 oz); Body mass index is 30.44 kg/m².    Device (Oxygen Therapy): nasal cannula;humidifiedFlow (L/min) (Oxygen Therapy):  [4-10] 4    Oxygen range:SpO2:  [78 %-98 %] 90 %            Intake/Output Summary (Last 24 hours) at 3/29/2025 1219  Last data filed at 3/28/2025 2000  Gross per 24 hour   Intake 50 ml   Output 250 ml   Net -200 ml       Physical Exam  Constitutional:       Appearance: Normal appearance.   HENT:      Head: Normocephalic and atraumatic.      Nose: Nose normal.      Mouth/Throat:      Mouth: Mucous membranes are moist.      Pharynx: Oropharynx is clear.   Eyes:      Extraocular Movements: Extraocular movements intact.      Conjunctiva/sclera: Conjunctivae normal.   Cardiovascular:      Rate and Rhythm: Normal rate and regular rhythm.      Pulses: Normal pulses.      Heart sounds: Normal heart sounds. No murmur heard.  Pulmonary:      Effort: Pulmonary effort is normal. No respiratory distress.      Breath sounds: No stridor. Rhonchi present. No wheezing or rales.   Abdominal:      General: Abdomen is flat. Bowel sounds are normal.      Palpations: Abdomen is soft.      Tenderness: There is no abdominal tenderness.   Skin:     General: Skin is warm and dry.   Neurological:      General: No focal deficit present.      Mental Status: He is alert and oriented to person, place, and time.            Result Review:  I  have reviewed the results from last note by [facility] physician and agree with these findings:  [x]  Laboratory accordion  [x]  Microbiology  [x]  Radiology  [x]  EKG/Telemetry   [x]  Cardiology/Vascular   [x]  Pathology  [x]  Old records  []  Other:    Medication Review:  I have reviewed the current MAR.  Antibiotics  piperacillin-tazobactam (ZOSYN) 3.375 g IVPB in 100 mL NS MBP (CD)     Scheduled Medications  atorvastatin, 40 mg, Oral, Daily  busPIRone, 5 mg, Oral, Nightly  carvedilol, 25 mg, Oral, BID With Meals  ferrous sulfate, 325 mg, Oral, Daily With Breakfast  furosemide, 40 mg, Intravenous, Once  gabapentin, 200 mg, Oral, TID  ipratropium-albuterol, 3 mL, Nebulization, Once  OLANZapine, 2.5 mg, Oral, BID  pantoprazole, 40 mg, Intravenous, BID  piperacillin-tazobactam, 3.375 g, Intravenous, Q8H  sertraline, 75 mg, Oral, Daily  tamsulosin, 0.4 mg, Oral, Daily  vitamin B-12, 1,000 mcg, Oral, Daily      ICU Drips     PRN Medications    acetaminophen **OR** acetaminophen **OR** acetaminophen    senna-docusate sodium **AND** polyethylene glycol **AND** bisacodyl **AND** bisacodyl    hydrocortisone-bacitracin-zinc oxide-nystatin    ipratropium-albuterol    OLANZapine    ondansetron    [COMPLETED] Insert Peripheral IV **AND** sodium chloride    Lines, Drains & Airways       Active LDAs       Name Placement date Placement time Site Days    Peripheral IV 03/27/25 1306 Anterior;Right Forearm 03/27/25  1306  Forearm  1    Peripheral IV 03/28/25 2030 Anterior;Proximal;Right Forearm 03/28/25  2030  Forearm  less than 1                    Diet Orders (active) (From admission, onward)       Start     Ordered    03/29/25 1153  NPO Diet NPO Type: Strict NPO  Diet Effective Now         03/29/25 1152                      Assessment:  Acute hypoxic respiratory failure  Aspiration pneumonitis  Pulmonary hypertension by echo  Dementia  Dysphagia  History of odontoid fracture (Nov, 2024)  CKD3b  CAD  Hypertension  Hyperlipidemia  Type 2  diabetes mellitus  Anemia      THESE ARE NEW MEDICAL PROBLEMS TO ME.    Plan of Treatment  -I suspect that he developed an aspiration pneumonia versus CHF exacerbation  -Hypoxia much improved after Lasix yesterday, agree with another dose of 40 mg IV Lasix today  -Continue zosyn, plan to complete 5 day course (last dose 4/3/25)  -f/u cultures  -Wean O2 as tolerated, goal O2 sats > 92%  -Repeat echo pending. RVSP elevated on last echo in 2023      Nilay Kimble MD  [facility], [facility]  Pulmonary and Critical Care Medicine

## 2025-03-29 NOTE — PROGRESS NOTES
The patient seems significantly more confused when seen today.  I have increased his Zyprexa to 5 mg twice daily, continuing previously prescribed as needed Zyprexa.  Unlike yesterday, he is oriented to person only and is struggling to try to dress himself during today's interview.

## 2025-03-29 NOTE — PROGRESS NOTES
Name: Dereck Ramírez ADMIT: 3/27/2025   : 1940  PCP: Amber Rodney APRN    MRN: [id number] LOS: 1 days   AGE/SEX: 84 y.o. male  ROOM: [facility]     Subjective   Subjective   3/28/2025  Patient seen and examined at bedside he is awake and oriented x 3 but on 8 L high flow nasal cannula.  Appears to have aspirated overnight.  Case discussed with daughter who states that he has been having agitation episodes overnight and become near uncontrollable.  He is DNR/DNI.    3/29/2025  Oxygen increased to 10 L nasal cannula overnight but improved to 3 L after a dose of Lasix.  Patient confused and trying to get out of bed.  Will check an ABG to rule out hypercapnia and gave another dose of Lasix       Objective   Objective   Vital Signs  Temp:  [97.5 °F (36.4 °C)-98.8 °F (37.1 °C)] 98.8 °F (37.1 °C)  Heart Rate:  [73-90] 86  Resp:  [18-22] 20  BP: (128-157)/(57-64) 143/59  SpO2:  [78 %-98 %] 90 %  on  Flow (L/min) (Oxygen Therapy):  [4-10] 4;   Device (Oxygen Therapy): nasal cannula;humidified  Body mass index is 30.44 kg/m².  Physical Exam  Constitutional:       General: He is not in acute distress.     Appearance: He is ill-appearing.      Comments: Patient alert and oriented x 2 but with poor cognition   HENT:      Head: Normocephalic and atraumatic.      Nose: Nose normal. No congestion.      Mouth/Throat:      Pharynx: Oropharynx is clear. No oropharyngeal exudate.   Eyes:      General: No scleral icterus.  Cardiovascular:      Rate and Rhythm: Normal rate and regular rhythm.      Heart sounds: No murmur heard.     No friction rub. No gallop.   Pulmonary:      Breath sounds: Rhonchi and rales present. No wheezing.      Comments: Patient on 3 L high flow nasal cannula  Abdominal:      General: There is no distension.      Tenderness: There is no abdominal tenderness. There is no guarding.   Musculoskeletal:      Cervical back: Normal range of motion. No rigidity.      Right lower leg: Edema present.       Left lower leg: Edema present.   Skin:     Coloration: Skin is not jaundiced.      Findings: No bruising.      Comments: Patient was scabbing and excoriations on the legs   Neurological:      General: No focal deficit present.      Mental Status: He is alert. He is disoriented.      Motor: Weakness present.       Results Review     I reviewed the patient's new clinical results.  Results from last 7 days   Lab Units 03/29/25  0352 03/28/25  0414 03/27/25  1306   WBC 10*3/mm3 10.54 8.20 7.87   HEMOGLOBIN g/dL 7.9* 7.6* 7.8*   PLATELETS 10*3/mm3 252 223 218     Results from last 7 days   Lab Units 03/29/25  0352 03/28/25  0414 03/27/25  1306   SODIUM mmol/L 143 139 139   POTASSIUM mmol/L 3.8 3.8 4.3   CHLORIDE mmol/L 109* 108* 105   CO2 mmol/L 21.8* 23.3 22.1   BUN mg/dL 27* 28* 34*   CREATININE mg/dL 1.63* 1.75* 1.61*   GLUCOSE mg/dL 124* 118* 94   EGFR mL/min/1.73 41.8* 38.4* 42.4*     Results from last 7 days   Lab Units 03/28/25  0414 03/27/25  1306   ALBUMIN g/dL 3.3* 3.5   BILIRUBIN mg/dL 0.3 0.3   ALK PHOS U/L 152* 157*   AST (SGOT) U/L 17 15   ALT (SGPT) U/L 15 19     Results from last 7 days   Lab Units 03/29/25  0352 03/28/25  0414 03/27/25  1306   CALCIUM mg/dL 8.0* 8.2* 8.5*   ALBUMIN g/dL  --  3.3* 3.5     Results from last 7 days   Lab Units 03/28/25  1130 03/27/25  2047   PROCALCITONIN ng/mL 0.04  --    LACTATE mmol/L  --  0.8     Glucose   Date/Time Value Ref Range Status   03/28/2025 0632 126 70 - 130 mg/dL Final   03/27/2025 2348 175 (H) 70 - 130 mg/dL Final       XR Chest 1 View  Result Date: 3/29/2025  Worsening bilateral alveolar and interstitial infiltrates.  This report was finalized on 3/29/2025 3:08 AM by Dr. Domenica Lang M.D on Workstation: [id number]      XR Spine Cervical Complete With Flex Ext  Result Date: 3/28/2025   As described.    This report was finalized on 3/28/2025 5:57 PM by Dr. Facundo Guzman M.D on Workstation: [id number]      XR Chest 1 View  Result Date:  3/27/2025  1. Mild cardiomegaly. 2. Mild vascular congestion with patchy area of asymmetric edema or pneumonia in the right midlung.   This report was finalized on 3/27/2025 11:41 PM by Dr. Glenn Wolff M.D on Workstation: [id number]        I have personally reviewed all medications:  Scheduled Medications  atorvastatin, 40 mg, Oral, Daily  busPIRone, 5 mg, Oral, Nightly  carvedilol, 25 mg, Oral, BID With Meals  ferrous sulfate, 325 mg, Oral, Daily With Breakfast  furosemide, 40 mg, Intravenous, Once  gabapentin, 200 mg, Oral, TID  ipratropium-albuterol, 3 mL, Nebulization, Once  OLANZapine, 2.5 mg, Oral, BID  pantoprazole, 40 mg, Intravenous, BID  piperacillin-tazobactam, 3.375 g, Intravenous, Q8H  sertraline, 75 mg, Oral, Daily  tamsulosin, 0.4 mg, Oral, Daily  vitamin B-12, 1,000 mcg, Oral, Daily    Infusions   Diet  NPO Diet NPO Type: Strict NPO    I have personally reviewed:  [x]  Laboratory   [x]  Microbiology   [x]  Radiology   [x]  EKG/Telemetry  [x]  Cardiology/Vascular   []  Pathology    []  Records       Assessment/Plan     Active Hospital Problems    Diagnosis  POA    **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       84 y.o. male admitted with Dementia with aggressive behavior.    #Acute hypoxic respiratory failure  #Aspiration pneumonia    -Oxygen 10L NC > 3L after dose of lasix overnight    -Chest x-ray shows right middle lobe pneumonia on admission    -Chest x-ray on 3/29/2025 shows worsening infiltrates    -Keep n.p.o. per SLP    -Zosyn    -Blood cultures    -Strep, Legionella, pneumonia    -Repeat ABG due to confusion    -Pulm to assess    -DuoNeb as needed    -Will give another dose of Lasix 40 mg IV and monitor response    -Possible dysphagia diet if continues to improve respiratory wise    #Possible HFpEF    -Echo from 8/31/2023 shows diastolic dysfunction     -Patient had some improvement with IV Lasix overnight    -proBNP 2656 yesterday    -Will give another dose of IV Lasix, monitor response carefully    -Check echo    #Dementia with aggression    -Daughter states patient gets aggressive and agitated at night but normal during the day    -Psychiatry consulted    -CT head without acute mass, shift, hemorrhage    -Seroquel 25 mg p.o. nightly    -Zoloft 25 mg p.o. daily    -Zyprexa IM as needed.    -Check ABG to rule out hypercapnia    #Odontoid fracture    -CT cervical spine shows age-indeterminate, although possible acute or subacute type II odontoid fracture    -Neurosurgery consulted, cervical x-rays look good, no further intervention needed    -PT/OT       #CAD    -Continue home statin and Coreg    -In the setting of anemia, hold Plavix    #CKD    -Creatinine 1.61 >  1.75 > 1.63 on admission    - creatinine ranges from base of around 1.4-1.6    -UA is bland    -N.p.o. start IVF NS at 75 an hour    -Monitor      #Anemia    -Hemoglobin 7.8 > 7.6, no overt bleeding    -Hemoglobin was 9.31-month ago    -Possibly secondary to Plavix use, hold    -Type and screen    -Iron panel reviewed    -Fecal occult    -PPI twice daily    -GI consulted    -Transfuse to maintain hemoglobin greater than 7.0    -Continue iron supplement    #BPH    - Flomax    #Hypertension    -Hold Norvasc due to lower extremity edema    -Continue Coreg    #Hyperlipidemia    -Continue statin    Patient is at high risk of further and/or rapid decompensation, will transfer from [facility] for closer observation.  Discussed with family and he is DNR/DNI    SCDs for DVT prophylaxis.  Limited code (no CPR, no intubation).  Discussed with patient, family, and nursing staff.  Anticipate discharge  to be determined  timing yet to be determined.  Expected Discharge Date: 4/1/2025; Expected Discharge Time:       DO Brice HansenGlend[facility]  03/29/25  11:55 EDT

## 2025-03-29 NOTE — PLAN OF CARE
Goal Outcome Evaluation:  Plan of Care Reviewed With: other (see comments)           Outcome Evaluation: Patient not appropriate for VFSS today per nurse due to oxygen levels. ST to re-attempt VFSS early next week.

## 2025-03-29 NOTE — PROGRESS NOTES
[facility]  Inpatient Progress Note    Reason for Follow Up:  Anemia     Subjective     Interval History:   Seen by speech but not appropriate for VFSS due to oxygen requirements-they are planning to try again next week.    States he is feeling well this morning.  Reports he still having difficulty swallowing food.  On 4 L of O2 this morning.  Spoke with nurse and no overt signs of bleeding noted.    Hemoglobin lower than baseline but stable at 7.9.  Blood pressure has been stable.  Most recently checked to be 143/59.  Required 9 L of oxygen overnight.    Current Facility-Administered Medications:     acetaminophen (TYLENOL) tablet 650 mg, 650 mg, Oral, Q4H PRN, 650 mg at 03/28/25 1120 **OR** acetaminophen (TYLENOL) 160 MG/5ML oral solution 650 mg, 650 mg, Oral, Q4H PRN **OR** acetaminophen (TYLENOL) suppository 650 mg, 650 mg, Rectal, Q4H PRN, Belle Duran MD    atorvastatin (LIPITOR) tablet 40 mg, 40 mg, Oral, Daily, Belle Duran MD, 40 mg at 03/28/25 1120    sennosides-docusate (PERICOLACE) 8.6-50 MG per tablet 2 tablet, 2 tablet, Oral, BID PRN **AND** polyethylene glycol (MIRALAX) packet 17 g, 17 g, Oral, Daily PRN **AND** bisacodyl (DULCOLAX) EC tablet 5 mg, 5 mg, Oral, Daily PRN **AND** bisacodyl (DULCOLAX) suppository 10 mg, 10 mg, Rectal, Daily PRN, Belle Duran MD    busPIRone (BUSPAR) tablet 5 mg, 5 mg, Oral, Nightly, Belle Duran MD, 5 mg at 03/28/25 2024    carvedilol (COREG) tablet 25 mg, 25 mg, Oral, BID With Meals, Belle Duran MD, 25 mg at 03/29/25 0926    ferrous sulfate tablet 325 mg, 325 mg, Oral, Daily With Breakfast, Belle Duran MD, 325 mg at 03/28/25 1121    gabapentin (NEURONTIN) capsule 200 mg, 200 mg, Oral, TID, Belle Duran, MD, 200 mg at 03/28/25 2024    hydrocortisone-bacitracin-zinc oxide-nystatin (MAGIC BARRIER) ointment 1 Application, 1 Application, Topical, PRN, Lavinia Pollack, DO     ipratropium-albuterol (DUO-NEB) nebulizer solution 3 mL, 3 mL, Nebulization, Once, Laurita Hoyos APRN    ipratropium-albuterol (DUO-NEB) nebulizer solution 3 mL, 3 mL, Nebulization, Q6H PRN, Lavinia Pollack DO, 3 mL at 03/29/25 0133    OLANZapine (zyPREXA) injection 5 mg, 5 mg, Intramuscular, Q8H PRN, Nas Maguire III, MD    OLANZapine (zyPREXA) tablet 2.5 mg, 2.5 mg, Oral, BID, Nas Maguire III, MD, 2.5 mg at 03/28/25 2024    ondansetron (ZOFRAN) injection 4 mg, 4 mg, Intravenous, Q6H PRN, Belle Duran MD    pantoprazole (PROTONIX) injection 40 mg, 40 mg, Intravenous, BID, Lavinia Pollack DO, 40 mg at 03/28/25 2024    piperacillin-tazobactam (ZOSYN) 3.375 g IVPB in 100 mL NS MBP (CD), 3.375 g, Intravenous, Q8H, Lavinia Pollack DO    sertraline (ZOLOFT) tablet 75 mg, 75 mg, Oral, Daily, Belle Duran MD, 75 mg at 03/28/25 1120    [COMPLETED] Insert Peripheral IV, , , Once **AND** sodium chloride 0.9 % flush 10 mL, 10 mL, Intravenous, PRN, Rickey Rojas MD, 10 mL at 03/28/25 2025    tamsulosin (FLOMAX) 24 hr capsule 0.4 mg, 0.4 mg, Oral, Daily, Belle Duran MD, 0.4 mg at 03/28/25 1120    vitamin B-12 (CYANOCOBALAMIN) tablet 1,000 mcg, 1,000 mcg, Oral, Daily, Belle Duran MD, 1,000 mcg at 03/28/25 1121    Objective     Vital Signs  Temp:  [97.5 °F (36.4 °C)-98.8 °F (37.1 °C)] 98.8 °F (37.1 °C)  Heart Rate:  [73-90] 86  Resp:  [18-22] 20  BP: (128-157)/(57-68) 143/59  Body mass index is 30.44 kg/m².    Intake/Output Summary (Last 24 hours) at 3/29/2025 1025  Last data filed at 3/28/2025 2000  Gross per 24 hour   Intake 50 ml   Output 250 ml   Net -200 ml     No intake/output data recorded.     Physical Exam:   General: patient awake, alert and cooperative   Eyes: Normal lids and lashes, no scleral icterus   Skin: warm and dry, not jaundiced   Pulm: regular -on 4 L of O2 at time of exam.  Notable cough   Abdomen: soft, nontender,  nondistended;      Results Review:     I reviewed the patient's new clinical results.    Results from last 7 days   Lab Units 03/29/25  0352 03/28/25  0414 03/27/25  1306   WBC 10*3/mm3 10.54 8.20 7.87   HEMOGLOBIN g/dL 7.9* 7.6* 7.8*   HEMATOCRIT % 25.2* 24.7* 25.7*   PLATELETS 10*3/mm3 252 223 218     Results from last 7 days   Lab Units 03/29/25  0352 03/28/25  0414 03/27/25  1306   SODIUM mmol/L 143 139 139   POTASSIUM mmol/L 3.8 3.8 4.3   CHLORIDE mmol/L 109* 108* 105   CO2 mmol/L 21.8* 23.3 22.1   BUN mg/dL 27* 28* 34*   CREATININE mg/dL 1.63* 1.75* 1.61*   CALCIUM mg/dL 8.0* 8.2* 8.5*   BILIRUBIN mg/dL  --  0.3 0.3   ALK PHOS U/L  --  152* 157*   ALT (SGPT) U/L  --  15 19   AST (SGOT) U/L  --  17 15   GLUCOSE mg/dL 124* 118* 94         Lab Results   Lab Value Date/Time    LIPASE 34 07/28/2014 0719    LIPASE 25 07/25/2014 1845       Radiology:  XR Chest 1 View   Final Result   Worsening bilateral alveolar and interstitial infiltrates.       This report was finalized on 3/29/2025 3:08 AM by Dr. Domenica Lang M.D on Workstation: [id number]          XR Spine Cervical Complete With Flex Ext   Final Result       As described.               This report was finalized on 3/28/2025 5:57 PM by Dr. Facundo Guzman M.D on Workstation: [id number]          XR Chest 1 View   Final Result      XR Chest 1 View   Final Result   1. Mild cardiomegaly.   2. Mild vascular congestion with patchy area of asymmetric edema or   pneumonia in the right midlung.           This report was finalized on 3/27/2025 11:41 PM by Dr. Glenn Wolff M.D on Workstation: [id number]              Assessment & Plan     Active Hospital Problems    Diagnosis     **Dementia with aggressive behavior     Acute hypoxic respiratory failure        Assessment:  Dysphagia  Concern for aspiration pneumonia  Acute hypoxic respiratory failure on 8 L high flow nasal cannula  Acute on chronic anemia -no signs of overt GI bleeding  On  Plavix  Dementia    Plan:  No signs of overt GI bleeding.  Will continue to monitor hemoglobin  Transfuse as needed per primary team  Regarding dysphagia he is currently being followed by speech therapy and they are planning VFSS to rule out oropharyngeal dysphagia.  Will await results.    Given current hypoxic respiratory failure and oxygen requirements he would be a poor candidate for scopes so if speech eval is unremarkable would recommend esophagram at that point  Continue PPI twice daily  Diet per speech therapy    Patient and plan of care discussed w/ attending, Dr. Gayle Barrera   [facility]  [phone]

## 2025-03-29 NOTE — PLAN OF CARE
Goal Outcome Evaluation:      Mental status unchanged. Pt restless but, cooperative. Increased o2 requirements. On call [facility] and Pulm notified. STAT CXR worse than previous imaging. IV fluids stopped, lasix given, PRN Duo-Neb, strict NPO until SLP re-evaluates. Incontinence care provided.

## 2025-03-30 NOTE — PLAN OF CARE
Problem: Adult Inpatient Plan of Care  Goal: Plan of Care Review  Flowsheets (Taken 3/30/2025 0535)  Progress: no change  Outcome Evaluation: No changes. VSS. AOx1, to self. Q2hr turns. No c/o pain or discomfort. NPO. Call light within reach.  Plan of Care Reviewed With: patient  Goal: Absence of Hospital-Acquired Illness or Injury  Intervention: Identify and Manage Fall Risk  Flowsheets  Taken 3/30/2025 0433  Safety Promotion/Fall Prevention:   activity supervised   assistive device/personal items within reach   clutter free environment maintained   fall prevention program maintained   lighting adjusted   nonskid shoes/slippers when out of bed   room organization consistent   safety round/check completed  Taken 3/30/2025 0202  Safety Promotion/Fall Prevention:   activity supervised   assistive device/personal items within reach   clutter free environment maintained   fall prevention program maintained   lighting adjusted   nonskid shoes/slippers when out of bed   room organization consistent   safety round/check completed  Taken 3/30/2025 0000  Safety Promotion/Fall Prevention:   activity supervised   assistive device/personal items within reach   clutter free environment maintained   fall prevention program maintained   lighting adjusted   nonskid shoes/slippers when out of bed   room organization consistent   safety round/check completed  Taken 3/29/2025 2256  Safety Promotion/Fall Prevention:   activity supervised   assistive device/personal items within reach   clutter free environment maintained   fall prevention program maintained   lighting adjusted   nonskid shoes/slippers when out of bed   room organization consistent   safety round/check completed  Taken 3/29/2025 2058  Safety Promotion/Fall Prevention:   activity supervised   assistive device/personal items within reach   clutter free environment maintained   fall prevention program maintained   lighting adjusted   nonskid shoes/slippers when out of  bed   room organization consistent   safety round/check completed  Intervention: Prevent Skin Injury  Flowsheets  Taken 3/30/2025 0433  Body Position: position changed independently  Taken 3/30/2025 0202  Body Position: position changed independently  Taken 3/30/2025 0000  Body Position: position changed independently  Taken 3/29/2025 2256  Body Position: position changed independently  Taken 3/29/2025 2058  Body Position: position changed independently  Skin Protection:   incontinence pads utilized   transparent dressing maintained  Intervention: Prevent and Manage VTE (Venous Thromboembolism) Risk  Flowsheets (Taken 3/29/2025 2058)  VTE Prevention/Management:   bilateral   SCDs (sequential compression devices) off   patient refused intervention  Intervention: Prevent Infection  Flowsheets (Taken 3/30/2025 0300 by Jia Ruby, PCT)  Infection Prevention:   environmental surveillance performed   hand hygiene promoted   rest/sleep promoted   single patient room provided  Goal: Optimal Comfort and Wellbeing  Intervention: Monitor Pain and Promote Comfort  Flowsheets (Taken 3/28/2025 0915 by Aamir Koenig, RN)  Pain Management Interventions:   pillow support provided   position adjusted  Intervention: Provide Person-Centered Care  Flowsheets (Taken 3/29/2025 2058)  Trust Relationship/Rapport:   care explained   choices provided   emotional support provided   empathic listening provided   questions answered   questions encouraged   reassurance provided   thoughts/feelings acknowledged  Goal: Readiness for Transition of Care  Intervention: Mutually Develop Transition Plan  Flowsheets  Taken 3/27/2025 1855 by Samia Heller RN  Equipment Currently Used at Home: walker, standard  Taken 3/27/2025 1852 by Samia Heller, RN  Transportation Anticipated: health plan transportation  Patient/Family Anticipated Services at Transition:   Patient/Family Anticipates Transition to: long-term care facility      Problem: Violence Risk or Actual  Goal: Anger and Impulse Control  Intervention: Minimize Safety Risk  Flowsheets  Taken 3/30/2025 0433 by Ric Rivas RN  Enhanced Safety Measures:   bed alarm set   room near unit station  Taken 3/30/2025 0202 by Ric Rivas RN  Enhanced Safety Measures:   bed alarm set   room near unit station  Taken 3/30/2025 0000 by Ric Rivas RN  Enhanced Safety Measures:   bed alarm set   room near unit station  Taken 3/29/2025 2256 by Ric Rivas RN  Enhanced Safety Measures:   bed alarm set   room near unit station  Taken 3/29/2025 2058 by Ric Rivas RN  Sensory Stimulation Regulation:   care clustered   lighting decreased  Enhanced Safety Measures:   bed alarm set   room near unit station  Taken 3/29/2025 1720 by Tye Butler RN  De-Escalation Techniques:   appropriate behavior reinforced   diversional activity encouraged   increased round frequency   medication administered   reoriented  Intervention: Promote Self-Control  Flowsheets (Taken 3/29/2025 2058)  Supportive Measures:   active listening utilized   relaxation techniques promoted     Problem: Fall Injury Risk  Goal: Absence of Fall and Fall-Related Injury  Intervention: Identify and Manage Contributors  Flowsheets (Taken 3/30/2025 0433)  Medication Review/Management:   medications reviewed   high-risk medications identified  Intervention: Promote Injury-Free Environment  Flowsheets  Taken 3/30/2025 0433  Safety Promotion/Fall Prevention:   activity supervised   assistive device/personal items within reach   clutter free environment maintained   fall prevention program maintained   lighting adjusted   nonskid shoes/slippers when out of bed   room organization consistent   safety round/check completed  Taken 3/30/2025 0202  Safety Promotion/Fall Prevention:   activity supervised   assistive device/personal items within reach   clutter free environment maintained   fall prevention program maintained   lighting adjusted    nonskid shoes/slippers when out of bed   room organization consistent   safety round/check completed  Taken 3/30/2025 0000  Safety Promotion/Fall Prevention:   activity supervised   assistive device/personal items within reach   clutter free environment maintained   fall prevention program maintained   lighting adjusted   nonskid shoes/slippers when out of bed   room organization consistent   safety round/check completed  Taken 3/29/2025 2256  Safety Promotion/Fall Prevention:   activity supervised   assistive device/personal items within reach   clutter free environment maintained   fall prevention program maintained   lighting adjusted   nonskid shoes/slippers when out of bed   room organization consistent   safety round/check completed  Taken 3/29/2025 2058  Safety Promotion/Fall Prevention:   activity supervised   assistive device/personal items within reach   clutter free environment maintained   fall prevention program maintained   lighting adjusted   nonskid shoes/slippers when out of bed   room organization consistent   safety round/check completed  Goal: Absence of Fall and Fall-Related Injury  Intervention: Identify and Manage Contributors  Flowsheets (Taken 3/30/2025 0433)  Medication Review/Management:   medications reviewed   high-risk medications identified  Intervention: Promote Injury-Free Environment  Flowsheets  Taken 3/30/2025 0433  Safety Promotion/Fall Prevention:   activity supervised   assistive device/personal items within reach   clutter free environment maintained   fall prevention program maintained   lighting adjusted   nonskid shoes/slippers when out of bed   room organization consistent   safety round/check completed  Taken 3/30/2025 0202  Safety Promotion/Fall Prevention:   activity supervised   assistive device/personal items within reach   clutter free environment maintained   fall prevention program maintained   lighting adjusted   nonskid shoes/slippers when out of bed   room  organization consistent   safety round/check completed  Taken 3/30/2025 0000  Safety Promotion/Fall Prevention:   activity supervised   assistive device/personal items within reach   clutter free environment maintained   fall prevention program maintained   lighting adjusted   nonskid shoes/slippers when out of bed   room organization consistent   safety round/check completed  Taken 3/29/2025 2256  Safety Promotion/Fall Prevention:   activity supervised   assistive device/personal items within reach   clutter free environment maintained   fall prevention program maintained   lighting adjusted   nonskid shoes/slippers when out of bed   room organization consistent   safety round/check completed  Taken 3/29/2025 2058  Safety Promotion/Fall Prevention:   activity supervised   assistive device/personal items within reach   clutter free environment maintained   fall prevention program maintained   lighting adjusted   nonskid shoes/slippers when out of bed   room organization consistent   safety round/check completed     Problem: Skin Injury Risk Increased  Goal: Skin Health and Integrity  Intervention: Optimize Skin Protection  Flowsheets  Taken 3/30/2025 0433  Activity Management: activity encouraged  Head of Bed (HOB) Positioning: HOB elevated  Taken 3/30/2025 0202  Activity Management: activity encouraged  Head of Bed (HOB) Positioning: HOB elevated  Taken 3/30/2025 0000  Activity Management: activity encouraged  Head of Bed (HOB) Positioning: HOB elevated  Taken 3/29/2025 2256  Activity Management: activity encouraged  Head of Bed (HOB) Positioning:   HOB elevated   HOB at 20-30 degrees  Taken 3/29/2025 2058  Activity Management: activity encouraged  Pressure Reduction Techniques:   frequent weight shift encouraged   weight shift assistance provided  Head of Bed (HOB) Positioning:   HOB elevated   HOB at 20-30 degrees  Pressure Reduction Devices: alternating pressure pump (BRYN)  Skin Protection:   incontinence pads  utilized   transparent dressing maintained  Intervention: Promote and Optimize Oral Intake  Flowsheets (Taken 3/28/2025 0915 by Aamir Koenig, RN)  Nutrition Interventions: diet adjusted   Goal Outcome Evaluation:  Plan of Care Reviewed With: patient        Progress: no change  Outcome Evaluation: No changes. VSS. AOx1, to self. Q2hr turns. No c/o pain or discomfort. NPO. Call light within reach.

## 2025-03-30 NOTE — PROGRESS NOTES
[facility]  Inpatient Progress Note    Reason for Follow Up:  Anemia     Subjective     Interval History:     Patient denies abdominal pain today.  He does have productive cough.  Currently on 6 L of O2.  Hemoglobin stable this morning at 7.8.  Blood pressure 150/60.  Spoke to nurse reports he had a very small bowel movement this morning-no overt signs of bleeding.    Current Facility-Administered Medications:     acetaminophen (TYLENOL) tablet 650 mg, 650 mg, Oral, Q4H PRN, 650 mg at 03/28/25 1120 **OR** acetaminophen (TYLENOL) 160 MG/5ML oral solution 650 mg, 650 mg, Oral, Q4H PRN **OR** acetaminophen (TYLENOL) suppository 650 mg, 650 mg, Rectal, Q4H PRN, Belle Duran MD    atorvastatin (LIPITOR) tablet 40 mg, 40 mg, Oral, Daily, Belle Duran MD, 40 mg at 03/28/25 1120    sennosides-docusate (PERICOLACE) 8.6-50 MG per tablet 2 tablet, 2 tablet, Oral, BID PRN **AND** polyethylene glycol (MIRALAX) packet 17 g, 17 g, Oral, Daily PRN **AND** bisacodyl (DULCOLAX) EC tablet 5 mg, 5 mg, Oral, Daily PRN **AND** bisacodyl (DULCOLAX) suppository 10 mg, 10 mg, Rectal, Daily PRN, Belle Duran MD    busPIRone (BUSPAR) tablet 5 mg, 5 mg, Oral, Nightly, Belle Duran MD, 5 mg at 03/28/25 2024    Calcium Replacement - Follow Nurse / BPA Driven Protocol, , Not Applicable, PRN, Lavinia Pollack DO    carvedilol (COREG) tablet 25 mg, 25 mg, Oral, BID With Meals, Belle Duran MD, 25 mg at 03/29/25 0926    ferrous sulfate tablet 325 mg, 325 mg, Oral, Daily With Breakfast, Belle Duran MD, 325 mg at 03/28/25 1121    furosemide (LASIX) injection 40 mg, 40 mg, Intravenous, Once, Lavinia Pollack DO    gabapentin (NEURONTIN) capsule 200 mg, 200 mg, Oral, TID, Stingl, Belle Lane MD, 200 mg at 03/28/25 2024    hydrocortisone-bacitracin-zinc oxide-nystatin (MAGIC BARRIER) ointment 1 Application, 1 Application, Topical, PRN, Lavinia Pollack, DO     ipratropium-albuterol (DUO-NEB) nebulizer solution 3 mL, 3 mL, Nebulization, Once, Laurita Hoyos APRN    ipratropium-albuterol (DUO-NEB) nebulizer solution 3 mL, 3 mL, Nebulization, Q6H PRN, Lavinia Pollack DO, 3 mL at 03/29/25 0133    Magnesium Standard Dose Replacement - Follow Nurse / BPA Driven Protocol, , Not Applicable, PRN, Lavinia Pollack DO    OLANZapine (zyPREXA) injection 5 mg, 5 mg, Intramuscular, Q8H PRN, Nas Maguire III, MD, 5 mg at 03/29/25 1425    OLANZapine (zyPREXA) tablet 5 mg, 5 mg, Oral, BID, Nas Maguire III, MD    ondansetron (ZOFRAN) injection 4 mg, 4 mg, Intravenous, Q6H PRN, Belle Duran MD    pantoprazole (PROTONIX) injection 40 mg, 40 mg, Intravenous, BID, Lavinia Pollack DO, 40 mg at 03/30/25 0826    Phosphorus Replacement - Follow Nurse / BPA Driven Protocol, , Not Applicable, PRN, Lavinia Pollack DO    piperacillin-tazobactam (ZOSYN) 3.375 g IVPB in 100 mL NS MBP (CD), 3.375 g, Intravenous, Q8H, Lavinia Pollack DO, 3.375 g at 03/30/25 0446    potassium chloride 10 mEq in 100 mL IVPB, 10 mEq, Intravenous, Q1H, Lavinia Pollack DO, Last Rate: 100 mL/hr at 03/30/25 0826, 10 mEq at 03/30/25 0826    Potassium Replacement - Follow Nurse / BPA Driven Protocol, , Not Applicable, PRN, Lavinia Pollack DO    sertraline (ZOLOFT) tablet 75 mg, 75 mg, Oral, Daily, Belle Duran MD, 75 mg at 03/28/25 1120    [COMPLETED] Insert Peripheral IV, , , Once **AND** sodium chloride 0.9 % flush 10 mL, 10 mL, Intravenous, PRN, Rickey Rojas MD, 10 mL at 03/28/25 2025    tamsulosin (FLOMAX) 24 hr capsule 0.4 mg, 0.4 mg, Oral, Daily, Belle Duran MD, 0.4 mg at 03/28/25 1120    vitamin B-12 (CYANOCOBALAMIN) tablet 1,000 mcg, 1,000 mcg, Oral, Daily, Belle Duran MD, 1,000 mcg at 03/28/25 1121    Objective     Vital Signs  Temp:  [98.2 °F (36.8 °C)-100.2 °F (37.9 °C)] 98.2 °F (36.8 °C)  Heart Rate:  [78-87] 78  Resp:  [16-21]  16  BP: (117-160)/(60-98) 150/60  Body mass index is 30.44 kg/m².    Intake/Output Summary (Last 24 hours) at 3/30/2025 0924  Last data filed at 3/29/2025 2349  Gross per 24 hour   Intake --   Output 550 ml   Net -550 ml     No intake/output data recorded.     Physical Exam:   General: patient awake, alert and cooperative   Eyes: Normal lids and lashes, no scleral icterus   Pulm: regular and unlabored   Abdomen: soft, nontender, nondistended;     Results Review:     I reviewed the patient's new clinical results.    Results from last 7 days   Lab Units 03/30/25  0422 03/29/25 0352 03/28/25  0414   WBC 10*3/mm3 10.88* 10.54 8.20   HEMOGLOBIN g/dL 7.8* 7.9* 7.6*   HEMATOCRIT % 24.0* 25.2* 24.7*   PLATELETS 10*3/mm3 251 252 223     Results from last 7 days   Lab Units 03/30/25  0422 03/29/25  0352 03/28/25  0414 03/27/25  1306   SODIUM mmol/L 145 143 139 139   POTASSIUM mmol/L 3.3* 3.8 3.8 4.3   CHLORIDE mmol/L 108* 109* 108* 105   CO2 mmol/L 24.9 21.8* 23.3 22.1   BUN mg/dL 24* 27* 28* 34*   CREATININE mg/dL 1.63* 1.63* 1.75* 1.61*   CALCIUM mg/dL 7.8* 8.0* 8.2* 8.5*   BILIRUBIN mg/dL  --   --  0.3 0.3   ALK PHOS U/L  --   --  152* 157*   ALT (SGPT) U/L  --   --  15 19   AST (SGOT) U/L  --   --  17 15   GLUCOSE mg/dL 114* 124* 118* 94         Lab Results   Lab Value Date/Time    LIPASE 34 07/28/2014 0719    LIPASE 25 07/25/2014 1845       Radiology:  XR Chest 1 View   Final Result      XR Chest 1 View   Final Result   Worsening bilateral alveolar and interstitial infiltrates.       This report was finalized on 3/29/2025 3:08 AM by Dr. Domenica Lang M.D on Workstation: [id number]          XR Spine Cervical Complete With Flex Ext   Final Result       As described.               This report was finalized on 3/28/2025 5:57 PM by Dr. Facundo Guzman M.D on Workstation: [id number]          XR Chest 1 View   Final Result      XR Chest 1 View   Final Result   1. Mild cardiomegaly.   2. Mild vascular congestion with  patchy area of asymmetric edema or   pneumonia in the right midlung.           This report was finalized on 3/27/2025 11:41 PM by Dr. Glenn Wolff M.D on Workstation: [id number]          CT Chest Without Contrast Diagnostic    (Results Pending)       Assessment & Plan     Active Hospital Problems    Diagnosis     **Dementia with aggressive behavior     Acute hypoxic respiratory failure        Assessment:  Dysphagia  Concern for aspiration pneumonia  Acute hypoxic respiratory failure on 8 L high flow nasal cannula  Acute on chronic anemia -no signs of overt GI bleeding  On Plavix  Dementia    Plan:  No signs of overt GI bleeding.  Continue to monitor hemoglobin  Transfuse as needed per primary team  Awaiting speech therapy workup for dysphagia-they are planning for VFSS when he is more stable from respiratory standpoint.  Given current hypoxic respiratory failure and oxygen requirements he is a poor candidate for scopes.  If speech evaluation is unremarkable would recommend esophagram at that point  He has gone several days without a bowel movement-would consider starting a bowel regimen such as MiraLAX daily  Diet per speech therapy    At this time, GI will sign off.  Please do not hesitate to call back if needed           Evelyn Barrera   [facility]  [phone]

## 2025-03-30 NOTE — PROGRESS NOTES
Pulmonary Update Note  - Discussed with hospitalist this morning. Patient has been aspirating and more altered. Daughter is interested in comfort care measures because she said he has not had any quality of life and would not want to live like this.   - Given his age and known comorbidities with the underlying dementia, I think it is medically appropriate for patient to pursue palliative care/comfort care if that is what daughter would like to do. Even if he were to pursue full treatment, it would likely be a prolonged hospital stay requiring further diuresis, continued IV antibiotics, and probably would need a thoracentesis given the moderate size pleural effusions with partial atelectasis seen on CT chest.   - Daughter already made the decision for comfort care at the time I was rounding on him, so did not go in and disturb him. Pulmonary will sign off. If there are questions or concerns, please call us.

## 2025-03-30 NOTE — PLAN OF CARE
Goal Outcome Evaluation:  Plan of Care Reviewed With: child        Progress: no change  Outcome Evaluation: Pt transferred to  for palliative care. PPS 20%. Pt responsive, alert to self and dtr but otherwise confused. Pt's dtr at bedside upon arrival to . Pt c/o difficulty breathing, denies pain. Pt has congested cough, difficulty clearing secretions. Pt given morphine 2mg for SOA with good effect. Pt attempting to get out of bed, states he needs to use the bathroom despite being wet. Pt bladder scanned and then barnard placed, more than 500cc out. Dressings to BLE changed. Pt continues to deny pain. Pt appears to be sleeping comfortably at this time.

## 2025-03-30 NOTE — CONSULTS
[facility] Visit Report    Dereck Ramírez  [id number]  3/30/2025    [facility] reviewed with [facility] medical officer, ** Dr Andrzej Mendoza. Patient is medically eligible for HSB. Spoke with Dr Pollack who reports that he will enter d/c re-admit tomorrow morning.    Met with patient's daughter/PCG Maribel Bowers; attempted to contact dtr Georgina Marie no answer, left Vmx2 no return call.     Detailed explanation of services provided for HSB admission. Disease progression and GOC discussed. Maribel's wishes are for patient to be CMO and to remain at [address] for EOL care. Consents were dated for 3/31/25 and signed by dtr/PCG Maribel Bowers.     [facility] LAZARUS Cummings will do admission tomorrow ~ 930 am    Thank you for allowing [facility] to participate with this patient's and family care needs.    Paty Mosquera, RN   [facility] Admissions Coordinator  [phone]

## 2025-03-30 NOTE — PROGRESS NOTES
Name: Dereck Ramírez ADMIT: 3/27/2025   : 1940  PCP: Amber Rodney APRN    MRN: [id number] LOS: 2 days   AGE/SEX: 84 y.o. male  ROOM: [facility]     Subjective   Subjective   3/28/2025  Patient seen and examined at bedside he is awake and oriented x 3 but on 8 L high flow nasal cannula.  Appears to have aspirated overnight.  Case discussed with daughter who states that he has been having agitation episodes overnight and become near uncontrollable.  He is DNR/DNI.    3/29/2025  Oxygen increased to 10 L nasal cannula overnight but improved to 3 L after a dose of Lasix.  Patient confused and trying to get out of bed.  Will check an ABG to rule out hypercapnia and gave another dose of Lasix    3/30/2025  Patient aspirating at bedside requiring frequent suction by nursing, he remains in restraints due to AMS. Case discussed with daughter at bedside who states he would not want to live like this and has no quality of life.  Maribel elected to make patient comfort care as continued care unlikely to improve outcome or quality of life.    Patient made comfort care and non-comfort meds stopped.        Objective   Objective   Vital Signs  Temp:  [98.2 °F (36.8 °C)-100.2 °F (37.9 °C)] 98.2 °F (36.8 °C)  Heart Rate:  [78-87] 78  Resp:  [16-21] 16  BP: (117-160)/(60-98) 150/60  SpO2:  [92 %-97 %] 97 %  on  Flow (L/min) (Oxygen Therapy):  [3-6] 6;   Device (Oxygen Therapy): high-flow nasal cannula;humidified  Body mass index is 30.44 kg/m².  Physical Exam  Constitutional:       General: He is not in acute distress.     Appearance: He is ill-appearing.      Comments: Patient awake and alert but not oriented, appears ill   HENT:      Head: Normocephalic and atraumatic.      Nose: Nose normal. No congestion.      Mouth/Throat:      Pharynx: Oropharynx is clear. No oropharyngeal exudate.   Eyes:      General: No scleral icterus.  Cardiovascular:      Rate and Rhythm: Normal rate and regular rhythm.      Heart  sounds: No murmur heard.     No friction rub. No gallop.   Pulmonary:      Breath sounds: Rhonchi and rales present. No wheezing.      Comments: Patient on 6 L high flow nasal cannula  Abdominal:      General: There is no distension.      Tenderness: There is no abdominal tenderness. There is no guarding.   Musculoskeletal:      Cervical back: Normal range of motion. No rigidity.      Right lower leg: Edema present.      Left lower leg: Edema present.   Skin:     Coloration: Skin is not jaundiced.      Findings: No bruising.      Comments: Patient was scabbing and excoriations on the legs   Neurological:      Mental Status: He is alert. He is disoriented.      Motor: Weakness present.       Results Review     I reviewed the patient's new clinical results.  Results from last 7 days   Lab Units 03/30/25 0422 03/29/25 0352 03/28/25 0414 03/27/25  1306   WBC 10*3/mm3 10.88* 10.54 8.20 7.87   HEMOGLOBIN g/dL 7.8* 7.9* 7.6* 7.8*   PLATELETS 10*3/mm3 251 252 223 218     Results from last 7 days   Lab Units 03/30/25 0422 03/29/25 0352 03/28/25 0414 03/27/25  1306   SODIUM mmol/L 145 143 139 139   POTASSIUM mmol/L 3.3* 3.8 3.8 4.3   CHLORIDE mmol/L 108* 109* 108* 105   CO2 mmol/L 24.9 21.8* 23.3 22.1   BUN mg/dL 24* 27* 28* 34*   CREATININE mg/dL 1.63* 1.63* 1.75* 1.61*   GLUCOSE mg/dL 114* 124* 118* 94   EGFR mL/min/1.73 41.8* 41.8* 38.4* 42.4*     Results from last 7 days   Lab Units 03/28/25 0414 03/27/25  1306   ALBUMIN g/dL 3.3* 3.5   BILIRUBIN mg/dL 0.3 0.3   ALK PHOS U/L 152* 157*   AST (SGOT) U/L 17 15   ALT (SGPT) U/L 15 19     Results from last 7 days   Lab Units 03/30/25 0422 03/29/25 0352 03/28/25 0414 03/27/25  1306   CALCIUM mg/dL 7.8* 8.0* 8.2* 8.5*   ALBUMIN g/dL  --   --  3.3* 3.5   MAGNESIUM mg/dL 2.2  --   --   --      Results from last 7 days   Lab Units 03/28/25  1130 03/27/25 2047   PROCALCITONIN ng/mL 0.04  --    LACTATE mmol/L  --  0.8     Glucose   Date/Time Value Ref Range Status    03/28/2025 0632 126 70 - 130 mg/dL Final   03/27/2025 2348 175 (H) 70 - 130 mg/dL Final       CT Chest Without Contrast Diagnostic  Result Date: 3/30/2025   1. Cardiomegaly with interstitial edema seen in the upper lungs. 2. Mild bilateral pleural effusions. 3. Heterogeneous and groundglass opacities in the upper lobes and groundglass opacities in the superior segments of the lower lobes. Could be pulmonary edema. Multifocal pneumonia or hemorrhage in the appropriate clinical settings. 4. Posterior dependent and basilar lower lobe opacities with volume loss. Suspect a large amount of partial atelectasis adjacent to the effusions. Superimposed edema or pneumonia also possible. 5. Subsegmental airway occlusions in the posterior basilar segments of the lower lobes and small amount of secretions in the more central airways seen.  This report was finalized on 3/30/2025 10:35 AM by Dr. Dg Perdomo M.D on Workstation: [id number]      XR Chest 1 View  Result Date: 3/29/2025  Worsening bilateral alveolar and interstitial infiltrates.  This report was finalized on 3/29/2025 3:08 AM by Dr. Domenica Lang M.D on Workstation: [id number]      XR Spine Cervical Complete With Flex Ext  Result Date: 3/28/2025   As described.    This report was finalized on 3/28/2025 5:57 PM by Dr. Facundo Guzman M.D on Workstation: [id number]        I have personally reviewed all medications:  Scheduled Medications  atorvastatin, 40 mg, Oral, Daily  busPIRone, 5 mg, Oral, Nightly  carvedilol, 25 mg, Oral, BID With Meals  ferrous sulfate, 325 mg, Oral, Daily With Breakfast  gabapentin, 200 mg, Oral, TID  ipratropium-albuterol, 3 mL, Nebulization, Once  OLANZapine, 5 mg, Oral, BID  pantoprazole, 40 mg, Intravenous, BID  piperacillin-tazobactam, 3.375 g, Intravenous, Q8H  potassium chloride, 10 mEq, Intravenous, Q1H  sertraline, 75 mg, Oral, Daily  tamsulosin, 0.4 mg, Oral, Daily  vitamin B-12, 1,000 mcg, Oral, Daily    Infusions    Diet  NPO Diet NPO Type: Strict NPO    I have personally reviewed:  [x]  Laboratory   [x]  Microbiology   [x]  Radiology   [x]  EKG/Telemetry  [x]  Cardiology/Vascular   []  Pathology    []  Records       Assessment/Plan     Active Hospital Problems    Diagnosis  POA    **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       84 y.o. male admitted with Dementia with aggressive behavior.    #Acute hypoxic respiratory failure  #Aspiration pneumonia  #Possible HFpEF  #Dementia with aggression  #Odontoid fracture  #CKD  #Anemia  #BPH  #Hypertension  #Hyperlipidemia    Case discussed with daughter Maribel at bedside, Crystal unavailable by phone.  Due to worsening dyspnea complicated by aspiration, AMS, with poor prognosis and poor QOL she has elected to make patient comfort care in accordance with his known wishes.  Non comfort care meds stopped and comfort meds placed. Consult hospice and transfer to .     Comfort care  for DVT prophylaxis.  Limited code (no CPR, no intubation).  Discussed with patient, family, and nursing staff.  Anticipate discharge  to be determined  timing yet to be determined.  Expected Discharge Date: 4/1/2025; Expected Discharge Time:       Lavinia Pollack DO  [facility]  03/30/25  11:35 EDT

## 2025-03-31 PROBLEM — G31.1 SENILE DEGENERATION OF BRAIN: Status: ACTIVE | Noted: 2025-03-31

## 2025-03-31 NOTE — PLAN OF CARE
Goal Outcome Evaluation:           Progress: declining  Outcome Evaluation: PPS 20%. Pt mostly sleeping throughout the day. Pt alert to self, otherwise confused. Pt restless and moving legs up and down in the bed with some grimacing. RN gave morphine and ativan IV X 1. Pt tolerated well. Pt drank well today, no bites taken. Pt assisted with turns, barnard care, and oral care. Pt daughter was in this AM. Will continue with comfort care. Pt HSB today.

## 2025-03-31 NOTE — NURSING NOTE
Wound/ostomy - WOC nurse applied 2 layer coflex compression this past Friday, patient has been transitioned palliative/comfort/HSB. Per charting, the 2 layer coflex compression was removed by nursing and applied vaseline gauze, ABD and rolled gauze which is appropriate management, as well, or legs can be left KANA, whichever is comfortable for the patient. Orders updated.

## 2025-03-31 NOTE — NURSING NOTE
Pt comfort measures, A&O to self. Pt complained of back pain, prn morphine 2mg given. Pt very sweet. Plan of care ongoing.

## 2025-03-31 NOTE — PROGRESS NOTES
Patient transferred to the palliative care unit following goals of care and treatment preferences discussion with Dr. Pollack.  Patient and/or family state goal of care to be comfort and treatment preferences to be geared toward symptom management.  The palliative care team will follow for further support and discussion as needed and interdisciplinary team will continue to address spiritual and cultural concerns as indicated.

## 2025-03-31 NOTE — SIGNIFICANT NOTE
03/31/25 0946   OTHER   Discipline physical therapist   Rehab Time/Intention   Session Not Performed other (see comments)  (PT orders DC'd by Dr. Pollack due to transition to palliative care. PT will sign off.)

## 2025-03-31 NOTE — PROGRESS NOTES
Case Management Discharge Note      Final Note: admitted to a [facility] bed on 3/31/25. LAURA Ruiz RN CCP.         Selected Continued Care - Discharged on 3/31/2025 Admission date: 3/27/2025 - Discharge disposition: Hospice/Medical Facility ([facility] - [facility])      Destination Coordination complete.      Service Provider Services Address Phone Fax Patient Preferred    [facility] [address] [phone] [phone] --              Durable Medical Equipment    No services have been selected for the patient.                Dialysis/Infusion    No services have been selected for the patient.                Home Medical Care    No services have been selected for the patient.                Therapy    No services have been selected for the patient.                Community Resources    No services have been selected for the patient.                Community & DME    No services have been selected for the patient.                    Selected Continued Care - Episodes Includes continued care and service providers with selected services from the active episodes listed below               Final Discharge Disposition Code: 51 - hospice medical facility

## 2025-03-31 NOTE — PROGRESS NOTES
Palliative Social Work Note    Purpose of visit: Initial visit  Assessment: Patient alert and comfortable appearing with some confusion noted. He expressed no symptom burden at time of visit and stated he felt comfortable. PPS: 20%  Support System: Present at bedside, Involved in care, and Family  Psychosocial Needs Identified: Caregiver support  Plan/Other Comments:       SW met with patient and daughter, Maribel, at bedside this morning. Explained role and purpose of visit for psychosocial support. Patient has two children, Maribel and Georgina, however, family has been unable to reach Georgina despite multiple call attempts. Patient alert at time of visit today with some confusion noted. He had been living at home with Maribel prior to this hospitalization. Maribel also cares for two small children at home. She spoke of patient's decline and increased care needs, especially at night as he became more agitated. Support provided. Patient and family met with [facility] last night and this morning, and have agreed to HSB admission. Maribel has a good understanding of hospice services. Spiritual/cultural needs addressed. None identified at this time. Family aware of SW availability for continued support as needed moving forward.

## 2025-03-31 NOTE — NURSING NOTE
[facility] [place] [id number]. Dx Senile degeneration of the brain, dementia. No significant changes from yesterday's hospice eval. No oral intake today, minimally responsive at times. Hospitalist agreeable to scattered bed orders for today. Verified with family goals continue unchanged for inpatient hospice and comfort care. Patient daughter [facility] and patient ID number. Updated nurse on plan for scattered bed and she was aware. Reviewed with hospice physician and obtained new verbal certification for inpatient hospice. Symptom management: pain/SOA with Iv morphine x2 doses in past 24hr. Change of benefit form handed to CM. Please call with any questions, concerns or updates.     Thank You for allowing me to evaluate this patient    Zoila Mcnair RN BSN- Referrals Admissions Coordinator    [phone]

## 2025-03-31 NOTE — H&P
Patient Name:  Dereck Ramírez  YOB: 1940  MRN:  [id number]  Admit Date:  3/31/2025  Patient Care Team:  Amber Rodney APRN as PCP - General (Family Medicine)  Lynette Mcwilliams, IDRIS as Ambulatory  ([facility] [place])  Rayne Sheikh MSW as  ([facility]) ([facility] [place])      Subjective   History Present Illness     No chief complaint on file.      Mr. Ramírez is a 84 y.o. male with a history of hypertension, hyperlipidemia, BPH, anemia, CKD, CAD who presented to [facility] initially complaining of altered mental status.  Please see the admitting history and physical for further details.  He was found to have acute hypoxic respiratory failure secondary to aspiration pneumonia complicated by edema exacerbating his dementia with odontoid fracture on CT and was admitted to the hospital for further evaluation and treatment.  Imaging showed pneumonia and patient was aspirating.  Oxygen was increased to 8 L high flow nasal cannula and he was started on broad-spectrum antibiotics and ABG showed hypoxia.  He was started on antipsychotics for his dementia with aggression psych consulted.  Neurosurgery consulted on the C2 fracture from last November and there was no intervention necessary.  Speech recommended n.p.o., patient not a candidate for EGD per GI, though there is anemia there is no overt bleeding.  Patient's mental status waxed and waned.  He was also given some Lasix and did have some minimal improvement.  However patient continued to aspirate requiring frequent suction.  Pulm was consulted and he was continued on Lasix as needed along with antibiotics.  Patient's mental status continued to worsen and he continued to aspirate.  Case was discussed with family and they decided to make him comfort care with hospice consult.  Patient was transferred to  with comfort care orders.  Patient was accepted as inpatient hospice.  He was discharged and  readmitted as inpatient hospice on 3/31/2025.     #Acute hypoxic respiratory failure  #Aspiration pneumonia  #Volume overload  #Senile degeneration of the brain, dementia  #Dementia with aggression  #Odontoid fracture  #CKD  #Anemia  #BPH  #Hypertension  #Hyperlipidemia      History of Present Illness    Review of Systems   Unable to obtain due to acuity of condition  Personal History     Past Medical History:   Diagnosis Date    Arthritis     Cerebellar artery occlusion     Coronary artery disease     Diabetes mellitus     Enlarged prostate     Hyperlipidemia     Hypertension     Stroke 2011, 2012     Past Surgical History:   Procedure Laterality Date    CARDIAC CATHETERIZATION      lesion 1: intervention outcome    HERNIA REPAIR       Family History   Problem Relation Age of Onset    Heart disease Mother     Stroke Mother     Crohn's disease Father     Stroke Father     Arthritis Father     Cancer Other     Stroke Other     Diabetes Other     Heart defect Other     Hypertension Other     Thyroid disease Other     Cancer Maternal Uncle         bone    Dementia Maternal Grandmother      Social History     Tobacco Use    Smoking status: Never    Smokeless tobacco: Never   Vaping Use    Vaping status: Never Used   Substance Use Topics    Alcohol use: No    Drug use: No     Current Facility-Administered Medications on File Prior to Encounter   Medication Dose Route Frequency Provider Last Rate Last Admin    [DISCONTINUED] acetaminophen (TYLENOL) 160 MG/5ML oral solution 650 mg  650 mg Oral Q4H PRN Belle Duran MD        [DISCONTINUED] acetaminophen (TYLENOL) 160 MG/5ML oral solution 650 mg  650 mg Oral Q4H PRN Lavinia Pollack, DO        [DISCONTINUED] acetaminophen (TYLENOL) suppository 650 mg  650 mg Rectal Q4H PRN Belle Duran MD        [DISCONTINUED] acetaminophen (TYLENOL) suppository 650 mg  650 mg Rectal Q4H PRN Lavinia Pollack,         [DISCONTINUED] acetaminophen (TYLENOL) tablet 650 mg   650 mg Oral Q4H PRN Belle Duran MD   650 mg at 03/28/25 1120    [DISCONTINUED] acetaminophen (TYLENOL) tablet 650 mg  650 mg Oral Q4H PRN Lavinia Pollack DO        [DISCONTINUED] atorvastatin (LIPITOR) tablet 40 mg  40 mg Oral Daily Belle Duran MD   40 mg at 03/28/25 1120    [DISCONTINUED] bisacodyl (DULCOLAX) EC tablet 5 mg  5 mg Oral Daily PRN Belle Duran MD        [DISCONTINUED] bisacodyl (DULCOLAX) suppository 10 mg  10 mg Rectal Daily PRN Belle Duran MD        [DISCONTINUED] busPIRone (BUSPAR) tablet 5 mg  5 mg Oral Nightly Belle Duran MD   5 mg at 03/28/25 2024    [DISCONTINUED] Calcium Replacement - Follow Nurse / BPA Driven Protocol   Not Applicable PRN Lavinia Pollack DO        [DISCONTINUED] carvedilol (COREG) tablet 25 mg  25 mg Oral BID With Meals Belle Duran MD   25 mg at 03/29/25 0926    [DISCONTINUED] diphenoxylate-atropine (LOMOTIL) 2.5-0.025 MG per tablet 1 tablet  1 tablet Oral Q2H PRN Lavinia Pollack DO        [DISCONTINUED] ferrous sulfate tablet 325 mg  325 mg Oral Daily With Breakfast Belle Duran MD   325 mg at 03/28/25 1121    [DISCONTINUED] gabapentin (NEURONTIN) capsule 200 mg  200 mg Oral TID Belle Duran MD   200 mg at 03/28/25 2024    [DISCONTINUED] glycopyrrolate (ROBINUL) injection 0.4 mg  0.4 mg Intravenous Q4H PRN Lavinia Pollack,         [DISCONTINUED] hydrocortisone-bacitracin-zinc oxide-nystatin (MAGIC BARRIER) ointment 1 Application  1 Application Topical PRN Lavinia Pollack,         [DISCONTINUED] HYDROmorphone (DILAUDID) injection 0.5 mg  0.5 mg Intravenous Q1H PRN Lavinia Pollack,         [DISCONTINUED] HYDROmorphone (DILAUDID) injection 1 mg  1 mg Intravenous Q1H PRN Lavinia Pollack DO        [DISCONTINUED] HYDROmorphone (DILAUDID) injection 1.5 mg  1.5 mg Intravenous Q1H PRN Lavinia Pollack DO        [DISCONTINUED] ipratropium-albuterol (DUO-NEB) nebulizer solution 3 mL  3 mL  Nebulization Once Laurita Hoyos APRN        [DISCONTINUED] ipratropium-albuterol (DUO-NEB) nebulizer solution 3 mL  3 mL Nebulization Q6H PRN Lavinia Pollack DO   3 mL at 03/29/25 0133    [DISCONTINUED] ketorolac (TORADOL) injection 15 mg  15 mg Intravenous Q6H PRN Lavinia Pollack,         [DISCONTINUED] LORazepam (ATIVAN) 2 MG/ML concentrated solution 0.5 mg  0.5 mg Sublingual Q1H PRN Lavinia Pollack, DO        [DISCONTINUED] LORazepam (ATIVAN) 2 MG/ML concentrated solution 1 mg  1 mg Sublingual Q1H PRN Lavinia Pollack,         [DISCONTINUED] LORazepam (ATIVAN) 2 MG/ML concentrated solution 2 mg  2 mg Sublingual Q1H PRN Lavinia Pollack,         [DISCONTINUED] LORazepam (ATIVAN) injection 0.5 mg  0.5 mg Intravenous Q1H PRN Lavinia Pollack,         [DISCONTINUED] LORazepam (ATIVAN) injection 0.5 mg  0.5 mg Subcutaneous Q1H PRN Lavinia Pollack,         [DISCONTINUED] LORazepam (ATIVAN) injection 1 mg  1 mg Intravenous Q1H PRN Lavinia Pollack DO        [DISCONTINUED] LORazepam (ATIVAN) injection 1 mg  1 mg Subcutaneous Q1H PRN Lavinia Pollack,         [DISCONTINUED] LORazepam (ATIVAN) injection 2 mg  2 mg Intravenous Q1H PRN Lavinia Pollack,         [DISCONTINUED] LORazepam (ATIVAN) injection 2 mg  2 mg Subcutaneous Q1H PRN Lavinia Pollack,         [DISCONTINUED] Magnesium Standard Dose Replacement - Follow Nurse / BPA Driven Protocol   Not Applicable PRN Lavinia Pollack,         [DISCONTINUED] morphine concentrated solution 10 mg  10 mg Sublingual Q1H PRN Lavinia Pollack DO        [DISCONTINUED] morphine concentrated solution 20 mg  20 mg Sublingual Q1H PRN Lavinia Pollack,         [DISCONTINUED] morphine concentrated solution 3.8 mg  3.8 mg Sublingual Q1H PRN Lavinia Pollack,         [DISCONTINUED] morphine injection 2 mg  2 mg Intravenous Q1H PRN Lavinia Pollack DO   2 mg at 03/31/25 0458    [DISCONTINUED] morphine injection 4 mg  4 mg Intravenous Q1H PRN Lavinia Pollack,          [DISCONTINUED] morphine injection 6 mg  6 mg Intravenous Q1H PRN Lavinia Pollack DO        [DISCONTINUED] OLANZapine (zyPREXA) injection 5 mg  5 mg Intramuscular Q8H PRN Nas Maguire III, MD   5 mg at 03/29/25 1425    [DISCONTINUED] OLANZapine (zyPREXA) tablet 5 mg  5 mg Oral BID Nas Maguire III, MD        [DISCONTINUED] ondansetron (ZOFRAN) injection 4 mg  4 mg Intravenous Q6H PRN Belle Druan MD        [DISCONTINUED] pantoprazole (PROTONIX) injection 40 mg  40 mg Intravenous BID Lavinia Pollack DO   40 mg at 03/30/25 0826    [DISCONTINUED] Phosphorus Replacement - Follow Nurse / BPA Driven Protocol   Not Applicable PRN Lavinia Pollack DO        [DISCONTINUED] piperacillin-tazobactam (ZOSYN) 3.375 g IVPB in 100 mL NS MBP (CD)  3.375 g Intravenous Q8H Lavinia Pollack DO   3.375 g at 03/30/25 0446    [DISCONTINUED] polyethylene glycol (MIRALAX) packet 17 g  17 g Oral Daily PRN Belle Duran MD        [DISCONTINUED] Polyvinyl Alcohol-Povidone PF (ARTIFICIAL TEARS) 1.4-0.6 % ophthalmic solution 1 drop  1 drop Both Eyes Q30 Min PRN Lavinia Pollack DO        [DISCONTINUED] potassium chloride 10 mEq in 100 mL IVPB  10 mEq Intravenous Q1H Lavinia Pollack  mL/hr at 03/30/25 1010 10 mEq at 03/30/25 1010    [DISCONTINUED] Potassium Replacement - Follow Nurse / BPA Driven Protocol   Not Applicable PRN Lavinia Pollack DO        [DISCONTINUED] sennosides-docusate (PERICOLACE) 8.6-50 MG per tablet 2 tablet  2 tablet Oral BID PRN Belle Duran MD        [DISCONTINUED] sertraline (ZOLOFT) tablet 75 mg  75 mg Oral Daily Belle Duran MD   75 mg at 03/28/25 1120    [DISCONTINUED] sodium chloride 0.9 % flush 10 mL  10 mL Intravenous PRN Rickey Rojas MD   10 mL at 03/28/25 2025    [DISCONTINUED] tamsulosin (FLOMAX) 24 hr capsule 0.4 mg  0.4 mg Oral Daily Belle Duran MD   0.4 mg at 03/28/25 1120    [DISCONTINUED] vitamin B-12 (CYANOCOBALAMIN)  tablet 1,000 mcg  1,000 mcg Oral Daily Belle Duran MD   1,000 mcg at 03/28/25 1121     Current Outpatient Medications on File Prior to Encounter   Medication Sig Dispense Refill    acetaminophen (TYLENOL) 325 MG tablet Take 2 tablets by mouth Every 4 (Four) Hours As Needed for Mild Pain. 30 tablet 0    amLODIPine (NORVASC) 10 MG tablet Take 1 tablet by mouth Daily. 90 tablet 3    atorvastatin (LIPITOR) 40 MG tablet TAKE 1 TABLET BY MOUTH DAILY 90 tablet 1    busPIRone (BUSPAR) 5 MG tablet TAKE 1 TABLET BY MOUTH THREE TIMES DAILY AS NEEDED FOR ANXIETY (Patient taking differently: Take 1 tablet by mouth Every Night.) 270 tablet 1    carvedilol (COREG) 25 MG tablet TAKE 1 TABLET BY MOUTH TWICE DAILY WITH MEALS (Patient taking differently: Take 1 tablet by mouth 2 (Two) Times a Day With Meals.) 180 tablet 0    clopidogrel (PLAVIX) 75 MG tablet TAKE 1 TABLET BY MOUTH DAILY 90 tablet 3    docusate sodium 100 MG capsule Take 1 capsule by mouth 2 (Two) Times a Day. 30 each 0    ferrous gluconate (FERGON) 324 MG tablet TAKE 1 TABLET BY MOUTH DAILY WITH BREAKFAST 90 tablet 1    furosemide (LASIX) 40 MG tablet TAKE 1 TABLET BY MOUTH TWICE DAILY 180 tablet 3    gabapentin (NEURONTIN) 100 MG capsule Take 2 capsules by mouth 3 (Three) Times a Day. 540 capsule 1    nateglinide (STARLIX) 60 MG tablet Take 1 tablet by mouth 2 (Two) Times a Day With Meals. 180 tablet 3    polyethylene glycol (MIRALAX) 17 g packet Take 17 g by mouth Daily As Needed (Use if senna-docusate is ineffective). 30 each 0    potassium chloride 10 MEQ CR tablet Take 1 tablet by mouth Daily. 90 tablet 3    QUEtiapine (SEROquel) 25 MG tablet Take 1 tablet by mouth Every Night for 30 days. 90 tablet 0    sertraline (ZOLOFT) 50 MG tablet Take 1.5 tablets by mouth Daily. 135 tablet 1    tamsulosin (FLOMAX) 0.4 MG capsule 24 hr capsule TAKE 1 CAPSULE BY MOUTH DAILY 90 capsule 1    vitamin B-12 (CYANOCOBALAMIN) 1000 MCG tablet TAKE 1 TABLET BY MOUTH DAILY  90 tablet 1     No Known Allergies    Objective    Objective     Vital Signs  Temp:  [97.3 °F (36.3 °C)-99 °F (37.2 °C)] 97.3 °F (36.3 °C)  Heart Rate:  [79-91] 91  Resp:  [18] 18  BP: (120-158)/(58-68) 120/58  SpO2:  [66 %-95 %] 66 %  on   ;   Device (Oxygen Therapy): room air  There is no height or weight on file to calculate BMI.    Physical Exam  Appearance: He is ill-appearing.      Comments: Patient awake and alert but poor cognition, demented  HENT:      Head: Normocephalic and atraumatic.      Nose: Nose normal. No congestion.      Mouth/Throat:      Pharynx: Oropharynx is clear. No oropharyngeal exudate.   Eyes:      General: No scleral icterus.  Cardiovascular:      Rate and Rhythm: Normal rate and regular rhythm.      Heart sounds: No murmur heard.     No friction rub. No gallop.   Pulmonary:      Breath sounds: Rhonchi and rales present. No wheezing.      Comments: Patient on 6 L high flow nasal cannula  Abdominal:      General: There is no distension.      Tenderness: There is no abdominal tenderness. There is no guarding.   Musculoskeletal:      Cervical back: Normal range of motion. No rigidity.      Right lower leg: Edema present.      Left lower leg: Edema present.   Skin:     Coloration: Skin is not jaundiced.      Findings: No bruising.      Comments: Patient was scabbing and excoriations on the legs   Neurological:      Mental Status: He is alert. He is disoriented.      Motor: Weakness present.     Results Review:  I reviewed the patient's new clinical results.  I reviewed the patient's new imaging results and agree with the interpretation.  I reviewed the patient's other test results and agree with the interpretation  I personally viewed and interpreted the patient's EKG/Telemetry data  Discussed with ED provider.    Lab Results (last 24 hours)       ** No results found for the last 24 hours. **            Imaging Results (Last 24 Hours)       ** No results found for the last 24 hours. **             Results for orders placed during the hospital encounter of 08/31/23    Adult Transthoracic Echo Complete w/ Color, Spectral and Contrast if Necessary Per Protocol    Interpretation Summary    Left ventricular systolic function is normal. Calculated left ventricular EF = 63.2%    Left ventricular wall thickness is consistent with moderate concentric hypertrophy.    Left ventricular diastolic function is consistent with (grade I) impaired relaxation.    The left atrial cavity is moderately dilated.    Estimated right ventricular systolic pressure from tricuspid regurgitation is mildly elevated (35-45 mmHg).      No orders to display        Assessment/Plan     Active Hospital Problems    Diagnosis  POA    **Senile degeneration of brain [G31.1]  Yes      Resolved Hospital Problems   No resolved problems to display.     #Acute hypoxic respiratory failure  #Aspiration pneumonia  #Possible HFpEF  #Dementia with aggression  #Odontoid fracture  #CKD  #Anemia  #BPH  #Hypertension  #Hyperlipidemia    -In consultation with family, patient has been made comfort care    -Comfort care orders placed and none comfort care orders ceased    -Hospice consulted, patient discharged and readmitted as inpatient hospice         VTE Prophylaxis -  comfort care .  Code Status -comfort care.       Lavinia Pollack DO  [facility]  03/31/25  10:46 EDT

## 2025-03-31 NOTE — DISCHARGE SUMMARY
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        Hospital Course     Mr. Ramírez is a 84 y.o. male with a history of hypertension, hyperlipidemia, BPH, anemia, CKD, CAD who presented to [facility] initially complaining of altered mental status.  Please see the admitting history and physical for further details.  He was found to have acute hypoxic respiratory failure secondary to aspiration pneumonia complicated by edema exacerbating his dementia with odontoid fracture on CT and was admitted to the hospital for further evaluation and treatment.  Imaging showed pneumonia and patient was aspirating.  Oxygen was increased to 8 L high flow nasal cannula and he was started on broad-spectrum antibiotics and ABG showed hypoxia.  He was started on antipsychotics for his dementia with aggression psych consulted.  Neurosurgery consulted on the C2 fracture from last November and there was no intervention necessary.  Speech recommended n.p.o., patient not a candidate for EGD per GI, though there is anemia there is no overt bleeding.  Patient's mental status waxed and waned.  He was also given some Lasix and did have some minimal improvement.  However patient continued to aspirate requiring frequent suction.  Pulm was consulted and he was continued on Lasix as needed along with antibiotics.  Patient's mental status continued to worsen and he continued to aspirate.  Case was discussed with family and they decided to make him comfort care with hospice consult.  Patient was transferred to  with  comfort care orders.  Patient was accepted as inpatient hospice.  He was discharged and readmitted as inpatient hospice on 3/31/2025.    #Acute hypoxic respiratory failure  #Aspiration pneumonia  #Possible HFpEF  #Dementia with aggression  #Odontoid fracture  #CKD  #Anemia  #BPH  #Hypertension  #Hyperlipidemia    Day of Discharge     Subjective:  Patient to be discharged and readmitted as inpatient hospice  Physical Exam:  Temp:  [97.3 °F (36.3 °C)-99 °F (37.2 °C)] 97.3 °F (36.3 °C)  Heart Rate:  [78-91] 91  Resp:  [16-18] 18  BP: (120-158)/(58-68) 120/58  Body mass index is 30.44 kg/m².  Physical Exam  Constitutional:         Appearance: He is ill-appearing.      Comments: Patient awake and alert but not oriented, demented  HENT:      Head: Normocephalic and atraumatic.      Nose: Nose normal. No congestion.      Mouth/Throat:      Pharynx: Oropharynx is clear. No oropharyngeal exudate.   Eyes:      General: No scleral icterus.  Cardiovascular:      Rate and Rhythm: Normal rate and regular rhythm.      Heart sounds: No murmur heard.     No friction rub. No gallop.   Pulmonary:      Breath sounds: Rhonchi and rales present. No wheezing.      Comments: Patient on 6 L high flow nasal cannula  Abdominal:      General: There is no distension.      Tenderness: There is no abdominal tenderness. There is no guarding.   Musculoskeletal:      Cervical back: Normal range of motion. No rigidity.      Right lower leg: Edema present.      Left lower leg: Edema present.   Skin:     Coloration: Skin is not jaundiced.      Findings: No bruising.      Comments: Patient was scabbing and excoriations on the legs   Neurological:      Mental Status: He is alert. He is disoriented.      Motor: Weakness present.   Consultants     Consult Orders (all) (From admission, onward)       Start     Ordered    03/30/25 1507  Inpatient Hospice / [facility] Consult  Once        Specialty:  Hospice and Palliative Medicine  Provider:  (Not yet assigned)     03/30/25 1506    03/28/25 0840  Inpatient Neurosurgery Consult  Once        Specialty:  Neurosurgery  Provider:  Kimmy Castellano, KAVON    03/28/25 0839    03/28/25 0835  Inpatient Pulmonology Consult  Once,   Status:  Canceled        Specialty:  Pulmonary Disease  Provider:  Alondra Angeles MD    03/28/25 0834    03/27/25 2319  Inpatient Case Management  Consult  Once        Provider:  (Not yet assigned)    03/27/25 2319 03/27/25 2002  Inpatient Gastroenterology Consult  Once        Specialty:  Gastroenterology  Provider:  Josafat Thompson MD    03/27/25 2001 03/27/25 1954  Inpatient Psychiatrist Consult  Once        Specialty:  Psychiatry  Provider:  Nas Maguire III, MD    03/27/25 1953 03/27/25 1357  [facility] (on-call MD unless specified) Details  Once        Specialty:  Hospitalist  Provider:  Belle Duran MD    03/27/25 1356                  Procedures     * Surgery not found *    Imaging Results (All)       Procedure Component Value Units Date/Time    CT Chest Without Contrast Diagnostic [[id number]] Collected: 03/30/25 1023     Updated: 03/30/25 1038    Narrative:      CT CHEST WO CONTRAST DIAGNOSTIC-     INDICATION: Worsening hypoxia. Pneumonia and effusions.     COMPARISON: CT chest September 30, 2015     TECHNIQUE:  Routine CT chest without IV contrast. Coronal and sagittal reformats.  Radiation dose reduction techniques were utilized, including automated  exposure control and exposure modulation based on body size.     FINDINGS:      Chest wall: Gynecomastia. No lymphadenopathy.     Mediastinum: Three-vessel coronary artery atherosclerotic calcification.  Mild cardiomegaly. Trace pericardial fluid. Mildly prominent mediastinal  lymph nodes, suspect reactive.     Lung/pleura: Mild right pleural effusion, extends from near the apex to  the base. Mild left pleural effusion, extends from the apex to the base.  Respiratory motion artifact. Small amount of  secretions seen in the  central airways. Subsegmental airway occlusions seen in the posterior  basilar lower lobes. Smooth interlobular septal thickening seen in the  upper lungs. Heterogeneous and groundglass opacities seen in the  bilateral upper lobes. Small amount of peripheral groundglass opacity in  the right middle lobe. Solid pulmonary nodule in the lateral segment  right middle lobe, series 3, axial mage 60, measures 6 mm, unchanged.  Posterior dependent and basilar opacities in the lower lobes, with some  volume loss suspected. Groundglass opacities in the superior segments of  the lower lobes.     Upper abdomen: Fluid attenuating cyst seen in the posterior spleen  measuring 3.1 cm. Moderate pancreatic atrophy. Colonic diverticulosis.     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 Diffuse idiopathic skeletal hyperostosis in the thoracic  spine. Degenerative sternoclavicular joints.       Impression:         1. Cardiomegaly with interstitial edema seen in the upper lungs.  2. Mild bilateral pleural effusions.  3. Heterogeneous and groundglass opacities in the upper lobes and  groundglass opacities in the superior segments of the lower lobes. Could  be pulmonary edema. Multifocal pneumonia or hemorrhage in the  appropriate clinical settings.  4. Posterior dependent and basilar lower lobe opacities with volume  loss. Suspect a large amount of partial atelectasis adjacent to the  effusions. Superimposed edema or pneumonia also possible.  5. Subsegmental airway occlusions in the posterior basilar segments of  the lower lobes and small amount of secretions in the more central  airways seen.     This report was finalized on 3/30/2025 10:35 AM by Dr. Dg Perdomo M.D on Workstation: [id number]       XR Chest 1 View [[id number]] Collected: 03/30/25 0743     Updated: 03/30/25 0748    Narrative:      ONE-VIEW PORTABLE CHEST AT 7:35 A.M.     HISTORY: Shortness of breath.  Pneumonia.     FINDINGS: There is cardiomegaly with diffuse interstitial and alveolar  haziness throughout both lungs related to either extensive CHF or  extensive diffuse pneumonia and this shows slight worsening when  compared to yesterday's exam.     This report was finalized on 3/30/2025 7:45 AM by Dr. Luis Schaffer M.D  on Workstation: [id number]       XR Chest 1 View [[id number]] Collected: 03/29/25 0306     Updated: 03/29/25 0311    Narrative:      SINGLE VIEW OF THE CHEST     HISTORY: Shortness of breath     COMPARISON: March 28, 2025     FINDINGS:  There is cardiomegaly. There are extensive bilateral alveolar and  interstitial infiltrates. These have worsened when compared to prior  study. No pneumothorax is seen. There may be a small right pleural  effusion.       Impression:      Worsening bilateral alveolar and interstitial infiltrates.     This report was finalized on 3/29/2025 3:08 AM by Dr. Domenica Lang M.D on Workstation: [id number]       XR Spine Cervical Complete With Flex Ext [[id number]] Collected: 03/28/25 1754     Updated: 03/28/25 1800    Narrative:      XR SPINE CERVICAL COMPLETE W FLEX EXT-     INDICATIONS: C2 fracture, follow-up.     TECHNIQUE: 10 views of the cervical spine     COMPARISON: 12/17/2024     FINDINGS:     Alignment of the dens fracture fragments appears grossly similar to the  prior exam. No new fractures are noted. Alignment does not appear  significantly changed between flexion and extension. Multilevel endplate  spurring, disc space narrowing, and facet arthropathy are present. If  there is further clinical concern, cross-sectional imaging could be  considered for further evaluation.       Impression:         As described.           This report was finalized on 3/28/2025 5:57 PM by Dr. Facundo Guzman M.D on Workstation: [id number]       XR Chest 1 View [[id number]] Collected: 03/28/25 0724     Updated: 03/28/25 0729    Narrative:      XR CHEST 1 VW-     Clinical:  Hypoxia     COMPARISON examination 3/27/2025     FINDINGS: Infiltrative process within the right midlung zone diminished  within the interim. Remaining infiltrates similar to the previous  examination. The cardiomediastinal silhouette is stable, cardiac  enlargement as before. No gross pleural effusion seen.     CONCLUSION: Diminished airspace disease right midlung zone otherwise  similar to 3/27/2025.     This report was finalized on 3/28/2025 7:26 AM by Dr. Jairo Sanchez M.D  on Workstation: [id number]       XR Chest 1 View [[id number]] Collected: 03/27/25 2051     Updated: 03/27/25 2345    Narrative:      XR CHEST 1 VW-3/27/2025     HISTORY: Cough, hypoxia.     Heart size is mildly enlarged. There is bilateral vascular congestion.  There is a patchy area of increased density in the right midlung which  may represent asymmetric pulmonary edema or pneumonia. Please correlate  with the clinical findings. There may be very minimal pleural fluid  blunting both costophrenic angles.       Impression:      1. Mild cardiomegaly.  2. Mild vascular congestion with patchy area of asymmetric edema or  pneumonia in the right midlung.        This report was finalized on 3/27/2025 11:41 PM by Dr. Glenn Wolff M.D on Workstation: [id number]             Results for orders placed during the hospital encounter of 03/27/25    Duplex Venous Lower Extremity - Bilateral CAR    Interpretation Summary    Normal bilateral lower extremity venous duplex scan.    Results for orders placed during the hospital encounter of 08/31/23    Adult Transthoracic Echo Complete w/ Color, Spectral and Contrast if Necessary Per Protocol    Interpretation Summary    Left ventricular systolic function is normal. Calculated left ventricular EF = 63.2%    Left ventricular wall thickness is consistent with moderate concentric hypertrophy.    Left ventricular diastolic function is consistent with (grade I) impaired relaxation.    The left atrial cavity is  "moderately dilated.    Estimated right ventricular systolic pressure from tricuspid regurgitation is mildly elevated (35-45 mmHg).    Pertinent Labs     Results from last 7 days   Lab Units 03/30/25  0422 03/29/25  0352 03/28/25  0414 03/27/25  1306   WBC 10*3/mm3 10.88* 10.54 8.20 7.87   HEMOGLOBIN g/dL 7.8* 7.9* 7.6* 7.8*   PLATELETS 10*3/mm3 251 252 223 218     Results from last 7 days   Lab Units 03/30/25  0422 03/29/25  0352 03/28/25  0414 03/27/25  1306   SODIUM mmol/L 145 143 139 139   POTASSIUM mmol/L 3.3* 3.8 3.8 4.3   CHLORIDE mmol/L 108* 109* 108* 105   CO2 mmol/L 24.9 21.8* 23.3 22.1   BUN mg/dL 24* 27* 28* 34*   CREATININE mg/dL 1.63* 1.63* 1.75* 1.61*   GLUCOSE mg/dL 114* 124* 118* 94   EGFR mL/min/1.73 41.8* 41.8* 38.4* 42.4*     Results from last 7 days   Lab Units 03/28/25  0414 03/27/25  1306   ALBUMIN g/dL 3.3* 3.5   BILIRUBIN mg/dL 0.3 0.3   ALK PHOS U/L 152* 157*   AST (SGOT) U/L 17 15   ALT (SGPT) U/L 15 19     Results from last 7 days   Lab Units 03/30/25  0422 03/29/25  0352 03/28/25  0414 03/27/25  1306   CALCIUM mg/dL 7.8* 8.0* 8.2* 8.5*   ALBUMIN g/dL  --   --  3.3* 3.5   MAGNESIUM mg/dL 2.2  --   --   --        Results from last 7 days   Lab Units 03/28/25  0507 03/28/25  0414 03/27/25  1306   PROBNP pg/mL  --  2,656.0* 1,800.0   D DIMER QUANT MCGFEU/mL 0.86*  --   --            Invalid input(s): \"LDLCALC\"  Results from last 7 days   Lab Units 03/28/25  1225 03/28/25  0920 03/28/25  0915   BLOODCX   --  No growth at 2 days No growth at 2 days   RESPCX  Scant growth (1+) Normal respiratory delmis. No S. aureus or Pseudomonas aeruginosa detected. Final report.  --   --      Results from last 7 days   Lab Units 03/27/25 2003   COVID19  Not Detected       Test Results Pending at Discharge     Pending Results       None              Discharge Details        Discharge Medications        ASK your doctor about these medications        Instructions Start Date   acetaminophen 325 MG " tablet  Commonly known as: TYLENOL   650 mg, Oral, Every 4 Hours PRN      amLODIPine 10 MG tablet  Commonly known as: NORVASC   10 mg, Oral, Daily      atorvastatin 40 MG tablet  Commonly known as: LIPITOR   40 mg, Oral, Daily      busPIRone 5 MG tablet  Commonly known as: BUSPAR   5 mg, Oral, 3 Times Daily PRN, for anxiety      carvedilol 25 MG tablet  Commonly known as: COREG   TAKE 1 TABLET BY MOUTH TWICE DAILY WITH MEALS      clopidogrel 75 MG tablet  Commonly known as: PLAVIX   75 mg, Oral, Daily      docusate sodium 100 MG capsule   100 mg, Oral, 2 Times Daily      ferrous gluconate 324 MG tablet  Commonly known as: FERGON   324 mg, Oral, Daily With Breakfast      furosemide 40 MG tablet  Commonly known as: LASIX   40 mg, Oral, 2 Times Daily      gabapentin 100 MG capsule  Commonly known as: NEURONTIN   200 mg, Oral, 3 Times Daily      nateglinide 60 MG tablet  Commonly known as: STARLIX   60 mg, Oral, 2 Times Daily With Meals      polyethylene glycol 17 g packet  Commonly known as: MIRALAX   17 g, Oral, Daily PRN      potassium chloride 10 MEQ CR tablet   10 mEq, Oral, Daily      QUEtiapine 25 MG tablet  Commonly known as: SEROquel   25 mg, Oral, Nightly      sertraline 50 MG tablet  Commonly known as: ZOLOFT   75 mg, Oral, Daily      tamsulosin 0.4 MG capsule 24 hr capsule  Commonly known as: FLOMAX   0.4 mg, Oral, Daily      vitamin B-12 1000 MCG tablet  Commonly known as: CYANOCOBALAMIN   1,000 mcg, Oral, Daily               No Known Allergies    Discharge Disposition:        Discharge Diet:  Diet Order   Procedures    Diet: Cardiac, Diabetic; Healthy Heart (2-3 Na+); Consistent Carbohydrate; Texture: Soft to Chew (NDD 3); Soft to Chew: Chopped Meat; Fluid Consistency: Honey Thick       Discharge Activity:       CODE STATUS:    Code Status and Medical Interventions: No CPR (Do Not Attempt to Resuscitate); Comfort Measures   Ordered at: 03/30/25 1142     Code Status (Patient has no pulse and is not  breathing):    No CPR (Do Not Attempt to Resuscitate)     Medical Interventions (Patient has pulse or is breathing):    Comfort Measures     Level Of Support Discussed With:    Health Care Surrogate       Future Appointments   Date Time Provider Department Center   8/6/2025 11:45 AM Amber Rodney APRN [facility]       Time Spent on Discharge:  45 minutes      Lavinia Pollack DO  [facility]  03/31/25  07:15 EDT

## 2025-03-31 NOTE — PROGRESS NOTES
Discharge Planning Assessment  [facility]     Patient Name: Dereck Ramírez  MRN: [id number]  Today's Date: 3/31/2025    Admit Date: 3/27/2025    Plan: [facility] scattered bed on 3/31/25. LAURA Ruiz RN, CCP.   Discharge Needs Assessment    No documentation.                  Discharge Plan       Row Name 03/31/25 1205       Plan    Plan [facility] scattered bed on 3/31/25. LAURA Ruiz RN, CCP.    Plan Comments The patient transferred to [address] [place] [address] on 3/30/25 @ 13:16 for palliative care. [facility] scattered bed on 3/31/25. LAURA Ruiz RN, CCP.    Final Discharge Disposition Code 51 - hospice medical facility    Final Note admitted to a [facility] scattered bed on 3/31/25. LAURA Ruiz RN CCP.                  Continued Care and Services - Discharged on 3/31/2025 Admission date: 3/27/2025 - Discharge disposition: Hospice/Medical Facility ([facility])      Destination Coordination complete.      Service Provider Request Status Services Address Phone Fax Patient Preferred    [facility]  Selected Inpatient Hospice [address] [facility] [id number] [phone] --                  Selected Continued Care - Episodes Includes continued care and service providers with selected services from the active episodes listed below          Expected Discharge Date and Time       Expected Discharge Date Expected Discharge Time    Mar 31, 2025            Demographic Summary    No documentation.                  Functional Status    No documentation.                  Psychosocial    No documentation.                  Abuse/Neglect    No documentation.                  Legal    No documentation.                  Substance Abuse    No documentation.                  Patient Forms    No documentation.                     Gisselle Ruiz RN

## 2025-04-01 NOTE — PROGRESS NOTES
Name: Dereck Ramírez ADMIT: 3/31/2025   : 1940  PCP: Amber Rodney APRN    MRN: [id number] LOS: 1 days   AGE/SEX: 84 y.o. male  ROOM: [facility]     Subjective   Subjective   2025  Patient with frequent periods of agitation, pulling out IV lines.  Discussed with nursing, has required frequent medications to control symptoms.  May start scheduled meds on top of as needed meds       Objective   Objective   Vital Signs  Temp:  [97.7 °F (36.5 °C)-99.4 °F (37.4 °C)] 97.7 °F (36.5 °C)  Heart Rate:  [66-71] 66  Resp:  [18] 18  BP: (137-143)/(62-65) 137/65  SpO2:  [96 %-97 %] 97 %  on  Flow (L/min) (Oxygen Therapy):  [6] 6;   Device (Oxygen Therapy): room air  Body mass index is 30.38 kg/m².  Physical Exam  Constitutional:       Appearance: He is ill-appearing.      Comments: AAO x 1, poor cognition   HENT:      Head: Normocephalic and atraumatic.      Nose: No congestion.      Mouth/Throat:      Pharynx: No oropharyngeal exudate.   Eyes:      General: No scleral icterus.  Cardiovascular:      Heart sounds: No murmur heard.     No friction rub. No gallop.   Pulmonary:      Breath sounds: Rales present. No rhonchi.   Abdominal:      General: There is no distension.      Tenderness: There is no abdominal tenderness. There is no guarding.   Musculoskeletal:         General: No swelling or deformity.      Right lower leg: Edema present.      Left lower leg: Edema present.   Skin:     Coloration: Skin is not jaundiced.      Findings: No bruising or lesion.      Comments: Excoriations on legs bilaterally   Neurological:      Motor: Weakness present.       Results Review     I reviewed the patient's new clinical results.  Results from last 7 days   Lab Units 25  0422 25  0352 25  0414 25  1306   WBC 10*3/mm3 10.88* 10.54 8.20 7.87   HEMOGLOBIN g/dL 7.8* 7.9* 7.6* 7.8*   PLATELETS 10*3/mm3 251 252 223 218     Results from last 7 days   Lab Units 25  0422 25  0355  "03/28/25  0414 03/27/25  1306   SODIUM mmol/L 145 143 139 139   POTASSIUM mmol/L 3.3* 3.8 3.8 4.3   CHLORIDE mmol/L 108* 109* 108* 105   CO2 mmol/L 24.9 21.8* 23.3 22.1   BUN mg/dL 24* 27* 28* 34*   CREATININE mg/dL 1.63* 1.63* 1.75* 1.61*   GLUCOSE mg/dL 114* 124* 118* 94   EGFR mL/min/1.73 41.8* 41.8* 38.4* 42.4*     Results from last 7 days   Lab Units 03/28/25  0414 03/27/25  1306   ALBUMIN g/dL 3.3* 3.5   BILIRUBIN mg/dL 0.3 0.3   ALK PHOS U/L 152* 157*   AST (SGOT) U/L 17 15   ALT (SGPT) U/L 15 19     Results from last 7 days   Lab Units 03/30/25  0422 03/29/25  0352 03/28/25  0414 03/27/25  1306   CALCIUM mg/dL 7.8* 8.0* 8.2* 8.5*   ALBUMIN g/dL  --   --  3.3* 3.5   MAGNESIUM mg/dL 2.2  --   --   --      Results from last 7 days   Lab Units 03/28/25  1130 03/27/25  2047   PROCALCITONIN ng/mL 0.04  --    LACTATE mmol/L  --  0.8     No results found for: \"HGBA1C\", \"POCGLU\"    CT Chest Without Contrast Diagnostic  Result Date: 3/30/2025   1. Cardiomegaly with interstitial edema seen in the upper lungs. 2. Mild bilateral pleural effusions. 3. Heterogeneous and groundglass opacities in the upper lobes and groundglass opacities in the superior segments of the lower lobes. Could be pulmonary edema. Multifocal pneumonia or hemorrhage in the appropriate clinical settings. 4. Posterior dependent and basilar lower lobe opacities with volume loss. Suspect a large amount of partial atelectasis adjacent to the effusions. Superimposed edema or pneumonia also possible. 5. Subsegmental airway occlusions in the posterior basilar segments of the lower lobes and small amount of secretions in the more central airways seen.  This report was finalized on 3/30/2025 10:35 AM by Dr. 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       84 y.o. 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    #Acute hypoxic respiratory failure  #Aspiration pneumonia  #Possible HFpEF  #Dementia with aggression  #Odontoid fracture  #CKD  #Anemia  #BPH  #Hypertension  #Hyperlipidemia    -In consultation with family, patient has been made comfort care    -Comfort care orders placed and none comfort care orders ceased    -Hospice consulted, patient discharged and readmitted as inpatient hospice    -Patient requiring more frequent medications due to agitation, may have to start scheduled comfort meds to control symptoms which are indicative of nonverbal distress         Comfort care  for DVT prophylaxis..  Discussed with patient and comfort care .  Anticipate discharge  patient is inpatient hospice    Expected Discharge Date: 4/7/2025; Expected Discharge Time:       Lavinia Pollack DO  [facility]  04/01/25  09:50 EDT

## 2025-04-01 NOTE — PROGRESS NOTES
[facility] Visit Report    Dereck Ramírez  [id number]  4/1/2025    Admission R/T [facility] Dx: yes    Reason for [facility] Admission: Senile Degeneration of the Brain    Review of Visit: Patient is a 84 y.o. 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 Admitted to [facility] for GIP for symptom management of pain, restlessness. No active isolations     PPS: 30%    VS: Temp:  [97.7 °F (36.5 °C)-99.4 °F (37.4 °C)] 97.7 °F (36.5 °C)  Heart Rate:  [66-71] 66  Resp:  [18] 18  BP: (137-143)/(62-65) 137/65    Medications in 24 hours:  - 2mg of IV Morphine x4 PRN  - 0.5mg of IV Ativan x4 PRN    Recommendations:  Continue to monitor for signs of decline and provide comfort measures. Contact [facility] at [phone] with any questions or concerns and at TOD.     Assessment:  Patient is sitting up in bed. Patient is awake. Patient is pleasantly confused. Patient's daughter is at bedside. Patient had a coughing episode where his lips turned purple. Daughter states he has been having this episodes. Lunch was brought to bedside. Patient ate after coughing episode stopped. Patient denied having pain, trouble breathing and anxiety. Patient appears comfortable except for when coughing episode happened. Baltazar catheter is draining to bedside. PPS is 30%.    Collaboration:  Spoke with pts daughter at the bedside with condition update and support provided. Training provided regarding assessment findings, disease progression, symptom management, recommendations and expected length of stay. Family v/u of pts condition and all questions were answered. Collaborated with [facility] IDRIS Tanner about pts condition.    Disposition:  Patient meets GIP criteria, requiring frequent administration and continued titration of parenteral medications to achieve and maintain symptom management. If patient were to stabilize he would require LTC placement due to increased daily care needs.   Will continue [facility] RN visits to monitor for changes, assess needs and provide support.       Dean Santacruz RN  [facility] Health Visit Nurse  Scattered Bed Team

## 2025-04-01 NOTE — PROGRESS NOTES
SB team SW met with patient to explain role of SB team SW and assess for needs. Pt was lying in his hospital bed, alert, confused and having visual hallucinations and was seeing a spider. No family at the bedside. SW collaborated with hospital nurse and called daughter to offer support and unable to reach. SW left a VM and awaiting a return call. SW unable to complete PHQ9 due to patient being unresponsive.  SW unable to obtain  Home information or complete bereavement risk assessment. SW will visit on a weekly and PRN basis to offer EOL support and assist with needs.

## 2025-04-01 NOTE — PLAN OF CARE
Goal Outcome Evaluation:           Progress: declining  Outcome Evaluation: PPS 20%. Pt medicated with 2mg of morphine and 0.5mg of ativan x2 for anxiety and pain. Pt resting between care. New IV placed. Family at bedside for short time. No needs at this time.

## 2025-04-01 NOTE — PLAN OF CARE
Goal Outcome Evaluation:  Plan of Care Reviewed With: patient        Progress: declining  Outcome Evaluation: Pt AOx1.  Medicated with 2mg morphine and 0.5mg ativan x2.  Pt's daughter came to visit in the evening.  Pt appears comfortable at this time.  Will continue to provide comfort measures as needed.

## 2025-04-02 PROBLEM — J90 PLEURAL EFFUSION, BILATERAL: Status: ACTIVE | Noted: 2025-01-01

## 2025-04-02 PROBLEM — Z51.5 HOSPICE CARE PATIENT: Status: ACTIVE | Noted: 2025-01-01

## 2025-04-02 PROBLEM — I50.33 ACUTE ON CHRONIC HEART FAILURE WITH PRESERVED EJECTION FRACTION (HFPEF): Status: ACTIVE | Noted: 2025-01-01

## 2025-04-02 PROBLEM — J69.0 ASPIRATION PNEUMONITIS: Status: ACTIVE | Noted: 2025-01-01

## 2025-04-02 PROBLEM — I27.20 PULMONARY HYPERTENSION: Status: ACTIVE | Noted: 2025-01-01

## 2025-04-02 PROBLEM — R13.12 OROPHARYNGEAL DYSPHAGIA: Status: ACTIVE | Noted: 2025-01-01

## 2025-04-02 PROBLEM — N18.31 STAGE 3A CHRONIC KIDNEY DISEASE: Status: ACTIVE | Noted: 2019-04-21

## 2025-04-02 PROBLEM — E11.65 TYPE 2 DIABETES MELLITUS WITH HYPERGLYCEMIA: Status: ACTIVE | Noted: 2025-01-01

## 2025-04-02 PROBLEM — I67.9 CEREBROVASCULAR SMALL VESSEL DISEASE: Status: ACTIVE | Noted: 2025-01-01

## 2025-04-02 PROBLEM — F02.80 DEMENTIA DUE TO ANOTHER GENERAL MEDICAL CONDITION: Status: ACTIVE | Noted: 2025-01-01

## 2025-04-02 PROBLEM — G31.9 CEREBRAL ATROPHY: Status: ACTIVE | Noted: 2025-01-01

## 2025-04-02 PROBLEM — D63.8 ANEMIA, CHRONIC DISEASE: Status: ACTIVE | Noted: 2025-01-01

## 2025-04-02 PROBLEM — Z86.73 HISTORY OF CVA (CEREBROVASCULAR ACCIDENT): Status: ACTIVE | Noted: 2025-01-01

## 2025-04-02 NOTE — PLAN OF CARE
Goal Outcome Evaluation:  Plan of Care Reviewed With: patient        Progress: declining  Outcome Evaluation: PPS 20%. IV Morphine 2mg and Ativan 1mg given x2. F/C. Productive thick cough w/oral suction PRN. No family at bedside overnight. Restless in bed at times.

## 2025-04-02 NOTE — H&P
[facility]  Palliative Care/Hospice Admit/Consult Note     Referring Provider: Lavinia Pollack DO   Reason for Consultation: Hospice Care  Date of Admission:  3/31/2025    Patient Care Team:  Amber Rodney APRN as PCP - General (Family Medicine)  Lynette Mcwilliams, IDRIS as Ambulatory  ([facility])  Rayne Sheikh MSW as  (Amb Case Mgmt) ([facility])  Nabeel Haywood MD as Attending Provider (Hospice and Palliative Medicine)    Chief complaint:  Senile Degeneration of the Brain     History of present illness:  The patient is a 84 y.o. male who has known dementia,  hypertension, BPH, anemia, CKD, and CAD who presented to [facility] 3/27/2025 with altered mental status.  He was found to have acute hypoxic respiratory failure secondary to aspiration pneumonia complicated by edema exacerbating his dementia with history of odontoid fracture on CT from November and was admitted to the hospital for further evaluation and treatment.  Imaging showed pneumonia and patient was aspirating.  Oxygen was increased to 8 L high flow nasal cannula and he was started on broad-spectrum antibiotics and ABG showed acute respiratory failure with hypoxia.  He was started on antipsychotics for his dementia with aggression per psych consulted.  Neurosurgery consulted on the C2 fracture from last November and there was no intervention necessary.  Speech recommended n.p.o.; patient not a candidate for EGD per GI, though there is anemia there is no overt bleeding.  Patient's mental status waxed and waned.  He was also given Lasix and did have some minimal improvement.  However patient continued to aspirate requiring frequent suction.  Pulmonary was consulted and he was continued on Lasix as needed along with antibiotics.  Patient's mental status continued to worsen and he continued to aspirate.  Case was discussed with family and they decided to make him comfort  care with hospice consult.  Patient was transferred to  with comfort care orders.  Patient was discharged from acute care and readmitted as hospice scattered bed patient 3/31/2025. I was asked to assume his care.     At the time of my evaluation, I reviewed with his 2 daughters at bedside. With medications previously administered, the patient appears comfortable. No ROS obtainable from the patient.     Review of Systems  Review of systems could not be obtained due to   patient sedation status. patient unresponsive.    Palliative Performance Scale  Palliative Performance Scale Score: 10%  [place] Symptom Assessment System Revised  Pain Score: 2   ESAS Tiredness Score: 7  ESAS Nausea Score: No nausea  ESAS Depression Score: No depression  ESAS Anxiety Score: No anxiety  ESAS Drowsiness Score: 9  ESAS Lack of Appetite Score: 9  ESAS Wellbeing Score: 2  ESAS Dyspnea Score: No shortness of breath  ESAS Other Problem Score: 3 (congestion/coughing at times)  ESAS Source of Information: healthcare professional caregiver  ESAS Intervention: medicated/see MAR  ESAS Intervention Response: tolerated    History  Past Medical History:   Diagnosis Date    Arthritis     Cerebellar artery occlusion     Coronary artery disease     Diabetes mellitus     Enlarged prostate     Hyperlipidemia     Hypertension     Stroke 2011, 2012   ,   Past Surgical History:   Procedure Laterality Date    CARDIAC CATHETERIZATION      lesion 1: intervention outcome    HERNIA REPAIR     ,   Family History   Problem Relation Age of Onset    Heart disease Mother     Stroke Mother     Crohn's disease Father     Stroke Father     Arthritis Father     Cancer Other     Stroke Other     Diabetes Other     Heart defect Other     Hypertension Other     Thyroid disease Other     Cancer Maternal Uncle         bone    Dementia Maternal Grandmother    , and   Social History     Socioeconomic History    Marital status:    Tobacco Use    Smoking status: Never     Smokeless tobacco: Never   Vaping Use    Vaping status: Never Used   Substance and Sexual Activity    Alcohol use: No    Drug use: No    Sexual activity: Never     E-cigarette/Vaping    E-cigarette/Vaping Use Never User      E-cigarette/Vaping Substances     E-cigarette/Vaping Devices      Allergy Patient has no known allergies.    Vital Signs   Temp:  [98 °F (36.7 °C)-98.1 °F (36.7 °C)] 98 °F (36.7 °C)  Heart Rate:  [71] 71  Resp:  [18] 18  BP: (121-156)/(49-75) 121/49  Device (Oxygen Therapy): nasal cannulaFlow (L/min) (Oxygen Therapy):  [3] 3SpO2:  [91 %-94 %] 91 %    Physical Exam:  General Appearance:   Not awake and appears in no acute distress in bed at 45 degrees, leaning slightly to his left, chronically ill-appearing elderly man   Head:    Normocephalic, without obvious abnormality, atraumatic   Eyes:            Lids and lashes normal, conjunctivae and sclerae normal, no icterus   Ears:    Ears appear intact with no abnormalities noted   Throat:   No oral lesions, oral mucosa somewhat moist   Neck:   No adenopathy, supple, trachea midline, no thyromegaly   Back:     No scoliosis present   Lungs:     Clear to auscultation, respirations diminished and not labored    Heart:    Regular rhythm and normal rate       Abdomen:     Soft and non-tender, non-distended   Genitalia:    Deferred   Extremities:  No pretibial edema, distal half of the lower extremities and ankles Curlex wrapped, pale and some cyanosis evident    Pulses:  Radial pulses palpable    Skin:   No bleeding         Neurologic:  Not awake to test      Results Review:   I reviewed the patient's new clinical results.    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  I reviewed with the patient's RN.  I reviewed with the patient's 2 daughters at bedside.  With medications previously administered, the patient appears comfortable.  The patient has required glycopyrrolate for airway congestion.  The patient has required 1 dose of 2 mg and 3 doses of 4 mg IV morphine, 4 doses yesterday, and 4 doses of 1 mg IV Ativan, 4 doses yesterday, thus far today.  Medications will be continued and adjusted as needed for symptom management for comfort.  No attempts at resuscitation will be made.    I answered all of the patient's daughter's questions.  They are aware of his terminal condition.  They wish comfort care to be provided and continued.        Nabeel Haywood MD  Hospice and Palliative Medicine  04/02/25  20:45 EDT

## 2025-04-02 NOTE — PROGRESS NOTES
[facility] Visit Report    Dereck Ramírez  [id number]  4/2/2025    Admission R/T [facility] Dx: yes     Reason for [facility] Admission: Senile Degeneration of the Brain     Review of Visit: Patient is a 84 y.o. male with a primary [facility] diagnosis of Senile Degeneration of the Brain. Admitted to [facility] for GIP for symptom management of pain, restlessness. No active isolations      PPS: 20%     VS: Temp:  [97.5 °F (36.4 °C)-98.1 °F (36.7 °C)] 98.1 °F (36.7 °C)  Heart Rate:  [70-71] 71  Resp:  [16-18] 18  BP: (128-156)/(73-75) 156/75     Medications in 24 hours:  - 2mg of IV Morphine x3 PRN  - 4mg of IV Morphine x2 PRN  - 0.5mg of IV Ativan x1 PRN  - 1mg of IV Ativan x4 PRN     Recommendations:  Continue to monitor for signs of decline and provide comfort measures. Contact [facility] at [phone] with any questions or concerns and at TOD.      Assessment:  Patient is sitting up in bed with his eyes closed. Patient did not arouse to voice or touch. Patient's color is pale and dusky in areas. Nail beds are dusky. Baltazar catheter is draining to bedside. Urine color is jeanna. PPS is 20% today. Patient is showing no signs of distress or discomfort.      Collaboration:  Attempted to speak with pts daughter/POA(Georgina) by phone. RN left a  with an update on patient's condition. Customer support number left for daughter in case she wants to speak to RN. Collaborated with [facility] IDRIS Lam about pts condition and visit.     Disposition:  Patient meets GIP criteria, requiring frequent administration and continued titration of parenteral medications to achieve and maintain symptom management. If patient were to stabilize he would require LTC placement due to increased daily care needs.  Will continue [facility] RN visits to monitor for changes, assess needs and provide support.        Dean Santacruz RN  [facility] Visit Nurse  Scattered Bed Team

## 2025-04-02 NOTE — PLAN OF CARE
Goal Outcome Evaluation:                    Patient resting comfortably in bed with family at bedside. Candida auris contact isolation for rule out. Patient does have coughing spells when time for repositioning, then settles in, less frequent. PPS 10%. Will cont comfort care.

## 2025-04-03 NOTE — PROGRESS NOTES
[facility]  Palliative Care/Hospice Follow Up Note       LOS: 3 days   Patient Care Team:  Amber Rodney APRN as PCP - General (Family Medicine)  Lynette Mcwilliams, RN as Ambulatory  ([facility])  Rayne Sheikh MSW as  (Amb Case Mgmt) ([facility])  Nabeel Haywood MD as Attending Provider (Hospice and Palliative Medicine)    Chief Complaint:  Senile Degeneration of the Brain     Interval History:     Patient Complaints: None  Patient Denies:  None  History taken from:  RN  Review of Systems:  As above.    Palliative Performance Scale  Palliative Performance Scale Score: 10%  [place] Symptom Assessment System Revised  Pain Score: 2   ESAS Tiredness Score: 9  ESAS Nausea Score: No nausea  ESAS Depression Score: No depression  ESAS Anxiety Score: 1  ESAS Drowsiness Score: 9  ESAS Lack of Appetite Score: 9  ESAS Wellbeing Score: 2  ESAS Dyspnea Score: 2  ESAS Other Problem Score: Best possible response  ESAS Source of Information: patient, healthcare professional caregiver, family caregiver  ESAS Intervention: medicated/see MAR  ESAS Intervention Response: tolerated    Vital Signs  Temp:  [98 °F (36.7 °C)-99.2 °F (37.3 °C)] 99.2 °F (37.3 °C)  Heart Rate:  [70-71] 70  Resp:  [18] 18  BP: (121-128)/(49-55) 128/55  Device (Oxygen Therapy): nasal cannulaFlow (L/min) (Oxygen Therapy):  [3] 3SpO2:  [89 %-91 %] 89 %    Physical Exam:  General Appearance:     Not awake and appears in no acute distress while being turned from his left side to his right side, slight moan noted with turning, chronically ill-appearing elderly man    Throat:   No oral lesions, oral mucosa somewhat moist   Neck:   No adenopathy, supple, trachea midline   Lungs:     Clear to auscultation with an occasional rhonchi, respirations diminished with slight increase inspiratory effort     Heart:    Regular rhythm and normal rate   Abdomen:     Soft and non-tender, non-distended    Extremities:   No pretibial edema, distal half of the lower extremities and ankles Curlex wrapped, pale and some cyanosis evident    Pulses:   Radial pulses palpable        Results Review:     I reviewed the patient's new clinical results.    Medication Reviewed.    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  With medications previously administered, patient appears comfortable.  The patient has required glycopyrrolate for airway congestion.  The patient has required 1 dose of 4 mg IV morphine and 3 doses of 1 mg IV Dilaudid, 6 doses yesterday, and 4 doses of 2 mg IV Ativan, 6 doses yesterday, thus far today.  Medications will be continued and adjusted as needed for symptom management for comfort.  No attempts at resuscitation will be made.    The patient has required escalation of medications representing his declining condition.    Plan for disposition:  CHAR Haywood MD  Hospice and Palliative Medicine  04/03/25  14:18 EDT

## 2025-04-03 NOTE — PLAN OF CARE
Goal Outcome Evaluation:           Progress: declining  Outcome Evaluation: PPS 10%. Pt premedicated prior to turns with 1 mg dilaudid, 2 mg ativan and 0.4 mg robinul IV. Pt having increased congestion noted, moderate amount of white secretions noted. Pt family at bedside early in the day. Pt assisted with turns, oral care and barnard care. Will continue comfort care.

## 2025-04-03 NOTE — PLAN OF CARE
Goal Outcome Evaluation:  Plan of Care Reviewed With: patient        Progress: declining  Outcome Evaluation: PPS 10%. Premed with Morphine 4mg x2 and Ativan 2mg given x3. Then, before last turn of the shift changed from Morphine to Dilaudid 1mg. Coughing spells with turns. F/C.

## 2025-04-03 NOTE — PROGRESS NOTES
[facility] Visit Report    Dereck Ramírez  [id number]  4/3/2025    Admission R/T [facility] Dx: yes     Reason for [facility] Admission: Senile Degeneration of the Brain     Review of Visit: Patient is a 84 y.o. male with a primary [facility] diagnosis of Senile Degeneration of the Brain. Admitted to [facility] for GIP for symptom management of pain, restlessness. No active isolations      PPS: 10%     VS: Temp:  [98 °F (36.7 °C)-99.2 °F (37.3 °C)] 99.2 °F (37.3 °C)  Heart Rate:  [70-71] 70  Resp:  [18] 18  BP: (121-128)/(49-55) 128/55     Medications in 24 hours:  - 4mg of IV Morphine x4 PRN  - 1mg of IV Dilaudid x2 PRN  - 1mg of IV Ativan x1 PRN  - 2mg of IV Ativan x5 PRN  - 0.4mg of IV Robinul x6 PRN     Recommendations:  Continue to monitor for signs of decline and provide comfort measures. Contact [facility] at [phone] with any questions or concerns and at TOD.      Assessment:  Patient is lying on his left side in a recovery position. Inspiratory moan/grunt is present. Faint congestion is noted. Patient is unresponsive to touch and voice. Patient's color is pale and dusky in areas. Nail beds are dusky. Baltazar catheter is draining to bedside. Urine color is dark yellow. PPS is 10% today. Patient is showing no signs of distress or discomfort.      Collaboration:  Spoke with pts daughter at the bedside with condition update and support provided. Training provided regarding assessment findings, disease progression, symptom management, recommendations and expected length of stay. Family v/u of pts condition and all questions were answered. Collaborated with [facility] IDRIS Sanchez about pts condition and visit.     Disposition:  Patient meets GIP criteria, requiring frequent administration and continued titration of parenteral medications to achieve and maintain symptom management. If patient were to stabilize he would require LTC placement due to increased daily care needs.  Will  continue [facility] RN visits to monitor for changes, assess needs and provide support.       Dean Santacruz, RN  [facility] Health Visit Nurse  Scattered Bed Team

## 2025-04-04 NOTE — PROGRESS NOTES
[facility] Visit Report     Dereck Ramírez  [id number]  4/4/2025     Admission R/T [facility] Dx: yes     Reason for [facility] Admission: Senile Degeneration of the Brain     Patient is a 84 y.o. male with a primary [facility] diagnosis of Senile Degeneration of the Brain. Admitted to [facility] for GIP for symptom management of pain, restlessness. No active isolations      PPS: 10%     Medications in 24 hours:  - 1mg of IV Dilaudid x7 PRN  - 2mg of IV Ativan x7 PRN  - 0.8mg of IV Robinul x6 PRN     Recommendations:  Continue to monitor for signs of decline and provide comfort measures. Contact [facility] at [phone] with any questions or concerns and at TOD.     -Increase dilaudid to 1.5mg and continue with q4h but utilize more frequently if needed. -- [facility] DR Vallecillo. Discussed with facility RN as standing orders are on chart     Assessment:  Patient is lying on his right side with 2 dtrs in the room. He was having terminal congestion with rhonchi noted to bilateral lobes on 3L n/c breathing 33 BPM. Facility RN updated on pt rr and discussed increasing dilaudid with facility RN and also family at bedside. PT was warm to touch with palpable peripheral pulses. F/c patent with diminished UOP and hypoactive b/s. He is NPO and remained unresponsive for this visit.     Collaboration:  Spoke with pts two daughters at the bedside with condition update and support provided. Training provided regarding assessment findings, disease progression, symptom management, recommendations and expected length of stay. Family v/u of pts condition and all questions were answered. Collaborated with [facility] RN about pts condition and visit.     Disposition:  Patient meets GIP criteria, requiring frequent administration and continued titration of parenteral medications to achieve and maintain symptom management. If patient were to stabilize he would require LTC placement  due to increased daily care needs.  Will continue [facility] RN visits to monitor for changes, assess needs and provide support.         Miya Lay RN  [facility] Scattered Bed Team  [phone]

## 2025-04-04 NOTE — PROGRESS NOTES
[facility]  Palliative Care/Hospice Follow Up Note       LOS: 4 days   Patient Care Team:  Amber Rodney APRN as PCP - General (Family Medicine)  Lynette Mcwilliams, RN as Ambulatory  ([facility])  Rayne Sheikh MSW as  (Amb Case Mgmt) ([facility])  Nabeel Haywood MD as Attending Provider (Hospice and Palliative Medicine)    Chief Complaint:  Senile Degeneration of the Brain     Interval History:     Patient Complaints: None  Patient Denies:  None  History taken from:  RN  Review of Systems:  As above.    Palliative Performance Scale  Palliative Performance Scale Score: 10%  [place] Symptom Assessment System Revised  Pain Score: no pain   ESAS Tiredness Score: Worst possible tiredness  ESAS Nausea Score: No nausea  ESAS Depression Score: No depression  ESAS Anxiety Score: No anxiety  ESAS Drowsiness Score: Worst possible drowsiness  ESAS Lack of Appetite Score: Worst lack of appetite  ESAS Wellbeing Score: Best wellbeing  ESAS Dyspnea Score: No shortness of breath  ESAS Other Problem Score: Best possible response  ESAS Source of Information: healthcare professional caregiver  ESAS Intervention: medicated/see MAR  ESAS Intervention Response: tolerated    Vital Signs  Temp:  [101 °F (38.3 °C)-101.6 °F (38.7 °C)] 101.6 °F (38.7 °C)  Heart Rate:  [80-81] 80  Resp:  [18-20] 18  BP: (147-149)/(53-56) 147/53  Device (Oxygen Therapy): nasal cannula;humidifiedFlow (L/min) (Oxygen Therapy):  [3] 3SpO2:  [1 %-94 %] 1 %    Physical Exam:  General Appearance:     Not awake and appears in no acute distress lying on his left side, chronically ill-appearing elderly man    Throat:   No oral lesions, oral mucosa somewhat moist   Neck:   No adenopathy, supple, trachea midline   Lungs:     Clear to auscultation with an increased inspiratory effort and scattered expiratory greater than inspiratory rhonchi    Heart:    Regular rhythm and normal rate   Abdomen:      Soft and non-tender, non-distended   Extremities:   No pretibial edema, distal half of the lower extremities and ankles Curlex wrapped, pale and ashen and some cyanosis evident    Pulses:   Radial pulses palpable        Results Review:     I reviewed the patient's new clinical results.    Medication Reviewed.    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  I reviewed with the patient's RN.  With medications previously administered, patient appears comfortable.  The patient has required glycopyrrolate for airway congestion.  The patient has required 3 doses of 1 mg IV Dilaudid, 6 doses yesterday, and 3 doses of 2 mg IV Ativan, 6 doses yesterday, thus far today.  Medications will be continued and adjusted as needed for symptom management for comfort.  No attempts at resuscitation will be made.    The patient is demonstrating worsening respiratory status demonstrating his declining condition.    Plan for disposition:  CHAR Haywood MD  Hospice and Palliative Medicine  04/04/25  10:31 EDT

## 2025-04-04 NOTE — PLAN OF CARE
Goal Outcome Evaluation:  Plan of Care Reviewed With: patient        Progress: declining  Outcome Evaluation: Medicated prior to turns with dilaudid, ativan, and robinul. Appears comfortable at rest. Will continue palliative care.

## 2025-04-04 NOTE — PLAN OF CARE
Problem: Adult Inpatient Plan of Care  Goal: Plan of Care Review  Outcome: Progressing  Flowsheets (Taken 4/4/2025 1620)  Progress: declining  Outcome Evaluation: Premedicated with 1 mg of dilaudid, 2 mg of ativan and 0.4 mg of robinul ( gave an extra dose.) increased dilaudid to 1.5 mg and robinul to 0.8 mg. Barnard and oral care done. Some secretions coming out. Family at bedside some of the day, will continue to keep comfortable.  Plan of Care Reviewed With:   patient   family     Problem: Adult Inpatient Plan of Care  Goal: Patient-Specific Goal (Individualized)  Outcome: Progressing  Flowsheets (Taken 4/4/2025 1620)  Patient/Family-Specific Goals (Include Timeframe): to keep comfortable  Individualized Care Needs: premedicate prior to turns, barnard and oral care,

## 2025-04-05 NOTE — PLAN OF CARE
Goal Outcome Evaluation:  Plan of Care Reviewed With: patient        Progress: declining  Outcome Evaluation: Medicated every 4 hours prior to turns for comfort. Dilaudid 1.5mg, ativan 2mg and robinul 0.8mg. No family at bedside this shift. Will continue palliative care.

## 2025-04-05 NOTE — DISCHARGE SUMMARY
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 male who has known dementia,  hypertension, BPH, anemia, CKD, and CAD who presented to [facility] 3/27/2025 with altered mental status.  He was found to have acute hypoxic respiratory failure secondary to aspiration pneumonia complicated by edema exacerbating his dementia with history of odontoid fracture on CT from November and was admitted to the hospital for further evaluation and treatment.  Imaging showed pneumonia and patient was aspirating.  Oxygen was increased to 8 L high flow nasal cannula and he was started on broad-spectrum antibiotics and ABG showed acute respiratory failure with hypoxia.  He was started on antipsychotics for his dementia with aggression per psych consulted.  Neurosurgery consulted on the C2 fracture from last November and there was no intervention necessary.  Speech  recommended n.p.o.; patient not a candidate for EGD per GI, though there is anemia there is no overt bleeding.  Patient's mental status waxed and waned.  He was also given Lasix and did have some minimal improvement.  However patient continued to aspirate requiring frequent suction.  Pulmonary was consulted and he was continued on Lasix as needed along with antibiotics.  Patient's mental status continued to worsen and he continued to aspirate.  Case was discussed with family and they decided to make him comfort care with hospice consult.  Patient was transferred to  with comfort care orders.  Patient was discharged from acute care and readmitted as hospice scattered bed patient 3/31/2025.  Medications were provided and adjusted as needed for symptom management for comfort.  Since admission, gradual decline noted.  Earlier this a.m., I was called that the patient's respirations ceased and no pulse palpable. No heart sounds audible. I pronounced the patient at 0813 hours.      Nabeel Haywood MD  Hospice and Palliative Medicine  04/05/25  09:34 EDT

## 2025-04-07 NOTE — PROGRESS NOTES
Case Management Discharge Note      Final Note: The patient  on 25 @ 08:13. LAURA Ruiz Rn, CCP.         Selected Continued Care - Discharged on 2025 Admission date: 3/31/2025 - Discharge disposition:       Destination Coordination complete.      Service Provider Services Address Phone Fax Patient Preferred    [facility] [address] [facility] [place] [address] [phone] --              Durable Medical Equipment    No services have been selected for the patient.                Dialysis/Infusion    No services have been selected for the patient.                Home Medical Care    No services have been selected for the patient.                Therapy    No services have been selected for the patient.                Community Resources    No services have been selected for the patient.                Community & DME    No services have been selected for the patient.                    Selected Continued Care - Episodes Includes continued care and service providers with selected services from the active episodes listed below          Selected Continued Care - Prior Encounters Includes continued care and service providers with selected services from prior encounters from 2024 to 2025      Discharged on 3/31/2025 Admission date: 3/27/2025 - Discharge disposition: Hospice/Medical Facility ([facility])      Destination       Service Provider Services Address Phone Fax Patient Preferred    [facility] [address] [phone] --                               Final Discharge Disposition Code: 20 -